# Patient Record
Sex: FEMALE | Race: WHITE | Employment: OTHER | URBAN - METROPOLITAN AREA
[De-identification: names, ages, dates, MRNs, and addresses within clinical notes are randomized per-mention and may not be internally consistent; named-entity substitution may affect disease eponyms.]

---

## 2020-02-12 ENCOUNTER — ANESTHESIA (OUTPATIENT)
Dept: OPERATING ROOM | Age: 75
DRG: 853 | End: 2020-02-12
Payer: MEDICARE

## 2020-02-12 ENCOUNTER — APPOINTMENT (OUTPATIENT)
Dept: GENERAL RADIOLOGY | Age: 75
DRG: 853 | End: 2020-02-12
Payer: MEDICARE

## 2020-02-12 ENCOUNTER — APPOINTMENT (OUTPATIENT)
Dept: CT IMAGING | Age: 75
DRG: 853 | End: 2020-02-12
Payer: MEDICARE

## 2020-02-12 ENCOUNTER — ANESTHESIA EVENT (OUTPATIENT)
Dept: OPERATING ROOM | Age: 75
DRG: 853 | End: 2020-02-12
Payer: MEDICARE

## 2020-02-12 ENCOUNTER — HOSPITAL ENCOUNTER (INPATIENT)
Age: 75
LOS: 8 days | Discharge: HOME | DRG: 853 | End: 2020-02-20
Attending: EMERGENCY MEDICINE | Admitting: SURGERY
Payer: MEDICARE

## 2020-02-12 VITALS — OXYGEN SATURATION: 93 % | SYSTOLIC BLOOD PRESSURE: 113 MMHG | TEMPERATURE: 100 F | DIASTOLIC BLOOD PRESSURE: 64 MMHG

## 2020-02-12 PROBLEM — K35.891 ACUTE APPENDICITIS WITH GENERALIZED PERITONITIS AND GANGRENE: Status: ACTIVE | Noted: 2020-02-12

## 2020-02-12 PROBLEM — K35.209 ACUTE APPENDICITIS WITH GENERALIZED PERITONITIS AND GANGRENE: Status: ACTIVE | Noted: 2020-02-12

## 2020-02-12 PROBLEM — K35.20 ACUTE APPENDICITIS WITH GENERALIZED PERITONITIS AND GANGRENE: Status: ACTIVE | Noted: 2020-02-12

## 2020-02-12 LAB
ALBUMIN SERPL-MCNC: 3.8 G/DL (ref 3.5–5.2)
ALP BLD-CCNC: 86 U/L (ref 35–104)
ALT SERPL-CCNC: 58 U/L (ref 0–32)
ANION GAP SERPL CALCULATED.3IONS-SCNC: 14 MMOL/L (ref 7–16)
AST SERPL-CCNC: 86 U/L (ref 0–31)
BACTERIA: ABNORMAL /HPF
BASOPHILS ABSOLUTE: 0.01 E9/L (ref 0–0.2)
BASOPHILS RELATIVE PERCENT: 0.2 % (ref 0–2)
BILIRUB SERPL-MCNC: 1.5 MG/DL (ref 0–1.2)
BILIRUBIN URINE: ABNORMAL
BLOOD, URINE: NEGATIVE
BUN BLDV-MCNC: 20 MG/DL (ref 8–23)
CALCIUM SERPL-MCNC: 8.9 MG/DL (ref 8.6–10.2)
CASTS 2: ABNORMAL /LPF
CASTS UA: ABNORMAL /LPF
CASTS: ABNORMAL /LPF
CHLORIDE BLD-SCNC: 102 MMOL/L (ref 98–107)
CLARITY: CLEAR
CO2: 19 MMOL/L (ref 22–29)
COLOR: YELLOW
CREAT SERPL-MCNC: 1.7 MG/DL (ref 0.5–1)
EOSINOPHILS ABSOLUTE: 0.03 E9/L (ref 0.05–0.5)
EOSINOPHILS RELATIVE PERCENT: 0.6 % (ref 0–6)
EPITHELIAL CELLS, UA: ABNORMAL /HPF
GFR AFRICAN AMERICAN: 35
GFR NON-AFRICAN AMERICAN: 29 ML/MIN/1.73
GLUCOSE BLD-MCNC: 107 MG/DL (ref 74–99)
GLUCOSE URINE: NEGATIVE MG/DL
HCT VFR BLD CALC: 36.5 % (ref 34–48)
HEMOGLOBIN: 11.8 G/DL (ref 11.5–15.5)
IMMATURE GRANULOCYTES #: 0.01 E9/L
IMMATURE GRANULOCYTES %: 0.2 % (ref 0–5)
INFLUENZA A BY PCR: NOT DETECTED
INFLUENZA B BY PCR: NOT DETECTED
KETONES, URINE: ABNORMAL MG/DL
LACTIC ACID, SEPSIS: 1.6 MMOL/L (ref 0.5–1.9)
LEUKOCYTE ESTERASE, URINE: ABNORMAL
LIPASE: 19 U/L (ref 13–60)
LYMPHOCYTES ABSOLUTE: 0.64 E9/L (ref 1.5–4)
LYMPHOCYTES RELATIVE PERCENT: 12.8 % (ref 20–42)
MCH RBC QN AUTO: 29.6 PG (ref 26–35)
MCHC RBC AUTO-ENTMCNC: 32.3 % (ref 32–34.5)
MCV RBC AUTO: 91.7 FL (ref 80–99.9)
MONOCYTES ABSOLUTE: 0.36 E9/L (ref 0.1–0.95)
MONOCYTES RELATIVE PERCENT: 7.2 % (ref 2–12)
NEUTROPHILS ABSOLUTE: 3.95 E9/L (ref 1.8–7.3)
NEUTROPHILS RELATIVE PERCENT: 79 % (ref 43–80)
NITRITE, URINE: POSITIVE
PDW BLD-RTO: 14.4 FL (ref 11.5–15)
PH UA: 5.5 (ref 5–9)
PLATELET # BLD: 340 E9/L (ref 130–450)
PMV BLD AUTO: 9.2 FL (ref 7–12)
POTASSIUM SERPL-SCNC: 3.8 MMOL/L (ref 3.5–5)
PROTEIN UA: 100 MG/DL
RBC # BLD: 3.98 E12/L (ref 3.5–5.5)
RBC UA: ABNORMAL /HPF (ref 0–2)
SODIUM BLD-SCNC: 135 MMOL/L (ref 132–146)
SPECIFIC GRAVITY UA: 1.02 (ref 1–1.03)
TOTAL PROTEIN: 7.2 G/DL (ref 6.4–8.3)
TROPONIN: <0.01 NG/ML (ref 0–0.03)
UROBILINOGEN, URINE: 1 E.U./DL
WBC # BLD: 5 E9/L (ref 4.5–11.5)
WBC UA: ABNORMAL /HPF (ref 0–5)

## 2020-02-12 PROCEDURE — 87077 CULTURE AEROBIC IDENTIFY: CPT

## 2020-02-12 PROCEDURE — 2500000003 HC RX 250 WO HCPCS: Performed by: SURGERY

## 2020-02-12 PROCEDURE — 2709999900 HC NON-CHARGEABLE SUPPLY: Performed by: SURGERY

## 2020-02-12 PROCEDURE — 96376 TX/PRO/DX INJ SAME DRUG ADON: CPT

## 2020-02-12 PROCEDURE — 71045 X-RAY EXAM CHEST 1 VIEW: CPT

## 2020-02-12 PROCEDURE — 99285 EMERGENCY DEPT VISIT HI MDM: CPT

## 2020-02-12 PROCEDURE — 6360000002 HC RX W HCPCS: Performed by: STUDENT IN AN ORGANIZED HEALTH CARE EDUCATION/TRAINING PROGRAM

## 2020-02-12 PROCEDURE — 87040 BLOOD CULTURE FOR BACTERIA: CPT

## 2020-02-12 PROCEDURE — 96375 TX/PRO/DX INJ NEW DRUG ADDON: CPT

## 2020-02-12 PROCEDURE — 3700000001 HC ADD 15 MINUTES (ANESTHESIA): Performed by: SURGERY

## 2020-02-12 PROCEDURE — 6360000002 HC RX W HCPCS: Performed by: NURSE ANESTHETIST, CERTIFIED REGISTERED

## 2020-02-12 PROCEDURE — 87205 SMEAR GRAM STAIN: CPT

## 2020-02-12 PROCEDURE — 87186 SC STD MICRODIL/AGAR DIL: CPT

## 2020-02-12 PROCEDURE — 83605 ASSAY OF LACTIC ACID: CPT

## 2020-02-12 PROCEDURE — 2720000010 HC SURG SUPPLY STERILE: Performed by: SURGERY

## 2020-02-12 PROCEDURE — 0DTJ4ZZ RESECTION OF APPENDIX, PERCUTANEOUS ENDOSCOPIC APPROACH: ICD-10-PCS | Performed by: SURGERY

## 2020-02-12 PROCEDURE — 87206 SMEAR FLUORESCENT/ACID STAI: CPT

## 2020-02-12 PROCEDURE — 87015 SPECIMEN INFECT AGNT CONCNTJ: CPT

## 2020-02-12 PROCEDURE — 87102 FUNGUS ISOLATION CULTURE: CPT

## 2020-02-12 PROCEDURE — 2580000003 HC RX 258: Performed by: STUDENT IN AN ORGANIZED HEALTH CARE EDUCATION/TRAINING PROGRAM

## 2020-02-12 PROCEDURE — 85025 COMPLETE CBC W/AUTO DIFF WBC: CPT

## 2020-02-12 PROCEDURE — 6360000002 HC RX W HCPCS: Performed by: ANESTHESIOLOGY

## 2020-02-12 PROCEDURE — 74176 CT ABD & PELVIS W/O CONTRAST: CPT

## 2020-02-12 PROCEDURE — 80053 COMPREHEN METABOLIC PANEL: CPT

## 2020-02-12 PROCEDURE — 3600000014 HC SURGERY LEVEL 4 ADDTL 15MIN: Performed by: SURGERY

## 2020-02-12 PROCEDURE — 87502 INFLUENZA DNA AMP PROBE: CPT

## 2020-02-12 PROCEDURE — 36415 COLL VENOUS BLD VENIPUNCTURE: CPT

## 2020-02-12 PROCEDURE — 93005 ELECTROCARDIOGRAM TRACING: CPT | Performed by: STUDENT IN AN ORGANIZED HEALTH CARE EDUCATION/TRAINING PROGRAM

## 2020-02-12 PROCEDURE — 7100000000 HC PACU RECOVERY - FIRST 15 MIN: Performed by: SURGERY

## 2020-02-12 PROCEDURE — 05HM33Z INSERTION OF INFUSION DEVICE INTO RIGHT INTERNAL JUGULAR VEIN, PERCUTANEOUS APPROACH: ICD-10-PCS | Performed by: SURGERY

## 2020-02-12 PROCEDURE — 7100000001 HC PACU RECOVERY - ADDTL 15 MIN: Performed by: SURGERY

## 2020-02-12 PROCEDURE — 36556 INSERT NON-TUNNEL CV CATH: CPT

## 2020-02-12 PROCEDURE — 2500000003 HC RX 250 WO HCPCS: Performed by: STUDENT IN AN ORGANIZED HEALTH CARE EDUCATION/TRAINING PROGRAM

## 2020-02-12 PROCEDURE — 83690 ASSAY OF LIPASE: CPT

## 2020-02-12 PROCEDURE — 2500000003 HC RX 250 WO HCPCS: Performed by: NURSE ANESTHETIST, CERTIFIED REGISTERED

## 2020-02-12 PROCEDURE — 2580000003 HC RX 258: Performed by: NURSE ANESTHETIST, CERTIFIED REGISTERED

## 2020-02-12 PROCEDURE — 87070 CULTURE OTHR SPECIMN AEROBIC: CPT

## 2020-02-12 PROCEDURE — 2700000000 HC OXYGEN THERAPY PER DAY

## 2020-02-12 PROCEDURE — 96365 THER/PROPH/DIAG IV INF INIT: CPT

## 2020-02-12 PROCEDURE — 3600000004 HC SURGERY LEVEL 4 BASE: Performed by: SURGERY

## 2020-02-12 PROCEDURE — 81001 URINALYSIS AUTO W/SCOPE: CPT

## 2020-02-12 PROCEDURE — 96372 THER/PROPH/DIAG INJ SC/IM: CPT

## 2020-02-12 PROCEDURE — 87116 MYCOBACTERIA CULTURE: CPT

## 2020-02-12 PROCEDURE — 2060000000 HC ICU INTERMEDIATE R&B

## 2020-02-12 PROCEDURE — 87088 URINE BACTERIA CULTURE: CPT

## 2020-02-12 PROCEDURE — 84484 ASSAY OF TROPONIN QUANT: CPT

## 2020-02-12 PROCEDURE — 87075 CULTR BACTERIA EXCEPT BLOOD: CPT

## 2020-02-12 PROCEDURE — 88304 TISSUE EXAM BY PATHOLOGIST: CPT

## 2020-02-12 PROCEDURE — 3700000000 HC ANESTHESIA ATTENDED CARE: Performed by: SURGERY

## 2020-02-12 RX ORDER — 0.9 % SODIUM CHLORIDE 0.9 %
1000 INTRAVENOUS SOLUTION INTRAVENOUS ONCE
Status: COMPLETED | OUTPATIENT
Start: 2020-02-12 | End: 2020-02-12

## 2020-02-12 RX ORDER — DIPHENHYDRAMINE HYDROCHLORIDE 50 MG/ML
12.5 INJECTION INTRAMUSCULAR; INTRAVENOUS
Status: DISCONTINUED | OUTPATIENT
Start: 2020-02-12 | End: 2020-02-12 | Stop reason: HOSPADM

## 2020-02-12 RX ORDER — MORPHINE SULFATE 4 MG/ML
6 INJECTION, SOLUTION INTRAMUSCULAR; INTRAVENOUS ONCE
Status: DISCONTINUED | OUTPATIENT
Start: 2020-02-12 | End: 2020-02-12

## 2020-02-12 RX ORDER — PROMETHAZINE HYDROCHLORIDE 25 MG/ML
25 INJECTION, SOLUTION INTRAMUSCULAR; INTRAVENOUS ONCE
Status: COMPLETED | OUTPATIENT
Start: 2020-02-12 | End: 2020-02-12

## 2020-02-12 RX ORDER — SODIUM CHLORIDE, SODIUM LACTATE, POTASSIUM CHLORIDE, CALCIUM CHLORIDE 600; 310; 30; 20 MG/100ML; MG/100ML; MG/100ML; MG/100ML
INJECTION, SOLUTION INTRAVENOUS CONTINUOUS PRN
Status: DISCONTINUED | OUTPATIENT
Start: 2020-02-12 | End: 2020-02-12 | Stop reason: SDUPTHER

## 2020-02-12 RX ORDER — MEPERIDINE HYDROCHLORIDE 25 MG/ML
12.5 INJECTION INTRAMUSCULAR; INTRAVENOUS; SUBCUTANEOUS EVERY 5 MIN PRN
Status: DISCONTINUED | OUTPATIENT
Start: 2020-02-12 | End: 2020-02-12 | Stop reason: HOSPADM

## 2020-02-12 RX ORDER — ACETAMINOPHEN 325 MG/1
650 TABLET ORAL EVERY 4 HOURS PRN
Status: DISCONTINUED | OUTPATIENT
Start: 2020-02-12 | End: 2020-02-20 | Stop reason: HOSPADM

## 2020-02-12 RX ORDER — PANTOPRAZOLE SODIUM 40 MG/10ML
40 INJECTION, POWDER, LYOPHILIZED, FOR SOLUTION INTRAVENOUS DAILY
Status: DISCONTINUED | OUTPATIENT
Start: 2020-02-13 | End: 2020-02-20 | Stop reason: HOSPADM

## 2020-02-12 RX ORDER — PROPOFOL 10 MG/ML
INJECTION, EMULSION INTRAVENOUS PRN
Status: DISCONTINUED | OUTPATIENT
Start: 2020-02-12 | End: 2020-02-12 | Stop reason: SDUPTHER

## 2020-02-12 RX ORDER — FENTANYL CITRATE 50 UG/ML
50 INJECTION, SOLUTION INTRAMUSCULAR; INTRAVENOUS ONCE
Status: COMPLETED | OUTPATIENT
Start: 2020-02-12 | End: 2020-02-12

## 2020-02-12 RX ORDER — ONDANSETRON 2 MG/ML
4 INJECTION INTRAMUSCULAR; INTRAVENOUS ONCE
Status: COMPLETED | OUTPATIENT
Start: 2020-02-12 | End: 2020-02-12

## 2020-02-12 RX ORDER — SODIUM CHLORIDE, SODIUM LACTATE, POTASSIUM CHLORIDE, CALCIUM CHLORIDE 600; 310; 30; 20 MG/100ML; MG/100ML; MG/100ML; MG/100ML
INJECTION, SOLUTION INTRAVENOUS CONTINUOUS
Status: DISCONTINUED | OUTPATIENT
Start: 2020-02-12 | End: 2020-02-14

## 2020-02-12 RX ORDER — LIDOCAINE HYDROCHLORIDE 20 MG/ML
INJECTION, SOLUTION EPIDURAL; INFILTRATION; INTRACAUDAL; PERINEURAL PRN
Status: DISCONTINUED | OUTPATIENT
Start: 2020-02-12 | End: 2020-02-12 | Stop reason: SDUPTHER

## 2020-02-12 RX ORDER — SODIUM CHLORIDE 9 MG/ML
INJECTION, SOLUTION INTRAVENOUS EVERY 8 HOURS
Status: DISCONTINUED | OUTPATIENT
Start: 2020-02-13 | End: 2020-02-20 | Stop reason: HOSPADM

## 2020-02-12 RX ORDER — MORPHINE SULFATE 2 MG/ML
2 INJECTION, SOLUTION INTRAMUSCULAR; INTRAVENOUS
Status: DISCONTINUED | OUTPATIENT
Start: 2020-02-12 | End: 2020-02-20

## 2020-02-12 RX ORDER — MORPHINE SULFATE 4 MG/ML
4 INJECTION, SOLUTION INTRAMUSCULAR; INTRAVENOUS
Status: DISCONTINUED | OUTPATIENT
Start: 2020-02-12 | End: 2020-02-18

## 2020-02-12 RX ORDER — FENTANYL CITRATE 50 UG/ML
INJECTION, SOLUTION INTRAMUSCULAR; INTRAVENOUS PRN
Status: DISCONTINUED | OUTPATIENT
Start: 2020-02-12 | End: 2020-02-12 | Stop reason: SDUPTHER

## 2020-02-12 RX ORDER — LEVOTHYROXINE SODIUM 0.05 MG/1
50 TABLET ORAL DAILY
Status: DISCONTINUED | OUTPATIENT
Start: 2020-02-13 | End: 2020-02-20 | Stop reason: HOSPADM

## 2020-02-12 RX ORDER — SUCCINYLCHOLINE/SOD CL,ISO/PF 200MG/10ML
SYRINGE (ML) INTRAVENOUS PRN
Status: DISCONTINUED | OUTPATIENT
Start: 2020-02-12 | End: 2020-02-12 | Stop reason: SDUPTHER

## 2020-02-12 RX ORDER — ONDANSETRON 2 MG/ML
4 INJECTION INTRAMUSCULAR; INTRAVENOUS EVERY 6 HOURS PRN
Status: DISCONTINUED | OUTPATIENT
Start: 2020-02-12 | End: 2020-02-20 | Stop reason: HOSPADM

## 2020-02-12 RX ORDER — ONDANSETRON 2 MG/ML
INJECTION INTRAMUSCULAR; INTRAVENOUS PRN
Status: DISCONTINUED | OUTPATIENT
Start: 2020-02-12 | End: 2020-02-12 | Stop reason: SDUPTHER

## 2020-02-12 RX ORDER — ROCURONIUM BROMIDE 10 MG/ML
INJECTION, SOLUTION INTRAVENOUS PRN
Status: DISCONTINUED | OUTPATIENT
Start: 2020-02-12 | End: 2020-02-12 | Stop reason: SDUPTHER

## 2020-02-12 RX ADMIN — FENTANYL CITRATE 100 MCG: 50 INJECTION, SOLUTION INTRAMUSCULAR; INTRAVENOUS at 20:22

## 2020-02-12 RX ADMIN — ROCURONIUM BROMIDE 10 MG: 10 SOLUTION INTRAVENOUS at 20:47

## 2020-02-12 RX ADMIN — PHENYLEPHRINE HYDROCHLORIDE 100 MCG: 10 INJECTION INTRAVENOUS at 20:31

## 2020-02-12 RX ADMIN — LIDOCAINE HYDROCHLORIDE 100 MG: 20 INJECTION, SOLUTION EPIDURAL; INFILTRATION; INTRACAUDAL; PERINEURAL at 20:22

## 2020-02-12 RX ADMIN — HYDROMORPHONE HYDROCHLORIDE 0.5 MG: 1 INJECTION, SOLUTION INTRAMUSCULAR; INTRAVENOUS; SUBCUTANEOUS at 21:46

## 2020-02-12 RX ADMIN — ONDANSETRON HYDROCHLORIDE 4 MG: 2 INJECTION, SOLUTION INTRAMUSCULAR; INTRAVENOUS at 20:30

## 2020-02-12 RX ADMIN — Medication 100 MG: at 20:22

## 2020-02-12 RX ADMIN — SODIUM CHLORIDE 1000 ML: 9 INJECTION, SOLUTION INTRAVENOUS at 18:22

## 2020-02-12 RX ADMIN — FENTANYL CITRATE 50 MCG: 50 INJECTION, SOLUTION INTRAMUSCULAR; INTRAVENOUS at 18:23

## 2020-02-12 RX ADMIN — ROCURONIUM BROMIDE 20 MG: 10 SOLUTION INTRAVENOUS at 20:32

## 2020-02-12 RX ADMIN — PROMETHAZINE HYDROCHLORIDE 25 MG: 25 INJECTION INTRAMUSCULAR; INTRAVENOUS at 18:02

## 2020-02-12 RX ADMIN — METRONIDAZOLE 500 MG: 500 INJECTION, SOLUTION INTRAVENOUS at 18:58

## 2020-02-12 RX ADMIN — SODIUM CHLORIDE, POTASSIUM CHLORIDE, SODIUM LACTATE AND CALCIUM CHLORIDE: 600; 310; 30; 20 INJECTION, SOLUTION INTRAVENOUS at 20:16

## 2020-02-12 RX ADMIN — SODIUM CHLORIDE 1000 ML: 9 INJECTION, SOLUTION INTRAVENOUS at 16:55

## 2020-02-12 RX ADMIN — SUGAMMADEX 400 MG: 100 INJECTION, SOLUTION INTRAVENOUS at 21:15

## 2020-02-12 RX ADMIN — FENTANYL CITRATE 50 MCG: 50 INJECTION, SOLUTION INTRAMUSCULAR; INTRAVENOUS at 16:55

## 2020-02-12 RX ADMIN — HYDROMORPHONE HYDROCHLORIDE 0.5 MG: 1 INJECTION, SOLUTION INTRAMUSCULAR; INTRAVENOUS; SUBCUTANEOUS at 21:52

## 2020-02-12 RX ADMIN — PROPOFOL 110 MG: 10 INJECTION, EMULSION INTRAVENOUS at 20:22

## 2020-02-12 RX ADMIN — CEFTRIAXONE 1 G: 1 INJECTION, POWDER, FOR SOLUTION INTRAMUSCULAR; INTRAVENOUS at 18:22

## 2020-02-12 RX ADMIN — ONDANSETRON 4 MG: 2 INJECTION INTRAMUSCULAR; INTRAVENOUS at 16:55

## 2020-02-12 RX ADMIN — SODIUM CHLORIDE 1000 ML: 9 INJECTION, SOLUTION INTRAVENOUS at 19:25

## 2020-02-12 ASSESSMENT — ENCOUNTER SYMPTOMS
BLOOD IN STOOL: 0
ABDOMINAL DISTENTION: 1
RECTAL PAIN: 0
NAUSEA: 0
DIARRHEA: 0
BACK PAIN: 0
COLOR CHANGE: 0
COUGH: 0
ABDOMINAL PAIN: 1
SHORTNESS OF BREATH: 1
VOMITING: 0
VOICE CHANGE: 0
TROUBLE SWALLOWING: 0
CONSTIPATION: 0
SORE THROAT: 0

## 2020-02-12 ASSESSMENT — PAIN DESCRIPTION - PAIN TYPE
TYPE: SURGICAL PAIN
TYPE: ACUTE PAIN
TYPE: SURGICAL PAIN
TYPE: ACUTE PAIN
TYPE: SURGICAL PAIN
TYPE: SURGICAL PAIN

## 2020-02-12 ASSESSMENT — PAIN DESCRIPTION - FREQUENCY
FREQUENCY: CONTINUOUS

## 2020-02-12 ASSESSMENT — PAIN SCALES - GENERAL
PAINLEVEL_OUTOF10: 10
PAINLEVEL_OUTOF10: 0
PAINLEVEL_OUTOF10: 10
PAINLEVEL_OUTOF10: 6
PAINLEVEL_OUTOF10: 10
PAINLEVEL_OUTOF10: 9
PAINLEVEL_OUTOF10: 6
PAINLEVEL_OUTOF10: 10
PAINLEVEL_OUTOF10: 10
PAINLEVEL_OUTOF10: 6
PAINLEVEL_OUTOF10: 8

## 2020-02-12 ASSESSMENT — PAIN DESCRIPTION - ORIENTATION
ORIENTATION: ANTERIOR

## 2020-02-12 ASSESSMENT — PAIN DESCRIPTION - DESCRIPTORS
DESCRIPTORS: ACHING
DESCRIPTORS: SHARP;ACHING

## 2020-02-12 ASSESSMENT — PAIN DESCRIPTION - LOCATION
LOCATION: ABDOMEN
LOCATION: BACK
LOCATION: ABDOMEN
LOCATION: ABDOMEN

## 2020-02-12 ASSESSMENT — PAIN DESCRIPTION - ONSET: ONSET: AWAKENED FROM SLEEP

## 2020-02-12 ASSESSMENT — PAIN - FUNCTIONAL ASSESSMENT: PAIN_FUNCTIONAL_ASSESSMENT: PREVENTS OR INTERFERES SOME ACTIVE ACTIVITIES AND ADLS

## 2020-02-12 ASSESSMENT — PAIN DESCRIPTION - PROGRESSION: CLINICAL_PROGRESSION: NOT CHANGED

## 2020-02-12 NOTE — ED PROVIDER NOTES
The patient is a 49-year-old female who presents emergency department with complaint of lower abdominal pain that started suddenly yesterday and is described as sharp. The patient reports that she was lifting some heavy bags full of canned food few days ago and wonders if she may have strained something. She states that her abdomen also feels distended and she has mild nausea without vomiting. She states she has never had abdominal pain like this. Pain is rated 10/10, constant, with radiation to the groin, worse with palpation or movement, better with lying still. Her last bowel movement was yesterday. She has passed no gas since the pain started. She has had no vomiting. She denies any chest pain but reports that the pain in her abdomen makes it a little bit hard to take deep breaths. She denies any other known trauma to the area. She has no history of surgeries. She is alert and oriented x3. Vital signs stable. The history is provided by the patient. Review of Systems   Constitutional: Negative for activity change, appetite change, chills, diaphoresis, fatigue and fever. HENT: Negative for congestion, sore throat, trouble swallowing and voice change. Eyes: Negative for visual disturbance. Respiratory: Positive for shortness of breath (Secondary to abdominal pain). Negative for cough. Cardiovascular: Negative for chest pain, palpitations and leg swelling. Gastrointestinal: Positive for abdominal distention and abdominal pain. Negative for blood in stool, constipation, diarrhea, nausea, rectal pain and vomiting. Endocrine: Negative for polyuria. Genitourinary: Negative for decreased urine volume, difficulty urinating, dysuria, flank pain, hematuria, vaginal bleeding, vaginal discharge and vaginal pain. Musculoskeletal: Negative for arthralgias, back pain, gait problem, joint swelling, myalgias, neck pain and neck stiffness.    Skin: Negative for color change, pallor, rash and wound. Allergic/Immunologic: Negative for immunocompromised state. Neurological: Negative for dizziness, tremors, seizures, syncope, facial asymmetry, speech difficulty, weakness, light-headedness, numbness and headaches. Hematological: Negative for adenopathy. Does not bruise/bleed easily. Psychiatric/Behavioral: Negative. Physical Exam  Vitals signs and nursing note reviewed. Constitutional:       General: She is not in acute distress. Appearance: She is well-developed. She is not ill-appearing, toxic-appearing or diaphoretic. HENT:      Head: Normocephalic and atraumatic. Jaw: There is normal jaw occlusion. Right Ear: External ear normal.      Left Ear: External ear normal.      Nose: Nose normal. No congestion or rhinorrhea. Mouth/Throat:      Pharynx: No oropharyngeal exudate or posterior oropharyngeal erythema. Eyes:      General: No scleral icterus. Right eye: No discharge. Left eye: No discharge. Conjunctiva/sclera: Conjunctivae normal.      Pupils: Pupils are equal, round, and reactive to light. Neck:      Musculoskeletal: Normal range of motion and neck supple. No neck rigidity or muscular tenderness. Thyroid: No thyromegaly. Vascular: No JVD. Trachea: No tracheal deviation. Cardiovascular:      Rate and Rhythm: Normal rate. Rhythm irregular. Pulses:           Radial pulses are 2+ on the right side and 2+ on the left side. Dorsalis pedis pulses are 2+ on the right side and 2+ on the left side. Heart sounds: Normal heart sounds, S1 normal and S2 normal. Heart sounds not distant. No murmur. No friction rub. No gallop. No S3 or S4 sounds. Pulmonary:      Effort: Pulmonary effort is normal. No tachypnea, accessory muscle usage or respiratory distress. Breath sounds: Normal breath sounds. No stridor, decreased air movement or transmitted upper airway sounds.  No decreased breath sounds, wheezing, rhonchi or rales.   Chest:      Chest wall: No tenderness. Abdominal:      General: There is distension. Palpations: Abdomen is soft. There is no hepatomegaly, splenomegaly, mass or pulsatile mass. Tenderness: There is abdominal tenderness (Generalized, worse in bilateral lower quadrants). There is no right CVA tenderness, left CVA tenderness, guarding or rebound. Hernia: No hernia is present. Comments: Bowel sounds decreased   Musculoskeletal: Normal range of motion. General: No swelling, tenderness, deformity or signs of injury. Right lower leg: No edema. Left lower leg: No edema. Lymphadenopathy:      Cervical: No cervical adenopathy. Skin:     General: Skin is warm and dry. Capillary Refill: Capillary refill takes less than 2 seconds. Coloration: Skin is not jaundiced or pale. Findings: No bruising, erythema, lesion or rash. Neurological:      General: No focal deficit present. Mental Status: She is alert and oriented to person, place, and time. Psychiatric:         Mood and Affect: Mood normal.         Behavior: Behavior normal.         Thought Content: Thought content normal.         Judgment: Judgment normal.          Procedures   Central Line Placement Procedure Note    Indication: vascular access, hypovolemia and need for frequent blood draws    Consent: The patient was counseled regarding the procedure, its indications, risks, potential complications and alternatives, and any questions were answered. Consent was obtained to proceed. Procedure: The patient was positioned appropriately and the skin over the right internal jugular vein was prepped with Chloraprep and draped in a sterile fashion. Local anesthesia was obtained by infiltration using 1% Lidocaine without epinephrine. A large bore needle was used to identify the vein. A guide wire was then inserted into the vein through the needle.  A triple lumen catheter was then inserted into the vessel over the guide wire using the Seldinger technique. All ports showed good, free flowing blood return and were flushed with saline solution. The catheter was then securely fastened to the skin with suture at 17 cm. Two sutures were placed into the kit included tube clamp, proximal eyelets and a suture end from each of the securing sutures was extended around the catheter and tied to the proximal eyelets as an added measure to prevent dislodgement. An antibiotic disk was placed and the site was then covered with a sterile dressing. A post procedure X-ray was ordered and is still pending at this time. The patient tolerated the procedure well. Complications: None        MDM   Patient presents to the ED for generalized abdominal pain worse in the lower quadrants. Differential diagnoses included but not limited to bowel obstruction, gastritis, peptic ulcer disease, pancreatitis, ileus, appendicitis, ischemic bowel, ovarian pathology, constipation, perforated viscus gallbladder disease, other process. Workup in the ED revealed acute appendicitis with perforation, urinary tract infection EL. Patient was given IV fluids, IV antibiotics IV analgesics, IV antiemetics for their symptoms essentially no change in symptoms. General surgery was consulted. Patient will require surgery evaluation and likely need to go to the OR. ED Course as of Feb 12 2038   Wed Feb 12, 2020   1755 GFR is too low for contrast scan. We will perform CT abdomen pelvis without.    [LS]   1808 Patient having nausea and vomiting as well as pain. I will treat her with Phenergan and morphine. Nursing rechecking blood pressure.    [LS]   1808 Patient is now slightly tachycardic with a heart of 110. Blood pressure 90/53. O2 saturation 89%. Patient placed on 2 L of oxygen. She is going to CT at this time. More IV fluids have been ordered as well as IV antibiotics. Patient does have UTI. Creatinine 1.7.   No leukocytosis or lactic acidosis. [LS]   9606 Patient and family updated on available results of labs and diagnostic imaging. Patient still having significant pain and not much improvement after first dose of fentanyl. Because her blood pressures running low I will give her another dose of fentanyl.     [LS]   1900 Spoke with OR on behalf of Dr. Abhijit Duarte. Patient findings and condition discussed. Surgery will call back. [BM]   1919 Triple-lumen catheter is been placed in the right IJ. X-ray is pending.    [LS]   1930 Spoke with Dr. Abhijit Duarte directly regarding patient. States he will take over management. [BM]   1947 Central venous catheter was withdrawn 3 cm after reviewing postplacement x-ray. Dr. Abhijit Duarte is at the bedside currently discussing the case with the patient and family. Radiology is coming to take a repeat x-ray. Plan is for the patient to go to the OR.    [LS]   2014 Repeat chest x-ray showed good position of central venous catheter and no pneumothorax.    [LS]   2014 Patient is gone to the OR.    [LS]      ED Course User Index  [BM] Claudia Aggarwal MD  [LS] Julienne Palmer,       EKG: This EKG is signed and interpreted by me. Rate: 107  Rhythm: Sinus  Interpretation: Sinus tachycardia with premature supraventricular complexes and fusion complexes, possible left atrial enlargement, cannot rule out anterior infarct age undetermined  Comparison: no previous EKG    ED Course as of Feb 12 2038   Wed Feb 12, 2020   1755 GFR is too low for contrast scan. We will perform CT abdomen pelvis without.    [LS]   1808 Patient having nausea and vomiting as well as pain. I will treat her with Phenergan and morphine. Nursing rechecking blood pressure.    [LS]   1808 Patient is now slightly tachycardic with a heart of 110. Blood pressure 90/53. O2 saturation 89%. Patient placed on 2 L of oxygen. She is going to CT at this time. More IV fluids have been ordered as well as IV antibiotics. Patient does have UTI.   Creatinine Range    Influenza A by PCR Not Detected Not Detected    Influenza B by PCR Not Detected Not Detected   CBC Auto Differential   Result Value Ref Range    WBC 5.0 4.5 - 11.5 E9/L    RBC 3.98 3.50 - 5.50 E12/L    Hemoglobin 11.8 11.5 - 15.5 g/dL    Hematocrit 36.5 34.0 - 48.0 %    MCV 91.7 80.0 - 99.9 fL    MCH 29.6 26.0 - 35.0 pg    MCHC 32.3 32.0 - 34.5 %    RDW 14.4 11.5 - 15.0 fL    Platelets 916 259 - 100 E9/L    MPV 9.2 7.0 - 12.0 fL    Neutrophils % 79.0 43.0 - 80.0 %    Immature Granulocytes % 0.2 0.0 - 5.0 %    Lymphocytes % 12.8 (L) 20.0 - 42.0 %    Monocytes % 7.2 2.0 - 12.0 %    Eosinophils % 0.6 0.0 - 6.0 %    Basophils % 0.2 0.0 - 2.0 %    Neutrophils Absolute 3.95 1.80 - 7.30 E9/L    Immature Granulocytes # 0.01 E9/L    Lymphocytes Absolute 0.64 (L) 1.50 - 4.00 E9/L    Monocytes Absolute 0.36 0.10 - 0.95 E9/L    Eosinophils Absolute 0.03 (L) 0.05 - 0.50 E9/L    Basophils Absolute 0.01 0.00 - 0.20 E9/L   Comprehensive Metabolic Panel   Result Value Ref Range    Sodium 135 132 - 146 mmol/L    Potassium 3.8 3.5 - 5.0 mmol/L    Chloride 102 98 - 107 mmol/L    CO2 19 (L) 22 - 29 mmol/L    Anion Gap 14 7 - 16 mmol/L    Glucose 107 (H) 74 - 99 mg/dL    BUN 20 8 - 23 mg/dL    CREATININE 1.7 (H) 0.5 - 1.0 mg/dL    GFR Non-African American 29 >=60 mL/min/1.73    GFR African American 35     Calcium 8.9 8.6 - 10.2 mg/dL    Total Protein 7.2 6.4 - 8.3 g/dL    Alb 3.8 3.5 - 5.2 g/dL    Total Bilirubin 1.5 (H) 0.0 - 1.2 mg/dL    Alkaline Phosphatase 86 35 - 104 U/L    ALT 58 (H) 0 - 32 U/L    AST 86 (H) 0 - 31 U/L   Lactate, Sepsis   Result Value Ref Range    Lactic Acid, Sepsis 1.6 0.5 - 1.9 mmol/L   Lipase   Result Value Ref Range    Lipase 19 13 - 60 U/L   Urinalysis with Microscopic   Result Value Ref Range    Color, UA Yellow Straw/Yellow    Clarity, UA Clear Clear    Glucose, Ur Negative Negative mg/dL    Bilirubin Urine MODERATE (A) Negative    Ketones, Urine TRACE (A) Negative mg/dL    Specific Gravity, UA 1.025 1.005 - 1.030    Blood, Urine Negative Negative    pH, UA 5.5 5.0 - 9.0    Protein,  (A) Negative mg/dL    Urobilinogen, Urine 1.0 <2.0 E.U./dL    Nitrite, Urine POSITIVE (A) Negative    Leukocyte Esterase, Urine SMALL (A) Negative    Casts FEW /LPF    CASTS 2 RARE /LPF    Casts UA RARE /LPF    WBC, UA 5-10 0 - 5 /HPF    RBC, UA NONE 0 - 2 /HPF    Epi Cells MODERATE /HPF    Bacteria, UA MODERATE (A) None Seen /HPF   Troponin   Result Value Ref Range    Troponin <0.01 0.00 - 0.03 ng/mL   EKG 12 Lead   Result Value Ref Range    Ventricular Rate 107 BPM    Atrial Rate 107 BPM    P-R Interval 180 ms    QRS Duration 84 ms    Q-T Interval 300 ms    QTc Calculation (Bazett) 400 ms    P Axis 44 degrees    R Axis -3 degrees    T Axis -91 degrees       RADIOLOGY:  XR CHEST PORTABLE   Final Result      1. Interval retraction of right IJ central venous catheter with distal   tip now overlying the expected location of superior vena cava and in   satisfactory position. 2. No pneumothorax. XR CHEST PORTABLE   Final Result      No airspace opacities or pleural effusion. CT ABDOMEN PELVIS WO CONTRAST Additional Contrast? None   Final Result      Acute appendicitis with reactive inflammation of the cecum and   terminal ileum and findings suspicious for focal perforation. No   loculated drainable fluid collection. XR CHEST PORTABLE   Final Result      No airspace opacities or pleural effusion.                ------------------------- NURSING NOTES AND VITALS REVIEWED ---------------------------  Date / Time Roomed:  2/12/2020  3:44 PM  ED Bed Assignment:  OR POOL/NONE    The nursing notes within the ED encounter and vital signs as below have been reviewed.      Patient Vitals for the past 24 hrs:   BP Temp Temp src Pulse Resp SpO2 Weight   02/12/20 1946 102/60 -- -- 110 18 95 % --   02/12/20 1915 105/71 -- -- 99 18 95 % --   02/12/20 1851 (!) 86/59 -- -- 102 20 96 % --   02/12/20

## 2020-02-12 NOTE — ED NOTES
Bed: 08  Expected date:   Expected time:   Means of arrival:   Comments:  [address] [facility]  02/12/20 4524

## 2020-02-13 LAB
ALBUMIN SERPL-MCNC: 3 G/DL (ref 3.5–5.2)
ALP BLD-CCNC: 62 U/L (ref 35–104)
ALT SERPL-CCNC: 48 U/L (ref 0–32)
ANION GAP SERPL CALCULATED.3IONS-SCNC: 11 MMOL/L (ref 7–16)
AST SERPL-CCNC: 55 U/L (ref 0–31)
BILIRUB SERPL-MCNC: 0.8 MG/DL (ref 0–1.2)
BUN BLDV-MCNC: 18 MG/DL (ref 8–23)
CALCIUM SERPL-MCNC: 7.6 MG/DL (ref 8.6–10.2)
CHLORIDE BLD-SCNC: 102 MMOL/L (ref 98–107)
CO2: 22 MMOL/L (ref 22–29)
CREAT SERPL-MCNC: 1.4 MG/DL (ref 0.5–1)
EKG ATRIAL RATE: 107 BPM
EKG P AXIS: 44 DEGREES
EKG P-R INTERVAL: 180 MS
EKG Q-T INTERVAL: 300 MS
EKG QRS DURATION: 84 MS
EKG QTC CALCULATION (BAZETT): 400 MS
EKG R AXIS: -3 DEGREES
EKG T AXIS: -91 DEGREES
EKG VENTRICULAR RATE: 107 BPM
GFR AFRICAN AMERICAN: 44
GFR NON-AFRICAN AMERICAN: 36 ML/MIN/1.73
GLUCOSE BLD-MCNC: 104 MG/DL (ref 74–99)
HCT VFR BLD CALC: 30.1 % (ref 34–48)
HEMOGLOBIN: 9.5 G/DL (ref 11.5–15.5)
MCH RBC QN AUTO: 29.9 PG (ref 26–35)
MCHC RBC AUTO-ENTMCNC: 31.6 % (ref 32–34.5)
MCV RBC AUTO: 94.7 FL (ref 80–99.9)
PDW BLD-RTO: 14.6 FL (ref 11.5–15)
PLATELET # BLD: 261 E9/L (ref 130–450)
PMV BLD AUTO: 9.3 FL (ref 7–12)
POTASSIUM SERPL-SCNC: 3.7 MMOL/L (ref 3.5–5)
RBC # BLD: 3.18 E12/L (ref 3.5–5.5)
SODIUM BLD-SCNC: 135 MMOL/L (ref 132–146)
TOTAL PROTEIN: 5.5 G/DL (ref 6.4–8.3)
WBC # BLD: 6.2 E9/L (ref 4.5–11.5)

## 2020-02-13 PROCEDURE — 6360000002 HC RX W HCPCS: Performed by: SURGERY

## 2020-02-13 PROCEDURE — 2060000000 HC ICU INTERMEDIATE R&B

## 2020-02-13 PROCEDURE — 36415 COLL VENOUS BLD VENIPUNCTURE: CPT

## 2020-02-13 PROCEDURE — 85027 COMPLETE CBC AUTOMATED: CPT

## 2020-02-13 PROCEDURE — 97165 OT EVAL LOW COMPLEX 30 MIN: CPT

## 2020-02-13 PROCEDURE — 93010 ELECTROCARDIOGRAM REPORT: CPT | Performed by: INTERNAL MEDICINE

## 2020-02-13 PROCEDURE — 2580000003 HC RX 258: Performed by: SURGERY

## 2020-02-13 PROCEDURE — 2700000000 HC OXYGEN THERAPY PER DAY

## 2020-02-13 PROCEDURE — 97530 THERAPEUTIC ACTIVITIES: CPT

## 2020-02-13 PROCEDURE — 80053 COMPREHEN METABOLIC PANEL: CPT

## 2020-02-13 PROCEDURE — 36592 COLLECT BLOOD FROM PICC: CPT

## 2020-02-13 PROCEDURE — C9113 INJ PANTOPRAZOLE SODIUM, VIA: HCPCS | Performed by: SURGERY

## 2020-02-13 PROCEDURE — 2580000003 HC RX 258: Performed by: STUDENT IN AN ORGANIZED HEALTH CARE EDUCATION/TRAINING PROGRAM

## 2020-02-13 RX ORDER — LISINOPRIL AND HYDROCHLOROTHIAZIDE 20; 12.5 MG/1; MG/1
2 TABLET ORAL DAILY
Status: ON HOLD | COMMUNITY
End: 2022-03-16 | Stop reason: HOSPADM

## 2020-02-13 RX ORDER — SODIUM CHLORIDE, SODIUM LACTATE, POTASSIUM CHLORIDE, AND CALCIUM CHLORIDE .6; .31; .03; .02 G/100ML; G/100ML; G/100ML; G/100ML
500 INJECTION, SOLUTION INTRAVENOUS ONCE
Status: DISCONTINUED | OUTPATIENT
Start: 2020-02-13 | End: 2020-02-20 | Stop reason: HOSPADM

## 2020-02-13 RX ORDER — OMEPRAZOLE 40 MG/1
40 CAPSULE, DELAYED RELEASE ORAL DAILY
COMMUNITY

## 2020-02-13 RX ADMIN — SODIUM CHLORIDE, POTASSIUM CHLORIDE, SODIUM LACTATE AND CALCIUM CHLORIDE: 600; 310; 30; 20 INJECTION, SOLUTION INTRAVENOUS at 07:05

## 2020-02-13 RX ADMIN — SODIUM CHLORIDE: 9 INJECTION, SOLUTION INTRAVENOUS at 20:34

## 2020-02-13 RX ADMIN — SODIUM CHLORIDE, POTASSIUM CHLORIDE, SODIUM LACTATE AND CALCIUM CHLORIDE: 600; 310; 30; 20 INJECTION, SOLUTION INTRAVENOUS at 11:26

## 2020-02-13 RX ADMIN — MORPHINE SULFATE 2 MG: 2 INJECTION, SOLUTION INTRAMUSCULAR; INTRAVENOUS at 19:54

## 2020-02-13 RX ADMIN — SODIUM CHLORIDE: 9 INJECTION, SOLUTION INTRAVENOUS at 03:00

## 2020-02-13 RX ADMIN — SODIUM CHLORIDE: 9 INJECTION, SOLUTION INTRAVENOUS at 11:26

## 2020-02-13 RX ADMIN — MORPHINE SULFATE 4 MG: 4 INJECTION, SOLUTION INTRAMUSCULAR; INTRAVENOUS at 09:13

## 2020-02-13 RX ADMIN — MORPHINE SULFATE 2 MG: 2 INJECTION, SOLUTION INTRAMUSCULAR; INTRAVENOUS at 12:31

## 2020-02-13 RX ADMIN — ENOXAPARIN SODIUM 40 MG: 40 INJECTION SUBCUTANEOUS at 09:13

## 2020-02-13 RX ADMIN — MORPHINE SULFATE 4 MG: 4 INJECTION, SOLUTION INTRAMUSCULAR; INTRAVENOUS at 16:04

## 2020-02-13 RX ADMIN — PANTOPRAZOLE SODIUM 40 MG: 40 INJECTION, POWDER, FOR SOLUTION INTRAVENOUS at 09:12

## 2020-02-13 RX ADMIN — PIPERACILLIN AND TAZOBACTAM 3.38 G: 3; .375 INJECTION, POWDER, FOR SOLUTION INTRAVENOUS at 00:04

## 2020-02-13 RX ADMIN — SODIUM CHLORIDE, POTASSIUM CHLORIDE, SODIUM LACTATE AND CALCIUM CHLORIDE: 600; 310; 30; 20 INJECTION, SOLUTION INTRAVENOUS at 19:28

## 2020-02-13 RX ADMIN — MORPHINE SULFATE 4 MG: 4 INJECTION, SOLUTION INTRAMUSCULAR; INTRAVENOUS at 03:06

## 2020-02-13 RX ADMIN — PIPERACILLIN AND TAZOBACTAM 3.38 G: 3; .375 INJECTION, POWDER, FOR SOLUTION INTRAVENOUS at 16:33

## 2020-02-13 RX ADMIN — PIPERACILLIN AND TAZOBACTAM 3.38 G: 3; .375 INJECTION, POWDER, FOR SOLUTION INTRAVENOUS at 07:05

## 2020-02-13 RX ADMIN — MORPHINE SULFATE 2 MG: 2 INJECTION, SOLUTION INTRAMUSCULAR; INTRAVENOUS at 00:04

## 2020-02-13 RX ADMIN — SODIUM CHLORIDE, POTASSIUM CHLORIDE, SODIUM LACTATE AND CALCIUM CHLORIDE: 600; 310; 30; 20 INJECTION, SOLUTION INTRAVENOUS at 00:13

## 2020-02-13 ASSESSMENT — PAIN SCALES - GENERAL
PAINLEVEL_OUTOF10: 10
PAINLEVEL_OUTOF10: 8
PAINLEVEL_OUTOF10: 8
PAINLEVEL_OUTOF10: 10
PAINLEVEL_OUTOF10: 10
PAINLEVEL_OUTOF10: 7
PAINLEVEL_OUTOF10: 6
PAINLEVEL_OUTOF10: 6
PAINLEVEL_OUTOF10: 8

## 2020-02-13 ASSESSMENT — PAIN - FUNCTIONAL ASSESSMENT
PAIN_FUNCTIONAL_ASSESSMENT: PREVENTS OR INTERFERES SOME ACTIVE ACTIVITIES AND ADLS

## 2020-02-13 ASSESSMENT — PAIN DESCRIPTION - PAIN TYPE
TYPE: SURGICAL PAIN

## 2020-02-13 ASSESSMENT — PAIN DESCRIPTION - ONSET: ONSET: AWAKENED FROM SLEEP

## 2020-02-13 ASSESSMENT — PAIN DESCRIPTION - LOCATION
LOCATION: BACK;ABDOMEN
LOCATION: ABDOMEN

## 2020-02-13 ASSESSMENT — PAIN DESCRIPTION - PROGRESSION: CLINICAL_PROGRESSION: GRADUALLY WORSENING

## 2020-02-13 ASSESSMENT — PAIN DESCRIPTION - ORIENTATION: ORIENTATION: MID

## 2020-02-13 ASSESSMENT — PAIN DESCRIPTION - FREQUENCY
FREQUENCY: INTERMITTENT

## 2020-02-13 ASSESSMENT — PAIN DESCRIPTION - DESCRIPTORS
DESCRIPTORS: DISCOMFORT;PRESSURE
DESCRIPTORS: ACHING;SHARP
DESCRIPTORS: ACHING;SHARP
DESCRIPTORS: DISCOMFORT

## 2020-02-13 NOTE — PROGRESS NOTES
Occupational Therapy  OCCUPATIONAL THERAPY INITIAL EVALUATION      Date:2020  Patient Name: Vladimir Justice  MRN: [id number]  : 1945  Room: [address] [place] [address]    Referring Provider: Kervin Keating MD  Evaluating OT: Valentino  Vannessa Ching - [id number]    AM-PAC Daily Activity Raw Score: 15/24      Recommended Adaptive Equipment: Shower Chair    Diagnosis: Acute appendicitis with generalized peritonitis and gangrene, unspecified whether abscess present, unspecified whether perforation present [K35.20, K35.891]   Surgery: Patient underwent laparoscopic appendectomy on 2020. Pertinent Medical History: HTN     Precautions: falls, abdominal splinting, JONI drain, 4L of O2 via nasal cannula, skin integrity, chair alarm    Home Living: Patient lives in a one-floor home; patient's bedroom and bathroom are on the main living level. Bathroom Setup: walk-in shower (with seat available)    Prior Level of Function (PLOF): Per patient, she was independent with ADLs, independent with IADLs (primarily responsible), and independent with functional mobility (without device) prior to this hospitalization. Driving: Yes    Pain Level: Patient reported experiencing abdominal pain, which she rated 9 out of 10; patient's nurse reported that patient had been given pain medication shortly prior to start of this session. Cognition: Patient alert and oriented x3. WFL command follow demonstrated. Patient pleasant, cooperative, and motivated to return to [facility] and home environment.    Memory: WFL   Sequencing: WFL   Problem Solving: WFL grossly   Judgement/Safety: WFL grossly    Functional Assessment:   Initial Eval Status  Date: 2020 Treatment Status  Date:  Short Term Goals  Treatment Frequency/Duration: 2-5x/week for 3-7 days PRN   Feeding Setup  Independent   Grooming Min A  Mod I / Independent   (standing at sink)   UB Dressing Min A  Mod I / Independent   LB Dressing Max A to adjust socks  Mod I / Independent - with use of AE, as needed/appropriate   Bathing Mod A  Mod I / Independent - with use of AE/DME, as needed/appropriate   Toileting Mod A  Patient with tyler cathter currently. Mod I / Independent   Bed Mobility  Supine-to-Sit: Mod A  Patient education provided regarding log rolling technique and abdominal splinting with bed mobility; patient verbalized understanding. Independent   Functional Transfers Sit-to-Stand: Min A   from EOB  Independent   Functional Mobility Min A   (without device) for few steps from EOB to bedside chair. Mod I / Independent - with use of device, as needed/appropriate   Balance Sitting: Good-  (at EOB)  Standing: Fair-  (without device)  Good dynamic standing balance during completion of ADLs and other functional tasks. Activity Tolerance Fair-  Patient will demonstrate Good understanding and consistent implementation of energy conservation techniques and work simplification techniques into ADL/IADL routines. Visual/  Perceptual WFL     N/A     Long-Term Goal: Patient will increase functional independence to PLOF in order to allow patient to live in least restrictive environment. Strength: ROM: Additional Information:    R UE  4/5  WFL    L UE 4/5  WFL      Hearing: WFL  Sensation: Patient denied experiencing numbness/tingling in B UEs. Tone: WFL  Edema: Yes - mild in B LEs    Comments: RN approved patient's participation in [address] [place] [address] activities. Upon arrival, patient supine in bed. At end of session, patient seated in bedside chair with call light and phone within reach, chair alarm activated, and all lines and tubes intact. Patient would benefit from continued skilled OT to increase safety and independence with completion of ADL/IADL tasks for functional independence and quality of life.      Treatment: Patient education provided regardin) safe transfer techniques, 2) abdominal splinting techniques, 3) log rolling technique with bed mobility, 4) importance of having assistance with ADLs

## 2020-02-13 NOTE — H&P
the abdomen reveal: Contrast: IV contrast: None. Oral contrast: None RESULT: Lower thorax: Linear atelectasis/scarring bilateral lower lobes, right middle lobe and lingula. 5 mm nodule in the right middle lobe is unchanged since 2010 suggesting benign etiology. No consolidation or pleural effusion. Mild aortic valve calcifications. Liver: Normal unenhanced appearance. Biliary:  Cholelithiasis. No gallbladder wall dilation or pericholecystic fluid. Spleen:  No splenomegaly. Pancreas: Normal. Adrenals: No mass. Kidneys: No nephroureterolithiasis or hydronephrosis. 5.4 cm cyst in the right mid to lower pole. GI tract: Appendix is fluid-filled and dilated to 18 mm with several appendicoliths (series 2 image 71, coronal images 41-46). There is surrounding periappendiceal stranding and ill-defined fluid in the right lower quadrant as well as inflammatory changes adjacent to the cecum and terminal ileum. Focus of gas in the right lower quadrant (series 2 image 68, coronal image 39) does not conform to a bowel loop and is concerning for perforation. No loculated drainable fluid collection. Scattered colonic diverticulosis without diverticulitis. Lymph nodes: No abdominal or pelvic lymphadenopathy. Mesentery/Peritoneum: Mesenteric stranding and ill-defined fluid within the right lower quadrant. There is mesenteric stranding in the left abdomen and trace ill-defined fluid in the left paracolic gutter (series 2 image 34-35). No loculated drainable fluid collection. Vasculature:  Mild vascular atherosclerotic calcifications. No abdominal or iliac artery aneurysm. Pelvis: No loculated drainable fluid collection. Ill-defined mesenteric stranding in the lower abdomen and pelvis. Bones/Soft Tissues: Severe multilevel degenerative changes of the thoracolumbar spine. Degenerative changes of the pubic symphysis and bilateral sacroiliac joints.      Acute appendicitis with reactive inflammation of the cecum and terminal ileum and findings suspicious for focal perforation. No loculated drainable fluid collection. Xr Chest Portable    Result Date: 2020  Patient MRN:  [id number] : 1945 Age: 66 years Gender: Female Order Date:  2020 7:15 PM EXAM: XR CHEST PORTABLE COMPARISON: 2020 INDICATION:  line placement line placement FINDINGS: The heart is normal in size. Right IJ central venous catheter is present with distal tip at level of right atrial/caval junction. No focal airspace opacity. There is no pleural effusion. There is no pneumothorax. No free air beneath the diaphragms. No airspace opacities or pleural effusion. Xr Chest Portable    Result Date: 2020  Patient MRN:  [id number] : 1945 Age: 66 years Gender: Female Order Date:  2020 4:30 PM EXAM: XR CHEST PORTABLE COMPARISON: 2015 INDICATION:  Abdominal pain, nausea Abdominal pain, nausea FINDINGS: The heart is normal in size. No focal airspace opacity. There is no pleural effusion. There is no pneumothorax. No free air beneath the diaphragms. No airspace opacities or pleural effusion.            Assessment:  Perforated appendicitis with peritonitis and sepsis      Plan:  IV abx  IV fluid resuscitation  Laparoscopy with washout and drainage; appendectomy         Carina Dutton MD 2020 at 7:53 PM

## 2020-02-13 NOTE — ANESTHESIA POSTPROCEDURE EVALUATION
Department of Anesthesiology  Postprocedure Note    Patient: Stuart Lester  MRN: [id number]  YOB: 1945  Date of evaluation: 2/13/2020  Time:  5:28 AM     Procedure Summary     Date:  02/12/20 Room / Location:  [facility] OR 07 / [facility]    Anesthesia Start:  2016 Anesthesia Stop:  2130    Procedures:       APPENDECTOMY LAPAROSCOPIC (N/A Abdomen)      lap appy Diagnosis:  (appendicitis)    Surgeon:  Daniel Benitez MD Responsible Provider:  Trixie Ballesteros MD    Anesthesia Type:  general ASA Status:  3 - Emergent          Anesthesia Type: general    Kash Phase I: Kash Score: 9    Kash Phase II:      Last vitals: Reviewed and per EMR flowsheets.        Anesthesia Post Evaluation    Patient location during evaluation: PACU  Patient participation: complete - patient participated  Level of consciousness: awake and alert  Airway patency: patent  Nausea & Vomiting: no nausea and no vomiting  Complications: no  Cardiovascular status: hemodynamically stable  Respiratory status: acceptable  Hydration status: euvolemic

## 2020-02-13 NOTE — PROGRESS NOTES
[address] Dr Zuleika Wren informed of low bp 86/51 pt update given to further orders at this time

## 2020-02-13 NOTE — OP NOTE
PREOPERATIVE DIAGNOSIS: Perforated appendicitis.     POSTOPERATIVE DIAGNOSIS: Perforated appendicitis with purulent peritonitis     OPERATION: Laparoscopic appendectomy      ANESTHESIA: General endotracheal anesthesia.     SURGEON: Daniel Benitez MD     Estimated Blood Loss: Minimal     Complications: none     Specimens: appendix, cultures     PROCEDURE: The patient was brought to the operating room and positioned supine on the OR table. Sequential compression devices were placed on the patient's lower extremities and functioning. Preoperative antibiotics were administered. General anesthesia was administered. A tyler catheter was placed. Immediately prior to the procedure a time-out was called and the surgical checklist was reviewed and agreed upon by all present. The patient was prepped and draped in the usual sterile fashion.     Using an 11 blade, a 5 mm supraumbilical incision was made. The abdominal wall was tented upward. A Veress needle was inserted through the incision into the peritoneal cavity and placement of the Veress needle was confirmed with a saline drop test.  The abdomen was insufflated to 15 mmHg. The needle was removed and a 5 mm trocar was inserted and connected to the insufflation line. A 5 mm 30 degree laparoscope was inserted. The abdomen was inspected. There was diffuse purulent peritonitis. The patient was placed in Trendelenburg position and rotated left side down. A 5mm port was placed under direct vision via stab incision in the suprapubic area, and a 12 mm port was placed under direct vision via stab incision in the left lower quadrant.     A suction tip was inserted and fluid was recovered via a Luken's trap and sent for culture. Using 2 atraumatic graspers, loculated fluid collections between small bowel loops were broken up. The suction  was inserted and the abdomen was copiously irrigated. Purulent fluid was suctioned out of the pelvis.   This area was copiously

## 2020-02-13 NOTE — ANESTHESIA PRE PROCEDURE
Department of Anesthesiology  Preprocedure Note       Name:  [place]   Age:  66 y.o.  :  1945                                          MRN:  [id number]         Date:  2020      Surgeon: Alvaro Morris):  Ridge Ontiveros MD    Procedure: APPENDECTOMY LAPAROSCOPIC (N/A Abdomen)    Medications prior to admission:   Prior to Admission medications    Medication Sig Start Date End Date Taking? Authorizing Provider   levothyroxine (SYNTHROID) 50 MCG tablet Take 50 mcg by mouth daily. Historical Provider, MD   lisinopril (PRINIVIL;ZESTRIL) 20 MG tablet Take 20 mg by mouth daily. Historical Provider, MD       Current medications:    Current Facility-Administered Medications   Medication Dose Route Frequency Provider Last Rate Last Dose    meperidine (DEMEROL) injection 12.5 mg  12.5 mg Intravenous Q5 Min PRN Denver Andreas, MD        diphenhydrAMINE (BENADRYL) injection 12.5 mg  12.5 mg Intravenous Once PRN Denver Andreas, MD        HYDROmorphone (DILAUDID) injection 0.5 mg  0.5 mg Intravenous Q5 Min PRN Denver Andreas, MD        HYDROmorphone (DILAUDID) injection 0.25 mg  0.25 mg Intravenous Q5 Min PRN Denver Andreas, MD        bupivacaine (MARCAINE) 10 mL, lidocaine 1 % 10 mL    PRN Ridge Ontiveros MD   20 mL at 20 2100     Current Outpatient Medications   Medication Sig Dispense Refill    levothyroxine (SYNTHROID) 50 MCG tablet Take 50 mcg by mouth daily.  lisinopril (PRINIVIL;ZESTRIL) 20 MG tablet Take 20 mg by mouth daily. Allergies:  No Known Allergies    Problem List:  There is no problem list on file for this patient. Past Medical History:        Diagnosis Date    Asthma     Hypertension        Past Surgical History:  No past surgical history on file.     Social History:    Social History     Tobacco Use    Smoking status: Former Smoker   Substance Use Topics    Alcohol use: No     Comment: former alcoholic                                Counseling given: clear to auscultation  (+) asthma:                           ROS comment: Former Smoker   Cardiovascular:    (+) hypertension:,       ECG reviewed  Rhythm: regular  Rate: normal           Beta Blocker:  Not on Beta Blocker         Neuro/Psych:   Negative Neuro/Psych ROS              GI/Hepatic/Renal:   (+) GERD:, renal disease (EL):,           Endo/Other:    (+) hypothyroidism::., .                 Abdominal:           Vascular: negative vascular ROS. Anesthesia Plan      general     ASA 3 - emergent     (Glidescope)  Induction: intravenous and rapid sequence. MIPS: Postoperative opioids intended and Prophylactic antiemetics administered. Anesthetic plan and risks discussed with patient. Plan discussed with CRNA.                 Sally Gonzalez MD   2/12/2020

## 2020-02-14 LAB
ALBUMIN SERPL-MCNC: 2.6 G/DL (ref 3.5–5.2)
ALP BLD-CCNC: 121 U/L (ref 35–104)
ALT SERPL-CCNC: 29 U/L (ref 0–32)
ANION GAP SERPL CALCULATED.3IONS-SCNC: 10 MMOL/L (ref 7–16)
AST SERPL-CCNC: 27 U/L (ref 0–31)
BILIRUB SERPL-MCNC: 0.4 MG/DL (ref 0–1.2)
BUN BLDV-MCNC: 21 MG/DL (ref 8–23)
CALCIUM SERPL-MCNC: 8 MG/DL (ref 8.6–10.2)
CHLORIDE BLD-SCNC: 102 MMOL/L (ref 98–107)
CO2: 25 MMOL/L (ref 22–29)
CREAT SERPL-MCNC: 1.4 MG/DL (ref 0.5–1)
GFR AFRICAN AMERICAN: 44
GFR NON-AFRICAN AMERICAN: 36 ML/MIN/1.73
GLUCOSE BLD-MCNC: 105 MG/DL (ref 74–99)
GRAM STAIN ORDERABLE: NORMAL
HCT VFR BLD CALC: 26.8 % (ref 34–48)
HEMOGLOBIN: 8.4 G/DL (ref 11.5–15.5)
MCH RBC QN AUTO: 29.8 PG (ref 26–35)
MCHC RBC AUTO-ENTMCNC: 31.3 % (ref 32–34.5)
MCV RBC AUTO: 95 FL (ref 80–99.9)
PDW BLD-RTO: 14.8 FL (ref 11.5–15)
PLATELET # BLD: 223 E9/L (ref 130–450)
PMV BLD AUTO: 9.4 FL (ref 7–12)
POTASSIUM SERPL-SCNC: 3.5 MMOL/L (ref 3.5–5)
RBC # BLD: 2.82 E12/L (ref 3.5–5.5)
SODIUM BLD-SCNC: 137 MMOL/L (ref 132–146)
TOTAL PROTEIN: 5.3 G/DL (ref 6.4–8.3)
URINE CULTURE, ROUTINE: NORMAL
WBC # BLD: 10.4 E9/L (ref 4.5–11.5)

## 2020-02-14 PROCEDURE — 97161 PT EVAL LOW COMPLEX 20 MIN: CPT

## 2020-02-14 PROCEDURE — 97535 SELF CARE MNGMENT TRAINING: CPT

## 2020-02-14 PROCEDURE — 80053 COMPREHEN METABOLIC PANEL: CPT

## 2020-02-14 PROCEDURE — 2700000000 HC OXYGEN THERAPY PER DAY

## 2020-02-14 PROCEDURE — 6360000002 HC RX W HCPCS: Performed by: SURGERY

## 2020-02-14 PROCEDURE — 2580000003 HC RX 258: Performed by: SURGERY

## 2020-02-14 PROCEDURE — 2500000003 HC RX 250 WO HCPCS: Performed by: STUDENT IN AN ORGANIZED HEALTH CARE EDUCATION/TRAINING PROGRAM

## 2020-02-14 PROCEDURE — 85027 COMPLETE CBC AUTOMATED: CPT

## 2020-02-14 PROCEDURE — 36415 COLL VENOUS BLD VENIPUNCTURE: CPT

## 2020-02-14 PROCEDURE — C9113 INJ PANTOPRAZOLE SODIUM, VIA: HCPCS | Performed by: SURGERY

## 2020-02-14 PROCEDURE — 2060000000 HC ICU INTERMEDIATE R&B

## 2020-02-14 RX ORDER — OXYCODONE HYDROCHLORIDE AND ACETAMINOPHEN 5; 325 MG/1; MG/1
1 TABLET ORAL EVERY 6 HOURS PRN
Qty: 10 TABLET | Refills: 0 | Status: SHIPPED | OUTPATIENT
Start: 2020-02-14 | End: 2020-02-21

## 2020-02-14 RX ORDER — DEXTROSE, SODIUM CHLORIDE, AND POTASSIUM CHLORIDE 5; .45; .15 G/100ML; G/100ML; G/100ML
INJECTION INTRAVENOUS CONTINUOUS
Status: DISCONTINUED | OUTPATIENT
Start: 2020-02-14 | End: 2020-02-18

## 2020-02-14 RX ADMIN — PIPERACILLIN AND TAZOBACTAM 3.38 G: 3; .375 INJECTION, POWDER, FOR SOLUTION INTRAVENOUS at 16:29

## 2020-02-14 RX ADMIN — PIPERACILLIN AND TAZOBACTAM 3.38 G: 3; .375 INJECTION, POWDER, FOR SOLUTION INTRAVENOUS at 00:17

## 2020-02-14 RX ADMIN — POTASSIUM CHLORIDE, DEXTROSE MONOHYDRATE AND SODIUM CHLORIDE: 150; 5; 450 INJECTION, SOLUTION INTRAVENOUS at 20:25

## 2020-02-14 RX ADMIN — MORPHINE SULFATE 2 MG: 2 INJECTION, SOLUTION INTRAMUSCULAR; INTRAVENOUS at 05:31

## 2020-02-14 RX ADMIN — PANTOPRAZOLE SODIUM 40 MG: 40 INJECTION, POWDER, FOR SOLUTION INTRAVENOUS at 08:53

## 2020-02-14 RX ADMIN — MORPHINE SULFATE 2 MG: 2 INJECTION, SOLUTION INTRAMUSCULAR; INTRAVENOUS at 21:28

## 2020-02-14 RX ADMIN — SODIUM CHLORIDE: 9 INJECTION, SOLUTION INTRAVENOUS at 20:24

## 2020-02-14 RX ADMIN — SODIUM CHLORIDE: 9 INJECTION, SOLUTION INTRAVENOUS at 04:18

## 2020-02-14 RX ADMIN — MORPHINE SULFATE 2 MG: 2 INJECTION, SOLUTION INTRAMUSCULAR; INTRAVENOUS at 08:53

## 2020-02-14 RX ADMIN — MORPHINE SULFATE 2 MG: 2 INJECTION, SOLUTION INTRAMUSCULAR; INTRAVENOUS at 00:01

## 2020-02-14 RX ADMIN — POTASSIUM CHLORIDE, DEXTROSE MONOHYDRATE AND SODIUM CHLORIDE: 150; 5; 450 INJECTION, SOLUTION INTRAVENOUS at 09:45

## 2020-02-14 RX ADMIN — ENOXAPARIN SODIUM 40 MG: 40 INJECTION SUBCUTANEOUS at 08:53

## 2020-02-14 RX ADMIN — PIPERACILLIN AND TAZOBACTAM 3.38 G: 3; .375 INJECTION, POWDER, FOR SOLUTION INTRAVENOUS at 08:52

## 2020-02-14 RX ADMIN — SODIUM CHLORIDE: 9 INJECTION, SOLUTION INTRAVENOUS at 13:12

## 2020-02-14 RX ADMIN — MORPHINE SULFATE 2 MG: 2 INJECTION, SOLUTION INTRAMUSCULAR; INTRAVENOUS at 14:10

## 2020-02-14 RX ADMIN — MORPHINE SULFATE 2 MG: 2 INJECTION, SOLUTION INTRAMUSCULAR; INTRAVENOUS at 18:26

## 2020-02-14 ASSESSMENT — PAIN DESCRIPTION - ORIENTATION
ORIENTATION: LOWER;RIGHT;LEFT
ORIENTATION: MID
ORIENTATION: MID

## 2020-02-14 ASSESSMENT — PAIN DESCRIPTION - PAIN TYPE
TYPE: SURGICAL PAIN
TYPE: SURGICAL PAIN
TYPE: ACUTE PAIN

## 2020-02-14 ASSESSMENT — PAIN - FUNCTIONAL ASSESSMENT
PAIN_FUNCTIONAL_ASSESSMENT: PREVENTS OR INTERFERES SOME ACTIVE ACTIVITIES AND ADLS
PAIN_FUNCTIONAL_ASSESSMENT: PREVENTS OR INTERFERES SOME ACTIVE ACTIVITIES AND ADLS
PAIN_FUNCTIONAL_ASSESSMENT: PREVENTS OR INTERFERES WITH MANY ACTIVE NOT PASSIVE ACTIVITIES

## 2020-02-14 ASSESSMENT — PAIN DESCRIPTION - LOCATION
LOCATION: ABDOMEN

## 2020-02-14 ASSESSMENT — PAIN SCALES - GENERAL
PAINLEVEL_OUTOF10: 6
PAINLEVEL_OUTOF10: 7
PAINLEVEL_OUTOF10: 6
PAINLEVEL_OUTOF10: 0
PAINLEVEL_OUTOF10: 7
PAINLEVEL_OUTOF10: 8
PAINLEVEL_OUTOF10: 8
PAINLEVEL_OUTOF10: 0
PAINLEVEL_OUTOF10: 8

## 2020-02-14 ASSESSMENT — PAIN DESCRIPTION - DESCRIPTORS
DESCRIPTORS: ACHING;DISCOMFORT
DESCRIPTORS: TENDER;PRESSURE;ACHING
DESCRIPTORS: ACHING;DISCOMFORT

## 2020-02-14 ASSESSMENT — PAIN SCALES - WONG BAKER: WONGBAKER_NUMERICALRESPONSE: 0

## 2020-02-14 ASSESSMENT — PAIN DESCRIPTION - PROGRESSION: CLINICAL_PROGRESSION: NOT CHANGED

## 2020-02-14 NOTE — CARE COORDINATION
CASE MANAGEMENT. ... Met with patient, son Renetta Berry and daughter in law to discuss hospital stay and discharge needs. Ms Uriel Madrid is POD # 2. Tolerating a clear liquid diet, continues to receive iv zosyn q8, iv protonix qd, and ivf + kcl at 100/hr. Still with no flatus or bm. Gonzales cath removed today. o2 on 3lnc is new-nursing to wean as tolerated. Confirmed with patient that she does not want [address] unless iv antibiotcs are needed at discharge. She was interested in home meal programs. List of services provided for her. Will cont to follow along and assist with needs accordingly.

## 2020-02-14 NOTE — PROGRESS NOTES
Occupational Therapy  OT BEDSIDE TREATMENT NOTE      Date:2020  Patient Name: Efrem Cook  MRN: [id number]  : 1945  Room: [address] [place] [address]     Referring Provider: Cat Liriano MD  Evaluating OT: Eun Pruitt, OTR/L - [id number]     AM-PAC Daily Activity Raw Score:       Recommended Adaptive Equipment: Shower Chair     Diagnosis: Acute appendicitis with generalized peritonitis and gangrene, unspecified whether abscess present, unspecified whether perforation present [K35.20, K35.891]   Surgery: Patient underwent laparoscopic appendectomy on 2020. Pertinent Medical History: HTN     Precautions: falls, abdominal splinting, JONI drain, 4L of O2 via nasal cannula, skin integrity, chair alarm     Home Living: Patient lives in a one-floor home; patient's bedroom and bathroom are on the main living level. Bathroom Setup: walk-in shower (with seat available)     Prior Level of Function (PLOF): Per patient, she was independent with ADLs, independent with IADLs (primarily responsible), and independent with functional mobility (without device) prior to this hospitalization. Driving: Yes     Pain Level: Abdomen- mild  Cognition: Patient alert and oriented 4/4 with fair ability to follow commands.   Safety awareness deficits noted.      Functional Assessment:    Initial Eval Status  Date: 2020 Treatment Status  Date: 20 Short Term Goals  Treatment Frequency/Duration: 2-5x/week for 3-7 days PRN   Feeding Setup   Independent   Grooming Min A SBA standing at sink  Mod I / Independent   (standing at sink)   UB Dressing Min A SBA to manage gown  Mod I / Independent   LB Dressing Max A to adjust socks SBA to don/ doff socks using cross over method  Mod I / Independent - with use of AE, as needed/appropriate   Bathing Mod A UE: SBA standing    LE: Min A  Mod I / Independent - with use of AE/DME, as needed/appropriate   Toileting Mod A  Patient with tyler cathter currently.   Mod I / Katty Fontenot

## 2020-02-14 NOTE — CARE COORDINATION
Social Work/Discharge Planning:  Met with patient in regards to [facility] indicating need for snf. Patient states plan is home and is agreeable to home health care ONLY if IV antibiotics are needed. Patient states she has a shower chair and denies any need for a walker. Will continue to follow.   Electronically signed by TRAN White on 2/14/2020 at 10:15 AM

## 2020-02-14 NOTE — PROGRESS NOTES
Physical Therapy    Facility/Department: [address] INTERMEDIATE 1  Initial Assessment    NAME: Isabel Bates  : 1945  MRN: [id number]    Date of Service: 2020      Patient Diagnosis(es): The primary encounter diagnosis was Appendicitis with perforation. Diagnoses of Urinary tract infection without hematuria, site unspecified, EL (acute kidney injury) ([facility] [address] and Septicemia ([facility] [address] were also pertinent to this visit. has a past medical history of Asthma and Hypertension. has a past surgical history that includes laparoscopic appendectomy (N/A, 2020). Physical Therapy Initial Assessment     Referring Provider:  Brooklyn Wells MD    Date of Service: 2020    Evaluating Therapist: Keshia Whittington PT      Equipment Recommendation wheeled walker  Room 427  Laparoscopic appendectomy    DIAGNOSIS: Acute appendicitis  PRECAUTIONS: falls, abdominal splinting, O2    Social:  Pt lives alone in a 1 floor plan 3 steps to enter. Prior to admission independent without device     Initial Evaluation  Date: 20 Treatment      Short Term/ Long Term   Goals   Was pt agreeable to Eval/treatment? yes     Does pt have pain? Abdominal pain     Bed Mobility  Rolling: mod assist  Supine to sit: mod assist  Sit to supine: NT  Scooting: mod assist  independent   Transfers Sit to stand: min assist  Stand to sit: min assist  Stand pivot: min assist  independent   Ambulation    25 feet with ww with min assist  150 feet with AAD independent   Stair Negotiation  Ascended and descended  NT   4 steps with 1 rail independent   LE strength     3+/5   4-/5   balance      Fair with ww     AM-PAC Raw score               15/24         Pt is alert and Oriented   LE ROM: WFL  Sensation: intact  Edema: none  Endurance: fair     ASSESSMENT  Pt displays functional ability as noted in the objective portion of this evaluation.       Patient education  Pt educated on abdominal splinting    Patient response to education:   Pt verbalized understanding Pt demonstrated skill Pt requires further education in this area   yes yes       ASSESSMENT:      Pt's/ family goals   1. To return home    Patient and or family understand(s) diagnosis, prognosis, and plan of care. yes    PLAN:    PT care will be provided in accordance with the objectives noted above. Exercises and functional mobility practice will be used as well as appropriate assistive devices or modalities to obtain goals. Patient and family education will also be administered as needed. Frequency of treatments: 2-5x/week x 5. Time in  0900  Time out  0915      Evaluation Time includes thorough review of current medical information, gathering information on past medical history/social history and prior level of function, completion of standardized testing/informal observation of tasks, assessment of data and education on plan of care and goals.     CPT codes:  [x] Low Complexity PT evaluation 28548  [] Moderate Complexity PT evaluation 12817  [] High Complexity PT evaluation 98154  [] PT Re-evaluation 44485  [] Gait training 09779 minutes  [] Manual therapy 48803 minutes  [] Therapeutic activities 34949 minutes  [] Therapeutic exercises 90576 minutes  [] Neuromuscular reeducation 06406 minutes     [facility] PT [id number]

## 2020-02-15 LAB
ALBUMIN SERPL-MCNC: 2.5 G/DL (ref 3.5–5.2)
ALP BLD-CCNC: 148 U/L (ref 35–104)
ALT SERPL-CCNC: 22 U/L (ref 0–32)
ANION GAP SERPL CALCULATED.3IONS-SCNC: 11 MMOL/L (ref 7–16)
AST SERPL-CCNC: 21 U/L (ref 0–31)
BILIRUB SERPL-MCNC: 0.3 MG/DL (ref 0–1.2)
BODY FLUID CULTURE, STERILE: ABNORMAL
BUN BLDV-MCNC: 12 MG/DL (ref 8–23)
CALCIUM SERPL-MCNC: 8.3 MG/DL (ref 8.6–10.2)
CHLORIDE BLD-SCNC: 103 MMOL/L (ref 98–107)
CO2: 25 MMOL/L (ref 22–29)
CREAT SERPL-MCNC: 1.1 MG/DL (ref 0.5–1)
GFR AFRICAN AMERICAN: 58
GFR NON-AFRICAN AMERICAN: 48 ML/MIN/1.73
GLUCOSE BLD-MCNC: 135 MG/DL (ref 74–99)
GRAM STAIN RESULT: ABNORMAL
HCT VFR BLD CALC: 26.3 % (ref 34–48)
HEMOGLOBIN: 8.1 G/DL (ref 11.5–15.5)
MCH RBC QN AUTO: 29.2 PG (ref 26–35)
MCHC RBC AUTO-ENTMCNC: 30.8 % (ref 32–34.5)
MCV RBC AUTO: 94.9 FL (ref 80–99.9)
ORGANISM: ABNORMAL
PDW BLD-RTO: 14.6 FL (ref 11.5–15)
PLATELET # BLD: 251 E9/L (ref 130–450)
PMV BLD AUTO: 9.2 FL (ref 7–12)
POTASSIUM SERPL-SCNC: 3.8 MMOL/L (ref 3.5–5)
RBC # BLD: 2.77 E12/L (ref 3.5–5.5)
SODIUM BLD-SCNC: 139 MMOL/L (ref 132–146)
TOTAL PROTEIN: 5.4 G/DL (ref 6.4–8.3)
WBC # BLD: 13.6 E9/L (ref 4.5–11.5)

## 2020-02-15 PROCEDURE — 6370000000 HC RX 637 (ALT 250 FOR IP): Performed by: SURGERY

## 2020-02-15 PROCEDURE — C9113 INJ PANTOPRAZOLE SODIUM, VIA: HCPCS | Performed by: SURGERY

## 2020-02-15 PROCEDURE — 2500000003 HC RX 250 WO HCPCS: Performed by: STUDENT IN AN ORGANIZED HEALTH CARE EDUCATION/TRAINING PROGRAM

## 2020-02-15 PROCEDURE — 2700000000 HC OXYGEN THERAPY PER DAY

## 2020-02-15 PROCEDURE — 36415 COLL VENOUS BLD VENIPUNCTURE: CPT

## 2020-02-15 PROCEDURE — 36592 COLLECT BLOOD FROM PICC: CPT

## 2020-02-15 PROCEDURE — 2580000003 HC RX 258

## 2020-02-15 PROCEDURE — 85027 COMPLETE CBC AUTOMATED: CPT

## 2020-02-15 PROCEDURE — 2060000000 HC ICU INTERMEDIATE R&B

## 2020-02-15 PROCEDURE — 2580000003 HC RX 258: Performed by: SURGERY

## 2020-02-15 PROCEDURE — 6360000002 HC RX W HCPCS: Performed by: SURGERY

## 2020-02-15 PROCEDURE — 80053 COMPREHEN METABOLIC PANEL: CPT

## 2020-02-15 RX ORDER — SODIUM CHLORIDE 0.9 % (FLUSH) 0.9 %
SYRINGE (ML) INJECTION
Status: COMPLETED
Start: 2020-02-15 | End: 2020-02-15

## 2020-02-15 RX ADMIN — SODIUM CHLORIDE, PRESERVATIVE FREE 10 ML: 5 INJECTION INTRAVENOUS at 08:12

## 2020-02-15 RX ADMIN — MORPHINE SULFATE 2 MG: 2 INJECTION, SOLUTION INTRAMUSCULAR; INTRAVENOUS at 16:08

## 2020-02-15 RX ADMIN — PIPERACILLIN AND TAZOBACTAM 3.38 G: 3; .375 INJECTION, POWDER, FOR SOLUTION INTRAVENOUS at 16:07

## 2020-02-15 RX ADMIN — SODIUM CHLORIDE: 9 INJECTION, SOLUTION INTRAVENOUS at 11:57

## 2020-02-15 RX ADMIN — PIPERACILLIN AND TAZOBACTAM 3.38 G: 3; .375 INJECTION, POWDER, FOR SOLUTION INTRAVENOUS at 00:09

## 2020-02-15 RX ADMIN — POTASSIUM CHLORIDE, DEXTROSE MONOHYDRATE AND SODIUM CHLORIDE: 150; 5; 450 INJECTION, SOLUTION INTRAVENOUS at 05:37

## 2020-02-15 RX ADMIN — PIPERACILLIN AND TAZOBACTAM 3.38 G: 3; .375 INJECTION, POWDER, FOR SOLUTION INTRAVENOUS at 23:55

## 2020-02-15 RX ADMIN — MORPHINE SULFATE 2 MG: 2 INJECTION, SOLUTION INTRAMUSCULAR; INTRAVENOUS at 05:37

## 2020-02-15 RX ADMIN — MORPHINE SULFATE 2 MG: 2 INJECTION, SOLUTION INTRAMUSCULAR; INTRAVENOUS at 12:01

## 2020-02-15 RX ADMIN — PIPERACILLIN AND TAZOBACTAM 3.38 G: 3; .375 INJECTION, POWDER, FOR SOLUTION INTRAVENOUS at 08:10

## 2020-02-15 RX ADMIN — Medication 10 ML: at 16:08

## 2020-02-15 RX ADMIN — MORPHINE SULFATE 2 MG: 2 INJECTION, SOLUTION INTRAMUSCULAR; INTRAVENOUS at 20:16

## 2020-02-15 RX ADMIN — LEVOTHYROXINE SODIUM 50 MCG: 50 TABLET ORAL at 05:40

## 2020-02-15 RX ADMIN — POTASSIUM CHLORIDE, DEXTROSE MONOHYDRATE AND SODIUM CHLORIDE: 150; 5; 450 INJECTION, SOLUTION INTRAVENOUS at 16:07

## 2020-02-15 RX ADMIN — MORPHINE SULFATE 2 MG: 2 INJECTION, SOLUTION INTRAMUSCULAR; INTRAVENOUS at 00:30

## 2020-02-15 RX ADMIN — MORPHINE SULFATE 2 MG: 2 INJECTION, SOLUTION INTRAMUSCULAR; INTRAVENOUS at 23:55

## 2020-02-15 RX ADMIN — SODIUM CHLORIDE: 9 INJECTION, SOLUTION INTRAVENOUS at 04:59

## 2020-02-15 RX ADMIN — MORPHINE SULFATE 2 MG: 2 INJECTION, SOLUTION INTRAMUSCULAR; INTRAVENOUS at 08:43

## 2020-02-15 RX ADMIN — ENOXAPARIN SODIUM 40 MG: 40 INJECTION SUBCUTANEOUS at 08:08

## 2020-02-15 RX ADMIN — PANTOPRAZOLE SODIUM 40 MG: 40 INJECTION, POWDER, FOR SOLUTION INTRAVENOUS at 08:12

## 2020-02-15 ASSESSMENT — PAIN DESCRIPTION - DESCRIPTORS
DESCRIPTORS: ACHING;DISCOMFORT;SORE
DESCRIPTORS: TENDER;PRESSURE;DISCOMFORT
DESCRIPTORS: DISCOMFORT;TENDER;PRESSURE
DESCRIPTORS: DISCOMFORT;SORE;TENDER
DESCRIPTORS: ACHING;SORE;DISCOMFORT
DESCRIPTORS: ACHING;DISCOMFORT;SORE

## 2020-02-15 ASSESSMENT — PAIN DESCRIPTION - LOCATION
LOCATION: ABDOMEN

## 2020-02-15 ASSESSMENT — PAIN DESCRIPTION - ORIENTATION
ORIENTATION: MID
ORIENTATION: UPPER
ORIENTATION: MID
ORIENTATION: LOWER;MID
ORIENTATION: UPPER
ORIENTATION: MID

## 2020-02-15 ASSESSMENT — PAIN DESCRIPTION - ONSET
ONSET: GRADUAL

## 2020-02-15 ASSESSMENT — PAIN SCALES - GENERAL
PAINLEVEL_OUTOF10: 6
PAINLEVEL_OUTOF10: 6
PAINLEVEL_OUTOF10: 8
PAINLEVEL_OUTOF10: 8
PAINLEVEL_OUTOF10: 6
PAINLEVEL_OUTOF10: 9
PAINLEVEL_OUTOF10: 9

## 2020-02-15 ASSESSMENT — PAIN DESCRIPTION - PAIN TYPE
TYPE: SURGICAL PAIN
TYPE: ACUTE PAIN
TYPE: SURGICAL PAIN
TYPE: ACUTE PAIN
TYPE: SURGICAL PAIN
TYPE: ACUTE PAIN

## 2020-02-15 ASSESSMENT — PAIN DESCRIPTION - FREQUENCY
FREQUENCY: CONTINUOUS

## 2020-02-15 ASSESSMENT — PAIN SCALES - WONG BAKER
WONGBAKER_NUMERICALRESPONSE: 0
WONGBAKER_NUMERICALRESPONSE: 0

## 2020-02-15 ASSESSMENT — PAIN - FUNCTIONAL ASSESSMENT
PAIN_FUNCTIONAL_ASSESSMENT: PREVENTS OR INTERFERES WITH MANY ACTIVE NOT PASSIVE ACTIVITIES
PAIN_FUNCTIONAL_ASSESSMENT: PREVENTS OR INTERFERES WITH MANY ACTIVE NOT PASSIVE ACTIVITIES

## 2020-02-15 NOTE — PROGRESS NOTES
much    Physical Exam:    Abdomen:    softly distended.    appropriate tenderness without guarding    Incision:   Intact ports; some drainage around JONI site  Drain cloudy serous    Impression:  POD#3 lap appy for perforated appendicitis  Increasing WBC    Plan:  Continue limited PO  Ambulate  Continue abx- cultures GNR ID and sensitivities pending  At high risk for intra-abdominal infection based on operative findings      Electronically by Eloy Campos MD on 2/15/2020 at 8:37 AM

## 2020-02-16 LAB
ALBUMIN SERPL-MCNC: 2.5 G/DL (ref 3.5–5.2)
ALP BLD-CCNC: 125 U/L (ref 35–104)
ALT SERPL-CCNC: 19 U/L (ref 0–32)
ANAEROBIC CULTURE: ABNORMAL
ANAEROBIC CULTURE: ABNORMAL
ANION GAP SERPL CALCULATED.3IONS-SCNC: 10 MMOL/L (ref 7–16)
AST SERPL-CCNC: 18 U/L (ref 0–31)
BILIRUB SERPL-MCNC: 0.3 MG/DL (ref 0–1.2)
BUN BLDV-MCNC: 9 MG/DL (ref 8–23)
CALCIUM SERPL-MCNC: 8.5 MG/DL (ref 8.6–10.2)
CHLORIDE BLD-SCNC: 103 MMOL/L (ref 98–107)
CO2: 26 MMOL/L (ref 22–29)
CREAT SERPL-MCNC: 0.9 MG/DL (ref 0.5–1)
GFR AFRICAN AMERICAN: >60
GFR NON-AFRICAN AMERICAN: >60 ML/MIN/1.73
GLUCOSE BLD-MCNC: 120 MG/DL (ref 74–99)
ORGANISM: ABNORMAL
ORGANISM: ABNORMAL
POTASSIUM SERPL-SCNC: 3.8 MMOL/L (ref 3.5–5)
SODIUM BLD-SCNC: 139 MMOL/L (ref 132–146)
TOTAL PROTEIN: 5.9 G/DL (ref 6.4–8.3)

## 2020-02-16 PROCEDURE — 2500000003 HC RX 250 WO HCPCS: Performed by: STUDENT IN AN ORGANIZED HEALTH CARE EDUCATION/TRAINING PROGRAM

## 2020-02-16 PROCEDURE — 6360000002 HC RX W HCPCS: Performed by: SURGERY

## 2020-02-16 PROCEDURE — 2580000003 HC RX 258

## 2020-02-16 PROCEDURE — 36415 COLL VENOUS BLD VENIPUNCTURE: CPT

## 2020-02-16 PROCEDURE — 6370000000 HC RX 637 (ALT 250 FOR IP): Performed by: SURGERY

## 2020-02-16 PROCEDURE — 80053 COMPREHEN METABOLIC PANEL: CPT

## 2020-02-16 PROCEDURE — 2580000003 HC RX 258: Performed by: SURGERY

## 2020-02-16 PROCEDURE — C9113 INJ PANTOPRAZOLE SODIUM, VIA: HCPCS | Performed by: SURGERY

## 2020-02-16 PROCEDURE — 36592 COLLECT BLOOD FROM PICC: CPT

## 2020-02-16 PROCEDURE — 2700000000 HC OXYGEN THERAPY PER DAY

## 2020-02-16 PROCEDURE — 2060000000 HC ICU INTERMEDIATE R&B

## 2020-02-16 RX ORDER — OXYMETAZOLINE HYDROCHLORIDE 0.05 G/100ML
2 SPRAY NASAL 2 TIMES DAILY PRN
Status: DISPENSED | OUTPATIENT
Start: 2020-02-16 | End: 2020-02-19

## 2020-02-16 RX ORDER — SODIUM CHLORIDE 0.9 % (FLUSH) 0.9 %
SYRINGE (ML) INJECTION
Status: COMPLETED
Start: 2020-02-16 | End: 2020-02-16

## 2020-02-16 RX ADMIN — LEVOTHYROXINE SODIUM 50 MCG: 50 TABLET ORAL at 08:43

## 2020-02-16 RX ADMIN — SODIUM CHLORIDE: 9 INJECTION, SOLUTION INTRAVENOUS at 04:39

## 2020-02-16 RX ADMIN — PIPERACILLIN AND TAZOBACTAM 3.38 G: 3; .375 INJECTION, POWDER, FOR SOLUTION INTRAVENOUS at 08:54

## 2020-02-16 RX ADMIN — MORPHINE SULFATE 2 MG: 2 INJECTION, SOLUTION INTRAMUSCULAR; INTRAVENOUS at 20:16

## 2020-02-16 RX ADMIN — PIPERACILLIN AND TAZOBACTAM 3.38 G: 3; .375 INJECTION, POWDER, FOR SOLUTION INTRAVENOUS at 23:45

## 2020-02-16 RX ADMIN — PIPERACILLIN AND TAZOBACTAM 3.38 G: 3; .375 INJECTION, POWDER, FOR SOLUTION INTRAVENOUS at 16:44

## 2020-02-16 RX ADMIN — SODIUM CHLORIDE: 9 INJECTION, SOLUTION INTRAVENOUS at 12:55

## 2020-02-16 RX ADMIN — MORPHINE SULFATE 2 MG: 2 INJECTION, SOLUTION INTRAMUSCULAR; INTRAVENOUS at 16:50

## 2020-02-16 RX ADMIN — MORPHINE SULFATE 2 MG: 2 INJECTION, SOLUTION INTRAMUSCULAR; INTRAVENOUS at 05:35

## 2020-02-16 RX ADMIN — ENOXAPARIN SODIUM 40 MG: 40 INJECTION SUBCUTANEOUS at 08:43

## 2020-02-16 RX ADMIN — POTASSIUM CHLORIDE, DEXTROSE MONOHYDRATE AND SODIUM CHLORIDE: 150; 5; 450 INJECTION, SOLUTION INTRAVENOUS at 12:18

## 2020-02-16 RX ADMIN — MORPHINE SULFATE 2 MG: 2 INJECTION, SOLUTION INTRAMUSCULAR; INTRAVENOUS at 23:44

## 2020-02-16 RX ADMIN — MORPHINE SULFATE 2 MG: 2 INJECTION, SOLUTION INTRAMUSCULAR; INTRAVENOUS at 08:46

## 2020-02-16 RX ADMIN — POTASSIUM CHLORIDE, DEXTROSE MONOHYDRATE AND SODIUM CHLORIDE: 150; 5; 450 INJECTION, SOLUTION INTRAVENOUS at 22:02

## 2020-02-16 RX ADMIN — SODIUM CHLORIDE: 9 INJECTION, SOLUTION INTRAVENOUS at 21:00

## 2020-02-16 RX ADMIN — Medication 2 SPRAY: at 13:54

## 2020-02-16 RX ADMIN — PANTOPRAZOLE SODIUM 40 MG: 40 INJECTION, POWDER, FOR SOLUTION INTRAVENOUS at 08:43

## 2020-02-16 RX ADMIN — Medication 10 ML: at 16:44

## 2020-02-16 RX ADMIN — POTASSIUM CHLORIDE, DEXTROSE MONOHYDRATE AND SODIUM CHLORIDE: 150; 5; 450 INJECTION, SOLUTION INTRAVENOUS at 01:54

## 2020-02-16 RX ADMIN — MORPHINE SULFATE 2 MG: 2 INJECTION, SOLUTION INTRAMUSCULAR; INTRAVENOUS at 12:18

## 2020-02-16 ASSESSMENT — PAIN DESCRIPTION - FREQUENCY
FREQUENCY: CONTINUOUS

## 2020-02-16 ASSESSMENT — PAIN - FUNCTIONAL ASSESSMENT: PAIN_FUNCTIONAL_ASSESSMENT: PREVENTS OR INTERFERES WITH MANY ACTIVE NOT PASSIVE ACTIVITIES

## 2020-02-16 ASSESSMENT — PAIN SCALES - GENERAL
PAINLEVEL_OUTOF10: 8
PAINLEVEL_OUTOF10: 8
PAINLEVEL_OUTOF10: 6
PAINLEVEL_OUTOF10: 8
PAINLEVEL_OUTOF10: 8
PAINLEVEL_OUTOF10: 9
PAINLEVEL_OUTOF10: 4

## 2020-02-16 ASSESSMENT — PAIN DESCRIPTION - LOCATION
LOCATION: ABDOMEN

## 2020-02-16 ASSESSMENT — PAIN DESCRIPTION - ONSET
ONSET: ON-GOING
ONSET: AWAKENED FROM SLEEP

## 2020-02-16 ASSESSMENT — PAIN DESCRIPTION - PAIN TYPE
TYPE: SURGICAL PAIN
TYPE: ACUTE PAIN
TYPE: SURGICAL PAIN

## 2020-02-16 ASSESSMENT — PAIN DESCRIPTION - DESCRIPTORS
DESCRIPTORS: ACHING;SORE;TENDER
DESCRIPTORS: ACHING;SORE;TENDER;DISCOMFORT
DESCRIPTORS: ACHING;SORE;TENDER
DESCRIPTORS: SHARP;RADIATING;PRESSURE
DESCRIPTORS: ACHING;SORE;TENDER

## 2020-02-16 ASSESSMENT — PAIN SCALES - WONG BAKER: WONGBAKER_NUMERICALRESPONSE: 0

## 2020-02-16 ASSESSMENT — PAIN DESCRIPTION - ORIENTATION
ORIENTATION: ANTERIOR
ORIENTATION: UPPER
ORIENTATION: ANTERIOR
ORIENTATION: ANTERIOR

## 2020-02-16 NOTE — PLAN OF CARE
Problem: Pain:  Goal: Control of acute pain  Description  Control of acute pain  Outcome: Met This Shift     Problem: Falls - Risk of:  Goal: Will remain free from falls  Description  Will remain free from falls  Outcome: Met This Shift     Problem: Skin Integrity:  Goal: Risk for impaired skin integrity will decrease  Description  Risk for impaired skin integrity will decrease  Outcome: Met This Shift

## 2020-02-16 NOTE — PROGRESS NOTES
Attending Physician Progress Note     Current Meds:  dextrose 5 % and 0.45 % NaCl with KCl 20 mEq infusion, Continuous  lactated ringers bolus, Once  levothyroxine (SYNTHROID) tablet 50 mcg, Daily  morphine (PF) injection 2 mg, Q3H PRN    Or  morphine sulfate (PF) injection 4 mg, Q3H PRN  enoxaparin (LOVENOX) injection 40 mg, Daily  ondansetron (ZOFRAN) injection 4 mg, Q6H PRN  acetaminophen (TYLENOL) tablet 650 mg, Q4H PRN  pantoprazole (PROTONIX) injection 40 mg, Daily  piperacillin-tazobactam (ZOSYN) 3.375 g in dextrose 5 % 50 mL IVPB extended infusion (mini-bag), Q8H    And  0.9 % sodium chloride infusion, Q8H         Vitals/Labs:    Patient Vitals for the past 24 hrs:   BP Temp Temp src Pulse Resp SpO2 Weight   02/16/20 0459 134/73 98.3 °F (36.8 °C) Oral 78 -- 96 % --   02/16/20 0130 (!) 147/71 98.1 °F (36.7 °C) Oral 80 18 -- --   02/16/20 0107 -- -- -- -- -- -- 196 lb 14.4 oz (89.3 kg)   02/15/20 2356 (!) 103/57 98.2 °F (36.8 °C) Oral 74 16 97 % --   02/15/20 2019 (!) 112/55 98.3 °F (36.8 °C) Oral 85 18 -- --   02/15/20 2016 -- -- -- -- -- 95 % --   02/15/20 1319 108/65 96.5 °F (35.8 °C) Axillary 73 21 -- --   02/15/20 0800 (!) 111/59 98.3 °F (36.8 °C) Oral 83 21 94 % --        I/O last 3 completed shifts: In: 2970 [P.O.:360; I.V.:2610]  Out: 4057 [Urine:1100; Drains:70]  No intake/output data recorded. Recent Labs     02/14/20  0344 02/15/20  0320   WBC 10.4 13.6*   HGB 8.4* 8.1*   HCT 26.8* 26.3*    251     Recent Labs     02/14/20  0344 02/15/20  0320 02/16/20  0545   CREATININE 1.4* 1.1* 0.9   BUN 21 12 9    139 139   K 3.5 3.8 3.8    103 103   CO2 25 25 26     Recent Labs     02/14/20  0344 02/15/20  0320 02/16/20  0545   AST 27 21 18   ALT 29 22 19   BILITOT 0.4 0.3 0.3   ALKPHOS 121* 148* 125*     No results for input(s): LIPASE, AMYLASE in the last 72 hours. No results for input(s): LACTATE in the last 72 hours. No results for input(s): INR, PTT in the last 72 hours.     Invalid

## 2020-02-17 LAB
ANION GAP SERPL CALCULATED.3IONS-SCNC: 8 MMOL/L (ref 7–16)
BLOOD CULTURE, ROUTINE: NORMAL
BUN BLDV-MCNC: 8 MG/DL (ref 8–23)
CALCIUM SERPL-MCNC: 8.5 MG/DL (ref 8.6–10.2)
CHLORIDE BLD-SCNC: 101 MMOL/L (ref 98–107)
CO2: 25 MMOL/L (ref 22–29)
CREAT SERPL-MCNC: 1 MG/DL (ref 0.5–1)
CULTURE, BLOOD 2: NORMAL
GFR AFRICAN AMERICAN: >60
GFR NON-AFRICAN AMERICAN: 54 ML/MIN/1.73
GLUCOSE BLD-MCNC: 124 MG/DL (ref 74–99)
HCT VFR BLD CALC: 26.2 % (ref 34–48)
HEMOGLOBIN: 8.2 G/DL (ref 11.5–15.5)
MCH RBC QN AUTO: 29.4 PG (ref 26–35)
MCHC RBC AUTO-ENTMCNC: 31.3 % (ref 32–34.5)
MCV RBC AUTO: 93.9 FL (ref 80–99.9)
PDW BLD-RTO: 14.8 FL (ref 11.5–15)
PLATELET # BLD: 271 E9/L (ref 130–450)
PMV BLD AUTO: 9.1 FL (ref 7–12)
POTASSIUM SERPL-SCNC: 3.8 MMOL/L (ref 3.5–5)
RBC # BLD: 2.79 E12/L (ref 3.5–5.5)
SODIUM BLD-SCNC: 134 MMOL/L (ref 132–146)
WBC # BLD: 11.3 E9/L (ref 4.5–11.5)

## 2020-02-17 PROCEDURE — 80048 BASIC METABOLIC PNL TOTAL CA: CPT

## 2020-02-17 PROCEDURE — 2700000000 HC OXYGEN THERAPY PER DAY

## 2020-02-17 PROCEDURE — C9113 INJ PANTOPRAZOLE SODIUM, VIA: HCPCS | Performed by: SURGERY

## 2020-02-17 PROCEDURE — 36592 COLLECT BLOOD FROM PICC: CPT

## 2020-02-17 PROCEDURE — 2500000003 HC RX 250 WO HCPCS: Performed by: STUDENT IN AN ORGANIZED HEALTH CARE EDUCATION/TRAINING PROGRAM

## 2020-02-17 PROCEDURE — 85027 COMPLETE CBC AUTOMATED: CPT

## 2020-02-17 PROCEDURE — 2580000003 HC RX 258: Performed by: SURGERY

## 2020-02-17 PROCEDURE — 36415 COLL VENOUS BLD VENIPUNCTURE: CPT

## 2020-02-17 PROCEDURE — 97530 THERAPEUTIC ACTIVITIES: CPT

## 2020-02-17 PROCEDURE — 1200000000 HC SEMI PRIVATE

## 2020-02-17 PROCEDURE — 6370000000 HC RX 637 (ALT 250 FOR IP): Performed by: STUDENT IN AN ORGANIZED HEALTH CARE EDUCATION/TRAINING PROGRAM

## 2020-02-17 PROCEDURE — 6360000002 HC RX W HCPCS: Performed by: SURGERY

## 2020-02-17 PROCEDURE — 6370000000 HC RX 637 (ALT 250 FOR IP): Performed by: SURGERY

## 2020-02-17 PROCEDURE — 97535 SELF CARE MNGMENT TRAINING: CPT

## 2020-02-17 RX ORDER — BISACODYL 10 MG
10 SUPPOSITORY, RECTAL RECTAL DAILY PRN
Status: DISCONTINUED | OUTPATIENT
Start: 2020-02-17 | End: 2020-02-20 | Stop reason: HOSPADM

## 2020-02-17 RX ADMIN — MORPHINE SULFATE 2 MG: 2 INJECTION, SOLUTION INTRAMUSCULAR; INTRAVENOUS at 18:25

## 2020-02-17 RX ADMIN — LEVOTHYROXINE SODIUM 50 MCG: 50 TABLET ORAL at 06:02

## 2020-02-17 RX ADMIN — PANTOPRAZOLE SODIUM 40 MG: 40 INJECTION, POWDER, FOR SOLUTION INTRAVENOUS at 09:24

## 2020-02-17 RX ADMIN — SODIUM CHLORIDE: 9 INJECTION, SOLUTION INTRAVENOUS at 03:45

## 2020-02-17 RX ADMIN — MORPHINE SULFATE 2 MG: 2 INJECTION, SOLUTION INTRAMUSCULAR; INTRAVENOUS at 06:02

## 2020-02-17 RX ADMIN — ENOXAPARIN SODIUM 40 MG: 40 INJECTION SUBCUTANEOUS at 09:24

## 2020-02-17 RX ADMIN — ONDANSETRON 4 MG: 2 INJECTION INTRAMUSCULAR; INTRAVENOUS at 23:41

## 2020-02-17 RX ADMIN — SODIUM CHLORIDE: 9 INJECTION, SOLUTION INTRAVENOUS at 13:30

## 2020-02-17 RX ADMIN — POTASSIUM CHLORIDE, DEXTROSE MONOHYDRATE AND SODIUM CHLORIDE: 150; 5; 450 INJECTION, SOLUTION INTRAVENOUS at 18:26

## 2020-02-17 RX ADMIN — PIPERACILLIN AND TAZOBACTAM 3.38 G: 3; .375 INJECTION, POWDER, FOR SOLUTION INTRAVENOUS at 09:29

## 2020-02-17 RX ADMIN — POTASSIUM CHLORIDE, DEXTROSE MONOHYDRATE AND SODIUM CHLORIDE: 150; 5; 450 INJECTION, SOLUTION INTRAVENOUS at 08:26

## 2020-02-17 RX ADMIN — MORPHINE SULFATE 2 MG: 2 INJECTION, SOLUTION INTRAMUSCULAR; INTRAVENOUS at 15:05

## 2020-02-17 RX ADMIN — SODIUM CHLORIDE: 9 INJECTION, SOLUTION INTRAVENOUS at 21:08

## 2020-02-17 RX ADMIN — MORPHINE SULFATE 2 MG: 2 INJECTION, SOLUTION INTRAMUSCULAR; INTRAVENOUS at 09:24

## 2020-02-17 RX ADMIN — BISACODYL 10 MG: 10 SUPPOSITORY RECTAL at 17:05

## 2020-02-17 RX ADMIN — MORPHINE SULFATE 4 MG: 4 INJECTION, SOLUTION INTRAMUSCULAR; INTRAVENOUS at 02:45

## 2020-02-17 RX ADMIN — PIPERACILLIN AND TAZOBACTAM 3.38 G: 3; .375 INJECTION, POWDER, FOR SOLUTION INTRAVENOUS at 17:02

## 2020-02-17 RX ADMIN — MORPHINE SULFATE 2 MG: 2 INJECTION, SOLUTION INTRAMUSCULAR; INTRAVENOUS at 21:45

## 2020-02-17 ASSESSMENT — PAIN SCALES - GENERAL
PAINLEVEL_OUTOF10: 0
PAINLEVEL_OUTOF10: 9
PAINLEVEL_OUTOF10: 8
PAINLEVEL_OUTOF10: 6
PAINLEVEL_OUTOF10: 8
PAINLEVEL_OUTOF10: 0
PAINLEVEL_OUTOF10: 10
PAINLEVEL_OUTOF10: 10

## 2020-02-17 ASSESSMENT — PAIN SCALES - WONG BAKER: WONGBAKER_NUMERICALRESPONSE: 0

## 2020-02-17 ASSESSMENT — PAIN DESCRIPTION - DESCRIPTORS: DESCRIPTORS: ACHING;DISCOMFORT;DULL

## 2020-02-17 ASSESSMENT — PAIN DESCRIPTION - PAIN TYPE
TYPE: SURGICAL PAIN
TYPE: SURGICAL PAIN

## 2020-02-17 ASSESSMENT — PAIN DESCRIPTION - PROGRESSION: CLINICAL_PROGRESSION: NOT CHANGED

## 2020-02-17 ASSESSMENT — PAIN DESCRIPTION - FREQUENCY: FREQUENCY: CONTINUOUS

## 2020-02-17 ASSESSMENT — PAIN DESCRIPTION - ORIENTATION: ORIENTATION: MID

## 2020-02-17 ASSESSMENT — PAIN DESCRIPTION - LOCATION
LOCATION: ABDOMEN
LOCATION: ABDOMEN

## 2020-02-17 ASSESSMENT — PAIN - FUNCTIONAL ASSESSMENT: PAIN_FUNCTIONAL_ASSESSMENT: ACTIVITIES ARE NOT PREVENTED

## 2020-02-17 ASSESSMENT — PAIN DESCRIPTION - ONSET: ONSET: ON-GOING

## 2020-02-17 NOTE — PROGRESS NOTES
Messaged surgical resident regarding patient's complaints of constipation and if patient can be transferred to general floor.

## 2020-02-17 NOTE — PROGRESS NOTES
Orders: Clear Liquid   · Oral Diet intake: 1-25%  · Oral Nutrition Supplement (ONS) Orders: None  · Anthropometric Measures:  · Ht: 5' 4\" (162.6 cm)   · Current Body Wt: 197 lb (89.4 kg)(2/17 bed scale)  · Admission Body Wt: 203 lb (92.1 kg)(2/12 bed scale)  · Usual Body Wt: (UTO)  · % Weight Change: unable to assess longterm wt changes d/t lack of EMR wt hx. pt denies any unplanned wt loss recently  · Ideal Body Wt: 120 lb (54.4 kg), % Ideal Body 164%  · BMI Classification: BMI 35.0 - 39.9 Obese Class II    Nutrition Interventions:   Start ONS(ADAT.  Add Ensure Clear TID)  Continued Inpatient Monitoring, Education Initiated, Coordination of Care(ONS options/preferences reviewed)    Nutrition Evaluation:   · Evaluation: Goals set   · Goals: tolerance to diet progression    · Monitoring: Nutrition Progression, Meal Intake, Supplement Intake, Diet Tolerance, Skin Integrity, Wound Healing, I&O, Weight, Pertinent Labs, Monitor Bowel Function      Electronically signed by Anna Vidal, MS, RD, LD on 2/17/20 at 4:08 PM    Contact Number: [phone]

## 2020-02-17 NOTE — PROGRESS NOTES
Occupational Therapy  OT BEDSIDE TREATMENT NOTE      Date:2020  Patient Name: Efrem Cook  MRN: [id number]  : 1945  Room: [address] [place] [address]     Referring Nohemi Eddy MD  Evaluating OT: Eun Pruitt, OTR/L - [id number]     AM-PAC Daily Activity Raw Score: 17     Recommended Adaptive Equipment: Shower Chair     Diagnosis: Acute appendicitis with generalized peritonitis and gangrene, unspecified whether abscess present, unspecified whether perforation present [K35.20, M57.633]   Surgery: Patient underwent laparoscopic appendectomy on 2020. Pertinent Medical History: HTN     Precautions: falls, abdominal splinting, JONI drain, O2 nasal cannula     Home Living: Patient lives in a one-floor home; patient's bedroom and bathroom are on the main living level. Bathroom Setup: walk-in shower (with seat available)     Prior Level of Function (PLOF): Per patient, she was independent with ADLs, independent with IADLs (primarily responsible), and independent with functional mobility (without device) prior to this hospitalization. Driving: Yes     Pain Level: Pt reports moderate pain   Cognition: Patient alert and oriented. Functional Assessment:    Initial Eval Status  Date: 2020 Treatment Status   Short Term Goals  Treatment Frequency/Duration: 2-5x/week for 3-7 days PRN   Feeding Setup   Independent   Grooming Min A Min  A. Pt refusing to sit up to don teeth.   Wants to complete ADL activity while supine in the bed.   Mod I / Independent   (standing at sink)   UB Dressing Min A Min A to don gown around back  Mod I / Independent   LB Dressing Max A to adjust socks Mod A due to pt limiting  Mod I / Independent - with use of AE, as needed/appropriate   Bathing Mod A  Mod I / Independent - with use of AE/DME, as needed/appropriate   Toileting Mod A  Patient with tyler cathter currently.   Mod I / Independent   Bed Mobility  Supine-to-Sit: Mod A  Patient education provided regarding log rolling technique and abdominal splinting with bed mobility; patient verbalized understanding. Min A supine to sit   SBA sit to supine  Independent   Functional Transfers Sit-to-Stand: Min A   from EOB  SBA  Independent   Functional Mobility Min A   (without device) for few steps from EOB to bedside chair.  SBA  Without device. Mod I / Independent - with use of device, as needed/appropriate   Balance Sitting: Good-  (at EOB)  Standing: Fair-  (without device)   Good dynamic standing balance during completion of ADLs and other functional tasks. Activity Tolerance Fair-  Fair-  Patient will demonstrate Good understanding and consistent implementation of energy conservation techniques and work simplification techniques into ADL/IADL       Comments:  Pt self limiting her activity. Initially refusing to get OOB. Later agreeable however refusing to sit in the chair to complete ADL or feeding. Pt attempting to drink broth while laying flat in the chair. Pt states she cannot eat while sitting upright. Nursing notified. O2 saturation monitored during session. 95% while on 3L during activity. Education: cues for pacing during mobility with limited carry over. Instructed with safety with O2 tubing. ADL retraining with limited carry over. · Pt has made limited  progress towards set goals.    · Continue with current plan of care        Treatment Charges: Mins Units    ADL/Home Mgt 57677 12 1    Thera Activities 24181 14 1    Ther Ex 29561      Manual Therapy 90120      Neuro Re-ed 68027      Orthotic manage/training  85008      Non Billable Time      Total Timed Treatment 26 5616 [facility] [address] [place] MARYANN/L [address]

## 2020-02-18 LAB
ANION GAP SERPL CALCULATED.3IONS-SCNC: 9 MMOL/L (ref 7–16)
BUN BLDV-MCNC: 8 MG/DL (ref 8–23)
CALCIUM SERPL-MCNC: 8.4 MG/DL (ref 8.6–10.2)
CHLORIDE BLD-SCNC: 102 MMOL/L (ref 98–107)
CO2: 26 MMOL/L (ref 22–29)
CREAT SERPL-MCNC: 1 MG/DL (ref 0.5–1)
GFR AFRICAN AMERICAN: >60
GFR NON-AFRICAN AMERICAN: 54 ML/MIN/1.73
GLUCOSE BLD-MCNC: 130 MG/DL (ref 74–99)
HCT VFR BLD CALC: 26 % (ref 34–48)
HEMOGLOBIN: 8 G/DL (ref 11.5–15.5)
MCH RBC QN AUTO: 29 PG (ref 26–35)
MCHC RBC AUTO-ENTMCNC: 30.8 % (ref 32–34.5)
MCV RBC AUTO: 94.2 FL (ref 80–99.9)
PDW BLD-RTO: 15.2 FL (ref 11.5–15)
PLATELET # BLD: 298 E9/L (ref 130–450)
PMV BLD AUTO: 9 FL (ref 7–12)
POTASSIUM SERPL-SCNC: 3.9 MMOL/L (ref 3.5–5)
RBC # BLD: 2.76 E12/L (ref 3.5–5.5)
SODIUM BLD-SCNC: 137 MMOL/L (ref 132–146)
WBC # BLD: 11.6 E9/L (ref 4.5–11.5)

## 2020-02-18 PROCEDURE — 2580000003 HC RX 258: Performed by: SURGERY

## 2020-02-18 PROCEDURE — 97535 SELF CARE MNGMENT TRAINING: CPT

## 2020-02-18 PROCEDURE — 6370000000 HC RX 637 (ALT 250 FOR IP): Performed by: STUDENT IN AN ORGANIZED HEALTH CARE EDUCATION/TRAINING PROGRAM

## 2020-02-18 PROCEDURE — 2500000003 HC RX 250 WO HCPCS: Performed by: STUDENT IN AN ORGANIZED HEALTH CARE EDUCATION/TRAINING PROGRAM

## 2020-02-18 PROCEDURE — 97530 THERAPEUTIC ACTIVITIES: CPT

## 2020-02-18 PROCEDURE — 85027 COMPLETE CBC AUTOMATED: CPT

## 2020-02-18 PROCEDURE — 80048 BASIC METABOLIC PNL TOTAL CA: CPT

## 2020-02-18 PROCEDURE — 2700000000 HC OXYGEN THERAPY PER DAY

## 2020-02-18 PROCEDURE — 36415 COLL VENOUS BLD VENIPUNCTURE: CPT

## 2020-02-18 PROCEDURE — C9113 INJ PANTOPRAZOLE SODIUM, VIA: HCPCS | Performed by: SURGERY

## 2020-02-18 PROCEDURE — 6370000000 HC RX 637 (ALT 250 FOR IP): Performed by: SURGERY

## 2020-02-18 PROCEDURE — 6360000002 HC RX W HCPCS: Performed by: SURGERY

## 2020-02-18 PROCEDURE — 1200000000 HC SEMI PRIVATE

## 2020-02-18 RX ORDER — HYDROCODONE BITARTRATE AND ACETAMINOPHEN 5; 325 MG/1; MG/1
1 TABLET ORAL EVERY 6 HOURS PRN
Status: DISCONTINUED | OUTPATIENT
Start: 2020-02-18 | End: 2020-02-20 | Stop reason: HOSPADM

## 2020-02-18 RX ORDER — OXYCODONE HYDROCHLORIDE 5 MG/1
5 TABLET ORAL EVERY 4 HOURS PRN
Status: DISCONTINUED | OUTPATIENT
Start: 2020-02-18 | End: 2020-02-18

## 2020-02-18 RX ADMIN — MORPHINE SULFATE 2 MG: 2 INJECTION, SOLUTION INTRAMUSCULAR; INTRAVENOUS at 01:32

## 2020-02-18 RX ADMIN — PIPERACILLIN AND TAZOBACTAM 3.38 G: 3; .375 INJECTION, POWDER, FOR SOLUTION INTRAVENOUS at 01:36

## 2020-02-18 RX ADMIN — ONDANSETRON 4 MG: 2 INJECTION INTRAMUSCULAR; INTRAVENOUS at 16:33

## 2020-02-18 RX ADMIN — SODIUM CHLORIDE: 9 INJECTION, SOLUTION INTRAVENOUS at 21:14

## 2020-02-18 RX ADMIN — PIPERACILLIN AND TAZOBACTAM 3.38 G: 3; .375 INJECTION, POWDER, FOR SOLUTION INTRAVENOUS at 09:16

## 2020-02-18 RX ADMIN — SODIUM CHLORIDE: 9 INJECTION, SOLUTION INTRAVENOUS at 13:31

## 2020-02-18 RX ADMIN — PIPERACILLIN AND TAZOBACTAM 3.38 G: 3; .375 INJECTION, POWDER, FOR SOLUTION INTRAVENOUS at 17:33

## 2020-02-18 RX ADMIN — OXYCODONE HYDROCHLORIDE 5 MG: 5 TABLET ORAL at 09:28

## 2020-02-18 RX ADMIN — MAGNESIUM HYDROXIDE 60 ML: 400 SUSPENSION ORAL at 06:25

## 2020-02-18 RX ADMIN — POTASSIUM CHLORIDE, DEXTROSE MONOHYDRATE AND SODIUM CHLORIDE: 150; 5; 450 INJECTION, SOLUTION INTRAVENOUS at 04:36

## 2020-02-18 RX ADMIN — ONDANSETRON 4 MG: 2 INJECTION INTRAMUSCULAR; INTRAVENOUS at 21:17

## 2020-02-18 RX ADMIN — PANTOPRAZOLE SODIUM 40 MG: 40 INJECTION, POWDER, FOR SOLUTION INTRAVENOUS at 09:16

## 2020-02-18 RX ADMIN — HYDROCODONE BITARTRATE AND ACETAMINOPHEN 1 TABLET: 5; 325 TABLET ORAL at 23:21

## 2020-02-18 RX ADMIN — ENOXAPARIN SODIUM 40 MG: 40 INJECTION SUBCUTANEOUS at 09:16

## 2020-02-18 RX ADMIN — MORPHINE SULFATE 2 MG: 2 INJECTION, SOLUTION INTRAMUSCULAR; INTRAVENOUS at 04:55

## 2020-02-18 RX ADMIN — SODIUM CHLORIDE: 9 INJECTION, SOLUTION INTRAVENOUS at 04:36

## 2020-02-18 RX ADMIN — HYDROCODONE BITARTRATE AND ACETAMINOPHEN 1 TABLET: 5; 325 TABLET ORAL at 16:33

## 2020-02-18 RX ADMIN — LEVOTHYROXINE SODIUM 50 MCG: 50 TABLET ORAL at 06:25

## 2020-02-18 ASSESSMENT — PAIN DESCRIPTION - LOCATION
LOCATION: ABDOMEN

## 2020-02-18 ASSESSMENT — PAIN DESCRIPTION - ORIENTATION
ORIENTATION: MID
ORIENTATION: RIGHT;LEFT;LOWER
ORIENTATION: MID

## 2020-02-18 ASSESSMENT — PAIN DESCRIPTION - PAIN TYPE
TYPE: SURGICAL PAIN

## 2020-02-18 ASSESSMENT — PAIN DESCRIPTION - ONSET
ONSET: ON-GOING
ONSET: GRADUAL
ONSET: GRADUAL
ONSET: ON-GOING

## 2020-02-18 ASSESSMENT — PAIN DESCRIPTION - FREQUENCY
FREQUENCY: CONTINUOUS

## 2020-02-18 ASSESSMENT — PAIN DESCRIPTION - DESCRIPTORS
DESCRIPTORS: ACHING;DISCOMFORT;DULL
DESCRIPTORS: ACHING;DISCOMFORT;DULL
DESCRIPTORS: ACHING;DISCOMFORT

## 2020-02-18 ASSESSMENT — PAIN SCALES - GENERAL
PAINLEVEL_OUTOF10: 0
PAINLEVEL_OUTOF10: 6
PAINLEVEL_OUTOF10: 0
PAINLEVEL_OUTOF10: 0
PAINLEVEL_OUTOF10: 6
PAINLEVEL_OUTOF10: 6
PAINLEVEL_OUTOF10: 0

## 2020-02-18 ASSESSMENT — PAIN - FUNCTIONAL ASSESSMENT
PAIN_FUNCTIONAL_ASSESSMENT: PREVENTS OR INTERFERES SOME ACTIVE ACTIVITIES AND ADLS
PAIN_FUNCTIONAL_ASSESSMENT: ACTIVITIES ARE NOT PREVENTED
PAIN_FUNCTIONAL_ASSESSMENT: PREVENTS OR INTERFERES SOME ACTIVE ACTIVITIES AND ADLS
PAIN_FUNCTIONAL_ASSESSMENT: ACTIVITIES ARE NOT PREVENTED
PAIN_FUNCTIONAL_ASSESSMENT: PREVENTS OR INTERFERES SOME ACTIVE ACTIVITIES AND ADLS

## 2020-02-18 ASSESSMENT — PAIN DESCRIPTION - PROGRESSION
CLINICAL_PROGRESSION: GRADUALLY WORSENING
CLINICAL_PROGRESSION: NOT CHANGED
CLINICAL_PROGRESSION: GRADUALLY WORSENING
CLINICAL_PROGRESSION: NOT CHANGED

## 2020-02-18 ASSESSMENT — PAIN SCALES - WONG BAKER: WONGBAKER_NUMERICALRESPONSE: 0

## 2020-02-18 NOTE — PROGRESS NOTES
increased to 87% on room air. Patient education  Pt educated on PT objectives during treatment session, deep breathing    Patient response to education:   Pt verbalized understanding Pt demonstrated skill Pt requires further education in this area   Yes    yes     ASSESSMENT:    Comments:  Pt found in bed. No report of dizziness during functional mobility. No LOB during ambulation. Ambulation distance limited since pt needed to use the restroom. At end of treatment session, pt left sitting on the commode with call light in reach. Treatment:  Patient practiced and was instructed in the following treatment:    Therapeutic activities described as above. PLAN:    Patient is making good progress towards established goals. Will continue with current POC.      Time in  0840  Time out  0850    Total Treatment Time  10 minutes     CPT codes:    [x] Therapeutic activities 13079 10 minutes  [] Therapeutic exercises 58264  minutes      [facility], [address]

## 2020-02-18 NOTE — PROGRESS NOTES
understanding.  Supine <> sit: SBA with HOB slightly elevated    Independent   Functional Transfers Sit-to-Stand: Min A   from EOB  STS: Sup from EOB Independent   Functional Mobility Min A   (without device) for few steps from EOB to bedside chair.  SBA without AD in room  Mod I / Independent - with use of device, as needed/appropriate   Balance Sitting: Good-  (at EOB)  Standing: Fair-  (without device) Sitting: Independent    Standing: SBA  Good dynamic standing balance during completion of ADLs and other functional tasks. Activity Tolerance Fair-  Fair Patient will demonstrate Good understanding and consistent implementation of energy conservation techniques and work simplification techniques into ADL/IADL           B UE were WFL during tasks. Comments: Upon arrival pt was supine in bed. At end of session pt was transferred back to bed per request with alarm on, all lines and tubes intact and call light within reach. Treatment: Pt required mod vc for encouragement to participate in tx session. Intermittent vc for pacing provided to prevent falls. Education: Safety training, benefits of performing functional tasks seated verses supine and activity pacing to prevent falls. · Pt has made good progress towards set goals.    · Continue with current plan of care      Treatment Charges: Mins Units   Ther Ex  35453     Manual Therapy 15816     Thera Activities 99668 5 0   ADL/Home Mgt 06195 10 1   Neuro Re-ed 07529     Group Therapy      Orthotic manage/training  42177     Non-Billable Time     Total Timed Treatment 15 1       Lolly Less CR/L [id number]

## 2020-02-19 ENCOUNTER — APPOINTMENT (OUTPATIENT)
Dept: CT IMAGING | Age: 75
DRG: 853 | End: 2020-02-19
Payer: MEDICARE

## 2020-02-19 LAB
ANION GAP SERPL CALCULATED.3IONS-SCNC: 12 MMOL/L (ref 7–16)
BUN BLDV-MCNC: 9 MG/DL (ref 8–23)
CALCIUM SERPL-MCNC: 8.5 MG/DL (ref 8.6–10.2)
CHLORIDE BLD-SCNC: 104 MMOL/L (ref 98–107)
CO2: 23 MMOL/L (ref 22–29)
CREAT SERPL-MCNC: 1 MG/DL (ref 0.5–1)
GFR AFRICAN AMERICAN: >60
GFR NON-AFRICAN AMERICAN: 54 ML/MIN/1.73
GLUCOSE BLD-MCNC: 110 MG/DL (ref 74–99)
HCT VFR BLD CALC: 26.8 % (ref 34–48)
HEMOGLOBIN: 8.5 G/DL (ref 11.5–15.5)
MCH RBC QN AUTO: 29.5 PG (ref 26–35)
MCHC RBC AUTO-ENTMCNC: 31.7 % (ref 32–34.5)
MCV RBC AUTO: 93.1 FL (ref 80–99.9)
PDW BLD-RTO: 15.1 FL (ref 11.5–15)
PLATELET # BLD: 360 E9/L (ref 130–450)
PMV BLD AUTO: 9.1 FL (ref 7–12)
POTASSIUM SERPL-SCNC: 3.7 MMOL/L (ref 3.5–5)
RBC # BLD: 2.88 E12/L (ref 3.5–5.5)
SODIUM BLD-SCNC: 139 MMOL/L (ref 132–146)
WBC # BLD: 11.9 E9/L (ref 4.5–11.5)

## 2020-02-19 PROCEDURE — 2700000000 HC OXYGEN THERAPY PER DAY

## 2020-02-19 PROCEDURE — 6360000004 HC RX CONTRAST MEDICATION: Performed by: RADIOLOGY

## 2020-02-19 PROCEDURE — 2580000003 HC RX 258: Performed by: SURGERY

## 2020-02-19 PROCEDURE — 74177 CT ABD & PELVIS W/CONTRAST: CPT

## 2020-02-19 PROCEDURE — 36415 COLL VENOUS BLD VENIPUNCTURE: CPT

## 2020-02-19 PROCEDURE — 80048 BASIC METABOLIC PNL TOTAL CA: CPT

## 2020-02-19 PROCEDURE — 85027 COMPLETE CBC AUTOMATED: CPT

## 2020-02-19 PROCEDURE — 1200000000 HC SEMI PRIVATE

## 2020-02-19 PROCEDURE — 6370000000 HC RX 637 (ALT 250 FOR IP): Performed by: STUDENT IN AN ORGANIZED HEALTH CARE EDUCATION/TRAINING PROGRAM

## 2020-02-19 PROCEDURE — C9113 INJ PANTOPRAZOLE SODIUM, VIA: HCPCS | Performed by: SURGERY

## 2020-02-19 PROCEDURE — 6360000002 HC RX W HCPCS: Performed by: SURGERY

## 2020-02-19 PROCEDURE — 6370000000 HC RX 637 (ALT 250 FOR IP): Performed by: SURGERY

## 2020-02-19 RX ADMIN — HYDROCODONE BITARTRATE AND ACETAMINOPHEN 1 TABLET: 5; 325 TABLET ORAL at 21:24

## 2020-02-19 RX ADMIN — IOPAMIDOL 110 ML: 755 INJECTION, SOLUTION INTRAVENOUS at 14:44

## 2020-02-19 RX ADMIN — HYDROCODONE BITARTRATE AND ACETAMINOPHEN 1 TABLET: 5; 325 TABLET ORAL at 15:28

## 2020-02-19 RX ADMIN — HYDROCODONE BITARTRATE AND ACETAMINOPHEN 1 TABLET: 5; 325 TABLET ORAL at 05:40

## 2020-02-19 RX ADMIN — IOHEXOL 50 ML: 240 INJECTION, SOLUTION INTRATHECAL; INTRAVASCULAR; INTRAVENOUS; ORAL at 14:44

## 2020-02-19 RX ADMIN — PIPERACILLIN AND TAZOBACTAM 3.38 G: 3; .375 INJECTION, POWDER, FOR SOLUTION INTRAVENOUS at 17:32

## 2020-02-19 RX ADMIN — PIPERACILLIN AND TAZOBACTAM 3.38 G: 3; .375 INJECTION, POWDER, FOR SOLUTION INTRAVENOUS at 01:14

## 2020-02-19 RX ADMIN — PIPERACILLIN AND TAZOBACTAM 3.38 G: 3; .375 INJECTION, POWDER, FOR SOLUTION INTRAVENOUS at 09:52

## 2020-02-19 RX ADMIN — SODIUM CHLORIDE 12.5 ML/HR: 9 INJECTION, SOLUTION INTRAVENOUS at 05:45

## 2020-02-19 RX ADMIN — MORPHINE SULFATE 2 MG: 2 INJECTION, SOLUTION INTRAMUSCULAR; INTRAVENOUS at 23:23

## 2020-02-19 RX ADMIN — SODIUM CHLORIDE: 9 INJECTION, SOLUTION INTRAVENOUS at 21:27

## 2020-02-19 RX ADMIN — PANTOPRAZOLE SODIUM 40 MG: 40 INJECTION, POWDER, FOR SOLUTION INTRAVENOUS at 09:52

## 2020-02-19 RX ADMIN — LEVOTHYROXINE SODIUM 50 MCG: 50 TABLET ORAL at 05:40

## 2020-02-19 RX ADMIN — ENOXAPARIN SODIUM 40 MG: 40 INJECTION SUBCUTANEOUS at 09:52

## 2020-02-19 RX ADMIN — ONDANSETRON 4 MG: 2 INJECTION INTRAMUSCULAR; INTRAVENOUS at 21:25

## 2020-02-19 ASSESSMENT — PAIN DESCRIPTION - DESCRIPTORS
DESCRIPTORS: ACHING;DISCOMFORT;DULL
DESCRIPTORS: ACHING;DISCOMFORT
DESCRIPTORS: ACHING;DISCOMFORT

## 2020-02-19 ASSESSMENT — PAIN DESCRIPTION - ORIENTATION
ORIENTATION: RIGHT;LEFT;LOWER
ORIENTATION: LOWER
ORIENTATION: RIGHT;LEFT;LOWER

## 2020-02-19 ASSESSMENT — PAIN SCALES - GENERAL
PAINLEVEL_OUTOF10: 8
PAINLEVEL_OUTOF10: 3
PAINLEVEL_OUTOF10: 6
PAINLEVEL_OUTOF10: 0
PAINLEVEL_OUTOF10: 0
PAINLEVEL_OUTOF10: 5
PAINLEVEL_OUTOF10: 0
PAINLEVEL_OUTOF10: 6

## 2020-02-19 ASSESSMENT — PAIN - FUNCTIONAL ASSESSMENT
PAIN_FUNCTIONAL_ASSESSMENT: PREVENTS OR INTERFERES SOME ACTIVE ACTIVITIES AND ADLS

## 2020-02-19 ASSESSMENT — PAIN DESCRIPTION - PAIN TYPE
TYPE: SURGICAL PAIN
TYPE: ACUTE PAIN;SURGICAL PAIN
TYPE: ACUTE PAIN;SURGICAL PAIN

## 2020-02-19 ASSESSMENT — PAIN DESCRIPTION - ONSET
ONSET: GRADUAL
ONSET: GRADUAL

## 2020-02-19 ASSESSMENT — PAIN DESCRIPTION - LOCATION
LOCATION: ABDOMEN

## 2020-02-19 ASSESSMENT — PAIN DESCRIPTION - FREQUENCY
FREQUENCY: CONTINUOUS
FREQUENCY: CONTINUOUS

## 2020-02-19 ASSESSMENT — PAIN DESCRIPTION - PROGRESSION
CLINICAL_PROGRESSION: GRADUALLY WORSENING
CLINICAL_PROGRESSION: GRADUALLY WORSENING

## 2020-02-19 NOTE — PROGRESS NOTES
Seen/examined  Doing well, had some chills  Moved bowels  Tmax 99.2  Abdomen soft, no guarding  For CT today with contrast- expecting organizing fluid collection(s)  Erwin

## 2020-02-19 NOTE — PROGRESS NOTES
Physical Therapy    Pt refused Rx, states not interested in PT, states walking on her own, presently wants to stay in bed. Wishes respected. Will follow on another date/time.     Vahid Pisano PTA [id number]

## 2020-02-19 NOTE — PROGRESS NOTES
GENERAL SURGERY  DAILY PROGRESS NOTE  2/19/2020    CHIEF COMPLAINT:  Chief Complaint   Patient presents with    Abdominal Pain     onset yesterday       SUBJECTIVE:  Chills overnight. No nausea/vomiting but belching. Passing flatus/+BM    OBJECTIVE:  /72   Pulse 81   Temp 98.2 °F (36.8 °C) (Oral)   Resp 16   Ht 5' 4\" (1.626 m)   Wt 197 lb 9.6 oz (89.6 kg)   SpO2 94%   BMI 33.92 kg/m²     GENERAL:  NAD. A&Ox3. LUNGS:  No increased work of breathing. CARDIOVASCULAR: RR  ABDOMEN:  Soft, non-distended, non-tender. JONI out. No guarding, rigidity, rebound.     ASSESSMENT/PLAN:  Perforated appendicitis w peritonitis s/p Lap Appy w JONI placement 2/12  Ileus -- resolved  Ecoli in cultures sensitive to Zosyn    Continue IV Abx  Low Fiber Diet  CT today -- likely has abscess or fluid collections    Ho Pierre DO  Resident, PGY-2  2/19/2020  7:09 AM    Seen/examined  Will remove JONI  Advance diet  CT tomorrow  Erwin

## 2020-02-20 VITALS
DIASTOLIC BLOOD PRESSURE: 80 MMHG | WEIGHT: 197.6 LBS | HEART RATE: 92 BPM | RESPIRATION RATE: 16 BRPM | TEMPERATURE: 98.4 F | HEIGHT: 64 IN | BODY MASS INDEX: 33.73 KG/M2 | OXYGEN SATURATION: 92 % | SYSTOLIC BLOOD PRESSURE: 124 MMHG

## 2020-02-20 PROCEDURE — 6370000000 HC RX 637 (ALT 250 FOR IP): Performed by: STUDENT IN AN ORGANIZED HEALTH CARE EDUCATION/TRAINING PROGRAM

## 2020-02-20 PROCEDURE — 2580000003 HC RX 258: Performed by: SURGERY

## 2020-02-20 PROCEDURE — 97535 SELF CARE MNGMENT TRAINING: CPT

## 2020-02-20 PROCEDURE — C9113 INJ PANTOPRAZOLE SODIUM, VIA: HCPCS | Performed by: SURGERY

## 2020-02-20 PROCEDURE — 2700000000 HC OXYGEN THERAPY PER DAY

## 2020-02-20 PROCEDURE — 6360000002 HC RX W HCPCS: Performed by: SURGERY

## 2020-02-20 PROCEDURE — 6370000000 HC RX 637 (ALT 250 FOR IP): Performed by: SURGERY

## 2020-02-20 RX ORDER — ALPRAZOLAM 0.25 MG/1
0.25 TABLET ORAL ONCE
Status: COMPLETED | OUTPATIENT
Start: 2020-02-20 | End: 2020-02-20

## 2020-02-20 RX ORDER — MORPHINE SULFATE 2 MG/ML
1 INJECTION, SOLUTION INTRAMUSCULAR; INTRAVENOUS EVERY 4 HOURS PRN
Status: DISCONTINUED | OUTPATIENT
Start: 2020-02-20 | End: 2020-02-20 | Stop reason: HOSPADM

## 2020-02-20 RX ORDER — AMOXICILLIN AND CLAVULANATE POTASSIUM 875; 125 MG/1; MG/1
1 TABLET, FILM COATED ORAL 2 TIMES DAILY
Qty: 20 TABLET | Refills: 0 | Status: SHIPPED | OUTPATIENT
Start: 2020-02-20 | End: 2020-03-01

## 2020-02-20 RX ORDER — ALPRAZOLAM 0.5 MG/1
0.5 TABLET ORAL 3 TIMES DAILY PRN
Qty: 15 TABLET | Refills: 0 | Status: SHIPPED | OUTPATIENT
Start: 2020-02-20 | End: 2020-03-21

## 2020-02-20 RX ADMIN — MORPHINE SULFATE 2 MG: 2 INJECTION, SOLUTION INTRAMUSCULAR; INTRAVENOUS at 02:30

## 2020-02-20 RX ADMIN — SODIUM CHLORIDE: 9 INJECTION, SOLUTION INTRAVENOUS at 08:40

## 2020-02-20 RX ADMIN — LEVOTHYROXINE SODIUM 50 MCG: 50 TABLET ORAL at 06:05

## 2020-02-20 RX ADMIN — PIPERACILLIN AND TAZOBACTAM 3.38 G: 3; .375 INJECTION, POWDER, FOR SOLUTION INTRAVENOUS at 02:31

## 2020-02-20 RX ADMIN — PIPERACILLIN AND TAZOBACTAM 3.38 G: 3; .375 INJECTION, POWDER, FOR SOLUTION INTRAVENOUS at 11:24

## 2020-02-20 RX ADMIN — PANTOPRAZOLE SODIUM 40 MG: 40 INJECTION, POWDER, FOR SOLUTION INTRAVENOUS at 08:40

## 2020-02-20 RX ADMIN — ALPRAZOLAM 0.25 MG: 0.25 TABLET ORAL at 06:05

## 2020-02-20 RX ADMIN — ENOXAPARIN SODIUM 40 MG: 40 INJECTION SUBCUTANEOUS at 08:40

## 2020-02-20 ASSESSMENT — PAIN DESCRIPTION - PROGRESSION: CLINICAL_PROGRESSION: GRADUALLY WORSENING

## 2020-02-20 ASSESSMENT — PAIN SCALES - GENERAL
PAINLEVEL_OUTOF10: 8
PAINLEVEL_OUTOF10: 0
PAINLEVEL_OUTOF10: 0

## 2020-02-20 ASSESSMENT — PAIN DESCRIPTION - DESCRIPTORS: DESCRIPTORS: ACHING;DISCOMFORT;DULL

## 2020-02-20 ASSESSMENT — PAIN DESCRIPTION - ONSET: ONSET: GRADUAL

## 2020-02-20 ASSESSMENT — PAIN DESCRIPTION - PAIN TYPE: TYPE: ACUTE PAIN;SURGICAL PAIN

## 2020-02-20 ASSESSMENT — PAIN DESCRIPTION - FREQUENCY: FREQUENCY: CONTINUOUS

## 2020-02-20 ASSESSMENT — PAIN - FUNCTIONAL ASSESSMENT: PAIN_FUNCTIONAL_ASSESSMENT: PREVENTS OR INTERFERES SOME ACTIVE ACTIVITIES AND ADLS

## 2020-02-20 ASSESSMENT — PAIN DESCRIPTION - LOCATION: LOCATION: ABDOMEN

## 2020-02-20 ASSESSMENT — PAIN DESCRIPTION - ORIENTATION: ORIENTATION: RIGHT;LEFT

## 2020-02-20 NOTE — PROGRESS NOTES
Pulse ox was 87% on room air at rest.  Ambulated patient on room air. Oxygen saturation was 88% on room air while ambulating. Oxygen applied 1 liters nasal cannula. Recovery pulse ox was 92% on 1 liters of oxygen while ambulating.

## 2020-02-20 NOTE — PROGRESS NOTES
Occupational Therapy  OT BEDSIDE TREATMENT NOTE      Date:2020  Patient Name: Vladimir Justice  MRN: [id number]  : 1945  Room: [address] [place] [address]A     Referring Beverly Preston MD  Evaluating OT: Valentino Pro. LaRiccia, OTR/L - [id number]     AM-PAC Daily Activity Raw Score:      Recommended Adaptive Equipment: Shower Chair     Diagnosis: Acute appendicitis with generalized peritonitis and gangrene, unspecified whether abscess present, unspecified whether perforation present [K35.20, J18.114]   Surgery: Patient underwent laparoscopic appendectomy on 2020. Pertinent Medical History: HTN     Precautions: falls, abdominal splinting, O2 nasal cannula     Home Living: Patient lives in a one-floor home; patient's bedroom and bathroom are on the main living level. Bathroom Setup: walk-in shower (with seat available)     Prior Level of Function (PLOF): Per patient, she was independent with ADLs, independent with IADLs (primarily responsible), and independent with functional mobility (without device) prior to this hospitalization. Driving: Yes     Pain Level: No c/o pain  Cognition: Patient alert and oriented with fair+ ability to follow multi- step commands.   Functional Assessment:    Initial Eval Status  Date: 2020 Treatment Status 20    Short Term Goals  Treatment Frequency/Duration: 2-5x/week for 3-7 days PRN   Feeding Setup   Independent   Grooming Min A  Sup Mod I / Independent   (standing at sink)   UB Dressing Min A  S/U  To doff gown and don pull over shirt Mod I / Independent   LB Dressing Max A to adjust socks Pt donned pants and underwear with Sup post compensatory training from therapist but required Min A to manage B socks. See comments. Mod I / Independent - with use of AE, as needed/appropriate   Bathing Mod A UE: Sup seated    LE: SBA standing  Mod I / Independent - with use of AE/DME, as needed/appropriate   Toileting Mod A  Patient with tyler cathter currently.  Sup  Mod I / Independent   Bed Mobility  Supine-to-Sit: Mod A  Patient education provided regarding log rolling technique and abdominal splinting with bed mobility; patient verbalized understanding.  Supine <> sit: Independent    Independent   Functional Transfers Sit-to-Stand: Min A   from EOB  STS: Sup from EOB, arm chair and shower chair Independent   Functional Mobility Min A   (without device) for few steps from EOB to bedside chair.  Sup without AD in room Mod I / Independent - with use of device, as needed/appropriate   Balance Sitting: Good-  (at EOB)  Standing: Fair-  (without device) Sitting: Independent     Standing: Sup  Good dynamic standing balance during completion of ADLs and other functional tasks. Activity Tolerance Fair-  Fair+ Patient will demonstrate Good understanding and consistent implementation of energy conservation techniques and work simplification techniques into ADL/IADL          B UE were WFL during tasks. Comments: Upon arrival pt was supine in bed. At end of session pt was transferred to chair with all lines and tubes intact. Treatment: Per RN physician placed an order for pt to shower. Nursing assessed dressings and applied IV protective cover prior to bathing. Attempted ADLs without O2 with RN present but SpO2 desaturated to 85%. While wearing nasal cannula SpO2 was 90% during activity. Instructed pt on energy conservation training and O2 management to prevent falls or SOB during ADLs post D/C. Assistance was required to management of B sock due to limited flexibility; however pt noted donning/ doffing socks by lifting B LE on bed which was demonstrated during previous session with sup. Education: Safety training, energy conservation techniques, O2 management and compensatory LE dressing techniques. · Pt has made good progress towards set goals.    · Continue with current plan of care      Treatment Charges: Mins Units   Ther Ex  [id number]     Manual Therapy [id number]     Thera Activities [id number]     ADL/Home Mgt [id number] 26 2   Neuro Re-ed [id number]     Group Therapy      Orthotic manage/training  [id number]     Non-Billable Time     Total Timed Treatment 26 2       [address] CR/L [id number]

## 2020-02-20 NOTE — PROGRESS NOTES
[facility] Quality Flow/Interdisciplinary Rounds Progress Note        Quality Flow Rounds held on February 20, 2020    Disciplines Attending:  Bedside Nurse, ,  and Nursing Unit Aram Freire was admitted on 2/12/2020  3:44 PM    Anticipated Discharge Date:  Expected Discharge Date: 02/16/20    Disposition:    Jorge Score:  Jorge Scale Score: 20    Readmission Risk              Risk of Unplanned Readmission:        12           Discussed patient goal for the day, patient clinical progression, and barriers to discharge.   The following Goal(s) of the Day/Commitment(s) have been identified:  Discharge [address] [place]  February 20, 2020

## 2020-02-20 NOTE — CARE COORDINATION
Pt to discharge home with home o2. Choices given and arranged through [facility] [place] [facility]. Orders in chart and confirmed discharge plan with pt and her daughter-in-law.  Pt to discharge today-FENG

## 2020-02-20 NOTE — PROGRESS NOTES
GENERAL SURGERY  DAILY PROGRESS NOTE  2/20/2020    CHIEF COMPLAINT:  Chief Complaint   Patient presents with    Abdominal Pain     onset yesterday       SUBJECTIVE:  No nausea/vomiting. Still with chills. Very anxious thinks that's contributing to her chills      OBJECTIVE:  BP (!) 137/92   Pulse 104   Temp 98.4 °F (36.9 °C) (Oral)   Resp 16   Ht 5' 4\" (1.626 m)   Wt 197 lb 9.6 oz (89.6 kg)   SpO2 93%   BMI 33.92 kg/m²     GENERAL:  NAD. A&Ox3. LUNGS:  No increased work of breathing. CARDIOVASCULAR: RR  ABDOMEN:  Soft, non-distended, non-tender. JONI out. No guarding, rigidity, rebound.     ASSESSMENT/PLAN:  Perforated appendicitis w peritonitis s/p Lap Appy w JONI placement 2/12  Ileus -- resolved  CT 2/19 -- small thin-walled fluid collection/abscess in pelvis with expected inflammatory changes of R colon/cecum    Continue Abx -- Ecoli in cultures sensitive to Zosyn home with PO abx  Xanax one-time dose for anxiety  Decrease IV pain Rx  Low Fiber Diet  Discharge planning       Gloria Salgado DO  Resident, PGY-2  2/20/2020  6:24 AM    Socorro Scales MD  General Surgery, PGY-5

## 2020-03-16 LAB
FUNGUS (MYCOLOGY) CULTURE: NORMAL
FUNGUS STAIN: NORMAL

## 2020-03-31 LAB
AFB CULTURE (MYCOBACTERIA): NORMAL
AFB SMEAR: NORMAL

## 2020-05-14 ENCOUNTER — HOSPITAL ENCOUNTER (EMERGENCY)
Age: 75
Discharge: HOME | End: 2020-05-15
Attending: EMERGENCY MEDICINE
Payer: MEDICARE

## 2020-05-14 PROCEDURE — 99284 EMERGENCY DEPT VISIT MOD MDM: CPT

## 2020-05-14 PROCEDURE — 93005 ELECTROCARDIOGRAM TRACING: CPT | Performed by: EMERGENCY MEDICINE

## 2020-05-14 RX ORDER — ALPRAZOLAM 0.25 MG/1
1 TABLET ORAL NIGHTLY PRN
COMMUNITY
End: 2022-04-19

## 2020-05-15 VITALS
SYSTOLIC BLOOD PRESSURE: 141 MMHG | HEART RATE: 70 BPM | TEMPERATURE: 98.2 F | BODY MASS INDEX: 31.58 KG/M2 | DIASTOLIC BLOOD PRESSURE: 79 MMHG | RESPIRATION RATE: 18 BRPM | OXYGEN SATURATION: 97 % | WEIGHT: 185 LBS | HEIGHT: 64 IN

## 2020-05-15 LAB
ANION GAP SERPL CALCULATED.3IONS-SCNC: 14 MMOL/L (ref 7–16)
BASOPHILS ABSOLUTE: 0.07 E9/L (ref 0–0.2)
BASOPHILS RELATIVE PERCENT: 0.7 % (ref 0–2)
BUN BLDV-MCNC: 14 MG/DL (ref 8–23)
CALCIUM SERPL-MCNC: 9.3 MG/DL (ref 8.6–10.2)
CHLORIDE BLD-SCNC: 97 MMOL/L (ref 98–107)
CO2: 24 MMOL/L (ref 22–29)
CREAT SERPL-MCNC: 1.1 MG/DL (ref 0.5–1)
EKG ATRIAL RATE: 87 BPM
EKG P AXIS: 35 DEGREES
EKG P-R INTERVAL: 176 MS
EKG Q-T INTERVAL: 376 MS
EKG QRS DURATION: 88 MS
EKG QTC CALCULATION (BAZETT): 452 MS
EKG R AXIS: -25 DEGREES
EKG T AXIS: 45 DEGREES
EKG VENTRICULAR RATE: 87 BPM
EOSINOPHILS ABSOLUTE: 0.62 E9/L (ref 0.05–0.5)
EOSINOPHILS RELATIVE PERCENT: 6.6 % (ref 0–6)
GFR AFRICAN AMERICAN: 58
GFR NON-AFRICAN AMERICAN: 48 ML/MIN/1.73
GLUCOSE BLD-MCNC: 135 MG/DL (ref 74–99)
HCT VFR BLD CALC: 36.1 % (ref 34–48)
HEMOGLOBIN: 11.8 G/DL (ref 11.5–15.5)
IMMATURE GRANULOCYTES #: 0.05 E9/L
IMMATURE GRANULOCYTES %: 0.5 % (ref 0–5)
LYMPHOCYTES ABSOLUTE: 2.73 E9/L (ref 1.5–4)
LYMPHOCYTES RELATIVE PERCENT: 29.1 % (ref 20–42)
MAGNESIUM: 1.8 MG/DL (ref 1.6–2.6)
MCH RBC QN AUTO: 29.4 PG (ref 26–35)
MCHC RBC AUTO-ENTMCNC: 32.7 % (ref 32–34.5)
MCV RBC AUTO: 90 FL (ref 80–99.9)
MONOCYTES ABSOLUTE: 0.74 E9/L (ref 0.1–0.95)
MONOCYTES RELATIVE PERCENT: 7.9 % (ref 2–12)
NEUTROPHILS ABSOLUTE: 5.17 E9/L (ref 1.8–7.3)
NEUTROPHILS RELATIVE PERCENT: 55.2 % (ref 43–80)
PDW BLD-RTO: 14.6 FL (ref 11.5–15)
PLATELET # BLD: 340 E9/L (ref 130–450)
PMV BLD AUTO: 8.7 FL (ref 7–12)
POTASSIUM REFLEX MAGNESIUM: 3.8 MMOL/L (ref 3.5–5)
RBC # BLD: 4.01 E12/L (ref 3.5–5.5)
SODIUM BLD-SCNC: 135 MMOL/L (ref 132–146)
TROPONIN: <0.01 NG/ML (ref 0–0.03)
WBC # BLD: 9.4 E9/L (ref 4.5–11.5)

## 2020-05-15 PROCEDURE — 85025 COMPLETE CBC W/AUTO DIFF WBC: CPT

## 2020-05-15 PROCEDURE — 83735 ASSAY OF MAGNESIUM: CPT

## 2020-05-15 PROCEDURE — 93010 ELECTROCARDIOGRAM REPORT: CPT | Performed by: INTERNAL MEDICINE

## 2020-05-15 PROCEDURE — 6370000000 HC RX 637 (ALT 250 FOR IP): Performed by: EMERGENCY MEDICINE

## 2020-05-15 PROCEDURE — 80048 BASIC METABOLIC PNL TOTAL CA: CPT

## 2020-05-15 PROCEDURE — 2580000003 HC RX 258: Performed by: EMERGENCY MEDICINE

## 2020-05-15 PROCEDURE — 84484 ASSAY OF TROPONIN QUANT: CPT

## 2020-05-15 RX ORDER — LORAZEPAM 1 MG/1
1 TABLET ORAL ONCE
Status: COMPLETED | OUTPATIENT
Start: 2020-05-15 | End: 2020-05-15

## 2020-05-15 RX ORDER — 0.9 % SODIUM CHLORIDE 0.9 %
1000 INTRAVENOUS SOLUTION INTRAVENOUS ONCE
Status: COMPLETED | OUTPATIENT
Start: 2020-05-15 | End: 2020-05-15

## 2020-05-15 RX ORDER — POTASSIUM CHLORIDE 20 MEQ/1
40 TABLET, EXTENDED RELEASE ORAL ONCE
Status: COMPLETED | OUTPATIENT
Start: 2020-05-15 | End: 2020-05-15

## 2020-05-15 RX ADMIN — LORAZEPAM 1 MG: 1 TABLET ORAL at 00:13

## 2020-05-15 RX ADMIN — SODIUM CHLORIDE 1000 ML: 9 INJECTION, SOLUTION INTRAVENOUS at 00:26

## 2020-05-15 RX ADMIN — POTASSIUM CHLORIDE 40 MEQ: 20 TABLET, EXTENDED RELEASE ORAL at 01:31

## 2020-05-15 NOTE — ED PROVIDER NOTES
90.0 80.0 - 99.9 fL    MCH 29.4 26.0 - 35.0 pg    MCHC 32.7 32.0 - 34.5 %    RDW 14.6 11.5 - 15.0 fL    Platelets 945 090 - 927 E9/L    MPV 8.7 7.0 - 12.0 fL    Neutrophils % 55.2 43.0 - 80.0 %    Immature Granulocytes % 0.5 0.0 - 5.0 %    Lymphocytes % 29.1 20.0 - 42.0 %    Monocytes % 7.9 2.0 - 12.0 %    Eosinophils % 6.6 (H) 0.0 - 6.0 %    Basophils % 0.7 0.0 - 2.0 %    Neutrophils Absolute 5.17 1.80 - 7.30 E9/L    Immature Granulocytes # 0.05 E9/L    Lymphocytes Absolute 2.73 1.50 - 4.00 E9/L    Monocytes Absolute 0.74 0.10 - 0.95 E9/L    Eosinophils Absolute 0.62 (H) 0.05 - 0.50 E9/L    Basophils Absolute 0.07 0.00 - 0.20 L3/A   Basic Metabolic Panel w/ Reflex to MG   Result Value Ref Range    Sodium 135 132 - 146 mmol/L    Potassium reflex Magnesium 3.8 3.5 - 5.0 mmol/L    Chloride 97 (L) 98 - 107 mmol/L    CO2 24 22 - 29 mmol/L    Anion Gap 14 7 - 16 mmol/L    Glucose 135 (H) 74 - 99 mg/dL    BUN 14 8 - 23 mg/dL    CREATININE 1.1 (H) 0.5 - 1.0 mg/dL    GFR Non-African American 48 >=60 mL/min/1.73    GFR African American 58     Calcium 9.3 8.6 - 10.2 mg/dL   Troponin   Result Value Ref Range    Troponin <0.01 0.00 - 0.03 ng/mL   Magnesium   Result Value Ref Range    Magnesium 1.8 1.6 - 2.6 mg/dL   EKG 12 Lead   Result Value Ref Range    Ventricular Rate 87 BPM    Atrial Rate 87 BPM    P-R Interval 176 ms    QRS Duration 88 ms    Q-T Interval 376 ms    QTc Calculation (Bazett) 452 ms    P Axis 35 degrees    R Axis -25 degrees    T Axis 45 degrees       RADIOLOGY:  Interpreted by Radiologist.  No orders to display       ------------------------- NURSING NOTES AND VITALS REVIEWED ---------------------------   The nursing notes within the ED encounter and vital signs as below have been reviewed.    BP (!) 151/83   Pulse 68   Temp 98.2 °F (36.8 °C) (Oral)   Resp 20   Ht 5' 4\" (1.626 m)   Wt 185 lb (83.9 kg)   SpO2 98%   BMI 31.76 kg/m²   Oxygen Saturation Interpretation: Normal      ---------------------------------------------------PHYSICAL EXAM--------------------------------------      Constitutional/General: Alert and oriented x3, well appearing, non toxic in NAD  Head: NC/AT  Eyes: PERRL, EOMI  Mouth: Oropharynx clear, handling secretions, no trismus  Neck: Supple, full ROM, no meningeal signs  Pulmonary: Lungs clear to auscultation bilaterally, no wheezes, rales, or rhonchi. Not in respiratory distress  Cardiovascular:  Regular rate and rhythm, no murmurs, gallops, or rubs. 2+ distal pulses  Abdomen: Soft, non tender, non distended,   Extremities: Moves all extremities x 4. Warm and well perfused  Skin: warm and dry without rash  Neurologic: GCS 15,  Psych: Normal Affect/moderately anxious      ------------------------------ ED COURSE/MEDICAL DECISION MAKING----------------------  Medications   potassium chloride (KLOR-CON M) extended release tablet 40 mEq (has no administration in time range)   0.9 % sodium chloride bolus (0 mLs Intravenous Stopped 5/15/20 0059)   LORazepam (ATIVAN) tablet 1 mg (1 mg Oral Given 5/15/20 0013)         Medical Decision Making:    Palpitations rule out dysrhythmia or electrolyte disorder    Counseling: The emergency provider has spoken with the patient and discussed todays results, in addition to providing specific details for the plan of care and counseling regarding the diagnosis and prognosis. Questions are answered at this time and they are agreeable with the plan.      --------------------------------- IMPRESSION AND DISPOSITION ---------------------------------    IMPRESSION  1.  Palpitations Stable       DISPOSITION  Disposition: Discharge to home  Patient condition is stable      EKG: Sinus rhythm with arrhythmia with frequent PVCs, no clear acute ischemic change and normal axis            John Ochoa MD  05/15/20 0122

## 2020-05-19 NOTE — PROGRESS NOTES
personally reviewed the laboratory, cardiac diagnostic and radiographic testing as outlined below:    Data:    No results for input(s): WBC, HGB, HCT, PLT in the last 72 hours. No results for input(s): NA, K, CL, CO2, BUN, CREATININE, CALCIUM in the last 72 hours. Invalid input(s): GLU, MAGNESIUM   Lab Results   Component Value Date    MG 1.8 05/15/2020     No results for input(s): TSH in the last 72 hours. No results for input(s): INR in the last 72 hours. CXR: 2020:    Narrative   Patient MRN:  [id number]   : 1945   Age: 66 years   Gender: Female       Order Date:  2020 7:45 PM       EXAM: XR CHEST PORTABLE       COMPARISON: 2020 at 1922 hours       INDICATION:  line placement, retracted line    line placement, retracted line        FINDINGS:       Retraction of previously seen right IJ central venous catheter with   distal tip now level superior vena cava. Heart is normal in size. No   airspace opacity or pleural effusion.           Impression       1. Interval retraction of right IJ central venous catheter with distal   tip now overlying the expected location of superior vena cava and in   satisfactory position.       2. No pneumothorax. EK20: SR with PACs and PVC, rate: 75bpm - Please see scan in Cardiology. Outpatient Monitor: 2020: I have independently reviewed all of the ECGs and rhythm strips per above     Assessment/Plan: This is a 66 y.o. female with a history of   Patient Active Problem List   Diagnosis    Acute appendicitis with generalized peritonitis and gangrene    who presents with occasional palpitations    1. Palpitations  - Reported occasional increased heart rate up to 180's bpm per pulse oximetry.  - Reported  palpitation episodes associated with SOB, lightheadedness and dizziness with HR between  bpm on pulse oximetry which she attributed to panic attack.    - Twenty four hour Holter monitor showed no significant arrhythmia and noted HR between 40 to 130 bpm.  - Can not totally exclude paroxysmal arrhythmias. - Will check echocardiogram to exclude structural heart disease.  - Will schedule 30 day cardiac monitor to exclude paroxysmal arrhythmias. 2. Hypertension  - Elevated at this office visit. - Currently on Prinzide. 3. Hypothyroidism  - Currently on Synthroid. - Managed by PCP. 4. Asthma  - Uses Asmanex inhaler bid. 5. GERD  - On Prilosec    6. Anxiety  - On Xanax    Recommendations:  1. Echocardiogram to exclude structural heart disease. 2. Thirty day cardiac monitor tp exclude paroxysmal arrhythmia. 3. Nuclear stress test due to dyspnea on exertion to rule out coronary artery disease. 4. Follow up in 3 months. Encouraged the patient to call the office for any questions or concerns. I have spent a total of 60 minutes with the patient and the family reviewing the above stated recommendations. And a total of >50% of that time involved face-to-face time providing counseling and or coordination of care with the other providers. Thank you for allowing me to participate in your patient's care. Please call me if there are any questions or concerns.       Talon Greenwood MD  Cardiac Electrophysiology  [address]  [facility] [place]: [facility]  5/21/20   12:26 PM

## 2020-05-21 ENCOUNTER — OFFICE VISIT (OUTPATIENT)
Dept: NON INVASIVE DIAGNOSTICS | Age: 75
End: 2020-05-21
Payer: MEDICARE

## 2020-05-21 VITALS
BODY MASS INDEX: 31.24 KG/M2 | HEIGHT: 64 IN | HEART RATE: 75 BPM | RESPIRATION RATE: 16 BRPM | WEIGHT: 183 LBS | DIASTOLIC BLOOD PRESSURE: 70 MMHG | SYSTOLIC BLOOD PRESSURE: 160 MMHG

## 2020-05-21 PROCEDURE — G8417 CALC BMI ABV UP PARAM F/U: HCPCS | Performed by: INTERNAL MEDICINE

## 2020-05-21 PROCEDURE — 4040F PNEUMOC VAC/ADMIN/RCVD: CPT | Performed by: INTERNAL MEDICINE

## 2020-05-21 PROCEDURE — 1090F PRES/ABSN URINE INCON ASSESS: CPT | Performed by: INTERNAL MEDICINE

## 2020-05-21 PROCEDURE — G8427 DOCREV CUR MEDS BY ELIG CLIN: HCPCS | Performed by: INTERNAL MEDICINE

## 2020-05-21 PROCEDURE — 1036F TOBACCO NON-USER: CPT | Performed by: INTERNAL MEDICINE

## 2020-05-21 PROCEDURE — 99205 OFFICE O/P NEW HI 60 MIN: CPT | Performed by: INTERNAL MEDICINE

## 2020-05-21 PROCEDURE — G8400 PT W/DXA NO RESULTS DOC: HCPCS | Performed by: INTERNAL MEDICINE

## 2020-05-21 PROCEDURE — 1123F ACP DISCUSS/DSCN MKR DOCD: CPT | Performed by: INTERNAL MEDICINE

## 2020-05-21 PROCEDURE — 93000 ELECTROCARDIOGRAM COMPLETE: CPT | Performed by: INTERNAL MEDICINE

## 2020-06-22 ENCOUNTER — TELEPHONE (OUTPATIENT)
Dept: CARDIOLOGY | Age: 75
End: 2020-06-22

## 2020-06-23 ENCOUNTER — TELEPHONE (OUTPATIENT)
Dept: NON INVASIVE DIAGNOSTICS | Age: 75
End: 2020-06-23

## 2020-06-24 ENCOUNTER — HOSPITAL ENCOUNTER (OUTPATIENT)
Dept: CARDIOLOGY | Age: 75
Discharge: HOME | End: 2020-06-24
Payer: MEDICARE

## 2020-06-24 VITALS
HEIGHT: 64 IN | SYSTOLIC BLOOD PRESSURE: 146 MMHG | BODY MASS INDEX: 30.73 KG/M2 | DIASTOLIC BLOOD PRESSURE: 62 MMHG | WEIGHT: 180 LBS | HEART RATE: 100 BPM

## 2020-06-24 LAB
LV EF: 60 %
LVEF MODALITY: NORMAL

## 2020-06-24 PROCEDURE — 6360000002 HC RX W HCPCS: Performed by: INTERNAL MEDICINE

## 2020-06-24 PROCEDURE — 2580000003 HC RX 258: Performed by: INTERNAL MEDICINE

## 2020-06-24 PROCEDURE — 93306 TTE W/DOPPLER COMPLETE: CPT | Performed by: PSYCHIATRY & NEUROLOGY

## 2020-06-24 PROCEDURE — A9500 TC99M SESTAMIBI: HCPCS | Performed by: INTERNAL MEDICINE

## 2020-06-24 PROCEDURE — 93017 CV STRESS TEST TRACING ONLY: CPT

## 2020-06-24 PROCEDURE — 78452 HT MUSCLE IMAGE SPECT MULT: CPT

## 2020-06-24 PROCEDURE — 3430000000 HC RX DIAGNOSTIC RADIOPHARMACEUTICAL: Performed by: INTERNAL MEDICINE

## 2020-06-24 RX ORDER — SODIUM CHLORIDE 0.9 % (FLUSH) 0.9 %
10 SYRINGE (ML) INJECTION PRN
Status: DISCONTINUED | OUTPATIENT
Start: 2020-06-24 | End: 2020-06-25 | Stop reason: HOSPADM

## 2020-06-24 RX ADMIN — Medication 34.3 MILLICURIE: at 09:59

## 2020-06-24 RX ADMIN — Medication 10 MILLICURIE: at 08:51

## 2020-06-24 RX ADMIN — REGADENOSON 0.4 MG: 0.08 INJECTION, SOLUTION INTRAVENOUS at 09:59

## 2020-06-24 RX ADMIN — SODIUM CHLORIDE, PRESERVATIVE FREE 10 ML: 5 INJECTION INTRAVENOUS at 10:00

## 2020-06-24 RX ADMIN — SODIUM CHLORIDE, PRESERVATIVE FREE 10 ML: 5 INJECTION INTRAVENOUS at 09:59

## 2020-06-24 RX ADMIN — SODIUM CHLORIDE, PRESERVATIVE FREE 10 ML: 5 INJECTION INTRAVENOUS at 08:51

## 2020-06-24 NOTE — PROCEDURES
normal.    Rotational analog analysis demonstrated soft tissue attenuation. The gated SPECT stress imaging in the short, vertical long, and horizontal long axis demonstrated normal homogeneous tracer distribution throughout the myocardium. The resting images show no change. Gated SPECT left ventricular ejection fraction was calculated to be 73%, with normal myocardial thickening and wall motion. Impression:    1. ECG during the infusion did not change. 2. The myocardial perfusion imaging was normal with attenuation artifact. 3. Overall left ventricular systolic function was normal without regional wall motion abnormalities. 4. Low risk general pharmacologic stress test.    Thank you for sending your patient to this [employer].      Electronically signed by Shiv Vargas MD on 6/24/20 at 3:06 PM EDT

## 2020-06-26 ENCOUNTER — TELEPHONE (OUTPATIENT)
Dept: NON INVASIVE DIAGNOSTICS | Age: 75
End: 2020-06-26

## 2020-07-02 NOTE — PROGRESS NOTES
Negative for epistaxis. Eyes: Negative for diploplia, blurred vision. Respiratory: Negative for cough and chest tightness. Positive for shortness of breath and negative for wheezing. Cardiovascular: pertinent positives in HPI  Gastrointestinal: Negative for abdominal pain and blood in stool. All other review of systems are negative     PHYSICAL EXAM:   Vitals:    20 1506   BP: (!) 138/90   Pulse: 74   Resp: 16   Weight: 182 lb (82.6 kg)   Height: 5' 4.5\" (1.638 m)      Constitutional: Well-developed, no acute distress  Eyes: conjunctivae normal, no xanthelasma   Ears, Nose, Throat: oral mucosa moist, no cyanosis   CV: no JVD. Regular rate and rhythm. Normal S1S2 and no S3. No murmurs, rubs, or gallops. PMI is nondisplaced  Lungs: clear to auscultation bilaterally, normal respiratory effort without used of accessory muscles  Abdomen: soft, non-tender, bowel sounds present, no masses or hepatomegaly   Musculoskeletal: no digital clubbing. Trace edema of LEs. Skin: warm, no rashes     I have personally reviewed the laboratory, cardiac diagnostic and radiographic testing as outlined below:    Data:    No results for input(s): WBC, HGB, HCT, PLT in the last 72 hours. No results for input(s): NA, K, CL, CO2, BUN, CREATININE, CALCIUM in the last 72 hours. Invalid input(s): GLU, MAGNESIUM   Lab Results   Component Value Date    MG 1.8 05/15/2020     No results for input(s): TSH in the last 72 hours. No results for input(s): INR in the last 72 hours. CXR: 2020:    Narrative   Patient MRN:  [id number]   : 1945   Age: 66 years   Gender: Female       Order Date:  2020 7:45 PM       EXAM: XR CHEST PORTABLE       COMPARISON: 2020 at 1922 hours       INDICATION:  line placement, retracted line    line placement, retracted line        FINDINGS:       Retraction of previously seen right IJ central venous catheter with   distal tip now level superior vena cava.  Heart is normal in size. No   airspace opacity or pleural effusion.           Impression       1. Interval retraction of right IJ central venous catheter with distal   tip now overlying the expected location of superior vena cava and in   satisfactory position.       2. No pneumothorax. EK20: SR with PACs, rate: 74bpm - Please see scan in Cardiology. Echocardiogram 20:   Findings      Left Ventricle   Left ventricle size is normal.   Normal left ventricular wall thickness. Normal left ventricular systolic function. Ejection fraction is visually estimated at 60%. Right Ventricle   Normal right ventricular size and function (TAPSE 2.5 cm). Left Atrium   Mildly dilated left atrium by volume index. Right Atrium   Normal sized right atrium. Mitral Valve   Physiologic and/or trace mitral regurgitation. No evidence of hemodynamically significant mitral stenosis. Tricuspid Valve   Mild tricuspid regurgitation. PASP is estimated at 22 mmHg. Aortic Valve   No evidence of hemodynamically significant aortic regurgitation or   stenosis. Pulmonic Valve   Mild pulmonic regurgitation. Pericardial Effusion   No evidence of a hemodynamically significant pericardial effusion. Aorta   Aortic root dimension within normal limits. Conclusions      Summary   Normal left ventricular systolic function. Ejection fraction is visually estimated at 60%. Normal right ventricular size and function (TAPSE 2.5 cm). Mild tricuspid regurgitation. PASP is estimated at 22 mmHg.       Signature      ----------------------------------------------------------------   Electronically signed by Sami Carballo MD(Interpreting   physician) on 2020 04:03 PM   ----------------------------------------------------------------     M-Mode/2D Measurements & Calculations      LV Diastolic    LV Systolic Dimension: 2.9  AV Cusp Separation: 1.8 cmLA   Dimension: 4.5  cm m/s   (continuity): 3.7                                PV Mean Gradient: 1.9   cm^2                                             mmHg   MV E' Septal   Velocity: 0.12   m/s   MV E' Lateral   Velocity: 6 m/s    Nuclear stress test 6/24/20:  FINDINGS: The overall quality of the study was good. Left ventricular cavity size was noted to be normal.    Rotational analog analysis demonstrated soft tissue attenuation. The gated SPECT stress imaging in the short, vertical long, and   horizontal long axis demonstrated normal homogeneous tracer   distribution throughout the myocardium. The resting images show no change. Gated SPECT left ventricular ejection fraction was calculated to   be 73%, with normal myocardial thickening and wall motion. Impression:    1. ECG during the infusion did not change. 2. The myocardial perfusion imaging was normal with attenuation   artifact. 3. Overall left ventricular systolic function was normal without   regional wall motion abnormalities. 4. Low risk general pharmacologic stress test.    Thank you for sending your patient to this [facility]. Electronically signed by Mira Pineda MD on 6/24/20 at 3:06 PM   EDT    Thirty day cardiac monitor 5/26/20:  Paroxysmal atrial fibrillation with rapid ventricular rate with 1% burden was observed. Symptom correlated with sinus rhythm with atrial run. Outpatient Monitor: 5/13/2020: I have independently reviewed all of the ECGs and rhythm strips per above     Assessment/Plan: This is a 66 y.o. female with a history of   Patient Active Problem List   Diagnosis    Acute appendicitis with generalized peritonitis and gangrene     1. Paroxysmal atrial fibrillation  - Newly diagnosed 5/21/20 on 30 day MCOT with < 1% burden. - [id number] = 4 (female, age, HTN). - Current OAC regiment includes none. - Current medical therapy includes none.   - Thus far has had no cardioversion, AAD or ablation.   - Had an extensive discussion regarding options between rate and rhythm control and the patient has chosen rate control for now. - Will start Cardizem for rate control.  - Will start Eliquis for stroke risk reduction.  - Consider start AAD therapy if fails Cardizem treatment. - Had an extensive discussion regarding all secondary causes of AF which include but are not limited to the following and then need for lifestyle modifications and stringent control of contributing medical conditions which include            - Weight Loss: aggressive weight loss and regular moderate cardiopulmonary exercise to maintain BMI <27 with a loss of at least 10% of their current weight which is safely and adequately maintained            - Exercise: 30min 3-4x/week of at least moderate cardiopulmonary exercise up to 250 min/wk            - Blood pressure: target of <130/80mmHg            - Dyslipidemia: add a statin if LDL >100mg/dL            - Diabetes Mellitus: add Metformin if HbA1c >6.5%            - Obstructive sleep apnea: evaluate for and or treat with CPAP if AHI >30/hour            - Abstinence from alcohol and tobacco products  - Discussed the potential improvement in all atrial fibrillation therapies if diet/exercise and weight loss are achieved per above. 2. Palpitations  - Reported occasional increased heart rate up to 180's bpm per pulse oximetry.  - Reported  palpitation episodes associated with SOB, lightheadedness and dizziness with HR between  bpm on pulse oximetry which she attributed to panic attack. - Twenty four hour Holter monitor showed no significant arrhythmia and noted HR between 40 to 130 bpm.  - 30 day cardiac monitor showed PAF with burden of <1%    3. Hypertension  - Elevated at this office visit. - Currently on Prinzide. 4. Hypothyroidism  - Currently on Synthroid. - Managed by PCP. 5. Asthma  - Uses Asmanex inhaler bid.     6. GERD  - On Prilosec    7. Anxiety  - On Xanax    Recommendations:  1. Will check CBC, CMP, TSH, FT4, INR and PTT as baseline. 2. Start Cardizem  mg daily. 3. Start Eliquis 5 mg bid. 4. Consider start AAD therapy such as Flecainide if fails Cardizem treatment. 5. Life style modifications as above. 6. Follow up in 3 months. Encouraged the patient to call the office for any questions or concerns. I have spent a total of 40 minutes with the patient and the family reviewing the above stated recommendations. And a total of >50% of that time involved face-to-face time providing counseling and or coordination of care with the other providers. Thank you for allowing me to participate in your patient's care. Please call me if there are any questions or concerns.       Silvino Morales MD  Cardiac Electrophysiology  [address]  [facility] [place]: [facility]  7/6/20   3:33 PM

## 2020-07-06 ENCOUNTER — HOSPITAL ENCOUNTER (OUTPATIENT)
Age: 75
Discharge: HOME | End: 2020-07-06
Payer: MEDICARE

## 2020-07-06 ENCOUNTER — OFFICE VISIT (OUTPATIENT)
Dept: NON INVASIVE DIAGNOSTICS | Age: 75
End: 2020-07-06
Payer: MEDICARE

## 2020-07-06 VITALS
WEIGHT: 182 LBS | HEART RATE: 74 BPM | HEIGHT: 65 IN | DIASTOLIC BLOOD PRESSURE: 90 MMHG | SYSTOLIC BLOOD PRESSURE: 138 MMHG | BODY MASS INDEX: 30.32 KG/M2 | RESPIRATION RATE: 16 BRPM

## 2020-07-06 LAB
ALBUMIN SERPL-MCNC: 4.2 G/DL (ref 3.5–5.2)
ALP BLD-CCNC: 59 U/L (ref 35–104)
ALT SERPL-CCNC: 29 U/L (ref 0–32)
ANION GAP SERPL CALCULATED.3IONS-SCNC: 11 MMOL/L (ref 7–16)
APTT: 35.1 SEC (ref 24.5–35.1)
AST SERPL-CCNC: 28 U/L (ref 0–31)
BILIRUB SERPL-MCNC: 0.3 MG/DL (ref 0–1.2)
BUN BLDV-MCNC: 11 MG/DL (ref 8–23)
CALCIUM SERPL-MCNC: 9.2 MG/DL (ref 8.6–10.2)
CHLORIDE BLD-SCNC: 95 MMOL/L (ref 98–107)
CO2: 26 MMOL/L (ref 22–29)
CREAT SERPL-MCNC: 1 MG/DL (ref 0.5–1)
GFR AFRICAN AMERICAN: >60
GFR NON-AFRICAN AMERICAN: 54 ML/MIN/1.73
GLUCOSE BLD-MCNC: 116 MG/DL (ref 74–99)
HCT VFR BLD CALC: 35.2 % (ref 34–48)
HEMOGLOBIN: 11.6 G/DL (ref 11.5–15.5)
INR BLD: 0.9
MCH RBC QN AUTO: 29.1 PG (ref 26–35)
MCHC RBC AUTO-ENTMCNC: 33 % (ref 32–34.5)
MCV RBC AUTO: 88.4 FL (ref 80–99.9)
PDW BLD-RTO: 14.2 FL (ref 11.5–15)
PLATELET # BLD: 351 E9/L (ref 130–450)
PMV BLD AUTO: 8.3 FL (ref 7–12)
POTASSIUM SERPL-SCNC: 4.4 MMOL/L (ref 3.5–5)
PROTHROMBIN TIME: 11 SEC (ref 9.3–12.4)
RBC # BLD: 3.98 E12/L (ref 3.5–5.5)
SODIUM BLD-SCNC: 132 MMOL/L (ref 132–146)
T4 FREE: 1.06 NG/DL (ref 0.93–1.7)
TOTAL PROTEIN: 7.8 G/DL (ref 6.4–8.3)
TSH SERPL DL<=0.05 MIU/L-ACNC: 2.38 UIU/ML (ref 0.27–4.2)
WBC # BLD: 7.4 E9/L (ref 4.5–11.5)

## 2020-07-06 PROCEDURE — 1036F TOBACCO NON-USER: CPT | Performed by: INTERNAL MEDICINE

## 2020-07-06 PROCEDURE — 80053 COMPREHEN METABOLIC PANEL: CPT

## 2020-07-06 PROCEDURE — 85730 THROMBOPLASTIN TIME PARTIAL: CPT

## 2020-07-06 PROCEDURE — 99215 OFFICE O/P EST HI 40 MIN: CPT | Performed by: INTERNAL MEDICINE

## 2020-07-06 PROCEDURE — 1123F ACP DISCUSS/DSCN MKR DOCD: CPT | Performed by: INTERNAL MEDICINE

## 2020-07-06 PROCEDURE — 84443 ASSAY THYROID STIM HORMONE: CPT

## 2020-07-06 PROCEDURE — G8427 DOCREV CUR MEDS BY ELIG CLIN: HCPCS | Performed by: INTERNAL MEDICINE

## 2020-07-06 PROCEDURE — G8417 CALC BMI ABV UP PARAM F/U: HCPCS | Performed by: INTERNAL MEDICINE

## 2020-07-06 PROCEDURE — 84439 ASSAY OF FREE THYROXINE: CPT

## 2020-07-06 PROCEDURE — G8400 PT W/DXA NO RESULTS DOC: HCPCS | Performed by: INTERNAL MEDICINE

## 2020-07-06 PROCEDURE — 93000 ELECTROCARDIOGRAM COMPLETE: CPT | Performed by: INTERNAL MEDICINE

## 2020-07-06 PROCEDURE — 85027 COMPLETE CBC AUTOMATED: CPT

## 2020-07-06 PROCEDURE — 4040F PNEUMOC VAC/ADMIN/RCVD: CPT | Performed by: INTERNAL MEDICINE

## 2020-07-06 PROCEDURE — 1090F PRES/ABSN URINE INCON ASSESS: CPT | Performed by: INTERNAL MEDICINE

## 2020-07-06 PROCEDURE — 85610 PROTHROMBIN TIME: CPT

## 2020-07-06 PROCEDURE — 36415 COLL VENOUS BLD VENIPUNCTURE: CPT

## 2020-07-06 RX ORDER — DILTIAZEM HYDROCHLORIDE 120 MG/1
120 CAPSULE, COATED, EXTENDED RELEASE ORAL DAILY
Qty: 30 CAPSULE | Refills: 3 | Status: SHIPPED
Start: 2020-07-06 | End: 2020-08-12 | Stop reason: DRUGHIGH

## 2020-07-09 ENCOUNTER — TELEPHONE (OUTPATIENT)
Dept: NON INVASIVE DIAGNOSTICS | Age: 75
End: 2020-07-09

## 2020-07-09 NOTE — TELEPHONE ENCOUNTER
----- Message from Jeanine Sneed MD sent at 7/6/2020  6:37 PM EDT -----  OK to start on Eliquis or Warfarin per patient's choice.  Thanks.  ----- Message -----  From: Sylvia Catalan Incoming Results From [employer]  Sent: 7/6/2020   4:20 PM EDT  To: Jeanine Sneed MD

## 2020-07-09 NOTE — TELEPHONE ENCOUNTER
I left a message for [place] to come and get samples of Eliquis and to fill out patient assit forms for [facility].

## 2020-08-05 ENCOUNTER — TELEPHONE (OUTPATIENT)
Dept: NON INVASIVE DIAGNOSTICS | Age: 75
End: 2020-08-05

## 2020-08-05 NOTE — TELEPHONE ENCOUNTER
Patient called and states since starting diltiazem she is still having AF breakthroughs, please advise.

## 2020-08-06 RX ORDER — FLECAINIDE ACETATE 50 MG/1
50 TABLET ORAL 2 TIMES DAILY
Qty: 14 TABLET | Refills: 0 | Status: SHIPPED
Start: 2020-08-06 | End: 2020-08-12

## 2020-08-06 RX ORDER — FLECAINIDE ACETATE 50 MG/1
50 TABLET ORAL 2 TIMES DAILY
COMMUNITY
End: 2020-08-06 | Stop reason: SDUPTHER

## 2020-08-06 NOTE — TELEPHONE ENCOUNTER
I called [place] to continue cardizem and start flecainide 50mg bid she verbalized understanding.  She requested only a week supply of flecainide because she does not have drug plan coverage and he worried about cost.

## 2020-08-12 ENCOUNTER — TELEPHONE (OUTPATIENT)
Dept: NON INVASIVE DIAGNOSTICS | Age: 75
End: 2020-08-12

## 2020-08-12 RX ORDER — DILTIAZEM HYDROCHLORIDE 180 MG/1
180 CAPSULE, EXTENDED RELEASE ORAL DAILY
COMMUNITY
End: 2022-05-25

## 2020-12-31 ENCOUNTER — HOSPITAL ENCOUNTER (OUTPATIENT)
Age: 75
Discharge: HOME | End: 2021-01-02

## 2020-12-31 PROCEDURE — 88342 IMHCHEM/IMCYTCHM 1ST ANTB: CPT

## 2020-12-31 PROCEDURE — 88305 TISSUE EXAM BY PATHOLOGIST: CPT

## 2022-02-22 ENCOUNTER — OFFICE VISIT (OUTPATIENT)
Dept: CARDIOLOGY CLINIC | Age: 77
End: 2022-02-22
Payer: MEDICARE

## 2022-02-22 VITALS
RESPIRATION RATE: 18 BRPM | WEIGHT: 172.2 LBS | BODY MASS INDEX: 28.69 KG/M2 | SYSTOLIC BLOOD PRESSURE: 107 MMHG | HEART RATE: 79 BPM | DIASTOLIC BLOOD PRESSURE: 65 MMHG | HEIGHT: 65 IN

## 2022-02-22 DIAGNOSIS — Z01.810 PREOP CARDIOVASCULAR EXAM: Primary | ICD-10-CM

## 2022-02-22 DIAGNOSIS — M25.569 KNEE PAIN, UNSPECIFIED CHRONICITY, UNSPECIFIED LATERALITY: ICD-10-CM

## 2022-02-22 DIAGNOSIS — I48.91 ATRIAL FIBRILLATION, UNSPECIFIED TYPE (HCC): ICD-10-CM

## 2022-02-22 PROBLEM — K35.891 ACUTE APPENDICITIS WITH GENERALIZED PERITONITIS AND GANGRENE: Status: RESOLVED | Noted: 2020-02-12 | Resolved: 2022-02-22

## 2022-02-22 PROBLEM — K35.209 ACUTE APPENDICITIS WITH GENERALIZED PERITONITIS AND GANGRENE: Status: RESOLVED | Noted: 2020-02-12 | Resolved: 2022-02-22

## 2022-02-22 PROBLEM — R00.2 PALPITATIONS: Status: RESOLVED | Noted: 2022-02-22 | Resolved: 2022-02-22

## 2022-02-22 PROBLEM — K35.20 ACUTE APPENDICITIS WITH GENERALIZED PERITONITIS AND GANGRENE: Status: RESOLVED | Noted: 2020-02-12 | Resolved: 2022-02-22

## 2022-02-22 PROBLEM — R00.2 PALPITATIONS: Status: ACTIVE | Noted: 2022-02-22

## 2022-02-22 PROCEDURE — 1036F TOBACCO NON-USER: CPT | Performed by: INTERNAL MEDICINE

## 2022-02-22 PROCEDURE — G8417 CALC BMI ABV UP PARAM F/U: HCPCS | Performed by: INTERNAL MEDICINE

## 2022-02-22 PROCEDURE — G8484 FLU IMMUNIZE NO ADMIN: HCPCS | Performed by: INTERNAL MEDICINE

## 2022-02-22 PROCEDURE — G8427 DOCREV CUR MEDS BY ELIG CLIN: HCPCS | Performed by: INTERNAL MEDICINE

## 2022-02-22 PROCEDURE — 4040F PNEUMOC VAC/ADMIN/RCVD: CPT | Performed by: INTERNAL MEDICINE

## 2022-02-22 PROCEDURE — G8400 PT W/DXA NO RESULTS DOC: HCPCS | Performed by: INTERNAL MEDICINE

## 2022-02-22 PROCEDURE — 1123F ACP DISCUSS/DSCN MKR DOCD: CPT | Performed by: INTERNAL MEDICINE

## 2022-02-22 PROCEDURE — 93000 ELECTROCARDIOGRAM COMPLETE: CPT | Performed by: INTERNAL MEDICINE

## 2022-02-22 PROCEDURE — 1090F PRES/ABSN URINE INCON ASSESS: CPT | Performed by: INTERNAL MEDICINE

## 2022-02-22 PROCEDURE — 99204 OFFICE O/P NEW MOD 45 MIN: CPT | Performed by: INTERNAL MEDICINE

## 2022-02-22 NOTE — PROGRESS NOTES
Chief Complaint   Patient presents with    Cardiac Clearance    Atrial Fibrillation       Patient Active Problem List    Diagnosis Date Noted    Atrial fibrillation ([facility] [address] 2022       Current Outpatient Medications   Medication Sig Dispense Refill    Multiple Vitamins-Minerals (PRESERVISION AREDS 2+MULTI VIT PO) Take by mouth      dilTIAZem (DILACOR XR) 180 MG extended release capsule Take 180 mg by mouth daily      apixaban (ELIQUIS) 5 MG TABS tablet Take 1 tablet by mouth 2 times daily 60 tablet 3    mometasone (ASMANEX, 120 METERED DOSES,) 220 MCG/INH inhaler Inhale 2 puffs into the lungs 2 times daily      ALPRAZolam (XANAX) 0.25 MG tablet Take 1 mg by mouth nightly as needed for Sleep.  lisinopril-hydrochlorothiazide (PRINZIDE;ZESTORETIC) 20-12.5 MG per tablet Take 2 tablets by mouth daily      omeprazole (PRILOSEC) 40 MG delayed release capsule Take 40 mg by mouth daily      levothyroxine (SYNTHROID) 50 MCG tablet Take 50 mcg by mouth daily. No current facility-administered medications for this visit. Allergies   Allergen Reactions    Other      Ragweed and milk after allergy testing  No particular symptoms       Vitals:    22 1200   BP: 107/65   Pulse: 79   Resp: 18   Weight: 172 lb 3.2 oz (78.1 kg)   Height: 5' 4.5\" (1.638 m)       Social History     Socioeconomic History    Marital status:       Spouse name: Not on file    Number of children: Not on file    Years of education: Not on file    Highest education level: Not on file   Occupational History    Not on file   Tobacco Use    Smoking status: Former Smoker     Types: Cigarettes     Quit date: 2002     Years since quittin.0    Smokeless tobacco: Never Used    Tobacco comment: quit 20 or > years ago   Vaping Use    Vaping Use: Never used   Substance and Sexual Activity    Alcohol use: No     Comment: former alcoholic    Drug use: Not Currently     Types: Marijuana [facility])    Sexual activity: Not on file   Other Topics Concern    Not on file   Social History Narrative    Not on file     Social Determinants of Health     Financial Resource Strain:     Difficulty of Paying Living Expenses: Not on file   Food Insecurity:     Worried About Running Out of Food in the Last Year: Not on file    Claudia of Food in the Last Year: Not on file   Transportation Needs:     Lack of Transportation (Medical): Not on file    Lack of Transportation (Non-Medical): Not on file   Physical Activity:     Days of Exercise per Week: Not on file    Minutes of Exercise per Session: Not on file   Stress:     Feeling of Stress : Not on file   Social Connections:     Frequency of Communication with Friends and Family: Not on file    Frequency of Social Gatherings with Friends and Family: Not on file    Attends [employer] Services: Not on file    Active Member of [address] [place] [address] [address] or Organizations: Not on file    Attends Club or Organization Meetings: Not on file    Marital Status: Not on file   Intimate Partner Violence:     Fear of Current or Ex-Partner: Not on file    Emotionally Abused: Not on file    Physically Abused: Not on file    Sexually Abused: Not on file   Housing Stability:     Unable to Pay for Housing in the Last Year: Not on file    Number of [place] in the Last Year: Not on file    Unstable Housing in the Last Year: Not on file       Family History   Problem Relation Age of Onset    Other Mother 80        no health problems    Colon Cancer Father 79    Alcohol Abuse Sister         DRUG abuse    Alcohol Abuse Sister         Drug abuse         SUBJECTIVE: [place] presents to the office today for consult for pre-op evaluation prior to knee surgery with Dr. Alexei Jimenez. Hx of PAF - I reviewed notes of EPS. She complains of no new or unstable cardiac symptoms,  and denies chest pain, dyspnea, orthopnea and syncope. Can do household chores.   Compliant with NOAC        Review of Systems:   Heart: as above   Lungs: as above   Eyes: denies changes in vision or discharge. Ears: denies changes in hearing or pain. Nose: denies epistaxis or masses   Throat: denies sore throat or trouble swallowing. Neuro: denies numbness, tingling, tremors. Skin: denies rashes or itching. : denies hematuria, dysuria   GI: denies vomiting, diarrhea   Psych: denies mood changed, anxiety, depression. all others negative. Physical Exam   /65   Pulse 79   Resp 18   Ht 5' 4.5\" (1.638 m)   Wt 172 lb 3.2 oz (78.1 kg)   BMI 29.10 kg/m²   Constitutional: Oriented to person, place, and time. Obese No distress. Head: Normocephalic and atraumatic. Eyes: EOM are normal. Pupils are equal, round, and reactive to light. Neck: Normal range of motion. Neck supple. No hepatojugular reflux and no JVD present. Carotid bruit is not present. Cardiovascular: Normal rate, regular rhythm, normal heart sounds and intact distal pulses. Exam reveals no gallop and no friction rub. No murmur heard. Pulmonary/Chest: Effort normal and breath sounds normal. No respiratory distress. No wheezes. No rales. Abdominal: Soft. Bowel sounds are normal. No distension and no mass. No tenderness. No rebound and no guarding. Musculoskeletal:  No edema and no tenderness. Neurological: Alert and oriented to person, place, and time. Skin: Skin is warm and dry. No rash noted. Not diaphoretic. No erythema. Psychiatric: Normal mood and affect. Behavior is normal.     EKG:  normal EKG, normal sinus rhythm, nonspecific ST and T waves changes, unchanged from previous tracings.     ASSESSMENT AND PLAN:  Patient Active Problem List   Diagnosis    Atrial fibrillation ([address]       ·  Patient has no high risk clinical predictors of an adverse outcome in surgery, such as recent myocardial infarction, unstable angina, heart failure, rhythm other than sinus, severe obstructive valvular disease,cva, insulin, ckd  · Able to achieve 4 METS with AODLs  · Normal CV exam and ekg  · Unremarkable echo and stress 2020  · RCRI Class I risk (< 1% chance of cardiac complication).        Jeimy Thomas M.D  [facility]

## 2022-03-07 NOTE — H&P
Chief Complaint   right knee pre-op   History of Present Illness   [place] returns for preoperative assessment of right knee. She rates her pain as 2-3/10 at rest, worse w/walkign / standing. She takes Tylenol as needed. She states she did fall about 2 weeks ago. She has fallen twice in the past 2 months. Her pain remains unchanged. Would like to discuss surgery today. Last xrays were done at [facility]. *She is on eloquis for afib Here today w/a friend given mild dementia symptoms. Past Medical History - reviewed   Arthritis   GERD/Heartburn   Asthma   Heart disease/Heart attack   Hypertension   Bruise easily   Thyroid disease   Family History - reviewed   Cancer (father)   Surgical History - reviewed   Social History - reviewed   Risk Factors - reviewed   Patient had a flu shot in the last 12 months? no   The patient is 72 years or older and has had a pneumonia vaccine: yes   Patient has an implant none   Tobacco status: former smoker   Alcohol use: has drank alcohol in the past   Does patient exercise? yes   How often does patient exercise? daily   In the past 12 months, have you fallen? yes   Current Medications (including meds started today):   No known medications   Current Allergies (reviewed this update):   No known allergies   Vital Signs   Weight: 175.00 lbs. (79.38 kg.)   Height: 64 in. (162.56 cm.)   Blood Pressure: 108/64 mm Hg   Body Mass Index: 30.04   Body Surface Area: 1.85 m2   Review of Systems   General: Patient denies sweats, weight loss, fevers, chills, fatigue. Eyes: Positive for vision loss - both eyes. ENT: Patient denies decreased hearing, difficulty swallowing. Cardiovascular: Positive for palpitations, shortness of breath with exertion. Respiratory: Positive for chest discomfort. Gastrointestinal: Patient denies vomiting, loss of appetite, diarrhea, nausea. Genitourinary: Patient denies urinary retention, urinary frequency, frequent UTI, urinary urgency, pain. Musculoskeletal: Positive for back pain, arthritis, muscle cramps. Skin: Patient denies dryness, unusual hair distribution, suspicious lesions, psoriasis, changes in color of skin, changes in nail beds, poor wound healing. Neurologic: Positive for visual disturbances, poor balance, disturbances in coordination. Psychiatric: Positive for anxiety. Heme/Lymphatic: Patient denies abnormal bruising. Allergic/Immunologic: Positive for seasonal allergies. The remainder of the complete review of systems was negative. Physical Exam   General Appearance:   Awake, alert, oriented x3   No acute distress   Cardiovascular:   Radial Pulses: palpable   Brachial Pulse: palpable   Capillary refill normal   Lymphatic/Skin:   Skin Appearance: Normal   Skin Tone: Normal   Ecchymosis: Absent   Abrasions: Absent   Right Knee Exam   Inspection   Gait: antalgic   Alignment: normal   Effusion: small   Edema: 1+   Quadriceps Atrophy: mild   Pain/Tenderness: diffuse, medial joint line and lateral joint line   Neurovascularly intact   ROM   Active ROM   Flexion: 115 deg   Extension: abnormal deg   Passive ROM   Flexion: 115 deg   Extension: 10 deg   Muscle Strength   Quadriceps strength: 5/5   Hamstring strength: 5/5   Stability   Lachman Test: normal   Medial/MCL: normal   Lateral/LCL: normal   Posterior drawer: normal   Pivot Shift: normal   Anterior drawer sign: normal   Medial Translation: negative   Lateral Translation: negative   Ext Rotation Dial Test: negative   Patella   Patellofemoral Crepitus: yes   Patella Grind: no   Patellar Apprehension: no   Patella Mobility: fair   Extensor Mechanism: intact   Able to logroll without pain: yes   Impression   1. Stress fracture, right tibia, initial encounter for fracture: Unchanged   2. Osteopenia: Unchanged   3. Primary localized osteoarthritis of right knee: Unchanged   4.  Knee pain, right: New   Plan of Care:   Right knee pain x 1 year w/prior MRI showing extensive edema across tibial plateau consistent w/osseous overload / stress reaction. She has full thickness articular cartilage loss. She has failed conservative Tx. TKA has been recommended. She wishes to proceed. Recommend performing in hospital setting to allow for > 1 day hospital stay if necessary and potential for THU stay if needed. Operative Treatment Discussion: This patient has tri-compartmental knee osteoarthritis that has resulted in progressive pain, motion restriction, stiffness and deterioration in overall function. Symptoms have been inadequately controlled with prior conservative measures and are affecting quality of life. Elective total knee replacement has been recommended. Surgical and Anesthesia risks, benefits and the typical post-op recovery process were discussed. Specific concerns relative to knee replacement were reviewed as well, including arthrofibrosis, DVT, prosthetic loosening, prosthetic infection and implant longevity. Education:   I have obtained informed consent for surgical treatment. This discussion included comparison of surgical and nonsurgical options. General surgical risks (blood loss, neurovascular injury, infection) and anesthesia risks (cardiopulmonary events, intubation challenges, medication side effects) were discussed. Specific risks relative to the specific recommended procedure were discussed. Patient understands that in some cases additional surgical intervention may be warranted. The team of doctors, PAs, and nurses that will be involved in their care was discussed. The typical post-operative recovery process for their specific procedure and pathology was outlined including return to work/school where relevant. Previous H&P done by Dr Montse Gray      H&P Update    The patient's history and physical was reviewed with the patient and there were no significant changes.   Dr Montse Gray examined the patient and there were no significant changes from the previous history and physical.      Electronically signed by JENA Duke on 3/15/2022 at 6:39 AM  3/15/2022  6:39 AM

## 2022-03-08 ENCOUNTER — ANESTHESIA EVENT (OUTPATIENT)
Dept: OPERATING ROOM | Age: 77
End: 2022-03-08
Payer: MEDICARE

## 2022-03-08 RX ORDER — FENTANYL CITRATE 50 UG/ML
25 INJECTION, SOLUTION INTRAMUSCULAR; INTRAVENOUS PRN
Status: CANCELLED | OUTPATIENT
Start: 2022-03-08

## 2022-03-08 RX ORDER — MIDAZOLAM HYDROCHLORIDE 2 MG/2ML
0.5 INJECTION, SOLUTION INTRAMUSCULAR; INTRAVENOUS PRN
Status: CANCELLED | OUTPATIENT
Start: 2022-03-08

## 2022-03-08 RX ORDER — ROPIVACAINE HYDROCHLORIDE 5 MG/ML
30 INJECTION, SOLUTION EPIDURAL; INFILTRATION; PERINEURAL
Status: CANCELLED | OUTPATIENT
Start: 2022-03-08 | End: 2022-03-08

## 2022-03-08 ASSESSMENT — LIFESTYLE VARIABLES: SMOKING_STATUS: 0

## 2022-03-08 NOTE — ANESTHESIA PRE PROCEDURE
Department of Anesthesiology  Preprocedure Note       Name:  [place]   Age:  64 y.o.  :  1945                                          MRN:  [id number]         Date:  3/8/2022      Surgeon: Florencia Riggs):  Merlin Hernandez MD    Procedure: Procedure(s):  RIGHT KNEE TOTAL ARTHROPLASTY   ++SKY++    ++PNB++    Medications prior to admission:   Prior to Admission medications    Medication Sig Start Date End Date Taking? Authorizing Provider   Multiple Vitamins-Minerals (PRESERVISION AREDS 2+MULTI VIT PO) Take by mouth    Historical Provider, MD   dilTIAZem (DILACOR XR) 180 MG extended release capsule Take 180 mg by mouth daily    Historical Provider, MD   apixaban (ELIQUIS) 5 MG TABS tablet Take 1 tablet by mouth 2 times daily 20   Sukumar Parmar MD   mometasone (ASMANEX, 120 METERED DOSES,) 220 MCG/INH inhaler Inhale 2 puffs into the lungs 2 times daily    Historical Provider, MD   ALPRAZolam (XANAX) 0.25 MG tablet Take 1 mg by mouth nightly as needed for Sleep. Historical Provider, MD   lisinopril-hydrochlorothiazide (PRINZIDE;ZESTORETIC) 20-12.5 MG per tablet Take 2 tablets by mouth daily    Historical Provider, MD   omeprazole (PRILOSEC) 40 MG delayed release capsule Take 40 mg by mouth daily    Historical Provider, MD   levothyroxine (SYNTHROID) 50 MCG tablet Take 50 mcg by mouth daily. Historical Provider, MD       Current medications:    Current Outpatient Medications   Medication Sig Dispense Refill    Multiple Vitamins-Minerals (PRESERVISION AREDS 2+MULTI VIT PO) Take by mouth      dilTIAZem (DILACOR XR) 180 MG extended release capsule Take 180 mg by mouth daily      apixaban (ELIQUIS) 5 MG TABS tablet Take 1 tablet by mouth 2 times daily 60 tablet 3    mometasone (ASMANEX, 120 METERED DOSES,) 220 MCG/INH inhaler Inhale 2 puffs into the lungs 2 times daily      ALPRAZolam (XANAX) 0.25 MG tablet Take 1 mg by mouth nightly as needed for Sleep.        lisinopril-hydrochlorothiazide (PRINZIDE;ZESTORETIC) 20-12.5 MG per tablet Take 2 tablets by mouth daily      omeprazole (PRILOSEC) 40 MG delayed release capsule Take 40 mg by mouth daily      levothyroxine (SYNTHROID) 50 MCG tablet Take 50 mcg by mouth daily. No current facility-administered medications for this encounter. Allergies: Allergies   Allergen Reactions    Other      Ragweed and milk after allergy testing  No particular symptoms       Problem List:    Patient Active Problem List   Diagnosis Code    [facility] I48.91       Past Medical History:        Diagnosis Date    Anxiety     Asthma     Heart palpitations     Hypertension     Hypothyroidism     PVC's (premature ventricular contractions)     SVT [facility]ra[facility]ricular [facility]cardia) ([facility])        Past Surgical History:        Procedure Laterality Date    LAPAROSCOPIC APPENDECTOMY N/A 2020    APPENDECTOMY LAPAROSCOPIC performed by Luis Alberto Maddox MD at [facility] OR       Social History:    Social History     Tobacco Use    Smoking status: Former Smoker     Types: Cigarettes     Quit date: 2002     Years since quittin.0    Smokeless tobacco: Never Used    Tobacco comment: quit 20 or > years ago   Substance Use Topics    Alcohol use: No     Comment: former alcoholic                                Counseling given: Not Answered  Comment: quit 20 or > years ago      Vital Signs (Current): There were no vitals filed for this visit.                                            BP Readings from Last 3 Encounters:   22 107/65   20 (!) 138/90   20 (!) 146/62       NPO Status:                                                                                 BMI:   Wt Readings from Last 3 Encounters:   22 172 lb 3.2 oz (78.1 kg)   20 182 lb (82.6 kg)   20 180 lb (81.6 kg)     There is no height or weight on file to calculate BMI.    CBC:   Lab Results   Component Value Date    WBC 7.4 07/06/2020    RBC 3.98 07/06/2020    HGB 11.6 07/06/2020    HCT 35.2 07/06/2020    MCV 88.4 07/06/2020    RDW 14.2 07/06/2020     07/06/2020       CMP:   Lab Results   Component Value Date     07/06/2020    K 4.4 07/06/2020    K 3.8 05/15/2020    CL 95 07/06/2020    CO2 26 07/06/2020    BUN 11 07/06/2020    CREATININE 1.0 07/06/2020    GFRAA >60 07/06/2020    LABGLOM 54 07/06/2020    GLUCOSE 116 07/06/2020    GLUCOSE 132 11/24/2011    PROT 7.8 07/06/2020    CALCIUM 9.2 07/06/2020    BILITOT 0.3 07/06/2020    ALKPHOS 59 07/06/2020    AST 28 07/06/2020    ALT 29 07/06/2020       POC Tests: No results for input(s): POCGLU, POCNA, POCK, POCCL, POCBUN, POCHEMO, POCHCT in the last 72 hours.     Coags:   Lab Results   Component Value Date    PROTIME 11.0 07/06/2020    INR 0.9 07/06/2020    APTT 35.1 07/06/2020       HCG (If Applicable): No results found for: PREGTESTUR, PREGSERUM, HCG, HCGQUANT     ABGs:   Lab Results   Component Value Date    H0HYXIAO 91.0 01/22/2011        Type & Screen (If Applicable):  No results found for: LABABO, LABRH    Drug/Infectious Status (If Applicable):  No results found for: HIV, HEPCAB    COVID-19 Screening (If Applicable): No results found for: COVID19        Anesthesia Evaluation  Patient summary reviewed and Nursing notes reviewed no history of anesthetic complications:   Airway: Mallampati: III  TM distance: >3 FB   Neck ROM: full  Mouth opening: > = 3 FB Dental:    (+) upper dentures and lower dentures      Pulmonary:   (+) decreased breath sounds,  asthma (Denies recent exacerbations, steroid usage, or increased inhaler requirements):     (-) sleep apnea and not a current smoker                           Cardiovascular:  Exercise tolerance: good (>4 METS),   (+) hypertension: no interval change and mild, angina: no interval change, dysrhythmias (PVC's and palpitations): SVT and atrial fibrillation, CANAS: after ambulating 2 flights of stairs and no interval change, murmur (Grade I),     (-) past MI, CAD and CABG/stent    ECG reviewed  Rhythm: regular  Rate: normal  Echocardiogram reviewed  Stress test reviewed       Beta Blocker:  Not on Beta Blocker      ROS comment: EKG:  Sinus  Rhythm   axis +82  Diffuse nonspecific T-abnormality. No change from ekg of 7/6/2020    ECHO 2020:  Normal left ventricular systolic function. Ejection fraction is visually estimated at 60%. Normal right ventricular size and function (TAPSE 2.5 cm). Mild tricuspid regurgitation. PASP is estimated at 22 mmHg    STRESS 2020: Impression:    1. ECG during the infusion did not change. 2.The myocardial perfusion imaging was normal with attenuation artifact. 3.Overall left ventricular systolic function was normal without regional wall motion abnormalities. 4. Low risk general pharmacologic stress test.     Neuro/Psych:   (+) depression/anxiety    (-) seizures, TIA and CVA           GI/Hepatic/Renal:   (+) GERD: no interval change, renal disease: CRI,      (-) hepatitis       Endo/Other:    (+) hypothyroidism, blood dyscrasia (Eliquis; history of anemia (Hbg 10.8 & Hct 33.5) ): anticoagulation therapy and anemia, arthritis: OA., .    (-) diabetes mellitus        Pt had no PAT visit       Abdominal:         (-) obese       Vascular: negative vascular ROS. - DVT and PE. Other Findings:           Anesthesia Plan      spinal, regional and general     ASA 3       Induction: intravenous. MIPS: Postoperative opioids intended and Prophylactic antiemetics administered. Anesthetic plan and risks discussed with patient. Plan discussed with CRNA. Lima Dec, DO   3/8/2022      PNB Consent:    Benefits, alternatives and risks of PNBs were discussed with patient. Risks include but are not limited to bleeding, LAST, infection and possible nerve trauma. PNB was recommended and encouraged as part of multi-modal pain therapy. All questions were answered.   After consideration of the above, the patient agrees to:   right Adductor Canal Block for management of post-op pain. Lima Lutz DO  Staff Anesthesiologist  March 9, 2022  9:31 AM    Pt seen, examined, chart reviewed, plan discussed. Davida Bryan MD  3/15/2022  7:48 AM    Pt seen and evaluated preop.   Mehul Diaz, APRN - CRNA

## 2022-03-09 ENCOUNTER — HOSPITAL ENCOUNTER (OUTPATIENT)
Dept: PREADMISSION TESTING | Age: 77
Discharge: HOME | End: 2022-03-09
Payer: MEDICARE

## 2022-03-09 VITALS
OXYGEN SATURATION: 97 % | HEIGHT: 65 IN | SYSTOLIC BLOOD PRESSURE: 120 MMHG | WEIGHT: 170 LBS | RESPIRATION RATE: 18 BRPM | HEART RATE: 71 BPM | DIASTOLIC BLOOD PRESSURE: 65 MMHG | TEMPERATURE: 97.9 F | BODY MASS INDEX: 28.32 KG/M2

## 2022-03-09 DIAGNOSIS — Z01.818 PREOP TESTING: ICD-10-CM

## 2022-03-09 LAB
BASOPHILS ABSOLUTE: 0.05 E9/L (ref 0–0.2)
BASOPHILS RELATIVE PERCENT: 0.7 % (ref 0–2)
EOSINOPHILS ABSOLUTE: 0.38 E9/L (ref 0.05–0.5)
EOSINOPHILS RELATIVE PERCENT: 5 % (ref 0–6)
HCT VFR BLD CALC: 33.5 % (ref 34–48)
HEMOGLOBIN: 10.8 G/DL (ref 11.5–15.5)
IMMATURE GRANULOCYTES #: 0.03 E9/L
IMMATURE GRANULOCYTES %: 0.4 % (ref 0–5)
LYMPHOCYTES ABSOLUTE: 2.21 E9/L (ref 1.5–4)
LYMPHOCYTES RELATIVE PERCENT: 28.8 % (ref 20–42)
MCH RBC QN AUTO: 29.8 PG (ref 26–35)
MCHC RBC AUTO-ENTMCNC: 32.2 % (ref 32–34.5)
MCV RBC AUTO: 92.3 FL (ref 80–99.9)
MONOCYTES ABSOLUTE: 0.61 E9/L (ref 0.1–0.95)
MONOCYTES RELATIVE PERCENT: 8 % (ref 2–12)
NEUTROPHILS ABSOLUTE: 4.39 E9/L (ref 1.8–7.3)
NEUTROPHILS RELATIVE PERCENT: 57.1 % (ref 43–80)
PDW BLD-RTO: 14.6 FL (ref 11.5–15)
PLATELET # BLD: 298 E9/L (ref 130–450)
PMV BLD AUTO: 8.8 FL (ref 7–12)
RBC # BLD: 3.63 E12/L (ref 3.5–5.5)
WBC # BLD: 7.7 E9/L (ref 4.5–11.5)

## 2022-03-09 PROCEDURE — 36415 COLL VENOUS BLD VENIPUNCTURE: CPT

## 2022-03-09 PROCEDURE — 85025 COMPLETE CBC W/AUTO DIFF WBC: CPT

## 2022-03-09 PROCEDURE — 87081 CULTURE SCREEN ONLY: CPT

## 2022-03-09 RX ORDER — M-VIT,TX,IRON,MINS/CALC/FOLIC 27MG-0.4MG
1 TABLET ORAL DAILY
Status: ON HOLD | COMMUNITY
End: 2022-03-16 | Stop reason: HOSPADM

## 2022-03-09 ASSESSMENT — ENCOUNTER SYMPTOMS: DYSPNEA ACTIVITY LEVEL: AFTER AMBULATING 2 FLIGHTS OF STAIRS

## 2022-03-09 ASSESSMENT — PAIN DESCRIPTION - ORIENTATION: ORIENTATION: RIGHT

## 2022-03-09 ASSESSMENT — PAIN DESCRIPTION - FREQUENCY: FREQUENCY: CONTINUOUS

## 2022-03-09 ASSESSMENT — PAIN DESCRIPTION - DESCRIPTORS: DESCRIPTORS: ACHING

## 2022-03-09 ASSESSMENT — KOOS JR
GOING UP OR DOWN STAIRS: 3
STANDING UPRIGHT: 2
TWISING OR PIVOTING ON KNEE: 3
STRAIGHTENING KNEE FULLY: 2
RISING FROM SITTING: 2
BENDING TO THE FLOOR TO PICK UP OBJECT: 2
HOW SEVERE IS YOUR KNEE STIFFNESS AFTER FIRST WAKING IN MORNING: 1

## 2022-03-09 ASSESSMENT — PAIN SCALES - GENERAL: PAINLEVEL_OUTOF10: 4

## 2022-03-09 ASSESSMENT — PAIN DESCRIPTION - LOCATION: LOCATION: KNEE

## 2022-03-09 ASSESSMENT — PAIN DESCRIPTION - ONSET: ONSET: AWAKENED FROM SLEEP

## 2022-03-09 ASSESSMENT — PAIN DESCRIPTION - PAIN TYPE: TYPE: CHRONIC PAIN

## 2022-03-09 ASSESSMENT — PAIN DESCRIPTION - PROGRESSION: CLINICAL_PROGRESSION: GRADUALLY WORSENING

## 2022-03-09 NOTE — PROGRESS NOTES
[facility] PRE-ADMISSION TESTING INSTRUCTIONS    The Preadmission Testing patient is instructed accordingly using the following criteria (check applicable):    ARRIVAL INSTRUCTIONS:  [x] Parking the day of Surgery is located in the Main Entrance lot. Upon entering the door, make an immediate right to the surgery reception desk    [x] Bring photo ID and insurance card    [] Bring in a copy of Living will or Durable Power of  papers. [x] Please be sure to arrange for responsible adult to provide transportation to and from the hospital    [x] Please arrange for responsible adult to be with you for the 24 hour period post procedure due to having anesthesia      GENERAL INSTRUCTIONS:    [x] Nothing by mouth after midnight, including gum, candy, mints or water    [x] You may brush your teeth, but do not swallow any water    [x] Take medications as instructed with 1-2 oz of water    [x] Stop herbal supplements and vitamins 5 days prior to procedure    [] Follow preop dosing of blood thinners per physician instructions    [] Take 1/2 dose of evening insulin, but no insulin after midnight    [] No oral diabetic medications after midnight    [] If diabetic and have low blood sugar or feel symptomatic, take 1-2oz apple juice only    [x] Bring inhalers day of surgery    [] Bring C-PAP/ Bi-Pap day of surgery    [] Bring urine specimen day of surgery    [] Shower or bath with soap, lather and rinse well, AM of Surgery, no lotion, powders or creams to surgical site    [] Follow bowel prep as instructed per surgeon    [x] No tobacco products within 24 hours of surgery     [x] No alcohol or illegal drug use within 24 hours of surgery.     [x] Jewelry, body piercing's, eyeglasses, contact lenses and dentures are not permitted into surgery (bring cases)      [x] Please do not wear any nail polish, make up or hair products on the day of surgery    [x] You can expect a call the business day prior to procedure to notify you if your arrival time changes    [] If you receive a survey after surgery we would greatly appreciate your comments    [] Parent/guardian of a minor must accompany their child and remain on the premises  the entire time they are under our care     [] Pediatric patients may bring favorite toy, blanket or comfort item with them    [] A caregiver or family member must remain with the patient during their stay if they are mentally handicapped, have dementia, disoriented or unable to use a call light or would be a safety concern if left unattended    [x] Please notify surgeon if you develop any illness between now and time of surgery (cold, cough, sore throat, fever, nausea, vomiting) or any signs of infections  including skin, wounds, and dental.    [x]  The Outpatient Pharmacy is available to fill your prescription here on your day of surgery, ask your preop nurse for details    [] Other instructions    EDUCATIONAL MATERIALS PROVIDED:    [x] PAT Preoperative Education Packet/Booklet     [x] Medication List    [] Transfusion bracelet applied with instructions    [x] Shower with soap, lather and rinse well, and use CHG wipes provided the evening before surgery as instructed    [x] Incentive spirometer with instructions        Have you been tested for COVID  No           Have you been told you were positive for COVID No  Have you had any known exposure to someone that is positive for COVID No  Do you have a cough                   No              Do you have shortness of breath No                 Do you have a sore throat            No                Are you having chills                    No                Are you having muscle aches. No                    Please come to the hospital wearing a mask and have your significant other wear a mask as well. Both of you should check your temperature before leaving to come here,  if it is 100 or higher please call [phone] for instruction.

## 2022-03-10 LAB — MRSA CULTURE ONLY: NORMAL

## 2022-03-15 ENCOUNTER — HOSPITAL ENCOUNTER (OUTPATIENT)
Age: 77
Setting detail: OBSERVATION
Discharge: HOME | End: 2022-03-17
Attending: ORTHOPAEDIC SURGERY | Admitting: ORTHOPAEDIC SURGERY
Payer: MEDICARE

## 2022-03-15 ENCOUNTER — APPOINTMENT (OUTPATIENT)
Dept: GENERAL RADIOLOGY | Age: 77
End: 2022-03-15
Attending: ORTHOPAEDIC SURGERY
Payer: MEDICARE

## 2022-03-15 ENCOUNTER — ANESTHESIA (OUTPATIENT)
Dept: OPERATING ROOM | Age: 77
End: 2022-03-15
Payer: MEDICARE

## 2022-03-15 VITALS — OXYGEN SATURATION: 97 % | SYSTOLIC BLOOD PRESSURE: 149 MMHG | TEMPERATURE: 95.2 F | DIASTOLIC BLOOD PRESSURE: 77 MMHG

## 2022-03-15 DIAGNOSIS — Z01.818 PREOP TESTING: Primary | ICD-10-CM

## 2022-03-15 DIAGNOSIS — G89.18 POST-OP PAIN: ICD-10-CM

## 2022-03-15 PROBLEM — M17.31 POST-TRAUMATIC OSTEOARTHRITIS OF RIGHT KNEE: Status: ACTIVE | Noted: 2022-03-15

## 2022-03-15 LAB
ANION GAP SERPL CALCULATED.3IONS-SCNC: 10 MMOL/L (ref 7–16)
BUN BLDV-MCNC: 14 MG/DL (ref 6–23)
CALCIUM SERPL-MCNC: 7.8 MG/DL (ref 8.6–10.2)
CHLORIDE BLD-SCNC: 103 MMOL/L (ref 98–107)
CO2: 22 MMOL/L (ref 22–29)
CREAT SERPL-MCNC: 0.9 MG/DL (ref 0.5–1)
GFR AFRICAN AMERICAN: >60
GFR NON-AFRICAN AMERICAN: >60 ML/MIN/1.73
GLUCOSE BLD-MCNC: 104 MG/DL (ref 74–99)
POTASSIUM SERPL-SCNC: 3.6 MMOL/L (ref 3.5–5)
SODIUM BLD-SCNC: 135 MMOL/L (ref 132–146)

## 2022-03-15 PROCEDURE — 7100000000 HC PACU RECOVERY - FIRST 15 MIN: Performed by: ORTHOPAEDIC SURGERY

## 2022-03-15 PROCEDURE — C1713 ANCHOR/SCREW BN/BN,TIS/BN: HCPCS | Performed by: ORTHOPAEDIC SURGERY

## 2022-03-15 PROCEDURE — G0378 HOSPITAL OBSERVATION PER HR: HCPCS

## 2022-03-15 PROCEDURE — 6370000000 HC RX 637 (ALT 250 FOR IP): Performed by: PHYSICIAN ASSISTANT

## 2022-03-15 PROCEDURE — 6360000002 HC RX W HCPCS: Performed by: ANESTHESIOLOGY

## 2022-03-15 PROCEDURE — 97161 PT EVAL LOW COMPLEX 20 MIN: CPT

## 2022-03-15 PROCEDURE — 97165 OT EVAL LOW COMPLEX 30 MIN: CPT

## 2022-03-15 PROCEDURE — 88311 DECALCIFY TISSUE: CPT

## 2022-03-15 PROCEDURE — 6360000002 HC RX W HCPCS: Performed by: INTERNAL MEDICINE

## 2022-03-15 PROCEDURE — 6360000002 HC RX W HCPCS: Performed by: PHYSICIAN ASSISTANT

## 2022-03-15 PROCEDURE — 2720000010 HC SURG SUPPLY STERILE: Performed by: ORTHOPAEDIC SURGERY

## 2022-03-15 PROCEDURE — 2500000003 HC RX 250 WO HCPCS: Performed by: ORTHOPAEDIC SURGERY

## 2022-03-15 PROCEDURE — 2500000003 HC RX 250 WO HCPCS: Performed by: NURSE ANESTHETIST, CERTIFIED REGISTERED

## 2022-03-15 PROCEDURE — 97535 SELF CARE MNGMENT TRAINING: CPT

## 2022-03-15 PROCEDURE — 88305 TISSUE EXAM BY PATHOLOGIST: CPT

## 2022-03-15 PROCEDURE — 2709999900 HC NON-CHARGEABLE SUPPLY: Performed by: ORTHOPAEDIC SURGERY

## 2022-03-15 PROCEDURE — 2580000003 HC RX 258: Performed by: PHYSICIAN ASSISTANT

## 2022-03-15 PROCEDURE — 2500000003 HC RX 250 WO HCPCS: Performed by: PHYSICIAN ASSISTANT

## 2022-03-15 PROCEDURE — 3600000015 HC SURGERY LEVEL 5 ADDTL 15MIN: Performed by: ORTHOPAEDIC SURGERY

## 2022-03-15 PROCEDURE — C1776 JOINT DEVICE (IMPLANTABLE): HCPCS | Performed by: ORTHOPAEDIC SURGERY

## 2022-03-15 PROCEDURE — 2700000000 HC OXYGEN THERAPY PER DAY

## 2022-03-15 PROCEDURE — 7100000001 HC PACU RECOVERY - ADDTL 15 MIN: Performed by: ORTHOPAEDIC SURGERY

## 2022-03-15 PROCEDURE — 64447 NJX AA&/STRD FEMORAL NRV IMG: CPT | Performed by: ANESTHESIOLOGY

## 2022-03-15 PROCEDURE — 36415 COLL VENOUS BLD VENIPUNCTURE: CPT

## 2022-03-15 PROCEDURE — 99219 PR INITIAL OBSERVATION CARE/DAY 50 MINUTES: CPT | Performed by: INTERNAL MEDICINE

## 2022-03-15 PROCEDURE — 6360000002 HC RX W HCPCS: Performed by: ORTHOPAEDIC SURGERY

## 2022-03-15 PROCEDURE — 97530 THERAPEUTIC ACTIVITIES: CPT

## 2022-03-15 PROCEDURE — 3700000000 HC ANESTHESIA ATTENDED CARE: Performed by: ORTHOPAEDIC SURGERY

## 2022-03-15 PROCEDURE — 2580000003 HC RX 258: Performed by: NURSE ANESTHETIST, CERTIFIED REGISTERED

## 2022-03-15 PROCEDURE — 3600000005 HC SURGERY LEVEL 5 BASE: Performed by: ORTHOPAEDIC SURGERY

## 2022-03-15 PROCEDURE — 94640 AIRWAY INHALATION TREATMENT: CPT

## 2022-03-15 PROCEDURE — 80048 BASIC METABOLIC PNL TOTAL CA: CPT

## 2022-03-15 PROCEDURE — 3700000001 HC ADD 15 MINUTES (ANESTHESIA): Performed by: ORTHOPAEDIC SURGERY

## 2022-03-15 PROCEDURE — 73560 X-RAY EXAM OF KNEE 1 OR 2: CPT

## 2022-03-15 PROCEDURE — 6360000002 HC RX W HCPCS: Performed by: NURSE ANESTHETIST, CERTIFIED REGISTERED

## 2022-03-15 DEVICE — CEMENT BNE 40 GM RADIOPAQUE BA SIMPLEX P: Type: IMPLANTABLE DEVICE | Site: KNEE | Status: FUNCTIONAL

## 2022-03-15 DEVICE — TIBIAL BEAR INSRT CR: Type: IMPLANTABLE DEVICE | Site: KNEE | Status: FUNCTIONAL

## 2022-03-15 DEVICE — BASEPLATE TIB SZ 4 KNEE TRITANIUM CEM PRI LO PROF TRIATHLON: Type: IMPLANTABLE DEVICE | Site: KNEE | Status: FUNCTIONAL

## 2022-03-15 DEVICE — IMPLANTABLE DEVICE: Type: IMPLANTABLE DEVICE | Site: KNEE | Status: FUNCTIONAL

## 2022-03-15 DEVICE — IMPLANT PATELLAR DIA32MM THK10MM X3 ASYMMETRIC TRIATHLON: Type: IMPLANTABLE DEVICE | Site: KNEE | Status: FUNCTIONAL

## 2022-03-15 RX ORDER — SODIUM CHLORIDE 9 MG/ML
25 INJECTION, SOLUTION INTRAVENOUS PRN
Status: DISCONTINUED | OUTPATIENT
Start: 2022-03-15 | End: 2022-03-15 | Stop reason: HOSPADM

## 2022-03-15 RX ORDER — SODIUM CHLORIDE 0.9 % (FLUSH) 0.9 %
10 SYRINGE (ML) INJECTION EVERY 12 HOURS SCHEDULED
Status: DISCONTINUED | OUTPATIENT
Start: 2022-03-15 | End: 2022-03-15 | Stop reason: HOSPADM

## 2022-03-15 RX ORDER — ONDANSETRON 2 MG/ML
4 INJECTION INTRAMUSCULAR; INTRAVENOUS
Status: DISCONTINUED | OUTPATIENT
Start: 2022-03-15 | End: 2022-03-15 | Stop reason: HOSPADM

## 2022-03-15 RX ORDER — GLYCOPYRROLATE 1 MG/5 ML
SYRINGE (ML) INTRAVENOUS PRN
Status: DISCONTINUED | OUTPATIENT
Start: 2022-03-15 | End: 2022-03-15 | Stop reason: SDUPTHER

## 2022-03-15 RX ORDER — PROMETHAZINE HYDROCHLORIDE 25 MG/1
12.5 TABLET ORAL EVERY 6 HOURS PRN
Status: DISCONTINUED | OUTPATIENT
Start: 2022-03-15 | End: 2022-03-17 | Stop reason: HOSPADM

## 2022-03-15 RX ORDER — DEXAMETHASONE SODIUM PHOSPHATE 4 MG/ML
INJECTION, SOLUTION INTRA-ARTICULAR; INTRALESIONAL; INTRAMUSCULAR; INTRAVENOUS; SOFT TISSUE PRN
Status: DISCONTINUED | OUTPATIENT
Start: 2022-03-15 | End: 2022-03-15 | Stop reason: SDUPTHER

## 2022-03-15 RX ORDER — MIDAZOLAM HYDROCHLORIDE 2 MG/2ML
2 INJECTION, SOLUTION INTRAMUSCULAR; INTRAVENOUS
Status: DISCONTINUED | OUTPATIENT
Start: 2022-03-15 | End: 2022-03-15 | Stop reason: HOSPADM

## 2022-03-15 RX ORDER — OXYCODONE HYDROCHLORIDE AND ACETAMINOPHEN 5; 325 MG/1; MG/1
1 TABLET ORAL
Status: DISPENSED | OUTPATIENT
Start: 2022-03-15 | End: 2022-03-16

## 2022-03-15 RX ORDER — PANTOPRAZOLE SODIUM 40 MG/1
40 TABLET, DELAYED RELEASE ORAL
Status: DISCONTINUED | OUTPATIENT
Start: 2022-03-16 | End: 2022-03-17 | Stop reason: HOSPADM

## 2022-03-15 RX ORDER — DOCUSATE SODIUM 100 MG/1
100 CAPSULE, LIQUID FILLED ORAL 2 TIMES DAILY PRN
Status: DISCONTINUED | OUTPATIENT
Start: 2022-03-15 | End: 2022-03-17

## 2022-03-15 RX ORDER — SODIUM CHLORIDE 0.9 % (FLUSH) 0.9 %
5-40 SYRINGE (ML) INJECTION EVERY 12 HOURS SCHEDULED
Status: DISCONTINUED | OUTPATIENT
Start: 2022-03-15 | End: 2022-03-15 | Stop reason: HOSPADM

## 2022-03-15 RX ORDER — DILTIAZEM HYDROCHLORIDE 180 MG/1
180 CAPSULE, EXTENDED RELEASE ORAL DAILY
Status: DISCONTINUED | OUTPATIENT
Start: 2022-03-16 | End: 2022-03-16 | Stop reason: CLARIF

## 2022-03-15 RX ORDER — ALPRAZOLAM 1 MG/1
1 TABLET ORAL NIGHTLY PRN
Status: DISCONTINUED | OUTPATIENT
Start: 2022-03-15 | End: 2022-03-17 | Stop reason: HOSPADM

## 2022-03-15 RX ORDER — LISINOPRIL AND HYDROCHLOROTHIAZIDE 20; 12.5 MG/1; MG/1
2 TABLET ORAL DAILY
Status: DISCONTINUED | OUTPATIENT
Start: 2022-03-15 | End: 2022-03-15 | Stop reason: CLARIF

## 2022-03-15 RX ORDER — IPRATROPIUM BROMIDE AND ALBUTEROL SULFATE 2.5; .5 MG/3ML; MG/3ML
1 SOLUTION RESPIRATORY (INHALATION)
Status: DISCONTINUED | OUTPATIENT
Start: 2022-03-15 | End: 2022-03-15 | Stop reason: HOSPADM

## 2022-03-15 RX ORDER — MEPERIDINE HYDROCHLORIDE 25 MG/ML
12.5 INJECTION INTRAMUSCULAR; INTRAVENOUS; SUBCUTANEOUS EVERY 5 MIN PRN
Status: DISCONTINUED | OUTPATIENT
Start: 2022-03-15 | End: 2022-03-15 | Stop reason: HOSPADM

## 2022-03-15 RX ORDER — CELECOXIB 100 MG/1
100 CAPSULE ORAL ONCE
Status: COMPLETED | OUTPATIENT
Start: 2022-03-15 | End: 2022-03-15

## 2022-03-15 RX ORDER — SODIUM CHLORIDE 9 MG/ML
INJECTION, SOLUTION INTRAVENOUS CONTINUOUS
Status: DISCONTINUED | OUTPATIENT
Start: 2022-03-15 | End: 2022-03-17 | Stop reason: HOSPADM

## 2022-03-15 RX ORDER — LABETALOL HYDROCHLORIDE 5 MG/ML
10 INJECTION, SOLUTION INTRAVENOUS
Status: DISCONTINUED | OUTPATIENT
Start: 2022-03-15 | End: 2022-03-15 | Stop reason: HOSPADM

## 2022-03-15 RX ORDER — ACETAMINOPHEN 500 MG
1000 TABLET ORAL ONCE
Status: COMPLETED | OUTPATIENT
Start: 2022-03-15 | End: 2022-03-15

## 2022-03-15 RX ORDER — MORPHINE SULFATE 2 MG/ML
2 INJECTION, SOLUTION INTRAMUSCULAR; INTRAVENOUS
Status: DISCONTINUED | OUTPATIENT
Start: 2022-03-15 | End: 2022-03-17 | Stop reason: HOSPADM

## 2022-03-15 RX ORDER — M-VIT,TX,IRON,MINS/CALC/FOLIC 27MG-0.4MG
1 TABLET ORAL DAILY
Status: DISCONTINUED | OUTPATIENT
Start: 2022-03-15 | End: 2022-03-17 | Stop reason: HOSPADM

## 2022-03-15 RX ORDER — DEXAMETHASONE SODIUM PHOSPHATE 10 MG/ML
8 INJECTION, SOLUTION INTRAMUSCULAR; INTRAVENOUS ONCE
Status: COMPLETED | OUTPATIENT
Start: 2022-03-15 | End: 2022-03-15

## 2022-03-15 RX ORDER — MIDAZOLAM HYDROCHLORIDE 2 MG/2ML
0.5 INJECTION, SOLUTION INTRAMUSCULAR; INTRAVENOUS PRN
Status: DISCONTINUED | OUTPATIENT
Start: 2022-03-15 | End: 2022-03-15 | Stop reason: HOSPADM

## 2022-03-15 RX ORDER — LEVOTHYROXINE SODIUM 0.05 MG/1
50 TABLET ORAL DAILY
Status: DISCONTINUED | OUTPATIENT
Start: 2022-03-15 | End: 2022-03-17 | Stop reason: HOSPADM

## 2022-03-15 RX ORDER — PHENYLEPHRINE HCL IN 0.9% NACL 1 MG/10 ML
SYRINGE (ML) INTRAVENOUS PRN
Status: DISCONTINUED | OUTPATIENT
Start: 2022-03-15 | End: 2022-03-15 | Stop reason: SDUPTHER

## 2022-03-15 RX ORDER — SODIUM CHLORIDE 0.9 % (FLUSH) 0.9 %
10 SYRINGE (ML) INJECTION PRN
Status: DISCONTINUED | OUTPATIENT
Start: 2022-03-15 | End: 2022-03-15 | Stop reason: HOSPADM

## 2022-03-15 RX ORDER — FENTANYL CITRATE 50 UG/ML
INJECTION, SOLUTION INTRAMUSCULAR; INTRAVENOUS PRN
Status: DISCONTINUED | OUTPATIENT
Start: 2022-03-15 | End: 2022-03-15 | Stop reason: SDUPTHER

## 2022-03-15 RX ORDER — POLYETHYLENE GLYCOL 3350 17 G/17G
17 POWDER, FOR SOLUTION ORAL DAILY PRN
Status: DISCONTINUED | OUTPATIENT
Start: 2022-03-15 | End: 2022-03-17 | Stop reason: HOSPADM

## 2022-03-15 RX ORDER — ROPIVACAINE HYDROCHLORIDE 5 MG/ML
INJECTION, SOLUTION EPIDURAL; INFILTRATION; PERINEURAL
Status: COMPLETED | OUTPATIENT
Start: 2022-03-15 | End: 2022-03-15

## 2022-03-15 RX ORDER — ONDANSETRON 2 MG/ML
4 INJECTION INTRAMUSCULAR; INTRAVENOUS EVERY 6 HOURS PRN
Status: DISCONTINUED | OUTPATIENT
Start: 2022-03-15 | End: 2022-03-17 | Stop reason: HOSPADM

## 2022-03-15 RX ORDER — CALCIUM CHLORIDE 100 MG/ML
INJECTION INTRAVENOUS; INTRAVENTRICULAR PRN
Status: DISCONTINUED | OUTPATIENT
Start: 2022-03-15 | End: 2022-03-15 | Stop reason: ALTCHOICE

## 2022-03-15 RX ORDER — HYDROCHLOROTHIAZIDE 12.5 MG/1
12.5 TABLET ORAL DAILY
Status: DISCONTINUED | OUTPATIENT
Start: 2022-03-15 | End: 2022-03-17 | Stop reason: HOSPADM

## 2022-03-15 RX ORDER — OXYCODONE HYDROCHLORIDE 5 MG/1
2.5 TABLET ORAL
Status: DISPENSED | OUTPATIENT
Start: 2022-03-15 | End: 2022-03-16

## 2022-03-15 RX ORDER — SODIUM CHLORIDE 0.9 % (FLUSH) 0.9 %
10 SYRINGE (ML) INJECTION EVERY 12 HOURS SCHEDULED
Status: DISCONTINUED | OUTPATIENT
Start: 2022-03-15 | End: 2022-03-17 | Stop reason: HOSPADM

## 2022-03-15 RX ORDER — ACETAMINOPHEN 325 MG/1
650 TABLET ORAL EVERY 4 HOURS PRN
Status: DISCONTINUED | OUTPATIENT
Start: 2022-03-15 | End: 2022-03-17 | Stop reason: HOSPADM

## 2022-03-15 RX ORDER — SODIUM CHLORIDE 0.9 % (FLUSH) 0.9 %
5-40 SYRINGE (ML) INJECTION PRN
Status: DISCONTINUED | OUTPATIENT
Start: 2022-03-15 | End: 2022-03-15 | Stop reason: HOSPADM

## 2022-03-15 RX ORDER — ONDANSETRON 2 MG/ML
INJECTION INTRAMUSCULAR; INTRAVENOUS PRN
Status: DISCONTINUED | OUTPATIENT
Start: 2022-03-15 | End: 2022-03-15 | Stop reason: SDUPTHER

## 2022-03-15 RX ORDER — BUDESONIDE 0.5 MG/2ML
0.5 INHALANT ORAL 2 TIMES DAILY
Status: DISCONTINUED | OUTPATIENT
Start: 2022-03-15 | End: 2022-03-17 | Stop reason: HOSPADM

## 2022-03-15 RX ORDER — LISINOPRIL 20 MG/1
20 TABLET ORAL DAILY
Status: DISCONTINUED | OUTPATIENT
Start: 2022-03-15 | End: 2022-03-17 | Stop reason: HOSPADM

## 2022-03-15 RX ORDER — OMEPRAZOLE 20 MG/1
40 CAPSULE, DELAYED RELEASE ORAL DAILY
Status: DISCONTINUED | OUTPATIENT
Start: 2022-03-15 | End: 2022-03-15 | Stop reason: CLARIF

## 2022-03-15 RX ORDER — BUPIVACAINE HYDROCHLORIDE 7.5 MG/ML
INJECTION, SOLUTION INTRASPINAL PRN
Status: DISCONTINUED | OUTPATIENT
Start: 2022-03-15 | End: 2022-03-15 | Stop reason: SDUPTHER

## 2022-03-15 RX ORDER — ROPIVACAINE HYDROCHLORIDE 5 MG/ML
30 INJECTION, SOLUTION EPIDURAL; INFILTRATION; PERINEURAL
Status: COMPLETED | OUTPATIENT
Start: 2022-03-15 | End: 2022-03-15

## 2022-03-15 RX ORDER — SODIUM CHLORIDE 9 MG/ML
25 INJECTION, SOLUTION INTRAVENOUS PRN
Status: DISCONTINUED | OUTPATIENT
Start: 2022-03-15 | End: 2022-03-17 | Stop reason: HOSPADM

## 2022-03-15 RX ORDER — SODIUM CHLORIDE 9 MG/ML
INJECTION, SOLUTION INTRAVENOUS CONTINUOUS
Status: DISCONTINUED | OUTPATIENT
Start: 2022-03-15 | End: 2022-03-15

## 2022-03-15 RX ORDER — FENTANYL CITRATE 50 UG/ML
25 INJECTION, SOLUTION INTRAMUSCULAR; INTRAVENOUS PRN
Status: DISCONTINUED | OUTPATIENT
Start: 2022-03-15 | End: 2022-03-15 | Stop reason: HOSPADM

## 2022-03-15 RX ORDER — SODIUM CHLORIDE 9 MG/ML
INJECTION, SOLUTION INTRAVENOUS CONTINUOUS PRN
Status: DISCONTINUED | OUTPATIENT
Start: 2022-03-15 | End: 2022-03-15 | Stop reason: SDUPTHER

## 2022-03-15 RX ORDER — SODIUM CHLORIDE 0.9 % (FLUSH) 0.9 %
10 SYRINGE (ML) INJECTION PRN
Status: DISCONTINUED | OUTPATIENT
Start: 2022-03-15 | End: 2022-03-17 | Stop reason: HOSPADM

## 2022-03-15 RX ORDER — HYDRALAZINE HYDROCHLORIDE 20 MG/ML
10 INJECTION INTRAMUSCULAR; INTRAVENOUS
Status: DISCONTINUED | OUTPATIENT
Start: 2022-03-15 | End: 2022-03-15 | Stop reason: HOSPADM

## 2022-03-15 RX ORDER — PROPOFOL 10 MG/ML
INJECTION, EMULSION INTRAVENOUS CONTINUOUS PRN
Status: DISCONTINUED | OUTPATIENT
Start: 2022-03-15 | End: 2022-03-15 | Stop reason: SDUPTHER

## 2022-03-15 RX ADMIN — CELECOXIB 100 MG: 100 CAPSULE ORAL at 09:32

## 2022-03-15 RX ADMIN — SODIUM CHLORIDE: 9 INJECTION, SOLUTION INTRAVENOUS at 12:15

## 2022-03-15 RX ADMIN — PROPOFOL 40 MCG/KG/MIN: 10 INJECTION, EMULSION INTRAVENOUS at 10:50

## 2022-03-15 RX ADMIN — TRANEXAMIC ACID 1000 MG: 1 INJECTION, SOLUTION INTRAVENOUS at 11:01

## 2022-03-15 RX ADMIN — FENTANYL CITRATE 25 MCG: 50 INJECTION, SOLUTION INTRAMUSCULAR; INTRAVENOUS at 10:48

## 2022-03-15 RX ADMIN — OXYCODONE AND ACETAMINOPHEN 1 TABLET: 5; 325 TABLET ORAL at 18:55

## 2022-03-15 RX ADMIN — BUPIVACAINE HYDROCHLORIDE IN DEXTROSE 1.8 ML: 7.5 INJECTION, SOLUTION SUBARACHNOID at 10:48

## 2022-03-15 RX ADMIN — ROPIVACAINE HYDROCHLORIDE 30 ML: 5 INJECTION, SOLUTION EPIDURAL; INFILTRATION; PERINEURAL at 09:58

## 2022-03-15 RX ADMIN — TRANEXAMIC ACID 1000 MG: 1 INJECTION, SOLUTION INTRAVENOUS at 13:48

## 2022-03-15 RX ADMIN — BUDESONIDE 500 MCG: 0.5 SUSPENSION RESPIRATORY (INHALATION) at 19:21

## 2022-03-15 RX ADMIN — OXYCODONE HYDROCHLORIDE 2.5 MG: 5 TABLET ORAL at 18:56

## 2022-03-15 RX ADMIN — MORPHINE SULFATE 2 MG: 2 INJECTION, SOLUTION INTRAMUSCULAR; INTRAVENOUS at 16:23

## 2022-03-15 RX ADMIN — OXYCODONE HYDROCHLORIDE 2.5 MG: 5 TABLET ORAL at 23:46

## 2022-03-15 RX ADMIN — Medication 100 MCG: at 11:15

## 2022-03-15 RX ADMIN — HYDROMORPHONE HYDROCHLORIDE 0.5 MG: 1 INJECTION, SOLUTION INTRAMUSCULAR; INTRAVENOUS; SUBCUTANEOUS at 12:51

## 2022-03-15 RX ADMIN — MIDAZOLAM HYDROCHLORIDE 2 MG: 1 INJECTION, SOLUTION INTRAMUSCULAR; INTRAVENOUS at 09:55

## 2022-03-15 RX ADMIN — ONDANSETRON 4 MG: 2 INJECTION INTRAMUSCULAR; INTRAVENOUS at 14:45

## 2022-03-15 RX ADMIN — DEXAMETHASONE SODIUM PHOSPHATE 10 MG: 4 INJECTION, SOLUTION INTRAMUSCULAR; INTRAVENOUS at 10:54

## 2022-03-15 RX ADMIN — Medication 100 MCG: at 11:09

## 2022-03-15 RX ADMIN — Medication 0.2 MG: at 10:53

## 2022-03-15 RX ADMIN — ACETAMINOPHEN 1000 MG: 500 TABLET ORAL at 09:32

## 2022-03-15 RX ADMIN — ROPIVACAINE HYDROCHLORIDE 30 ML: 5 INJECTION, SOLUTION EPIDURAL; INFILTRATION; PERINEURAL at 09:59

## 2022-03-15 RX ADMIN — Medication 0.2 MG: at 11:19

## 2022-03-15 RX ADMIN — FENTANYL CITRATE 100 MCG: 50 INJECTION INTRAMUSCULAR; INTRAVENOUS at 09:55

## 2022-03-15 RX ADMIN — Medication 2000 MG: at 18:48

## 2022-03-15 RX ADMIN — Medication 100 MCG: at 10:52

## 2022-03-15 RX ADMIN — OXYCODONE AND ACETAMINOPHEN 1 TABLET: 5; 325 TABLET ORAL at 23:46

## 2022-03-15 RX ADMIN — HYDROMORPHONE HYDROCHLORIDE 0.5 MG: 1 INJECTION, SOLUTION INTRAMUSCULAR; INTRAVENOUS; SUBCUTANEOUS at 13:50

## 2022-03-15 RX ADMIN — ONDANSETRON 4 MG: 2 INJECTION INTRAMUSCULAR; INTRAVENOUS at 10:50

## 2022-03-15 RX ADMIN — SODIUM CHLORIDE: 9 INJECTION, SOLUTION INTRAVENOUS at 10:31

## 2022-03-15 RX ADMIN — DEXAMETHASONE SODIUM PHOSPHATE 8 MG: 10 INJECTION INTRAMUSCULAR; INTRAVENOUS at 09:31

## 2022-03-15 ASSESSMENT — PAIN DESCRIPTION - PAIN TYPE
TYPE: SURGICAL PAIN

## 2022-03-15 ASSESSMENT — PULMONARY FUNCTION TESTS
PIF_VALUE: 0
PIF_VALUE: 1
PIF_VALUE: 0
PIF_VALUE: 1
PIF_VALUE: 1
PIF_VALUE: 0
PIF_VALUE: 1
PIF_VALUE: 0
PIF_VALUE: 3
PIF_VALUE: 0
PIF_VALUE: 0
PIF_VALUE: 1
PIF_VALUE: 2
PIF_VALUE: 0
PIF_VALUE: 0
PIF_VALUE: 2
PIF_VALUE: 0
PIF_VALUE: 2
PIF_VALUE: 1
PIF_VALUE: 0

## 2022-03-15 ASSESSMENT — PAIN SCALES - GENERAL
PAINLEVEL_OUTOF10: 0
PAINLEVEL_OUTOF10: 10
PAINLEVEL_OUTOF10: 9
PAINLEVEL_OUTOF10: 7
PAINLEVEL_OUTOF10: 8
PAINLEVEL_OUTOF10: 8
PAINLEVEL_OUTOF10: 6
PAINLEVEL_OUTOF10: 10

## 2022-03-15 ASSESSMENT — PAIN DESCRIPTION - ORIENTATION
ORIENTATION: RIGHT

## 2022-03-15 ASSESSMENT — PAIN DESCRIPTION - DESCRIPTORS
DESCRIPTORS: ACHING;THROBBING
DESCRIPTORS: BURNING;THROBBING;ACHING
DESCRIPTORS: BURNING;THROBBING
DESCRIPTORS: BURNING;THROBBING
DESCRIPTORS: ACHING

## 2022-03-15 ASSESSMENT — PAIN DESCRIPTION - FREQUENCY
FREQUENCY: CONTINUOUS

## 2022-03-15 ASSESSMENT — PAIN DESCRIPTION - ONSET
ONSET: ON-GOING

## 2022-03-15 ASSESSMENT — PAIN DESCRIPTION - PROGRESSION: CLINICAL_PROGRESSION: GRADUALLY WORSENING

## 2022-03-15 ASSESSMENT — PAIN DESCRIPTION - LOCATION
LOCATION: KNEE

## 2022-03-15 ASSESSMENT — PAIN - FUNCTIONAL ASSESSMENT: PAIN_FUNCTIONAL_ASSESSMENT: 0-10

## 2022-03-15 NOTE — PLAN OF CARE
Problem: Infection - Surgical Site:  Goal: Will show no infection signs and symptoms  Description: Will show no infection signs and symptoms  Outcome: Met This Shift     Problem: Pain - Acute:  Goal: Pain level will decrease  Description: Pain level will decrease  Outcome: Met This Shift

## 2022-03-15 NOTE — CONSULTS
[address] [facility]      Reason for Consult:  Medical management    History of Present Illness:    She is a 75-year-old female with past medical history of hypertension, atrial fibrillation, hypothyroidism, underwent elective right total knee arthroplasty. We are consulted for medical management. As far as her medical problems she denies any chest pain, angina, short of breath, tachycardia or palpitations. Has history of hypertension and A. fib which has been stable with current medications. She has progressive worsening of right knee pain since last September. She says pain was worse after walking ,standing & activity. She has tried Tylenol, steroid injection and therapy with partial relief. She is s/p fall x2. Pain was interfering with her ADLs and ambulation. She was following with Dr. Jose Goodman. Had MRI as outpatient. She decided to undergo elective rt knee replacement which was done today. She is doing well postoperatively. BP is on lower side but tolerating well. We are following for medical side. REVIEW OF SYSTEMS:  Complete ROS performed with patient, pertinent positives and negatives are listed in the HPI.     PMH:  Past Medical History:   Diagnosis Date    Anxiety     Asthma     controlled per pt    Atrial fibrillation ([address] 04/2021    Follows with Dr James Sauceda- 2-2022    History of supraventricular tachycardia 2021    after surgery    Hypertension     Hypothyroidism     Macular degeneration 01/2022    bilateral    PVC's (premature ventricular contractions)        Surgical History:  Past Surgical History:   Procedure Laterality Date    APPENDECTOMY      COLONOSCOPY      LAPAROSCOPIC APPENDECTOMY N/A 2/12/2020    APPENDECTOMY LAPAROSCOPIC performed by Ana María Gonzales MD at [facility] Right 3/15/2022    RIGHT KNEE TOTAL ARTHROPLASTY   ++SKY++    ++PNB++ performed by Jessa Collier MD at [address] Medications Prior to Admission:    Prior to Admission medications    Medication Sig Start Date End Date Taking? Authorizing Provider   dilTIAZem (DILACOR XR) 180 MG extended release capsule Take 180 mg by mouth daily   Yes Historical Provider, MD   mometasone (ASMANEX, 120 METERED DOSES,) 220 MCG/INH inhaler Inhale 2 puffs into the lungs 2 times daily   Yes Historical Provider, MD   ALPRAZolam (XANAX) 0.25 MG tablet Take 1 mg by mouth nightly as needed for Sleep. Yes Historical Provider, MD   lisinopril-hydrochlorothiazide (PRINZIDE;ZESTORETIC) 20-12.5 MG per tablet Take 2 tablets by mouth daily   Yes Historical Provider, MD   omeprazole (PRILOSEC) 40 MG delayed release capsule Take 40 mg by mouth daily   Yes Historical Provider, MD   levothyroxine (SYNTHROID) 50 MCG tablet Take 50 mcg by mouth daily. Yes Historical Provider, MD   Multiple Vitamins-Minerals (THERAPEUTIC MULTIVITAMIN-MINERALS) tablet Take 1 tablet by mouth daily    Historical Provider, MD   Multiple Vitamins-Minerals (PRESERVISION AREDS 2+MULTI VIT PO) Take by mouth    Historical Provider, MD   apixaban (ELIQUIS) 5 MG TABS tablet Take 1 tablet by mouth 2 times daily 7/6/20   Randall Holt MD       Allergies: Other    Social History:    reports that she quit smoking about 20 years ago. Her smoking use included cigarettes. She has never used smokeless tobacco. She reports previous drug use. Drug: Marijuana Skylar Sahu). She reports that she does not drink alcohol.     Family History:       Problem Relation Age of Onset    Other Mother 80        no health problems    Colon Cancer Father 79    Alcohol Abuse Sister         DRUG abuse    Alcohol Abuse Sister         Drug abuse         PHYSICAL EXAM:  Vitals:  /60   Pulse 65   Temp 97.1 °F (36.2 °C) (Axillary)   Resp 16   Ht 5' 4.5\" (1.638 m)   Wt 170 lb (77.1 kg)   SpO2 96%   BMI 28.73 kg/m²   General Appearance: alert and oriented to person, place and time, well developed present.

## 2022-03-15 NOTE — PROGRESS NOTES
Physical Therapy    Facility/Department: [facility] SURGERY  Initial Assessment    NAME: Patty Miner  : 1945  MRN: [id number]    Date of Service: 3/15/2022        REQUIRES PT FOLLOW UP: Yes       Patient Diagnosis(es): The encounter diagnosis was Preop testing. has a past medical history of Anxiety, Asthma, Atrial fibrillation ([address] History of supraventricular tachycardia, Hypertension, Hypothyroidism, Macular degeneration, and PVC's (premature ventricular contractions).   has a past surgical history that includes laparoscopic appendectomy (N/A, 2020); Appendectomy; Colonoscopy; and Total knee arthroplasty (Right, 3/15/2022). Evaluating Therapist: Noé Sepulveda PT     rec ww     Referring Provider:  JENA Urlich    PT order : PT eval and treat     Room #:  701   DIAGNOSIS:  OA s/p R TKA 3/15/2022   Per chart, pt has had recent falls     PRECAUTIONS: falls, WBAT with walker     Social:  Pt lives alone  in a  1  floor plan  4  steps and  1  rails to enter. Prior to admission pt walked with  No AD. Has std walker      Initial Evaluation  Date: 3/15/2022  Treatment      Short Term/ Long Term   Goals   Was pt agreeable to Eval/treatment? yes      Does pt have pain? R knee pain      Bed Mobility  Rolling:  NT   Supine to sit:  Min assist   Sit to supine:  SBA   Scooting:  Min assist    SBA    Transfers Sit to stand:   Mod assist   Stand to sit:  Min assist   Stand pivot:  NT    SBA    Ambulation     15 feet x 2  with  ww with  Min assist   150-200  feet with ww  with  SBA        Stair negotiation: ascended and descended NT    4  steps with  1  rail with  SBA    LE ROM  AAROM R knee 10-80 degrees      LE strength  R knee 2-/ 5     AM- PAC RAW score  15/ 24            Pt is alert and Oriented x  3      Balance:  Min assist, high fall l risk due to weakness and decreased safety awareness  Endurance: limited by nausea and dizziness   Bed/Chair alarm: yes      ASSESSMENT  Pt displays static/dynamic balance and to reduce fall risk  [x] Endurance Training to improve activity tolerance during functional mobility   [x] Transfer Training to improve safety and independence with all functional transfers   [x] Gait Training to improve gait mechanics, endurance and assess need for appropriate assistive device  [x] Stair Training in preparation for safe discharge home and/or into the community   [x] Positioning to prevent skin breakdown and contractures  [x] Safety and Education Training   [x] Patient/Caregiver Education   [x] HEP  [] Other     Frequency of treatments will be BID x 2 days. Time in: 1540   Time out:  1610       Evaluation Time includes thorough review of current medical information, gathering information on past medical history/social history and prior level of function, completion of standardized testing/informal observation of tasks, assessment of data and education on plan of care and goals.     CPT codes:  [x] Low Complexity PT evaluation 71547  [] Moderate Complexity PT evaluation 94463  [] High Complexity PT evaluation 91098  [] PT Re-evaluation 81950  [] Gait training 47698  minutes  [x] Therapeutic activities 11954 10 minutes  [] Therapeutic exercises 36884  minutes  [] Neuromuscular reeducation 54524  minutes       Victor Manuel 18 number:  PT [id number]

## 2022-03-15 NOTE — PROGRESS NOTES
Occupational Therapy  OCCUPATIONAL THERAPY INITIAL EVALUATION  [facility] [address]    Date: 3/15/2022     Patient Name: Dionte Sanchez  MRN: [id number]  : 1945  Room: [address] [place] [address]    Evaluating OT: Carly Partida. Leah Ek, OTR/MYRNA - [id number]    Referring Provider: JENA Rodriguez  Specific Provider Orders/Date: \"OT eval and treat\" - 3/15/2022    Diagnosis: Post-traumatic osteoarthritis of right knee [M17.31]     Patient has had two falls recently, per review of patient's medical record. Surgery: Patient underwent R TKA on 3/15/2022. Pertinent Medical History: anxiety, a-fib, HTN, macular degeneration    Precautions: fall risk, FWBAT, bed alarm    Assessment of Current Deficits:    [x] Functional mobility   [x]ADLs  [x] Strength               [x]Cognition   [x] Functional transfers   [x] IADLs         [x] Safety Awareness   [x]Endurance   [] Fine Coordination              [x] Balance      [] Vision/perception   [x]Sensation    []Gross Motor Coordination  [] ROM  [] Delirium                   [] Motor Control     OT PLAN OF CARE   OT POC is based on physician orders, patient diagnosis, and results of clinical assessment.   Frequency/Duration 2-5 days/week for 2 weeks PRN   Specific OT Treatment Interventions to Include:   * Instruction/training on adapted ADL techniques and AE recommendations to increase functional independence within precautions       * Training on energy conservation strategies, correct breathing pattern and techniques to improve independence/tolerance for self-care routine  * Functional transfer/mobility training/DME recommendations for increased independence, safety, and fall prevention  * Patient/Family education to increase follow through with safety techniques and functional independence  * Recommendation of environmental modifications for increased safety with functional transfers/mobility and ADLs  * Therapeutic exercise to improve motor endurance, ROM, and functional strength for ADLs/functional transfers  * Therapeutic activities to facilitate/challenge dynamic balance, stand tolerance for increased safety and independence with ADLs  * Neuro-muscular re-education: facilitation of righting/equilibrium reactions, midline orientation, scapular stability/mobility, normalization of muscle tone, and facilitation of volitional active controled movement    Recommended Adaptive Equipment: TBD     Home Living: Patient lives alone in a one-floor home (four steps to enter); laundry is on the main living level. Bathroom Setup: tub shower (with grab bars, but no seat)    Prior Level of Function (PLOF): Patient was independent with ADLs, IADLs, and functional mobility (without device) prior to this hospitalization. Driving: Yes    Pain Level: No complaints of pain. Cognition: Patient alert and oriented x3. WFL command follow demonstrated. Impulsivity demonstrated consistently. Decreased insight to current abilities/limitations demonstrated. Memory: Fair  Sequencing: Fair  Problem Solving: Fair  Judgement/Safety: Impaired   Impulsivity demonstrated. Functional Assessment:  -PAC Daily Activity Raw Score: 15/24   Initial Eval Status  Date: 3/15/2022 Treatment Status  Date:  Short Term Goals = Long Term Goals   Feeding Setup  Independent   Grooming Min A  Mod I  (seated/standing at sink)   UB Dressing Min A to [facility]/don hospital gown. Mod I  (including item retrieval)   LB Dressing Max A to thread feet into disposable brief; Max A to pull up and arrange brief. Cues needed consistently to maximize safety. Mod I / Independent - with use of AE, as needed/appropriate   Bathing Mod A  Mod I / Independent - with use of AE/DME, as needed/appropriate   Toileting Mod A needed for toilet transfer; cues needed to maximize safety. Max A for clothing management.   Mod I / Independent   Bed Mobility  Supine-to-Sit: Min A  Sit-to-Supine: SBA   Independent / Mod I in order to maximize patient's independence with ADLs, re-positioning, and other functional tasks. Functional Transfers Sit-to-Stand: Mod A   from EOB and toilet (with use of grab bar). Cues needed to maximize safety awareness. Independent   Functional Mobility Min A   (with walker) within patient's room and bathroom. Consistent cues needed to maximize safety awareness. Mod I with functional mobility (with device, as needed/appropriate) in order to maximize independence with ADLs/IADLs and other functional tasks. Balance Sitting: Fair+  (at EOB and on toilet)  Standing: Poor+ to Fair-  (with walker)  Fair+ dynamic standing balance during completion of ADLs/IADLs and other functional tasks. Activity Tolerance Fair-  Limited secondary to nausea and dizziness. One small episode of emesis demonstrated while seated at EOB (following initial sit-to-stand). Patient will demonstrate Good understanding and consistent implementation of energy conservation techniques and work simplification techniques into ADL/IADL routines. Visual/  Perceptual WFL  Patient has h/o macular degeneration. N/A     Additional Long-Term Goal: Patient will increase functional independence to PLOF in order to allow patient to live in least restrictive environment. Strength: ROM: Additional Information:    R UE  WFL WFL    L UE WFL  WFL      Hearing: Fair  Sensation: No complaints of numbness/tingling in B UEs. Tone: WFL  Edema: No    Comments: Upon arrival, patient supine in bed. At end of session, patient supine in bed with call light and phone within reach, bed alarm activated, physician present, and all lines and tubes intact. Patient would benefit from continued skilled OT to increase safety and independence with completion of ADL/IADL tasks for functional independence and quality of life.      Treatment: OT treatment provided this date included:    Instruction/training on safety and adapted techniques for completion of ADLs.   Instruction/training on safe functional mobility/transfer techniques. Patient education provided regardin) importance of having staff assistance with ADLs and other OOB activities to prevent falls/injury during hospitalization, 2) techniques to maximize safety with management of walker, particularly during ADLs. Patient indicated [address] understanding. Further skilled OT treatment indicated to increase patient's safety and independence with completion of ADL/IADL tasks in order to maximize patient's functional independence and quality of life. Rehab Potential: Good for established goals. Patient / Family Goal: Patient indicated that she anticipates returning home upon discharge. Patient and/or family were instructed on functional diagnosis, prognosis/goals, and OT plan of care. Demonstrated Fair understanding. Eval Complexity: Low    Time In: 1540  Time Out: 1610  Total Treatment Time: 15 minutes      Minutes Units   OT Eval Low 28468 15 1   OT Eval Medium 95856     OT Eval High 39269     OT Re-Eval [id number]     Therapeutic Ex 84966     Therapeutic Activities 02285     ADL/Self Care 47172 15 1   Orthotic Management 88907     Neuro Re-Ed 35202     Non-Billable Time N/A ---     Evaluation time includes thorough review of current medical information, gathering information on past medical history/social history and prior level of function, completion of standardized testing/informal observation of tasks, assessment of data, and education on plan of care and goals. MARZENA Locke/L  License Number: [id number]

## 2022-03-15 NOTE — PROGRESS NOTES
Patient report called to 7th floor RN; family waiting room notified of patient transfer. Pro Jones RN.

## 2022-03-15 NOTE — CARE COORDINATION
SOCIAL WORK:    Mrs. Juan Bradley ([place]) is scheduled for a right TKA today. She was met in ortho camp on March 9th and was provided all ortho discharge information. [place] is a patient of Dr. Janiya Champion. She resides alone in a one floor home with 4 entry steps, tub/shower set up. [place] is prepared to return home with [facility], [facility]). She has no agency preference for her wheeled walker and prefers [facility], as recommended by her surgeon, Dr. Domnick Siemens. Bunny Vyas at [facility] is following. A referral to [facility] will be made today upon arrival to the unit for [place]'s wheeled walker.  [facility] will also supply [place]'s CPM.    Electronically signed by TRAN Mas on 3/15/2022 at 9:50 AM

## 2022-03-16 PROBLEM — M17.31 POST-TRAUMATIC OSTEOARTHRITIS OF RIGHT KNEE: Status: RESOLVED | Noted: 2022-03-15 | Resolved: 2022-03-16

## 2022-03-16 LAB
ANION GAP SERPL CALCULATED.3IONS-SCNC: 8 MMOL/L (ref 7–16)
BASOPHILS ABSOLUTE: 0.02 E9/L (ref 0–0.2)
BASOPHILS RELATIVE PERCENT: 0.2 % (ref 0–2)
BUN BLDV-MCNC: 20 MG/DL (ref 6–23)
CALCIUM SERPL-MCNC: 8.6 MG/DL (ref 8.6–10.2)
CHLORIDE BLD-SCNC: 100 MMOL/L (ref 98–107)
CO2: 22 MMOL/L (ref 22–29)
CREAT SERPL-MCNC: 1.1 MG/DL (ref 0.5–1)
EOSINOPHILS ABSOLUTE: 0 E9/L (ref 0.05–0.5)
EOSINOPHILS RELATIVE PERCENT: 0 % (ref 0–6)
GFR AFRICAN AMERICAN: 58
GFR NON-AFRICAN AMERICAN: 48 ML/MIN/1.73
GLUCOSE BLD-MCNC: 154 MG/DL (ref 74–99)
HCT VFR BLD CALC: 28.6 % (ref 34–48)
HEMOGLOBIN: 9.2 G/DL (ref 11.5–15.5)
IMMATURE GRANULOCYTES #: 0.07 E9/L
IMMATURE GRANULOCYTES %: 0.6 % (ref 0–5)
LYMPHOCYTES ABSOLUTE: 1.05 E9/L (ref 1.5–4)
LYMPHOCYTES RELATIVE PERCENT: 8.6 % (ref 20–42)
MCH RBC QN AUTO: 29.8 PG (ref 26–35)
MCHC RBC AUTO-ENTMCNC: 32.2 % (ref 32–34.5)
MCV RBC AUTO: 92.6 FL (ref 80–99.9)
MONOCYTES ABSOLUTE: 0.39 E9/L (ref 0.1–0.95)
MONOCYTES RELATIVE PERCENT: 3.2 % (ref 2–12)
NEUTROPHILS ABSOLUTE: 10.69 E9/L (ref 1.8–7.3)
NEUTROPHILS RELATIVE PERCENT: 87.4 % (ref 43–80)
PDW BLD-RTO: 14.5 FL (ref 11.5–15)
PLATELET # BLD: 289 E9/L (ref 130–450)
PMV BLD AUTO: 8.9 FL (ref 7–12)
POTASSIUM SERPL-SCNC: 4.6 MMOL/L (ref 3.5–5)
RBC # BLD: 3.09 E12/L (ref 3.5–5.5)
SODIUM BLD-SCNC: 130 MMOL/L (ref 132–146)
WBC # BLD: 12.2 E9/L (ref 4.5–11.5)

## 2022-03-16 PROCEDURE — 6370000000 HC RX 637 (ALT 250 FOR IP): Performed by: PHYSICIAN ASSISTANT

## 2022-03-16 PROCEDURE — 99225 PR SBSQ OBSERVATION CARE/DAY 25 MINUTES: CPT | Performed by: INTERNAL MEDICINE

## 2022-03-16 PROCEDURE — 85025 COMPLETE CBC W/AUTO DIFF WBC: CPT

## 2022-03-16 PROCEDURE — 97530 THERAPEUTIC ACTIVITIES: CPT

## 2022-03-16 PROCEDURE — 96376 TX/PRO/DX INJ SAME DRUG ADON: CPT

## 2022-03-16 PROCEDURE — 6370000000 HC RX 637 (ALT 250 FOR IP): Performed by: INTERNAL MEDICINE

## 2022-03-16 PROCEDURE — 36415 COLL VENOUS BLD VENIPUNCTURE: CPT

## 2022-03-16 PROCEDURE — 6360000002 HC RX W HCPCS: Performed by: PHYSICIAN ASSISTANT

## 2022-03-16 PROCEDURE — G0378 HOSPITAL OBSERVATION PER HR: HCPCS

## 2022-03-16 PROCEDURE — 80048 BASIC METABOLIC PNL TOTAL CA: CPT

## 2022-03-16 PROCEDURE — 2700000000 HC OXYGEN THERAPY PER DAY

## 2022-03-16 PROCEDURE — 6360000002 HC RX W HCPCS: Performed by: INTERNAL MEDICINE

## 2022-03-16 PROCEDURE — 97110 THERAPEUTIC EXERCISES: CPT

## 2022-03-16 PROCEDURE — 2580000003 HC RX 258: Performed by: PHYSICIAN ASSISTANT

## 2022-03-16 PROCEDURE — 94640 AIRWAY INHALATION TREATMENT: CPT

## 2022-03-16 PROCEDURE — 97535 SELF CARE MNGMENT TRAINING: CPT

## 2022-03-16 PROCEDURE — 96375 TX/PRO/DX INJ NEW DRUG ADDON: CPT

## 2022-03-16 PROCEDURE — 96374 THER/PROPH/DIAG INJ IV PUSH: CPT

## 2022-03-16 RX ORDER — DOCUSATE SODIUM 100 MG/1
100 CAPSULE, LIQUID FILLED ORAL 2 TIMES DAILY
Qty: 28 CAPSULE | Refills: 0 | Status: SHIPPED | OUTPATIENT
Start: 2022-03-16 | End: 2022-03-30

## 2022-03-16 RX ORDER — OXYCODONE HYDROCHLORIDE AND ACETAMINOPHEN 5; 325 MG/1; MG/1
1 TABLET ORAL EVERY 6 HOURS PRN
Qty: 40 TABLET | Refills: 0 | Status: SHIPPED | OUTPATIENT
Start: 2022-03-16 | End: 2022-03-23

## 2022-03-16 RX ORDER — DILTIAZEM HYDROCHLORIDE 180 MG/1
180 CAPSULE, COATED, EXTENDED RELEASE ORAL DAILY
Status: DISCONTINUED | OUTPATIENT
Start: 2022-03-16 | End: 2022-03-17 | Stop reason: HOSPADM

## 2022-03-16 RX ORDER — FERROUS SULFATE 325(65) MG
325 TABLET ORAL
Qty: 42 TABLET | Refills: 0 | Status: SHIPPED | OUTPATIENT
Start: 2022-03-16 | End: 2022-05-25

## 2022-03-16 RX ORDER — OXYCODONE HYDROCHLORIDE AND ACETAMINOPHEN 5; 325 MG/1; MG/1
1 TABLET ORAL EVERY 4 HOURS PRN
Status: DISCONTINUED | OUTPATIENT
Start: 2022-03-16 | End: 2022-03-17 | Stop reason: HOSPADM

## 2022-03-16 RX ADMIN — MULTIPLE VITAMINS W/ MINERALS TAB 1 TABLET: TAB at 08:43

## 2022-03-16 RX ADMIN — Medication 2000 MG: at 11:15

## 2022-03-16 RX ADMIN — OXYCODONE HYDROCHLORIDE 2.5 MG: 5 TABLET ORAL at 04:52

## 2022-03-16 RX ADMIN — OXYCODONE AND ACETAMINOPHEN 1 TABLET: 5; 325 TABLET ORAL at 04:51

## 2022-03-16 RX ADMIN — OXYCODONE AND ACETAMINOPHEN 1 TABLET: 5; 325 TABLET ORAL at 20:41

## 2022-03-16 RX ADMIN — OXYCODONE HYDROCHLORIDE 2.5 MG: 5 TABLET ORAL at 08:44

## 2022-03-16 RX ADMIN — LEVOTHYROXINE SODIUM 50 MCG: 0.05 TABLET ORAL at 08:44

## 2022-03-16 RX ADMIN — SODIUM CHLORIDE, PRESERVATIVE FREE 10 ML: 5 INJECTION INTRAVENOUS at 08:46

## 2022-03-16 RX ADMIN — ONDANSETRON 4 MG: 2 INJECTION INTRAMUSCULAR; INTRAVENOUS at 08:50

## 2022-03-16 RX ADMIN — DILTIAZEM HYDROCHLORIDE 180 MG: 180 CAPSULE, COATED, EXTENDED RELEASE ORAL at 14:57

## 2022-03-16 RX ADMIN — BUDESONIDE 500 MCG: 0.5 SUSPENSION RESPIRATORY (INHALATION) at 09:09

## 2022-03-16 RX ADMIN — ONDANSETRON 4 MG: 2 INJECTION INTRAMUSCULAR; INTRAVENOUS at 18:51

## 2022-03-16 RX ADMIN — ACETAMINOPHEN 650 MG: 325 TABLET ORAL at 14:57

## 2022-03-16 RX ADMIN — Medication 2000 MG: at 03:58

## 2022-03-16 RX ADMIN — SODIUM CHLORIDE, PRESERVATIVE FREE 10 ML: 5 INJECTION INTRAVENOUS at 20:34

## 2022-03-16 RX ADMIN — APIXABAN 5 MG: 5 TABLET, FILM COATED ORAL at 08:43

## 2022-03-16 RX ADMIN — ALPRAZOLAM 1 MG: 1 TABLET ORAL at 19:12

## 2022-03-16 RX ADMIN — PANTOPRAZOLE SODIUM 40 MG: 40 TABLET, DELAYED RELEASE ORAL at 06:18

## 2022-03-16 RX ADMIN — OXYCODONE AND ACETAMINOPHEN 1 TABLET: 5; 325 TABLET ORAL at 08:43

## 2022-03-16 RX ADMIN — BUDESONIDE 500 MCG: 0.5 SUSPENSION RESPIRATORY (INHALATION) at 20:19

## 2022-03-16 RX ADMIN — APIXABAN 5 MG: 5 TABLET, FILM COATED ORAL at 20:34

## 2022-03-16 RX ADMIN — SODIUM CHLORIDE: 9 INJECTION, SOLUTION INTRAVENOUS at 01:46

## 2022-03-16 RX ADMIN — SODIUM CHLORIDE, PRESERVATIVE FREE 10 ML: 5 INJECTION INTRAVENOUS at 11:15

## 2022-03-16 ASSESSMENT — PAIN SCALES - GENERAL
PAINLEVEL_OUTOF10: 6
PAINLEVEL_OUTOF10: 7
PAINLEVEL_OUTOF10: 6
PAINLEVEL_OUTOF10: 7
PAINLEVEL_OUTOF10: 6
PAINLEVEL_OUTOF10: 7
PAINLEVEL_OUTOF10: 7
PAINLEVEL_OUTOF10: 8

## 2022-03-16 ASSESSMENT — PAIN DESCRIPTION - FREQUENCY
FREQUENCY: CONTINUOUS
FREQUENCY: CONTINUOUS

## 2022-03-16 ASSESSMENT — PAIN DESCRIPTION - PROGRESSION
CLINICAL_PROGRESSION: GRADUALLY WORSENING
CLINICAL_PROGRESSION: GRADUALLY WORSENING

## 2022-03-16 ASSESSMENT — PAIN DESCRIPTION - ONSET
ONSET: ON-GOING
ONSET: ON-GOING

## 2022-03-16 ASSESSMENT — PAIN DESCRIPTION - ORIENTATION
ORIENTATION: RIGHT
ORIENTATION: RIGHT

## 2022-03-16 ASSESSMENT — PAIN - FUNCTIONAL ASSESSMENT
PAIN_FUNCTIONAL_ASSESSMENT: PREVENTS OR INTERFERES SOME ACTIVE ACTIVITIES AND ADLS
PAIN_FUNCTIONAL_ASSESSMENT: PREVENTS OR INTERFERES SOME ACTIVE ACTIVITIES AND ADLS

## 2022-03-16 ASSESSMENT — PAIN DESCRIPTION - DESCRIPTORS
DESCRIPTORS: ACHING;THROBBING
DESCRIPTORS: ACHING;THROBBING

## 2022-03-16 ASSESSMENT — PAIN DESCRIPTION - PAIN TYPE
TYPE: SURGICAL PAIN
TYPE: SURGICAL PAIN

## 2022-03-16 ASSESSMENT — PAIN DESCRIPTION - LOCATION
LOCATION: KNEE
LOCATION: KNEE

## 2022-03-16 NOTE — PROGRESS NOTES
Physical Therapy  Facility/Department: [address] ORTHO SURGERY  Daily Treatment Note  NAME: Rodriguez Evans  : 1945  MRN: [id number]    Date of Service: 3/16/2022          Patient Diagnosis(es): The primary encounter diagnosis was Preop testing. A diagnosis of Post-op pain was also pertinent to this visit. has a past medical history of Anxiety, Asthma, Atrial fibrillation ([address] History of supraventricular tachycardia, Hypertension, Hypothyroidism, Macular degeneration, and PVC's (premature ventricular contractions).   has a past surgical history that includes laparoscopic appendectomy (N/A, 2020); Appendectomy; Colonoscopy; and Total knee arthroplasty (Right, 3/15/2022). Evaluating Therapist: Katy Jasso, PT      rec ww      Referring Provider:  JENA Ceja     PT order : PT eval and treat      Room #:  701   DIAGNOSIS:  OA s/p R TKA 3/15/2022   Per chart, pt has had recent falls      PRECAUTIONS: falls, WBAT with walker      Social:  Pt lives alone  in a  1  floor plan  4  steps and  1  rails to enter. Prior to admission pt walked with  No AD. Has std walker        Initial Evaluation  Date: 3/15/2022  Treatment     3/16/2022 AM Short Term/ Long Term   Goals   Was pt agreeable to Eval/treatment? yes   yes      Does pt have pain? R knee pain   R knee pain 6-8 throughout session      Bed Mobility  Rolling:  NT   Supine to sit:  Min assist   Sit to supine:  SBA   Scooting:  Min assist   Supine to  sit : S/I   Sit to supine : SBA   SBA    Transfers Sit to stand:   Mod assist   Stand to sit:  Min assist   Stand pivot:  NT   Sit <> stand : SBA/CGA  SBA    Ambulation     15 feet x 2  with  ww with  Min assist   200 and 100 feet x 1 with ww SBA  150-200  feet with ww  with  SBA          Stair negotiation: ascended and descended NT   4 steps with B HRs SBA   4 steps with 1HR and cane SBA   4  steps with  1  rail with  SBA    LE ROM  AAROM R knee 10-80 degrees        LE strength  R knee 2-/ 5       AM- PAC RAW score  15/ 24   18/ 24               Pt is alert and Oriented x  3       Balance:  SBA, high fall  risk due to decreased safety awareness  Endurance: WFL  Bed/Chair alarm: yes      ASSESSMENT    Pt displays functional ability as noted in the objective portion of this treatment          Conditions Requiring Skilled Therapeutic Intervention:     [x]? Decreased strength                                      [x]? Decreased ROM  [x]? Decreased functional mobility  [x]? Decreased balance   [x]? Decreased endurance   [x]? Decreased posture  []? Decreased sensation  [x]? Decreased coordination   []? Decreased vision  [x]? Decreased safety awareness   [x]? Increased pain             Treatment/Education:    Pt in bed upon arrival ; agreeable to PT. Left room to get supplies and pt sitting EOB upon return. Performed seated LAQs, knee flex, and hip flex prior to mobility. Cues given for hand and foot placement with transfers. Pt needed instruction to maintain proper distance from ww with gait. Pt able to amb with reciprocal gait. Instructed pt on sequencing on stair negotiation and technique with 1 HR and cane. Pt forgetful and requires cues for safety and to stay on task with mobility and conversation. At high risk for falls. Reports increased R knee pain after session and nausea. RN informed. Pt return to supine after session per her request, QS performed.      Pt educated on fall risk,  Safe and proper technique with mobility         Patient response to education:   Pt verbalized understanding Pt demonstrated skill Pt requires further education in this area   x  needs assist   x         Comments:  Pt left  In bed per pt request after session, with call light in reach.             PLAN    PT care will be provided in accordance with the objectives noted above. Whenever appropriate, clear delegation orders will be provided for nursing staff.   Exercises and functional mobility practice will be used as well as appropriate assistive devices or modalities to obtain goals. Patient and family education will also be administered as needed.           PLAN OF CARE:     Current Treatment Recommendations      [x]? Strengthening to improve independence with functional mobility   [x]? ROM to improve independence with functional mobility   []? Balance Training to improve static/dynamic balance and to reduce fall risk  [x]? Endurance Training to improve activity tolerance during functional mobility   [x]? Transfer Training to improve safety and independence with all functional transfers   [x]? Gait Training to improve gait mechanics, endurance and assess need for appropriate assistive device  [x]? Stair Training in preparation for safe discharge home and/or into the community   [x]? Positioning to prevent skin breakdown and contractures  [x]? Safety and Education Training   [x]? Patient/Caregiver Education   [x]? HEP  []? Other      Frequency of treatments will be BID x 2 days.     Time in: 0750   Time out:  0829          Con't with PT POC with emphasis on increased safety.      CPT codes:  []? Low Complexity PT evaluation [id number]  []? Moderate Complexity PT evaluation 64330  []? High Complexity PT evaluation [id number]  []? PT Re-evaluation [id number]  []? Gait training 67745  minutes  [x]? Therapeutic activities 64508 30 minutes  [x]? Therapeutic exercises 31987 8 minutes  []?  Neuromuscular reeducation 98059  minutes         Victor Manuel 18 number:  PT [id number]

## 2022-03-16 NOTE — ANESTHESIA POSTPROCEDURE EVALUATION
Department of Anesthesiology  Postprocedure Note    Patient: Carson Qiu  MRN: [id number]  YOB: 1945  Date of evaluation: 3/16/2022  Time:  11:45 AM     Procedure Summary     Date: 03/15/22 Room / Location: [address]    Anesthesia Start: 1027 Anesthesia Stop: 1327    Procedure: RIGHT KNEE TOTAL ARTHROPLASTY   ++SKY++    ++PNB++ (Right Knee) Diagnosis: (OSTEOARTHRITIS)    Surgeons: Haley Anna MD Responsible Provider: Renuka Morgan MD    Anesthesia Type: spinal, regional, general ASA Status: 3          Anesthesia Type: spinal, regional, general    Kash Phase I: Kash Score: 10    Kash Phase II:      Last vitals: Reviewed and per EMR flowsheets.        Anesthesia Post Evaluation    Patient location during evaluation: PACU  Patient participation: complete - patient participated  Level of consciousness: awake  Airway patency: patent  Nausea & Vomiting: no nausea and no vomiting  Complications: no  Cardiovascular status: hemodynamically stable  Respiratory status: acceptable  Hydration status: stable

## 2022-03-16 NOTE — CARE COORDINATION
Case Management: Social Work Case Management Initial Assessment  Farshad Chowdary,         Met with:patient  Information verified: address, contacts, phone number, , insurance Yes  PCP: Sinai Workman MD  Pharmacy:   [address] [place] [address] [phone]  [address] [place] [id number]  Phone: [phone] Fax: [phone]         Discharge Planning  Patient Status Order: Observation Date: 3-15-22  Readmission within last 30 days:  no  Current Residence: Private Residence  Living Arrangements:  Alone   Home Access: [facility] of Steps: N/A  Support Systems:  Family Members  Current Services PTA: None Supplier: n/a  Patient able to perform ADL's: ASSISTANCE NEEDED  DME used to aid ambulation prior to admission: N/A. Potential Assistance Needed:  N/A  Potential Assistance Purchasing Medications:  No  Does patient want to participate in local refill/meds to beds program?: Yes    Patient agreeable to home care: Yes  Type of Home Care Services:  None  Patient expects to be discharged to:  home    Prior SNF/Rehab Placement and Facility: NO  Agreeable to SNF/Rehab: No    Choices given to patient: YES    Expected Discharge date:  22  Follow Up Appointment: Best Day/ Time: Monday AM    Social Work Assessment/Plan: Follow-up visit was made with Mrs. Jose Arriaga ([place]) this a.m., as well as afternoon. [place] is prepared to return home and will have assistance, however, she has sever anxiety and is nauseated, therefore, she is discharging home with [facility] for [employer], tomorrow.       Electronically signed by TRAN Morrell on 3/16/22 at 1:10 PM EDT

## 2022-03-16 NOTE — PROGRESS NOTES
[facility]ist   Progress Note    Admitting Date and Time: 3/15/2022  7:31 AM  Admit Dx: Post-traumatic osteoarthritis of right knee [M17.31]     Seen for follow on multiple problems as listed below. Subjective:  Denies CP,sob or abd pain. Post op pain well controlled with current meds. Ambulating well while at therapy. ROS: denies fever, chills, cp, sob, n/v, HA unless stated above.      apixaban  5 mg Oral BID    sodium chloride flush  10 mL IntraVENous 2 times per day    ceFAZolin (ANCEF) IVPB  2,000 mg IntraVENous Q8H    oxyCODONE-acetaminophen  1 tablet Oral 6 times per day    And    oxyCODONE  2.5 mg Oral 6 times per day    dilTIAZem  180 mg Oral Daily    levothyroxine  50 mcg Oral Daily    therapeutic multivitamin-minerals  1 tablet Oral Daily    pantoprazole  40 mg Oral QAM AC    lisinopril  20 mg Oral Daily    And    hydroCHLOROthiazide  12.5 mg Oral Daily    budesonide  0.5 mg Nebulization BID     sodium chloride flush, 10 mL, PRN  sodium chloride, 25 mL, PRN  acetaminophen, 650 mg, Q4H PRN  morphine, 2 mg, Q3H PRN  promethazine, 12.5 mg, Q6H PRN   Or  ondansetron, 4 mg, Q6H PRN  polyethylene glycol, 17 g, Daily PRN  docusate sodium, 100 mg, BID PRN  ALPRAZolam, 1 mg, Nightly PRN         Objective:    /64   Pulse 57   Temp 98.4 °F (36.9 °C) (Oral)   Resp 16   Ht 5' 4.5\" (1.638 m)   Wt 170 lb (77.1 kg)   SpO2 94%   BMI 28.73 kg/m²   General Appearance: alert and oriented to person, place and time, in no acute distress  Skin: warm and dry  Head: normocephalic and atraumatic  Eyes: pupils equal, round, and reactive to light, extraocular eye movements intact, conjunctivae normal  Neck: supple and non-tender without mass  Pulmonary/Chest: clear to auscultation bilaterally  Cardiovascular: normal rate, regular rhythm, normal S1 and S2  Abdomen: soft, non-tender, non-distended, normal bowel sounds, no masses or organomegaly  Extremities: no cyanosis, clubbing, rt knee with dressing d/i  Musculoskeletal: normal range of motion  Neurologic: no cranial nerve deficit,  speech normal      Recent Labs     03/15/22  0849 03/16/22  0215    130*   K 3.6 4.6    100   CO2 22 22   BUN 14 20   CREATININE 0.9 1.1*   GLUCOSE 104* 154*   CALCIUM 7.8* 8.6       Recent Labs     03/16/22 0215   WBC 12.2*   RBC 3.09*   HGB 9.2*   HCT 28.6*   MCV 92.6   MCH 29.8   MCHC 32.2   RDW 14.5      MPV 8.9       Labs and images reviewed     Radiology:   XR KNEE RIGHT (1-2 VIEWS)   Final Result   Anatomically aligned right TKA with no unexpected findings. Assessment:    Principal Problem (Resolved):    Post-traumatic osteoarthritis of right knee  Active Problems:    * No active hospital problems. *      Plan:  HTN / A fib  -on Cardizem for rate control, on Eliquis for anticoagulation. This is resumed as per home. Lisinopril hydrochlorothiazide resumed as per home. BP on lower side, parameters to hold added. BP still on lower side 104/64 , meds were on hold. Advised patient to resume cardizem but continue to hold/ DC Lisinopril/ HCTZ and follow with PCP in 1 week for BP check & if needed resume .  Also advised to have BP machine at home- monitor BP & follow with PCP.     Hypothyroidism -on supplements as per home.     Deg OA rt knee s/p rt TKA - continue post op care as per ortho.      On eliquis so ok for dvt prophy  Full code.              Electronically signed by Joetta Severe, MD on 3/16/2022 at 7:41 AM

## 2022-03-16 NOTE — DISCHARGE SUMMARY
Discharge Summary     Patient ID:  Kush Miguel  [id number]  64 y.o.  1945    Admit date: 3/15/2022    Discharge date and time: No discharge date for patient encounter. Admitting Physician: Rodolfo Johnston MD     Discharge Physician: same    Admission Diagnoses: right Knee Osteoarthritis    Discharge Diagnoses:     Admission Condition: good    Discharged Condition: good    Procedure and Date: right Total Knee Arthroplasty     Hospital Course: A right total knee arthroplasty was performed on 3/15/2022. Following this procedure the patient was admitted for a 1/2 day postoperative hospital stay. During this admission, DVT prophylaxis was followed using bilateral ICDs, NESTOR hose, and Eliquis was resumed POD#1. A regional block was used for post-operative pain control along with the use of IV/oral pain medications. Patient was seen daily by PT. Discharge plans were discussed with the patient with the aid of . She did have low H&H POD #1 and iron was ordered for 2 weeks. Consults: Patient's PCP was consulted for medical management. Significant Diagnostic Studies: H&H 9.2/28.6   WBC 12.2    Discharge Exam:  Dressing c/d/i. Calf soft, non-tender. No LE edema. 5+ DF, PF, EHL motor function. 2+ DP and PT pulses. Disposition: home    Patient Instructions:   Per med rec, percocet for pain  Activity: WBAT with walker  Diet: regular diet  Wound Care: aquacel dressing x 1 week    Follow-up with Dr. Bobo Whitehead in 2 weeks.     JENA Domingo  3/16/2022  7:13 AM

## 2022-03-16 NOTE — PROGRESS NOTES
Physical Therapy  Facility/Department: [address] ORTHO SURGERY  Daily Treatment Note  NAME: Aminah Johnson  : 1945  MRN: [id number]    Date of Service: 3/16/2022          Patient Diagnosis(es): The primary encounter diagnosis was Preop testing. A diagnosis of Post-op pain was also pertinent to this visit. has a past medical history of Anxiety, Asthma, Atrial fibrillation ([address] History of supraventricular tachycardia, Hypertension, Hypothyroidism, Macular degeneration, and PVC's (premature ventricular contractions).   has a past surgical history that includes laparoscopic appendectomy (N/A, 2020); Appendectomy; Colonoscopy; and Total knee arthroplasty (Right, 3/15/2022). Evaluating Therapist: Betty Richey, PT      rec ww      Referring Provider:  JENA Hooker     PT order : PT eval and treat      Room #:  701   [facility] s/p R TKA 3/15/2022   Per chart, pt has had recent [address], WBAT with walker      Social:  Pt lives alone  in a  1  floor plan  4  steps and  1  rails to enter. Prior to admission pt walked with  No AD. Has std walker        Initial Evaluation  Date: 3/15/2022  Treatment     3/16/2022 PM Short Term/ Long Term   Goals   Was pt agreeable to Eval/treatment?  yes   yes      Does pt have pain?  R knee pain   R knee pain      Bed Mobility  Rolling:  NT   Supine to sit:  Min assist   Sit to supine:  SBA   Scooting:  Min assist   Supine to  sit : S/I     SBA    Transfers Sit to stand:  Mod assist   Stand to sit:  Min assist   Stand pivot:  NT   Sit <> stand : SBA  SBA    Ambulation     15 feet x 2  with  ww with  Min assist   100 feet x 2 with ww SBA  150-200  feet with ww  with  SBA          Stair negotiation: ascended and descended NT     4 steps with 1HR and QC SBA   4  steps with  1  rail with  SBA    LE ROM  AAROM R knee 10-80 degrees        LE strength  R knee 2-/ 5       AM- PAC RAW score  15/ 24   18/ 24               Pt is alert and Oriented x  3       Balance:  SBA,  high fall  risk due to decreased safety awareness  Endurance: WFL  Bed/Chair alarm: yes      ASSESSMENT     Pt displays functional ability as noted in the objective portion of this treatment          Conditions Requiring Skilled Therapeutic Intervention:     [x]? ?Decreased strength                                      [x]? ?Decreased ROM  [x]? ?Decreased functional mobility  [x]? ?Decreased balance   [x]? ?Decreased endurance   [x]? ?Decreased posture  []? ?Decreased sensation  [x]? ?Decreased coordination   []? ?Decreased vision  [x]? ?Decreased safety awareness   [x]? ?Increased pain                Treatment/Education:    Pt in bed upon arrival ; agreeable to PT. Performed supine  SAQs, heel slides, and QS. Cues given for hand and foot placement with transfers. Pt able to amb with reciprocal gait. Instructed pt on sequencing on stair negotiation and technique with 1 HR and QC. Pt forgetful and requires cues for safety and to stay on task with mobility and conversation. At high risk for falls. Pt instructed in proper technique with car transfers.      Pt educated on fall risk,  Safe and proper technique with mobility         Patient response to education:   Pt verbalized understanding Pt demonstrated skill Pt requires further education in this area   x  needs cues   x         Comments:  Pt left  In chair per pt request after session, with call light in reach.              PLAN     PT care will be provided in accordance with the objectives noted above. Kary Cid appropriate, clear delegation orders will be provided for nursing staff.  Exercises and functional mobility practice will be used as well as appropriate assistive devices or modalities to obtain goals. Patient and family education will also be administered as needed.           PLAN OF CARE:     Current Treatment Recommendations      [x]? ? Strengthening to improve independence with functional mobility   [x]? ? ROM to improve independence with functional mobility   []? ? Balance Training to improve static/dynamic balance and to reduce fall risk  [x]? ? Endurance Training to improve activity tolerance during functional mobility   [x]? ? Transfer Training to improve safety and independence with all functional transfers   [x]? ? Gait Training to improve gait mechanics, endurance and assess need for appropriate assistive device  [x]? ? Stair Training in preparation for safe discharge home and/or into the community   [x]? ? Positioning to prevent skin breakdown and contractures  [x]? ? Safety and Education Training   [x]? ? Patient/Caregiver Education   [x]? ?Neeraj Caul  []? ? Other      Frequency of treatments will be BID x 2 days.     Time OB: 4988  Time LHQ:  1770         Con't with PT POC with emphasis on increased safety.      CPT codes:  []?? Low Complexity PT evaluation 74600  []? ? Moderate Complexity PT evaluation S1907257  []? [place] Complexity PT evaluation [id number]  []?? PT Re-evaluation [id number]  []? ? Gait training 58555  minutes  [x]? ? Therapeutic activities 66836 95  minutes  [x]? ? Therapeutic exercises 91522 10 minutes  []? ? Neuromuscular reeducation [id number]  minutes         Victor Manuel 18 number:  PT [id number]

## 2022-03-16 NOTE — PLAN OF CARE
Problem: Falls - Risk of:  Goal: Will remain free from falls  Description: Will remain free from falls  3/16/2022 0101 by Bobby Duggan RN  Outcome: Met This Shift     Problem: Pain:  Goal: Pain level will decrease  Description: Pain level will decrease  3/16/2022 0101 by Bobby Duggan RN  Outcome: Met This Shift     Problem: Discharge Planning:  Goal: Discharged to appropriate level of care  Description: Discharged to appropriate level of care  Outcome: Met This Shift     Problem: Mobility - Impaired:  Goal: Mobility will improve  Description: Mobility will improve  Outcome: Met This Shift     Problem: Infection - Surgical Site:  Goal: Will show no infection signs and symptoms  Description: Will show no infection signs and symptoms  3/16/2022 0101 by Bobby Duggan RN  Outcome: Met This Shift     Problem: Pain - Acute:  Goal: Pain level will decrease  Description: Pain level will decrease  3/16/2022 0101 by Bobby Duggan RN  Outcome: Met This Shift

## 2022-03-16 NOTE — PROGRESS NOTES
Department of Surgery   Surgical Service- Orthopaedics  Physician Assistant TKA Progress Note      Jeffry Luna  3/16/2022  7:10 AM    SUBJECTIVE:  POD #1 s/p RIGHT TKA. Patient is doing well, no complaints, pain controlled. Not taking pain medications due to fear of hypotension. C/o headache when taking Percocet last night. OBJECTIVE:      VITALS:  /64   Pulse 57   Temp 98.4 °F (36.9 °C) (Oral)   Resp 16   Ht 5' 4.5\" (1.638 m)   Wt 170 lb (77.1 kg)   SpO2 94%   BMI 28.73 kg/m²     Physical Exam: Dressing c/d/i. Calf soft, non-tender. No LE edema. 5+ DF, PF, EHL motor function. 2+ DP and PT pulses.      Data:  CBC:   Lab Results   Component Value Date    WBC 12.2 03/16/2022    RBC 3.09 03/16/2022    HGB 9.2 03/16/2022    HCT 28.6 03/16/2022    MCV 92.6 03/16/2022    MCH 29.8 03/16/2022    MCHC 32.2 03/16/2022    RDW 14.5 03/16/2022     03/16/2022    MPV 8.9 03/16/2022     BMP:    Lab Results   Component Value Date     03/16/2022    K 4.6 03/16/2022    K 3.8 05/15/2020     03/16/2022    CO2 22 03/16/2022    BUN 20 03/16/2022    LABALBU 4.2 07/06/2020    LABALBU 3.4 01/23/2011    CREATININE 1.1 03/16/2022    CALCIUM 8.6 03/16/2022    GFRAA 58 03/16/2022    LABGLOM 48 03/16/2022    GLUCOSE 154 03/16/2022    GLUCOSE 132 11/24/2011         ASSESSMENT: POD #1 s/p right TKA   Postop anemia secondary to acute blood loss    PLAN:   Discharge home- home PT, HHC, CPM  Pain medications as ordered- Percocet 5/325  Iron started TID for 3 weeks for postop anemia  PT today before discharge  Discharge instructions reviewed        Adrien Estrada PA-C

## 2022-03-16 NOTE — OP NOTE
[address] [address]                                OPERATIVE REPORT    PATIENT NAME: Arturo Molina              :        1945  MED REC NO:   [id number]                            ROOM:       0701  ACCOUNT NO:   [de-identified]                           ADMIT DATE: 03/15/2022  PROVIDER:     Louie Rivera MD    DATE OF PROCEDURE:  03/15/2022    PREOPERATIVE DIAGNOSIS:  Right knee osteoarthritis with genu valgum. POSTOPERATIVE DIAGNOSIS:  Right knee osteoarthritis with genu valgum. PROCEDURE PERFORMED:  Right total knee arthroplasty, [id number]. COMPONENTS UTILIZED:  Hidalgo Triathlon cruciate-retaining cemented TKA  - size 4T, size 4F, 10 mm CR - X3 polyethylene A32 x 10 mm asymmetric  patella. SURGEON:  Ivonne Figueroa MD.    FIRST ASSISTANT:  Devante Méndez PA-C. No qualified surgical resident  available for case. Physician assistant participated intraoperatively  assisting with retraction. She also performed closure of skin with  application of postoperative dressing. Her participation improved OR  efficiency and decreased OR time. ANESTHESIA:  Spinal plus regional.    OPERATIVE INDICATION:  This is a 64-year-old female with progressive  knee pain and gait dysfunction that is the result of a primary  osteoarthritis with genu valgum. She has failed conservative treatment  measures and now presents for elective total knee arthroplasty. DESCRIPTION OF PROCEDURE:  An adductor canal block was performed. This  was followed by induction of spinal anesthesia. The patient was  positioned supine and the affected leg was prepped and draped in the  usual standard fashion. The knee was sealed with Bijan Everts. The leg was  then exsanguinated with an Esmarch and a tourniquet inflated to 250  mmHg. A traditional total knee arthroplasty was performed using  standard jigs.   A straight skin incision was made followed by a standard  median parapatellar arthrotomy. A portion of the fat pad was removed. A soft tissue sleeve was lifted off the anteromedial tibia over to and  including release of the deep MCL. The patella was everted and the knee  flexed up. A drill was used to enter the canal of the femur followed by  placement of a flexible intramedullary guide, set to position a distal  femoral cutting block for an 8 mm resection in 5 degrees valgus. The  distal femur was cut, the femur was sized, and an appropriate  four-in-one cutting block was used to make anterior, posterior, and  chamfer cuts. She had very reasonable bone quality. The ACL was  resected. The PCL was preserved. An extramedullary guide was then  dropped to the center of the tibia distally and was set up with a  3-degree posterior slope cutting block to make the tibial resection. A  laminar  was then placed and the posterior compartment of the  knee was cleared of remaining meniscal tissue and posterior femoral  osteophytes. A tensiometer was then used to balance flexion and  extension gaps, both of which measured 20 mm. Medial and lateral  balance was equal.  Trial implants were placed and the patella was then  addressed. A patellar resection was made leaving a symmetric 14 mm  shell of patella bone that was sized to be a 32. A trial 32 was placed  and this tracked centrally with no subluxation or tilt. I made a  decision to cement components given her age and overall bone quality  which was reasonable, but not terribly osteoporotic. The femur was  drilled for pegs. The tibia was set up and prepared with the cruciform  punch. All trial implants were removed and final Wishon  cruciate-retaining implants were then cemented using Brad Simplex  cement. The posterior capsule on both sides of the arthrotomy were  injected with Marcaine to help with postop pain control. The skin was  closed in layers using 3-0 Monocryl. Tourniquet was let down. Dry  sterile dressing was applied. The patient was awoken and transferred to  Recovery in stable condition. There were no complications.         Reynaldo Chris MD    D: 03/15/2022 20:38:05       T: 03/15/2022 20:40:35     [email or web address]  Job#: [id number]     Doc#: [id number]    CC:

## 2022-03-16 NOTE — PROGRESS NOTES
[facility] was ordered dilTIAZem (DILACOR XR) which is a nonformulary medication (formulary alternative is Cardizem CD). This medication will need to be supplied by the patient as the pharmacy does not carry this non-formulary medication. If the medication has not been administered by 1400 on the following day from the time the order was placed, a pharmacist will follow-up with the nurse of the patient to assess the capability of the patient to bring in the medication. If it is determined that the patient cannot supply the medication and it is not available to be dispensed from the pharmacy, the provider will be notified.   Orlando Vences, [address] [place] [address]  3/15/2022  10:23 PM

## 2022-03-16 NOTE — PATIENT CARE CONFERENCE
P Quality Flow/Interdisciplinary Rounds Progress Note        Quality Flow Rounds held on March 16, 2022    Disciplines Attending:  Bedside Nurse, ,  and Nursing Unit Aram [facility] was admitted on 3/15/2022  7:31 AM    Anticipated Discharge Date:  Expected Discharge Date: 03/16/22    Disposition:    Jorge Score:  Jorge Scale Score: 20    Readmission Risk              Risk of Unplanned Readmission:  0           Discussed patient goal for the day, patient clinical progression, and barriers to discharge. The following Goal(s) of the Day/Commitment(s) have been identified: social work to see regarding home vs rehab.    Manage pain and safety      Babatunde Rehman RN  March 16, 2022

## 2022-03-16 NOTE — PROGRESS NOTES
Occupational Therapy  OT BEDSIDE TREATMENT NOTE      Date:3/16/2022  Patient Name: Alma Hodges  MRN: [id number]  : 1945  Room: [address] [place] [address]       Evaluating OT: Zakia Martin. Jabari Mobley, JCARLOSR/L - [id number]     Referring Provider: JENA Gutierrez  Specific Provider Orders/Date: \"OT eval and treat\" - 3/15/2022     Diagnosis: Post-traumatic osteoarthritis of right knee [M17.31]                Patient has had two falls recently, per review of patient's medical record.     Surgery: Patient underwent R TKA on 3/15/2022.     Pertinent Medical History: anxiety, a-fib, HTN, macular degeneration     Precautions: fall risk, FWBAT     Assessment of Current Deficits:    [x]? Functional mobility             [x]?ADLs           [x]? Strength                  [x]? Cognition   [x]? Functional transfers           [x]? IADLs         [x]? Safety Awareness   [x]? Endurance   []? Fine Coordination              [x]? Balance      []? Vision/perception   [x]? Sensation     []? Gross Motor Coordination  []? ROM           []? Delirium                   []? Motor Control      OT PLAN OF CARE   OT POC is based on physician orders, patient diagnosis, and results of clinical assessment.   Frequency/Duration 2-5 days/week for 2 weeks PRN   Specific OT Treatment Interventions to Include:   * Instruction/training on adapted ADL techniques and AE recommendations to increase functional independence within precautions       * Training on energy conservation strategies, correct breathing pattern and techniques to improve independence/tolerance for self-care routine  * Functional transfer/mobility training/DME recommendations for increased independence, safety, and fall prevention  * Patient/Family education to increase follow through with safety techniques and functional independence  * Recommendation of environmental modifications for increased safety with functional transfers/mobility and ADLs  * Therapeutic exercise to improve motor endurance, ROM, and functional strength for ADLs/functional transfers  * Therapeutic activities to facilitate/challenge dynamic balance, stand tolerance for increased safety and independence with ADLs  * Neuro-muscular re-education: facilitation of righting/equilibrium reactions, midline orientation, scapular stability/mobility, normalization of muscle tone, and facilitation of volitional active controled movement     Recommended Adaptive Equipment: TBD      Home Living: Patient lives alone in a one-floor home (four steps to enter); laundry is on the main living level. Bathroom Setup: tub shower (with grab bars, but no seat)     Prior Level of Function (PLOF): Patient was independent with ADLs, IADLs, and functional mobility (without device) prior to this hospitalization.     Pain Level: No complaints of pain. Pt with complaint of nausea throughout the session. Cognition: Patient alert and oriented. Min cues for safety.      Functional Assessment:                  AM-PAC Daily Activity Raw Score: 15/24    Initial Eval Status  Date: 3/15/2022 Treatment Status  Date: 3/16/22  Short Term Goals = Long Term Goals   Feeding Setup   Independent   Grooming Min A  supervision while standing at the sink. Mod I  (seated/standing at sink)   UB Dressing Min A to doff/don hospital gown. Setup   Mod I  (including item retrieval)   LB Dressing Max A to thread feet into disposable brief; Max A to pull up and arrange brief. Cues needed consistently to maximize safety.  instructed with compensatory strategies for LB ADL. Pt donned clothing over feet with exception to shoes. Unable to don shoes due to swelling and shoes do not fit at this time. Pt reports she has shoehorn at home. Mod I / Independent - with use of AE, as needed/appropriate   Bathing Mod A Pt reports she plans to sponge bathe upon return home.   She has a tub bench however unable to use due to the size of her bathroom.     Mod I / Independent - with use of AE/DME, as needed/appropriate   Toileting Mod A needed for toilet transfer; cues needed to maximize safety. Max A for clothing management. SBA   Mod I / Independent   Bed Mobility  Supine-to-Sit: Min A  Sit-to-Supine: SBA   SBA supine <> sit onto the side of the bed. Independent / Mod I in order to maximize patient's independence with ADLs, re-positioning, and other functional tasks. Functional Transfers Sit-to-Stand: Mod A   from EOB and toilet (with use of grab bar). Cues needed to maximize safety awareness. CGA and cues for hand placement to increase safety.   Independent   Functional Mobility Min A   (with walker) within patient's room and bathroom. Consistent cues needed to maximize safety awareness. CGA using w/w. Cues for safety.   Mod I with functional mobility (with device, as needed/appropriate) in order to maximize independence with ADLs/IADLs and other functional tasks. Balance Sitting: Fair+  (at EOB and on toilet)  Standing: Poor+ to Fair-  (with walker)   Fair+ dynamic standing balance during completion of ADLs/IADLs and other functional tasks. Activity Tolerance Fair-  Limited secondary to nausea and dizziness. One small episode of emesis demonstrated while seated at EOB (following initial sit-to-stand).  limited due to nausea. Grabbing pan and having dry heaves several times. Patient will demonstrate Good understanding and consistent implementation of energy conservation techniques and work simplification techniques into ADL/IADL routines. Comments:  Pt able to stand at the sink to complete bathing and grooming. Increased nausea when bending during self care activities. Pt unable to eat lunch and returned to bed at the end of the session. Education/treatment:  ADL retraining with facilitation of movement and instruction of compensatory strategies  to increase self care skills. Therapeutic activity to address balance and endurance for ADL and transfers.   Pt education of walker safety, transfer safety, and home safety. · Pt has made  progress towards set goals.        Time In: 10:35  Time Out: 11:15     Min Units   Therapeutic Ex 17495     Therapeutic Activities 80808 10 1   ADL/Self Care 66483 30 2   Orthotic Management 97557     Neuro Re-Ed 91822     Non-Billable Time     TOTAL TIMED TREATMENT 40 1239 [facility] MARYANN/L [id number]

## 2022-03-17 VITALS
HEIGHT: 65 IN | BODY MASS INDEX: 28.32 KG/M2 | SYSTOLIC BLOOD PRESSURE: 122 MMHG | TEMPERATURE: 98.8 F | RESPIRATION RATE: 16 BRPM | DIASTOLIC BLOOD PRESSURE: 53 MMHG | OXYGEN SATURATION: 90 % | WEIGHT: 170 LBS | HEART RATE: 62 BPM

## 2022-03-17 LAB
ANION GAP SERPL CALCULATED.3IONS-SCNC: 8 MMOL/L (ref 7–16)
BASOPHILS ABSOLUTE: 0.01 E9/L (ref 0–0.2)
BASOPHILS RELATIVE PERCENT: 0.1 % (ref 0–2)
BUN BLDV-MCNC: 21 MG/DL (ref 6–23)
CALCIUM SERPL-MCNC: 8.6 MG/DL (ref 8.6–10.2)
CHLORIDE BLD-SCNC: 98 MMOL/L (ref 98–107)
CO2: 24 MMOL/L (ref 22–29)
CREAT SERPL-MCNC: 1.2 MG/DL (ref 0.5–1)
EOSINOPHILS ABSOLUTE: 0 E9/L (ref 0.05–0.5)
EOSINOPHILS RELATIVE PERCENT: 0 % (ref 0–6)
GFR AFRICAN AMERICAN: 52
GFR NON-AFRICAN AMERICAN: 43 ML/MIN/1.73
GLUCOSE BLD-MCNC: 100 MG/DL (ref 74–99)
HCT VFR BLD CALC: 24.1 % (ref 34–48)
HEMOGLOBIN: 7.8 G/DL (ref 11.5–15.5)
IMMATURE GRANULOCYTES #: 0.05 E9/L
IMMATURE GRANULOCYTES %: 0.4 % (ref 0–5)
LYMPHOCYTES ABSOLUTE: 1.7 E9/L (ref 1.5–4)
LYMPHOCYTES RELATIVE PERCENT: 15.2 % (ref 20–42)
MCH RBC QN AUTO: 30.1 PG (ref 26–35)
MCHC RBC AUTO-ENTMCNC: 32.4 % (ref 32–34.5)
MCV RBC AUTO: 93.1 FL (ref 80–99.9)
MONOCYTES ABSOLUTE: 0.98 E9/L (ref 0.1–0.95)
MONOCYTES RELATIVE PERCENT: 8.8 % (ref 2–12)
NEUTROPHILS ABSOLUTE: 8.44 E9/L (ref 1.8–7.3)
NEUTROPHILS RELATIVE PERCENT: 75.5 % (ref 43–80)
PDW BLD-RTO: 14.6 FL (ref 11.5–15)
PLATELET # BLD: 253 E9/L (ref 130–450)
PMV BLD AUTO: 9.1 FL (ref 7–12)
POTASSIUM SERPL-SCNC: 4.6 MMOL/L (ref 3.5–5)
RBC # BLD: 2.59 E12/L (ref 3.5–5.5)
SODIUM BLD-SCNC: 130 MMOL/L (ref 132–146)
WBC # BLD: 11.2 E9/L (ref 4.5–11.5)

## 2022-03-17 PROCEDURE — 36415 COLL VENOUS BLD VENIPUNCTURE: CPT

## 2022-03-17 PROCEDURE — 6370000000 HC RX 637 (ALT 250 FOR IP): Performed by: INTERNAL MEDICINE

## 2022-03-17 PROCEDURE — 97110 THERAPEUTIC EXERCISES: CPT

## 2022-03-17 PROCEDURE — 99225 PR SBSQ OBSERVATION CARE/DAY 25 MINUTES: CPT | Performed by: INTERNAL MEDICINE

## 2022-03-17 PROCEDURE — G0378 HOSPITAL OBSERVATION PER HR: HCPCS

## 2022-03-17 PROCEDURE — 6370000000 HC RX 637 (ALT 250 FOR IP): Performed by: PHYSICIAN ASSISTANT

## 2022-03-17 PROCEDURE — 85025 COMPLETE CBC W/AUTO DIFF WBC: CPT

## 2022-03-17 PROCEDURE — 97530 THERAPEUTIC ACTIVITIES: CPT

## 2022-03-17 PROCEDURE — 97535 SELF CARE MNGMENT TRAINING: CPT

## 2022-03-17 PROCEDURE — 80048 BASIC METABOLIC PNL TOTAL CA: CPT

## 2022-03-17 RX ORDER — FERROUS SULFATE 325(65) MG
325 TABLET ORAL
Status: DISCONTINUED | OUTPATIENT
Start: 2022-03-17 | End: 2022-03-17 | Stop reason: HOSPADM

## 2022-03-17 RX ORDER — DOCUSATE SODIUM 100 MG/1
100 CAPSULE, LIQUID FILLED ORAL 2 TIMES DAILY
Status: DISCONTINUED | OUTPATIENT
Start: 2022-03-17 | End: 2022-03-17 | Stop reason: HOSPADM

## 2022-03-17 RX ADMIN — APIXABAN 5 MG: 5 TABLET, FILM COATED ORAL at 08:25

## 2022-03-17 RX ADMIN — MULTIPLE VITAMINS W/ MINERALS TAB 1 TABLET: TAB at 08:25

## 2022-03-17 RX ADMIN — FERROUS SULFATE TAB 325 MG (65 MG ELEMENTAL FE) 325 MG: 325 (65 FE) TAB at 08:25

## 2022-03-17 RX ADMIN — PANTOPRAZOLE SODIUM 40 MG: 40 TABLET, DELAYED RELEASE ORAL at 06:36

## 2022-03-17 RX ADMIN — ACETAMINOPHEN 650 MG: 325 TABLET ORAL at 04:20

## 2022-03-17 RX ADMIN — DOCUSATE SODIUM 100 MG: 100 CAPSULE, LIQUID FILLED ORAL at 08:25

## 2022-03-17 RX ADMIN — DILTIAZEM HYDROCHLORIDE 180 MG: 180 CAPSULE, COATED, EXTENDED RELEASE ORAL at 08:25

## 2022-03-17 RX ADMIN — LEVOTHYROXINE SODIUM 50 MCG: 0.05 TABLET ORAL at 08:25

## 2022-03-17 ASSESSMENT — PAIN DESCRIPTION - ORIENTATION: ORIENTATION: RIGHT

## 2022-03-17 ASSESSMENT — PAIN DESCRIPTION - DESCRIPTORS: DESCRIPTORS: ACHING;THROBBING

## 2022-03-17 ASSESSMENT — PAIN DESCRIPTION - LOCATION: LOCATION: KNEE

## 2022-03-17 ASSESSMENT — PAIN - FUNCTIONAL ASSESSMENT: PAIN_FUNCTIONAL_ASSESSMENT: PREVENTS OR INTERFERES SOME ACTIVE ACTIVITIES AND ADLS

## 2022-03-17 ASSESSMENT — PAIN DESCRIPTION - PAIN TYPE: TYPE: SURGICAL PAIN

## 2022-03-17 ASSESSMENT — PAIN SCALES - GENERAL
PAINLEVEL_OUTOF10: 6
PAINLEVEL_OUTOF10: 5

## 2022-03-17 ASSESSMENT — PAIN DESCRIPTION - ONSET: ONSET: ON-GOING

## 2022-03-17 ASSESSMENT — PAIN DESCRIPTION - PROGRESSION: CLINICAL_PROGRESSION: GRADUALLY WORSENING

## 2022-03-17 ASSESSMENT — PAIN DESCRIPTION - FREQUENCY: FREQUENCY: CONTINUOUS

## 2022-03-17 NOTE — PROGRESS NOTES
[facility] Hospitalist   Progress Note    Admitting Date and Time: 3/15/2022  7:31 AM  Admit Dx: Post-traumatic osteoarthritis of right knee [M17.31]     Seen for follow on multiple problems as listed below. Subjective:  Denies CP ,sob or abd pain. Post op pain well controlled with current meds. Ambulating in hallway with therapy without problem. She is supposed to be discharged todays. .    ROS: denies fever, chills, cp, sob, n/v, HA unless stated above.      ferrous sulfate  325 mg Oral TID WC    docusate sodium  100 mg Oral BID    dilTIAZem  180 mg Oral Daily    apixaban  5 mg Oral BID    sodium chloride flush  10 mL IntraVENous 2 times per day    levothyroxine  50 mcg Oral Daily    therapeutic multivitamin-minerals  1 tablet Oral Daily    pantoprazole  40 mg Oral QAM AC    lisinopril  20 mg Oral Daily    And    hydroCHLOROthiazide  12.5 mg Oral Daily    budesonide  0.5 mg Nebulization BID     oxyCODONE-acetaminophen, 1 tablet, Q4H PRN  sodium chloride flush, 10 mL, PRN  sodium chloride, 25 mL, PRN  acetaminophen, 650 mg, Q4H PRN  morphine, 2 mg, Q3H PRN  promethazine, 12.5 mg, Q6H PRN   Or  ondansetron, 4 mg, Q6H PRN  polyethylene glycol, 17 g, Daily PRN  ALPRAZolam, 1 mg, Nightly PRN         Objective:    BP (!) 101/48   Pulse 54   Temp 98.8 °F (37.1 °C) (Oral)   Resp 16   Ht 5' 4.5\" (1.638 m)   Wt 170 lb (77.1 kg)   SpO2 90%   BMI 28.73 kg/m²   General Appearance: alert and oriented to person, place and time, in no acute distress  Skin: warm and dry  Head: normocephalic and atraumatic  Eyes: pupils equal, round, and reactive to light, extraocular eye movements intact, conjunctivae normal  Neck: supple and non-tender without mass  Pulmonary/Chest: clear to auscultation bilaterally  Cardiovascular: normal rate, regular rhythm, normal S1 and S2  Abdomen: soft, non-tender, non-distended, normal bowel sounds, no masses or organomegaly  Extremities: no cyanosis, clubbing, rt knee with dressing d/i  Musculoskeletal: normal range of motion  Neurologic: no cranial nerve deficit,  speech normal      Recent Labs     03/15/22  0849 03/16/22  0215 03/17/22  0407    130* 130*   K 3.6 4.6 4.6    100 98   CO2 22 22 24   BUN 14 20 21   CREATININE 0.9 1.1* 1.2*   GLUCOSE 104* 154* 100*   CALCIUM 7.8* 8.6 8.6       Recent Labs     03/16/22  0215 03/17/22  0407   WBC 12.2* 11.2   RBC 3.09* 2.59*   HGB 9.2* 7.8*   HCT 28.6* 24.1*   MCV 92.6 93.1   MCH 29.8 30.1   MCHC 32.2 32.4   RDW 14.5 14.6    253   MPV 8.9 9.1       Labs and images reviewed     Radiology:   XR KNEE RIGHT (1-2 VIEWS)   Final Result   Anatomically aligned right TKA with no unexpected findings. Assessment:    Principal Problem (Resolved):    Post-traumatic osteoarthritis of right knee  Active Problems:    * No active hospital problems. *      Plan:  HTN / A fib  -on Cardizem for rate control, on Eliquis for anticoagulation. This is resumed as per home. Lisinopril hydrochlorothiazide resumed as per home. BP on lower side, parameters to hold added. BP still on lower side 104/64 , meds were on hold. Advised patient to resume cardizem but continue to hold/ DC Lisinopril/ HCTZ and follow with PCP in 1 week for BP check & if needed resume .  Also advised to have BP machine at home- monitor BP & follow with PCP.     Hypothyroidism -on supplements as per home.     Deg OA rt knee s/p rt TKA - continue post op care as per ortho.      On eliquis so ok for dvt prophy  Full code.              Electronically signed by Carolyn Dempsey MD on 3/17/2022 at 7:28 AM

## 2022-03-17 NOTE — PROGRESS NOTES
Physical Therapy  Facility/Department: [address] ORTHO SURGERY  Daily Treatment Note  NAME: Alina Dorado  : 1945  MRN: [id number]    Date of Service: 3/17/2022      Patient Diagnosis(es): The primary encounter diagnosis was Preop testing. A diagnosis of Post-op pain was also pertinent to this visit. has a past medical history of Anxiety, Asthma, Atrial fibrillation ([address] History of supraventricular tachycardia, Hypertension, Hypothyroidism, Macular degeneration, and PVC's (premature ventricular contractions).   has a past surgical history that includes laparoscopic appendectomy (N/A, 2020); Appendectomy; Colonoscopy; and Total knee arthroplasty (Right, 3/15/2022).    Evaluating Therapist: Josh Murdock PT      rec ww      Referring Provider:  JENA Hooker     PT order : PT eval and treat      Room #:  701   [facility] s/p R TKA 3/15/2022   Per chart, pt has had recent [address] [facility] [address], WBAT with walker      Social:  Pt lives alone  in a  1  floor plan  4  steps and  1  rails to enter. Prior to admission pt walked with  No AD. Has std walker        Initial Evaluation  Date: 3/15/2022  Treatment     3/17/2022 AM Short Term/ Long Term   Goals   Was pt agreeable to Eval/treatment?  yes   yes      Does pt have pain?  R knee pain   R knee pain      Bed Mobility  Rolling:  NT   Supine to sit:  Min assist   Sit to supine:  SBA   Scooting:  Min assist   Supine to  sit : S/I      SBA    Transfers Sit to stand:  Mod assist   Stand to sit:  Min assist   Stand pivot:  NT   Sit <> stand : S/SBA  SBA    Ambulation     15 feet x 2  with  ww with  Min assist   240 feet x1 with ww SBA  150-200  feet with ww  with  SBA          Stair negotiation: ascended and descended NT     4 steps with 1HR and QC SBA   4 steps with 1 HR SBA   4  steps with  1  rail with  SBA    LE ROM  AAROM R knee 10-80 degrees        LE strength  R knee 2-/ 5       AM- PAC RAW score  15/ 24   18/ 24             Pt is alert and Oriented x  3       Balance:  SBA,  high fall  risk due to decreased safety awareness  Endurance: WFL  Bed/Chair alarm: yes      ASSESSMENT     Pt displays functional ability as noted in the objective portion of this treatment          Conditions Requiring Skilled Therapeutic Intervention:     [x]? ? ? Decreased strength                                      [x]? ? ? Decreased ROM  [x]? ? ? Decreased functional mobility  [x]? ? ? Decreased balance   [x]? ? ? Decreased endurance   [x]? ? ? Decreased posture  []? ? ?Decreased sensation  [x]? ? ? Decreased coordination   []? ? ?Decreased vision  [x]? ? ? Decreased safety awareness   [x]? ? ? Increased pain                   Treatment/Education:    Pt in bed upon arrival ; agreeable to PT.  Performed seated   LAQs, knee flex, and APs. Pt issued written HEP and reviewed all exercises. Cues given for hand and foot placement with transfers. Pt amb with steady, reciprocal gait . Pt able to recall correct sequencing on steps, but needed verbal cuing for proper QC use on steps      Pt educated on fall risk,  Safe and proper technique with mobility         Patient response to education:   Pt verbalized understanding Pt demonstrated skill Pt requires further education in this area   x  needs cues   x         Comments:  Pt left  In bed  per pt request after session, with call light in reach.              PLAN     PT care will be provided in accordance with the objectives noted above. Patrice Liu appropriate, clear delegation orders will be provided for nursing staff.  Exercises and functional mobility practice will be used as well as appropriate assistive devices or modalities to obtain goals. Patient and family education will also be administered as needed.           PLAN OF CARE:     Current Treatment Recommendations      [x]? ?? Strengthening to improve independence with functional mobility   [x]? ?? ROM to improve independence with functional mobility   []? ?? Balance Training to improve static/dynamic balance and to reduce fall risk  [x]? ?? Endurance Training to improve activity tolerance during functional mobility   [x]? ?? Transfer Training to improve safety and independence with all functional transfers   [x]? ?? Gait Training to improve gait mechanics, endurance and assess need for appropriate assistive device  [x]??? Stair Training in preparation for safe discharge home and/or into the community   [x]? ?? Positioning to prevent skin breakdown and contractures  [x]??? Safety and Education Training   [x]? ?? Patient/Caregiver Education   [x]? ?? HEP  []??? Other      Frequency of treatments will be BID x 2 days.     Time UZ: 3235  Time MFJ:  9746        Plan is for pt to discharge home today.      CPT codes:  []??? Low Complexity PT evaluation 11942  []? ?? Moderate Complexity PT evaluation [id number]  []??? High Complexity PT evaluation [id number]  []??? PT Re-evaluation [id number]  []??? Gait training [phone]  minutes  [x]? ?? Therapeutic activities 53523 41  minutes  [x]? ?? Therapeutic exercises 86550 10 minutes  []? ?? Neuromuscular reeducation 21245  minutes         Victor [address] 18 number:  PT [id number]

## 2022-03-17 NOTE — PROGRESS NOTES
Offered patient pain medication and patient continues to refuse. Requesting tylenol instead. Tylenol given per prn order. Ice applied to Rt knee. Hourly rounding continues.

## 2022-03-17 NOTE — PLAN OF CARE
Problem: Falls - Risk of:  Goal: Will remain free from falls  Description: Will remain free from falls  Outcome: Met This Shift     Problem: Pain:  Goal: Pain level will decrease  Description: Pain level will decrease  Outcome: Met This Shift     Problem: Discharge Planning:  Goal: Discharged to appropriate level of care  Description: Discharged to appropriate level of care  Outcome: Met This Shift     Problem: Mobility - Impaired:  Goal: Mobility will improve  Description: Mobility will improve  Outcome: Met This Shift     Problem: Infection - Surgical Site:  Goal: Will show no infection signs and symptoms  Description: Will show no infection signs and symptoms  Outcome: Met This Shift

## 2022-03-17 NOTE — PROGRESS NOTES
Occupational Therapy  OT BEDSIDE TREATMENT NOTE      Date:3/17/2022  Patient Name: Gael Ford  MRN: [id number]  : 1945  Room: [address] [place] [address]       Evaluating OT: Casey Hurst. Fernando Valle, OTR/L - [id number]     Referring Provider: JENA Andino  Specific Provider Orders/Date: \"OT eval and treat\" - 3/15/2022     Diagnosis: Post-traumatic osteoarthritis of right knee [M17.31]                Patient has had two falls recently, per review of patient's medical record.     Surgery: Patient underwent R TKA on 3/15/2022.     Pertinent Medical History: anxiety, a-fib, HTN, macular degeneration     Precautions: fall risk, FWBAT     Assessment of Current Deficits:    [x]? Functional mobility             [x]?ADLs           [x]? Strength                  [x]? Cognition   [x]? Functional transfers           [x]? IADLs         [x]? Safety Awareness   [x]? Endurance   []? Fine Coordination              [x]? Balance      []? Vision/perception   [x]? Sensation     []? Gross Motor Coordination  []? ROM           []? Delirium                   []? Motor Control      OT PLAN OF CARE   OT POC is based on physician orders, patient diagnosis, and results of clinical assessment.   Frequency/Duration 2-5 days/week for 2 weeks PRN   Specific OT Treatment Interventions to Include:   * Instruction/training on adapted ADL techniques and AE recommendations to increase functional independence within precautions       * Training on energy conservation strategies, correct breathing pattern and techniques to improve independence/tolerance for self-care routine  * Functional transfer/mobility training/DME recommendations for increased independence, safety, and fall prevention  * Patient/Family education to increase follow through with safety techniques and functional independence  * Recommendation of environmental modifications for increased safety with functional transfers/mobility and ADLs  * Therapeutic exercise to improve motor endurance, ROM, and functional strength for ADLs/functional transfers  * Therapeutic activities to facilitate/challenge dynamic balance, stand tolerance for increased safety and independence with ADLs  * Neuro-muscular re-education: facilitation of righting/equilibrium reactions, midline orientation, scapular stability/mobility, normalization of muscle tone, and facilitation of volitional active controled movement     Recommended Adaptive Equipment: TBD      Home Living: Patient lives alone in a one-floor home (four steps to enter); laundry is on the main living level. Bathroom Setup: tub shower (with grab bars, but no seat)     Prior Level of Function (PLOF): Patient was independent with ADLs, IADLs, and functional mobility (without device) prior to this hospitalization.     Pain Level: No complaints of pain. Pt with complaint of nausea. Cognition: Patient alert and oriented. Min cues for safety.      Functional Assessment:                  AM-PAC Daily Activity Raw Score: 18/24    Initial Eval Status  Date: 3/15/2022 Treatment Status  Date: 3/17/22  Short Term Goals = Long Term Goals   Feeding Setup   Independent   Grooming Min A  supervision while standing at the sink. Mod I  (seated/standing at sink)   UB Dressing Min A to doff/don hospital gown. Setup   Mod I  (including item retrieval)   LB Dressing Max A to thread feet into disposable brief; Max A to pull up and arrange brief. Cues needed consistently to maximize safety. Donned pants after setup. Unable to don shoes due to swelling. Mod I / Independent - with use of AE, as needed/appropriate   Bathing Mod A Pt now requesting to complete tub transfers. Per pt reports she has grab bars in the tub.   Completed tub transfer by holding onto the grab bar and stepping over tub surface with min A and mod cues for safety technique.        Mod I / Independent - with use of AE/DME, as needed/appropriate   Toileting Mod A needed for toilet transfer; cues needed to maximize safety. Max A for clothing management. SBA   Mod I / Independent   Bed Mobility  Supine-to-Sit: Min A  Sit-to-Supine: SBA   Supervision supine > sit onto the side of the bed. Independent / Mod I in order to maximize patient's independence with ADLs, re-positioning, and other functional tasks. Functional Transfers Sit-to-Stand: Mod A   from EOB and toilet (with use of grab bar). Cues needed to maximize safety awareness. SBA and cues for hand placement to increase safety.   Independent   Functional Mobility Min A   (with walker) within patient's room and bathroom. Consistent cues needed to maximize safety awareness. CGA using w/w however pt did experience LOB 1x when walking to the rehab room to complete tub transfer. Min A to maintain balance. Pt reports \"I lost my concentration\".     Mod I with functional mobility (with device, as needed/appropriate) in order to maximize independence with ADLs/IADLs and other functional tasks. Balance Sitting: Fair+  (at EOB and on toilet)  Standing: Poor+ to Fair-  (with walker)   Fair+ dynamic standing balance during completion of ADLs/IADLs and other functional tasks. Activity Tolerance Fair-  Limited secondary to nausea and dizziness. One small episode of emesis demonstrated while seated at EOB (following initial sit-to-stand).  fair  Patient will demonstrate Good understanding and consistent implementation of energy conservation techniques and work simplification techniques into ADL/IADL routines. Comments:  Pt states she was \"out of it\" yesterday and does not remember stating she would sponge bathe at home. Pt has grab bar in her tub. She did complete tub transfer however min A required and cues for safety technique. Recommended she sponge bathe and have home health assess safety of tub transfer in her home environment. Pt verbalized that she understood this recommendation.     LOB 1x during this session when she was walking in the hallway and began to wave at someone in another room. Pt was instructed with safety education/walker safety. She remains at risk for falls. Education/treatment:  ADL retraining with facilitation of movement and instruction of compensatory strategies  to increase self care skills. Therapeutic activity to address balance and endurance for ADL and transfers. Pt education of walker safety, transfer safety, and home safety. · Pt has made  progress towards set goals.        Time In: 7:45  Time Out: 8:10      Min Units   Therapeutic Ex 81303     Therapeutic Activities 70750 10 1   ADL/Self Care 72274 15 1   Orthotic Management 02259     Neuro Re-Ed 01581     Non-Billable Time     TOTAL TIMED TREATMENT 25 [facility] MARYANN/L 57540

## 2022-04-18 NOTE — PROGRESS NOTES
[address] and Vascular [facility], [place], operated by [facility] Electrophysiology  Outpatient Progress Note Report  PATIENT: [facility] RECORD NUMBER: [id number]  DATE OF SERVICE:  4/19/2022  ATTENDING ELECTROPHYSIOLOGIST: Fiona Sloan MD  REFERRING PHYSICIAN: Helen Eastman MD  CHIEF COMPLAINT: Paroxysmal atrial fibrillation    HPI: This is a 64 y.o. female with a history of   Patient Active Problem List   Diagnosis    [facility]rial [facility]    Acute medial meniscal tear    Anxiety    Arthritis    Asthma    Cholelithiasis    Depressive disorder    Diverticular disease    Fracture of tibia    Gastroesophageal reflux disease    Hiatal hernia    Hypercholesterolemia    Hypertension    Hypothyroidism    Insomnia    Lipoma    Obesity    Osteopenia    Overweight with body mass index (BMI) 25.0-29.9    Restless legs syndrome    Rupture of appendix    Secondary non-renal hyperparathyroidism ([facility]    Stage 3 chronic kidney disease (HCC)    Steatosis of liver    Tubular adenoma of colon   The patient presents to cardiac electrophysiology clinic for follow up and management of paroxysmal atrial fibrillation. The patient feels overall well from a EP POV and reports her last AF episode was a few months ago. During that time, she reports she was doing strenuous activity. The patient denies any chest pain, dyspnea, dizziness, syncope, orthopnea or paroxysmal nocturnal dyspnea. She reports she reduced her Eliquis to 2.5mg BID because she was unable to afford the 5mg tablet.      Patient Active Problem List    Diagnosis Date Noted    Acute medial meniscal tear 04/19/2022    Anxiety 04/19/2022    Arthritis 04/19/2022    Asthma 04/19/2022    Cholelithiasis 04/19/2022    Depressive disorder 04/19/2022    Diverticular disease 04/19/2022    Fracture of tibia 04/19/2022    Gastroesophageal reflux disease 04/19/2022    Hiatal hernia 04/19/2022    Hypercholesterolemia 2022    Hypertension 2022    Hypothyroidism 2022    Insomnia 2022    Lipoma 2022    Obesity 2022    Osteopenia 2022    Overweight with body mass index (BMI) 25.0-29.9 2022    Restless legs syndrome 2022    Rupture of appendix 2022    Secondary non-renal hyperparathyroidism ([facility] 2022    Stage 3 chronic kidney disease ([facility] 2022    Steatosis of liver 2022    Tubular adenoma of colon 2022    Atrial fibrillation ([facility] [facility] 2022       Past Medical History:   Diagnosis Date    Anxiety     Asthma     controlled per pt    Atrial fibrillation ([facility] [address] [facility] 2021    Follows with Dr Simin Gibbs-     History of supraventricular tachycardia     after surgery    Hypertension     Hypothyroidism     Macular degeneration 2022    bilateral    PVC's (premature ventricular contractions)        Family History   Problem Relation Age of Onset    Other Mother 80        no health problems    Colon Cancer Father 79    Alcohol Abuse Sister         DRUG abuse    Alcohol Abuse Sister         Drug abuse       Social History     Tobacco Use    Smoking status: Former Smoker     Types: Cigarettes     Quit date: 2002     Years since quittin.1    Smokeless tobacco: Never Used   Substance Use Topics    Alcohol use: No     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       Multiple Vitamins-Minerals (PRESERVISION AREDS 2+MULTI VIT PO) Take by mouth      dilTIAZem (DILACOR XR) 180 MG extended release capsule Take 180 mg by mouth daily      apixaban (ELIQUIS) 5 MG TABS tablet Take 1 tablet by mouth 2 times daily (Patient taking differently: Take 2.5 mg by mouth 2 times daily ) 60 tablet 3    mometasone (ASMANEX, 120 METERED DOSES,) 220 MCG/INH inhaler Inhale 2 puffs into the lungs 2 times daily      omeprazole (PRILOSEC) 40 MG delayed release capsule Take 40 mg by mouth daily      levothyroxine (SYNTHROID) 50 MCG tablet Take 50 mcg by mouth daily.  ferrous sulfate (IRON 325) 325 (65 Fe) MG tablet Take 1 tablet by mouth 3 times daily (with meals) for 14 days 42 tablet 0     No current facility-administered medications for this visit. Allergies   Allergen Reactions    Other      Ragweed and milk after allergy testing  No particular symptoms       ROS:   Constitutional: Negative for fever, activity change and appetite change. HENT: Negative for epistaxis. Eyes: Negative for diploplia, blurred vision. Respiratory: Negative for cough and chest tightness. Negative for shortness of breath and for wheezing. Cardiovascular: pertinent positives in HPI  Gastrointestinal: Negative for abdominal pain and blood in stool. All other review of systems are negative     PHYSICAL EXAM:   Vitals:    04/19/22 1323   BP: 126/82   Site: Left Upper Arm   Position: Sitting   Cuff Size: Medium Adult   Pulse: 84   Resp: 18   Weight: 169 lb (76.7 kg)   Height: 5' 4.5\" (1.638 m)      Constitutional: Well-developed, no acute distress  Eyes: conjunctivae normal, no xanthelasma   Ears, Nose, Throat: oral mucosa moist, no cyanosis   CV: no JVD. Regular rate and rhythm. Normal S1S2 and no S3. No murmurs, rubs, or gallops. PMI is nondisplaced  Lungs: clear to auscultation bilaterally, normal respiratory effort without used of accessory muscles  Abdomen: soft, non-tender, bowel sounds present, no masses or hepatomegaly   Musculoskeletal: no digital clubbing. Trace edema of LEs. Skin: warm, no rashes     I have personally reviewed the laboratory, cardiac diagnostic and radiographic testing as outlined below:    Data:    No results for input(s): WBC, HGB, HCT, PLT in the last 72 hours. No results for input(s): NA, K, CL, CO2, BUN, CREATININE, GLU, CALCIUM in the last 72 hours.     Invalid input(s): MAGNESIUM   Lab Results   Component Value Date    MG 1.8 05/15/2020     No results for input(s): TSH in the last 72 hours. No results for input(s): INR in the last 72 hours. CXR: 2020:    Narrative   Patient MRN:  [id number]   : 1945   Age: 66 years   Gender: Female       Order Date:  2020 7:45 PM       EXAM: XR CHEST PORTABLE       COMPARISON: 2020 at 1922 hours       INDICATION:  line placement, retracted line    line placement, retracted line        FINDINGS:       Retraction of previously seen right IJ central venous catheter with   distal tip now level superior vena cava. Heart is normal in size. No   airspace opacity or pleural effusion.           Impression       1. Interval retraction of right IJ central venous catheter with distal   tip now overlying the expected location of superior vena cava and in   satisfactory position.       2. No pneumothorax. EK22: SR, RAD, rate: 84 bpm - Please see scan in Cardiology. Echocardiogram 20:   Findings      Left Ventricle   Left ventricle size is normal.   Normal left ventricular wall thickness. Normal left ventricular systolic function. Ejection fraction is visually estimated at 60%. Right Ventricle   Normal right ventricular size and function (TAPSE 2.5 cm). Left Atrium   Mildly dilated left atrium by volume index. Right Atrium   Normal sized right atrium. Mitral Valve   Physiologic and/or trace mitral regurgitation. No evidence of hemodynamically significant mitral stenosis. Tricuspid Valve   Mild tricuspid regurgitation. PASP is estimated at 22 mmHg. Aortic Valve   No evidence of hemodynamically significant aortic regurgitation or   stenosis. Pulmonic Valve   Mild pulmonic regurgitation. Pericardial Effusion   No evidence of a hemodynamically significant pericardial effusion. Aorta   Aortic root dimension within normal limits.       Conclusions Summary   Normal left ventricular systolic function. Ejection fraction is visually estimated at 60%. Normal right ventricular size and function (TAPSE 2.5 cm). Mild tricuspid regurgitation. PASP is estimated at 22 mmHg.       Signature      ----------------------------------------------------------------   Electronically signed by Paola Mohamud MD(Interpreting   physician) on 06/24/2020 04:03 PM   ----------------------------------------------------------------     M-Mode/2D Measurements & Calculations      LV Diastolic    LV Systolic Dimension: 2.9  AV Cusp Separation: 1.8 cmLA   Dimension: 4.5  cm                          Dimension: 3.6 cmAO Root   cm              LV Volume Diastolic: 94 ml  Dimension: 2.6 cm   LV FS:35.6 %    LV Volume Systolic: 77.3 ml   LV PW           LV EDV/LV EDV Index: 94   Diastolic: 0.7  AH/87 YF/G^2KC ESV/LV ESV   cm              Index: 31.6 ml/17ml/ m^2   LV PW Systolic: EF Calculated: 62.1 %       LA/Aorta: 1.42   1 cm            LV Mass Index: 51 l/min*m^2 Ascending Aorta: 3.2 cm   Septum                                      LA volume/Index: 57.4 ml   Diastolic: 0.7                              /36ml/m^2   cm              LVOT: 2 cm                  RA Area: 13.6 cm^2   Septum   Systolic: 1 cm                              IVC Expiration: 1.6 cm      LV Mass: 95.66   g     Doppler Measurements & Calculations      MV Peak E-Wave:   AV Peak Velocity: 1.22 m/s     LVOT Peak Velocity: 0.96   0.68 m/s          AV Peak Gradient: 5.96 mmHg    m/s   MV Peak A-Wave:   AV Mean Velocity: 0.8 m/s      LVOT Mean Velocity: 0.64   0.79 m/s          AV Mean Gradient: 2.9 mmHg     m/s   MV E/A Ratio:     AV VTI: 22.4 cm                LVOT Peak Gradient: 3.7   0.86              AV Area (Continuity):2.62 cm^2 mmHgLVOT Mean Gradient:   MV Peak Gradient:                                1.8 mmHg   4.3 mmHg          LVOT VTI: 18.7 cm              Estimated RVSP: 22.3 mmHg   MV Mean Gradient: IVRT: rapid ventricular rate with 1% burden was observed. Symptom correlated with sinus rhythm with atrial run. Outpatient Monitor: 5/13/2020: I have independently reviewed all of the ECGs and rhythm strips per above     Assessment/Plan: This is a 64 y.o. female with a history of   Patient Active Problem List   Diagnosis    [facility] ([facility] [address] [facility]    Acute medial meniscal tear    Anxiety    Arthritis    Asthma    Cholelithiasis    Depressive disorder    Diverticular disease    Fracture of tibia    Gastroesophageal reflux disease    Hiatal hernia    Hypercholesterolemia    Hypertension    Hypothyroidism    Insomnia    Lipoma    Obesity    Osteopenia    Overweight with body mass index (BMI) 25.0-29.9    Restless legs syndrome    Rupture of appendix    Secondary non-renal hyperparathyroidism (HCC)    Stage 3 chronic kidney disease (HCC)    Steatosis of liver    Tubular adenoma of colon     1. Paroxysmal atrial fibrillation  - Newly diagnosed 5/21/20 on 30 day MCOT with < 1% burden. - [id number] = 4 (female, age, HTN). - Current OAC regiment includes Eliquis  - Current medical therapy includes Diltiazem  - Thus far has had no cardioversion, AAD or ablation.   - Presents in NSR.  - Consider start AAD therapy if fails Cardizem treatment. - Re-education on importance of well controlled HTN (goal BP < 130/80), adequate weight control (goal BMI of < 27), physical activity consisting of moderate cardiopulmonary exercise up to a goal of 250 min/wk, daily compliance with CPAP in treating sleep apnea, smoking cessation and limited ETOH intake. 2  Hypertension  - Controlled at this office visit. - Currently on Cardizem. 4. Hypothyroidism  - On Synthroid. - Managed by PCP. 5. Asthma  - Uses Asmanex inhaler bid. 6. GERD  - On Prilosec    7. Anxiety  - On Xanax    Recommendations:  1. Continue Dilacor  mg daily.   2. Stressed the importance of taking at the prescribed dose of Eliquis 5 mg bid. 3. Consider start AAD therapy such as Flecainide if fails Cardizem treatment. 4. Life style modifications as above. 5. Follow up in 12 months or sooner PRN. Encouraged the patient to call the office for any questions or concerns. I have spent a total of 40 minutes with the patient and the family reviewing the above stated recommendations. And a total of >50% of that time involved face-to-face time providing counseling and/or coordination of care with the other providers, preparation for the clinic visit, reviewing records/tests, counseling/education of the patient, ordering medications/tests/procedures, coordinating care, and documenting clinical information in the EHR. Thank you for allowing me to participate in your patient's care. Please call me if there are any questions or concerns.       Tha Key MD  Cardiac Electrophysiology  [address]  [facility] [place]  4/19/22   1:39 PM

## 2022-04-19 ENCOUNTER — OFFICE VISIT (OUTPATIENT)
Dept: NON INVASIVE DIAGNOSTICS | Age: 77
End: 2022-04-19
Payer: MEDICARE

## 2022-04-19 VITALS
DIASTOLIC BLOOD PRESSURE: 82 MMHG | SYSTOLIC BLOOD PRESSURE: 126 MMHG | HEIGHT: 65 IN | WEIGHT: 169 LBS | BODY MASS INDEX: 28.16 KG/M2 | RESPIRATION RATE: 18 BRPM | HEART RATE: 84 BPM

## 2022-04-19 DIAGNOSIS — I48.0 PAROXYSMAL ATRIAL FIBRILLATION (HCC): ICD-10-CM

## 2022-04-19 DIAGNOSIS — I49.5 SSS (SICK SINUS SYNDROME) (HCC): Primary | ICD-10-CM

## 2022-04-19 PROBLEM — D12.6 TUBULAR ADENOMA OF COLON: Status: ACTIVE | Noted: 2022-04-19

## 2022-04-19 PROBLEM — E78.00 HYPERCHOLESTEROLEMIA: Status: ACTIVE | Noted: 2022-04-19

## 2022-04-19 PROBLEM — E66.9 OBESITY: Status: ACTIVE | Noted: 2022-04-19

## 2022-04-19 PROBLEM — M85.80 OSTEOPENIA: Status: ACTIVE | Noted: 2022-04-19

## 2022-04-19 PROBLEM — M19.90 ARTHRITIS: Status: ACTIVE | Noted: 2022-04-19

## 2022-04-19 PROBLEM — D17.9 LIPOMA: Status: ACTIVE | Noted: 2022-04-19

## 2022-04-19 PROBLEM — E03.9 HYPOTHYROIDISM: Status: ACTIVE | Noted: 2022-04-19

## 2022-04-19 PROBLEM — K80.20 CHOLELITHIASIS: Status: ACTIVE | Noted: 2022-04-19

## 2022-04-19 PROBLEM — J45.909 ASTHMA: Status: ACTIVE | Noted: 2022-04-19

## 2022-04-19 PROBLEM — N18.30 STAGE 3 CHRONIC KIDNEY DISEASE (HCC): Status: ACTIVE | Noted: 2022-04-19

## 2022-04-19 PROBLEM — K21.9 GASTROESOPHAGEAL REFLUX DISEASE: Status: ACTIVE | Noted: 2022-04-19

## 2022-04-19 PROBLEM — F32.A DEPRESSIVE DISORDER: Status: ACTIVE | Noted: 2022-04-19

## 2022-04-19 PROBLEM — E66.3 OVERWEIGHT WITH BODY MASS INDEX (BMI) 25.0-29.9: Status: ACTIVE | Noted: 2022-04-19

## 2022-04-19 PROBLEM — F41.9 ANXIETY: Status: ACTIVE | Noted: 2022-04-19

## 2022-04-19 PROBLEM — I10 HYPERTENSION: Status: ACTIVE | Noted: 2022-04-19

## 2022-04-19 PROBLEM — S83.249A ACUTE MEDIAL MENISCAL TEAR: Status: ACTIVE | Noted: 2022-04-19

## 2022-04-19 PROBLEM — E21.1 SECONDARY NON-RENAL HYPERPARATHYROIDISM (HCC): Status: ACTIVE | Noted: 2022-04-19

## 2022-04-19 PROBLEM — G47.00 INSOMNIA: Status: ACTIVE | Noted: 2022-04-19

## 2022-04-19 PROBLEM — S82.209A FRACTURE OF TIBIA: Status: ACTIVE | Noted: 2022-04-19

## 2022-04-19 PROBLEM — K57.90 DIVERTICULAR DISEASE: Status: ACTIVE | Noted: 2022-04-19

## 2022-04-19 PROBLEM — G25.81 RESTLESS LEGS SYNDROME: Status: ACTIVE | Noted: 2022-04-19

## 2022-04-19 PROBLEM — K44.9 HIATAL HERNIA: Status: ACTIVE | Noted: 2022-04-19

## 2022-04-19 PROBLEM — K76.0 STEATOSIS OF LIVER: Status: ACTIVE | Noted: 2022-04-19

## 2022-04-19 PROBLEM — K35.32 RUPTURE OF APPENDIX: Status: ACTIVE | Noted: 2022-04-19

## 2022-04-19 PROCEDURE — 1036F TOBACCO NON-USER: CPT | Performed by: INTERNAL MEDICINE

## 2022-04-19 PROCEDURE — 1123F ACP DISCUSS/DSCN MKR DOCD: CPT | Performed by: INTERNAL MEDICINE

## 2022-04-19 PROCEDURE — G8417 CALC BMI ABV UP PARAM F/U: HCPCS | Performed by: INTERNAL MEDICINE

## 2022-04-19 PROCEDURE — 99215 OFFICE O/P EST HI 40 MIN: CPT | Performed by: INTERNAL MEDICINE

## 2022-04-19 PROCEDURE — G8427 DOCREV CUR MEDS BY ELIG CLIN: HCPCS | Performed by: INTERNAL MEDICINE

## 2022-04-19 PROCEDURE — G8400 PT W/DXA NO RESULTS DOC: HCPCS | Performed by: INTERNAL MEDICINE

## 2022-04-19 PROCEDURE — 1090F PRES/ABSN URINE INCON ASSESS: CPT | Performed by: INTERNAL MEDICINE

## 2022-04-19 PROCEDURE — 93000 ELECTROCARDIOGRAM COMPLETE: CPT | Performed by: INTERNAL MEDICINE

## 2022-04-19 PROCEDURE — 4040F PNEUMOC VAC/ADMIN/RCVD: CPT | Performed by: INTERNAL MEDICINE

## 2022-04-19 RX ORDER — ALPRAZOLAM 1 MG/1
1 TABLET ORAL 3 TIMES DAILY PRN
Status: ON HOLD | COMMUNITY
Start: 2022-03-24 | End: 2022-06-07 | Stop reason: HOSPADM

## 2022-04-20 ENCOUNTER — TELEPHONE (OUTPATIENT)
Dept: NON INVASIVE DIAGNOSTICS | Age: 77
End: 2022-04-20

## 2022-04-20 NOTE — TELEPHONE ENCOUNTER
Gave the patient financial aid application for [employer]    Samples of this drug were given to the patient, quantity 4 boxes, Lot Number [id number] expires 10/23

## 2022-04-26 ENCOUNTER — TELEPHONE (OUTPATIENT)
Dept: NON INVASIVE DIAGNOSTICS | Age: 77
End: 2022-04-26

## 2022-05-25 ENCOUNTER — APPOINTMENT (OUTPATIENT)
Dept: CT IMAGING | Age: 77
DRG: 871 | End: 2022-05-25
Payer: MEDICARE

## 2022-05-25 ENCOUNTER — APPOINTMENT (OUTPATIENT)
Dept: GENERAL RADIOLOGY | Age: 77
DRG: 871 | End: 2022-05-25
Payer: MEDICARE

## 2022-05-25 ENCOUNTER — HOSPITAL ENCOUNTER (INPATIENT)
Age: 77
LOS: 13 days | Discharge: NURSING, REHAB OR LONG-TERM CARE | DRG: 871 | End: 2022-06-07
Attending: STUDENT IN AN ORGANIZED HEALTH CARE EDUCATION/TRAINING PROGRAM | Admitting: INTERNAL MEDICINE
Payer: MEDICARE

## 2022-05-25 DIAGNOSIS — E87.6 HYPOKALEMIA: ICD-10-CM

## 2022-05-25 DIAGNOSIS — F41.9 ANXIETY: Primary | ICD-10-CM

## 2022-05-25 DIAGNOSIS — N30.01 ACUTE CYSTITIS WITH HEMATURIA: ICD-10-CM

## 2022-05-25 DIAGNOSIS — R41.82 ALTERED MENTAL STATUS, UNSPECIFIED ALTERED MENTAL STATUS TYPE: ICD-10-CM

## 2022-05-25 DIAGNOSIS — A41.9 SEPTICEMIA (HCC): ICD-10-CM

## 2022-05-25 PROBLEM — G93.40 ACUTE ENCEPHALOPATHY: Status: ACTIVE | Noted: 2022-05-25

## 2022-05-25 LAB
ALBUMIN SERPL-MCNC: 4 G/DL (ref 3.5–5.2)
ALP BLD-CCNC: 61 U/L (ref 35–104)
ALT SERPL-CCNC: 30 U/L (ref 0–32)
AMPHETAMINE SCREEN, URINE: NOT DETECTED
ANION GAP SERPL CALCULATED.3IONS-SCNC: 10 MMOL/L (ref 7–16)
APTT: 34.8 SEC (ref 24.5–35.1)
AST SERPL-CCNC: 40 U/L (ref 0–31)
BACTERIA: ABNORMAL /HPF
BARBITURATE SCREEN URINE: NOT DETECTED
BASOPHILS ABSOLUTE: 0.03 E9/L (ref 0–0.2)
BASOPHILS RELATIVE PERCENT: 0.2 % (ref 0–2)
BENZODIAZEPINE SCREEN, URINE: POSITIVE
BILIRUB SERPL-MCNC: 0.3 MG/DL (ref 0–1.2)
BILIRUBIN URINE: NEGATIVE
BLOOD, URINE: ABNORMAL
BUN BLDV-MCNC: 9 MG/DL (ref 6–23)
CALCIUM SERPL-MCNC: 8.9 MG/DL (ref 8.6–10.2)
CANNABINOID SCREEN URINE: NOT DETECTED
CHLORIDE BLD-SCNC: 102 MMOL/L (ref 98–107)
CHP ED QC CHECK: YES
CLARITY: CLEAR
CO2: 27 MMOL/L (ref 22–29)
COCAINE METABOLITE SCREEN URINE: NOT DETECTED
COLOR: YELLOW
CREAT SERPL-MCNC: 0.8 MG/DL (ref 0.5–1)
EOSINOPHILS ABSOLUTE: 0 E9/L (ref 0.05–0.5)
EOSINOPHILS RELATIVE PERCENT: 0 % (ref 0–6)
FENTANYL SCREEN, URINE: NOT DETECTED
GFR AFRICAN AMERICAN: >60
GFR NON-AFRICAN AMERICAN: >60 ML/MIN/1.73
GLUCOSE BLD-MCNC: 138 MG/DL
GLUCOSE BLD-MCNC: 140 MG/DL (ref 74–99)
GLUCOSE URINE: NEGATIVE MG/DL
HCT VFR BLD CALC: 32.9 % (ref 34–48)
HEMOGLOBIN: 10.6 G/DL (ref 11.5–15.5)
IMMATURE GRANULOCYTES #: 0.06 E9/L
IMMATURE GRANULOCYTES %: 0.4 % (ref 0–5)
INFLUENZA A BY PCR: NOT DETECTED
INFLUENZA B BY PCR: NOT DETECTED
INR BLD: 1.5
KETONES, URINE: NEGATIVE MG/DL
LACTIC ACID, SEPSIS: 1.9 MMOL/L (ref 0.5–1.9)
LEUKOCYTE ESTERASE, URINE: NEGATIVE
LIPASE: 46 U/L (ref 13–60)
LYMPHOCYTES ABSOLUTE: 1.1 E9/L (ref 1.5–4)
LYMPHOCYTES RELATIVE PERCENT: 7.5 % (ref 20–42)
Lab: ABNORMAL
MAGNESIUM: 1.6 MG/DL (ref 1.6–2.6)
MCH RBC QN AUTO: 29.3 PG (ref 26–35)
MCHC RBC AUTO-ENTMCNC: 32.2 % (ref 32–34.5)
MCV RBC AUTO: 90.9 FL (ref 80–99.9)
METER GLUCOSE: 138 MG/DL (ref 74–99)
METHADONE SCREEN, URINE: NOT DETECTED
MONOCYTES ABSOLUTE: 0.51 E9/L (ref 0.1–0.95)
MONOCYTES RELATIVE PERCENT: 3.5 % (ref 2–12)
MUCUS: PRESENT /LPF
NEUTROPHILS ABSOLUTE: 12.96 E9/L (ref 1.8–7.3)
NEUTROPHILS RELATIVE PERCENT: 88.4 % (ref 43–80)
NITRITE, URINE: NEGATIVE
OPIATE SCREEN URINE: NOT DETECTED
OXYCODONE URINE: NOT DETECTED
PDW BLD-RTO: 15.5 FL (ref 11.5–15)
PH UA: 6 (ref 5–9)
PHENCYCLIDINE SCREEN URINE: NOT DETECTED
PLATELET # BLD: 308 E9/L (ref 130–450)
PMV BLD AUTO: 9.1 FL (ref 7–12)
POTASSIUM REFLEX MAGNESIUM: 3.1 MMOL/L (ref 3.5–5)
PROTEIN UA: ABNORMAL MG/DL
PROTHROMBIN TIME: 16.1 SEC (ref 9.3–12.4)
RBC # BLD: 3.62 E12/L (ref 3.5–5.5)
RBC UA: ABNORMAL /HPF (ref 0–2)
SARS-COV-2, NAAT: NOT DETECTED
SODIUM BLD-SCNC: 139 MMOL/L (ref 132–146)
SPECIFIC GRAVITY UA: 1.02 (ref 1–1.03)
TOTAL CK: 518 U/L (ref 20–180)
TOTAL PROTEIN: 7.9 G/DL (ref 6.4–8.3)
TROPONIN, HIGH SENSITIVITY: 20 NG/L (ref 0–9)
TROPONIN, HIGH SENSITIVITY: 23 NG/L (ref 0–9)
UROBILINOGEN, URINE: 0.2 E.U./DL
WBC # BLD: 14.7 E9/L (ref 4.5–11.5)
WBC UA: ABNORMAL /HPF (ref 0–5)

## 2022-05-25 PROCEDURE — 85025 COMPLETE CBC W/AUTO DIFF WBC: CPT

## 2022-05-25 PROCEDURE — 96365 THER/PROPH/DIAG IV INF INIT: CPT

## 2022-05-25 PROCEDURE — 87635 SARS-COV-2 COVID-19 AMP PRB: CPT

## 2022-05-25 PROCEDURE — 87040 BLOOD CULTURE FOR BACTERIA: CPT

## 2022-05-25 PROCEDURE — 2580000003 HC RX 258: Performed by: STUDENT IN AN ORGANIZED HEALTH CARE EDUCATION/TRAINING PROGRAM

## 2022-05-25 PROCEDURE — 72125 CT NECK SPINE W/O DYE: CPT

## 2022-05-25 PROCEDURE — 71045 X-RAY EXAM CHEST 1 VIEW: CPT

## 2022-05-25 PROCEDURE — 85610 PROTHROMBIN TIME: CPT

## 2022-05-25 PROCEDURE — 93005 ELECTROCARDIOGRAM TRACING: CPT | Performed by: STUDENT IN AN ORGANIZED HEALTH CARE EDUCATION/TRAINING PROGRAM

## 2022-05-25 PROCEDURE — 87502 INFLUENZA DNA AMP PROBE: CPT

## 2022-05-25 PROCEDURE — 81001 URINALYSIS AUTO W/SCOPE: CPT

## 2022-05-25 PROCEDURE — 83690 ASSAY OF LIPASE: CPT

## 2022-05-25 PROCEDURE — 2060000000 HC ICU INTERMEDIATE R&B

## 2022-05-25 PROCEDURE — 83605 ASSAY OF LACTIC ACID: CPT

## 2022-05-25 PROCEDURE — 80307 DRUG TEST PRSMV CHEM ANLYZR: CPT

## 2022-05-25 PROCEDURE — 70450 CT HEAD/BRAIN W/O DYE: CPT

## 2022-05-25 PROCEDURE — 74176 CT ABD & PELVIS W/O CONTRAST: CPT

## 2022-05-25 PROCEDURE — 82550 ASSAY OF CK (CPK): CPT

## 2022-05-25 PROCEDURE — 71250 CT THORAX DX C-: CPT

## 2022-05-25 PROCEDURE — 6370000000 HC RX 637 (ALT 250 FOR IP): Performed by: INTERNAL MEDICINE

## 2022-05-25 PROCEDURE — 83735 ASSAY OF MAGNESIUM: CPT

## 2022-05-25 PROCEDURE — 84484 ASSAY OF TROPONIN QUANT: CPT

## 2022-05-25 PROCEDURE — 99285 EMERGENCY DEPT VISIT HI MDM: CPT

## 2022-05-25 PROCEDURE — 96375 TX/PRO/DX INJ NEW DRUG ADDON: CPT

## 2022-05-25 PROCEDURE — 80053 COMPREHEN METABOLIC PANEL: CPT

## 2022-05-25 PROCEDURE — 2580000003 HC RX 258: Performed by: INTERNAL MEDICINE

## 2022-05-25 PROCEDURE — 6360000002 HC RX W HCPCS: Performed by: STUDENT IN AN ORGANIZED HEALTH CARE EDUCATION/TRAINING PROGRAM

## 2022-05-25 PROCEDURE — 82962 GLUCOSE BLOOD TEST: CPT

## 2022-05-25 PROCEDURE — 87088 URINE BACTERIA CULTURE: CPT

## 2022-05-25 PROCEDURE — 36415 COLL VENOUS BLD VENIPUNCTURE: CPT

## 2022-05-25 PROCEDURE — 85730 THROMBOPLASTIN TIME PARTIAL: CPT

## 2022-05-25 PROCEDURE — 6370000000 HC RX 637 (ALT 250 FOR IP): Performed by: STUDENT IN AN ORGANIZED HEALTH CARE EDUCATION/TRAINING PROGRAM

## 2022-05-25 RX ORDER — LISINOPRIL AND HYDROCHLOROTHIAZIDE 20; 12.5 MG/1; MG/1
2 TABLET ORAL DAILY
Status: ON HOLD | COMMUNITY
End: 2022-06-07 | Stop reason: HOSPADM

## 2022-05-25 RX ORDER — PANTOPRAZOLE SODIUM 40 MG/1
40 TABLET, DELAYED RELEASE ORAL DAILY
Status: DISCONTINUED | OUTPATIENT
Start: 2022-05-26 | End: 2022-06-07 | Stop reason: HOSPADM

## 2022-05-25 RX ORDER — DOCUSATE SODIUM 100 MG/1
100 CAPSULE, LIQUID FILLED ORAL 2 TIMES DAILY PRN
COMMUNITY

## 2022-05-25 RX ORDER — SODIUM CHLORIDE 0.9 % (FLUSH) 0.9 %
5-40 SYRINGE (ML) INJECTION PRN
Status: DISCONTINUED | OUTPATIENT
Start: 2022-05-25 | End: 2022-05-27

## 2022-05-25 RX ORDER — SODIUM CHLORIDE 9 MG/ML
INJECTION, SOLUTION INTRAVENOUS PRN
Status: DISCONTINUED | OUTPATIENT
Start: 2022-05-25 | End: 2022-05-28

## 2022-05-25 RX ORDER — SODIUM CHLORIDE 9 MG/ML
INJECTION, SOLUTION INTRAVENOUS PRN
Status: DISCONTINUED | OUTPATIENT
Start: 2022-05-25 | End: 2022-05-27

## 2022-05-25 RX ORDER — POLYETHYLENE GLYCOL 3350 17 G/17G
17 POWDER, FOR SOLUTION ORAL DAILY PRN
Status: DISCONTINUED | OUTPATIENT
Start: 2022-05-25 | End: 2022-06-07 | Stop reason: HOSPADM

## 2022-05-25 RX ORDER — LISINOPRIL 10 MG/1
20 TABLET ORAL DAILY
Status: DISCONTINUED | OUTPATIENT
Start: 2022-05-26 | End: 2022-05-26

## 2022-05-25 RX ORDER — SODIUM CHLORIDE 9 MG/ML
INJECTION, SOLUTION INTRAVENOUS CONTINUOUS
Status: DISCONTINUED | OUTPATIENT
Start: 2022-05-25 | End: 2022-05-27

## 2022-05-25 RX ORDER — SODIUM CHLORIDE 0.9 % (FLUSH) 0.9 %
5-40 SYRINGE (ML) INJECTION EVERY 12 HOURS SCHEDULED
Status: DISCONTINUED | OUTPATIENT
Start: 2022-05-25 | End: 2022-05-28

## 2022-05-25 RX ORDER — SODIUM CHLORIDE 0.9 % (FLUSH) 0.9 %
5-40 SYRINGE (ML) INJECTION EVERY 12 HOURS SCHEDULED
Status: DISCONTINUED | OUTPATIENT
Start: 2022-05-25 | End: 2022-05-27

## 2022-05-25 RX ORDER — ACETAMINOPHEN 650 MG/1
650 SUPPOSITORY RECTAL ONCE
Status: COMPLETED | OUTPATIENT
Start: 2022-05-25 | End: 2022-05-25

## 2022-05-25 RX ORDER — 0.9 % SODIUM CHLORIDE 0.9 %
1000 INTRAVENOUS SOLUTION INTRAVENOUS ONCE
Status: COMPLETED | OUTPATIENT
Start: 2022-05-25 | End: 2022-05-25

## 2022-05-25 RX ORDER — ONDANSETRON 4 MG/1
4 TABLET, ORALLY DISINTEGRATING ORAL EVERY 8 HOURS PRN
Status: DISCONTINUED | OUTPATIENT
Start: 2022-05-25 | End: 2022-06-07 | Stop reason: HOSPADM

## 2022-05-25 RX ORDER — LEVOTHYROXINE SODIUM 0.05 MG/1
50 TABLET ORAL DAILY
Status: DISCONTINUED | OUTPATIENT
Start: 2022-05-26 | End: 2022-06-07 | Stop reason: HOSPADM

## 2022-05-25 RX ORDER — POTASSIUM CHLORIDE 7.45 MG/ML
10 INJECTION INTRAVENOUS
Status: COMPLETED | OUTPATIENT
Start: 2022-05-25 | End: 2022-05-25

## 2022-05-25 RX ORDER — LEVOTHYROXINE SODIUM 0.05 MG/1
100 TABLET ORAL WEEKLY
Status: ON HOLD | COMMUNITY
End: 2022-06-07 | Stop reason: HOSPADM

## 2022-05-25 RX ORDER — ACETAMINOPHEN 325 MG/1
650 TABLET ORAL EVERY 6 HOURS PRN
Status: DISCONTINUED | OUTPATIENT
Start: 2022-05-25 | End: 2022-06-07 | Stop reason: HOSPADM

## 2022-05-25 RX ORDER — SODIUM CHLORIDE 0.9 % (FLUSH) 0.9 %
5-40 SYRINGE (ML) INJECTION PRN
Status: DISCONTINUED | OUTPATIENT
Start: 2022-05-25 | End: 2022-05-28

## 2022-05-25 RX ORDER — ONDANSETRON 2 MG/ML
4 INJECTION INTRAMUSCULAR; INTRAVENOUS EVERY 6 HOURS PRN
Status: DISCONTINUED | OUTPATIENT
Start: 2022-05-25 | End: 2022-06-07 | Stop reason: HOSPADM

## 2022-05-25 RX ORDER — ACETAMINOPHEN 650 MG/1
650 SUPPOSITORY RECTAL EVERY 6 HOURS PRN
Status: DISCONTINUED | OUTPATIENT
Start: 2022-05-25 | End: 2022-06-07 | Stop reason: HOSPADM

## 2022-05-25 RX ORDER — DOCUSATE SODIUM 100 MG/1
100 CAPSULE, LIQUID FILLED ORAL 2 TIMES DAILY PRN
Status: DISCONTINUED | OUTPATIENT
Start: 2022-05-25 | End: 2022-06-07 | Stop reason: HOSPADM

## 2022-05-25 RX ADMIN — CEFTRIAXONE 1000 MG: 1 INJECTION, POWDER, FOR SOLUTION INTRAMUSCULAR; INTRAVENOUS at 17:42

## 2022-05-25 RX ADMIN — SODIUM CHLORIDE: 9 INJECTION, SOLUTION INTRAVENOUS at 23:54

## 2022-05-25 RX ADMIN — SODIUM CHLORIDE 1000 ML: 9 INJECTION, SOLUTION INTRAVENOUS at 14:18

## 2022-05-25 RX ADMIN — APIXABAN 5 MG: 5 TABLET, FILM COATED ORAL at 23:50

## 2022-05-25 RX ADMIN — POTASSIUM CHLORIDE 10 MEQ: 7.46 INJECTION, SOLUTION INTRAVENOUS at 17:39

## 2022-05-25 RX ADMIN — POTASSIUM CHLORIDE 10 MEQ: 7.46 INJECTION, SOLUTION INTRAVENOUS at 16:31

## 2022-05-25 RX ADMIN — ACETAMINOPHEN 650 MG: 650 SUPPOSITORY RECTAL at 18:31

## 2022-05-25 RX ADMIN — ACETAMINOPHEN 325MG 650 MG: 325 TABLET ORAL at 23:50

## 2022-05-25 ASSESSMENT — PAIN - FUNCTIONAL ASSESSMENT: PAIN_FUNCTIONAL_ASSESSMENT: NONE - DENIES PAIN

## 2022-05-25 NOTE — LETTER
[place] Department Medicaid  CERTIFICATION OF NECESSITY  FOR NON-EMERGENCY TRANSPORTATION   BY GROUND AMBULANCE      Individual Information   1. Name: [place] 2. [place] Medicaid Billing Number:    3. Address: [address] [place] [address]  [address] [facility]      Transportation Provider Information   4. Provider Name:  [id number]   5. [place] Medicaid Provider Number:  National Provider Identifier (NPI):      Certification  7. Criteria:  During transport, this individual requires:  [x] Medical treatment or continuous     supervision by an EMT. [] The administration or regulation of oxygen by another person. [] Supervised protective restraint. 8. Period Beginning Date:  06/02/2022   9. Length  [x] Not more than 6 day(s)  [] One Year     Additional Information Relevant to Certification   10. Comments or Explanations, If Necessary or Appropriate     Acute Encephalopathy, Seizures, Alert to self only, DL PICC, double vision     Certifying Practitioner Information   11. Name of Practitioner:  Dr Manjula Mcghee   12. [place] Medicaid Provider Number, If Applicable:  [address] Provider Identifier (NPI):      Signature Information   14. Date of Signature:  06/02/2022 15. Name of Person Signing:  Hanna Mabry RN.   16. Signature and Professional Designation:   Electronically signed by Hanna Mabry RN on 6/2/22 at 12:05 PM EDT         OD 87704  Rev. 7/2015  [facility] Encounter Date/Time: 5/25/2022 Amanda [address] Account: [de-identified]    MRN: [id number]    Patient: [address] Serial #: [id number]      ENCOUNTER          Patient Class: I Private Enc?   No Unit  Sheyla Sood [address] Service: MED   Encounter DX: Acute encephalopathy [G9*   ADM Provider: Jordan De La Vega DO   Procedure:     ATT Provider: Jordan De La Vega DO   REF Provider:        Admission DX: Acute encephalopathy, Altered mental status, unspecified altered mental status type and DX codes: G93.40, R41.82      PATIENT                 Name: [place] : 1945 (77 yrs)   Address: [address] [address], [address] Sex: Female   City: [address] [place] [address]         Marital Status:    Employer: RETIRED         Jainism: [place] [employer]   Primary Care Provider: Yuliana Junior,*         Primary Phone: [phone]   EMERGENCY CONTACT   Contact Name Legal Guardian? Relationship to Patient Home Phone Work Phone   1. Walter Delgadillo      Child  Child [phone]              GUARANTOR            Guarantor: [place]     : 1945   Address: [address] [place] [address];[address] Sex: Female     [address]     Relation to Patient: Self       Home Phone: [phone]   Guarantor ID: [id number]       Work Phone:     Guarantor Employer: RETIRED         Status: RETIRED      COVERAGE        PRIMARY INSURANCE   Payor: [facility] MEDICARE Plan: [facility] - [employer] DIV[employer] SOLUTIONS   Payor Address: ,          Group Number: [id number] Insurance Type: [address] Name: Rosalina Palmer : 1945   Subscriber ID: [id number] Pat. Rel. to Sub: Self   SECONDARY INSURANCE   Payor:   Plan:     Payor Address:  ,           Group Number:   Insurance Type:     Subscriber Name:   Subscriber :     Subscriber ID:   Pat.  Rel. to Sub:             CSN: [id number]
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
## 2022-05-25 NOTE — LETTER
[address] Medicaid  CERTIFICATION OF NECESSITY  FOR TRANSPORTATION   BY WHEELCHAIR VAN     Individual Information   1. Name: [place] 2. [place] Medicaid Billing Number:    3. Address: [address] [place] [address]  [address] [place] [address]      Transportation Provider Information   4. Provider Name:  [facility]   5. [place] Medicaid Provider Number:  National Provider Identifier (NPI):      Certification  7. Criteria:  By signing this document, the practitioner certifies that two statements are true:  A. This individual must be accompanied by a mobility-related assistive device from the point of pick-up to the point of drop-off. B. Transport of this individual by standard passenger vehicle or common carrier is precluded or contraindicated. 8. Period Beginning Date: 06/07/2022   9. Length  [x] Not more than 1 day(s)  [] One Year     Additional Information Relevant to Certification   10. Comments or Explanations, If Necessary or Appropriate     Acute encephalopathy, seizures, aphasia, recent knee surgery     Certifying Practitioner Information   11. Name of Practitioner:  Dr Gopi Love   12. [place] Medicaid Provider Number, If Applicable:  [address] Provider Identifier (NPI):      Signature Information   14. Date of Signature:  06/07/2022 15. Name of Person Signing:  Diana Vega RN.   16. Signature and Professional Designation:  Electronically signed by Diana Vega RN on 6/7/22 at 11:12 AM EDT       OD 32484  Rev. 7/2015  [facility] Encounter Date/Time: 5/25/2022 Amanda 71 Account: [de-identified]    MRN: [id number]    Patient: [place]    Contact Serial #: [id number]      ENCOUNTER          Patient Class: I Private Enc?   No Unit  Wyatt Kenyon [address] Service: MED   Encounter DX: Acute encephalopathy [G9*   ADM Provider: Yg Jimenez DO   Procedure:     ATT Provider: Yg Jimenez DO   REF Provider:    Admission DX: Acute encephalopathy, Altered mental status, unspecified altered mental status type and DX codes: G93.40, R41.82      PATIENT                 Name: [place] : 1945 (77 yrs)   Address: [address], [address] * Sex: Female   City: [address] [place] [address]         Marital Status:    Employer: RETIRED         Evangelical: [employer]   Primary Care Provider: Cynthia Ritchie,*         Primary Phone: [phone]   EMERGENCY CONTACT   Contact Name Legal Guardian? Relationship to Patient Home Phone Work Phone   1. Chavo Guerra      Child  Child [phone]              GUARANTOR            Guarantor: [place]     : 1945   Address: [address] Sex: Female     [address]     Relation to Patient: Self       Home Phone: [phone]   Guarantor ID: [id number]       Work Phone:     Guarantor Employer: RETIRED         Status: RETIRED      COVERAGE        PRIMARY INSURANCE   Payor: [facility] MEDICARE Plan: [facility] SOLUTIONS   Payor Address: ,          Group Number: [id number] Insurance Type: [address] Name: Cat Tomas : 1945   Subscriber ID: [id number] Pat. Rel. to Sub: Self   SECONDARY INSURANCE   Payor:   Plan:     Payor Address:  ,           Group Number:   Insurance Type:     Subscriber Name:   Subscriber :     Subscriber ID:   Pat.  Rel. to Sub:             CSN: [id number]

## 2022-05-25 NOTE — Clinical Note
Discharge Plan[de-identified] Other/[facility])   Telemetry/Cardiac Monitoring Required?: Yes

## 2022-05-25 NOTE — ED PROVIDER NOTES
initial workup, who stated that patient was treated for UTI a couple weeks ago. She had reportedly told son that she thought antibiotic was messing with her brain as she felt off. She has previous history of alcohol abuse but has not had issues for years. She reportedly does drink on occasion. Serum and urine drug screens ordered, pending. Patient was given dose of rocephin in the ED and potassium was replaced. BP remained stable in the ED. Patient was admitted to telemetry in stable condition. ED Course as of 05/26/22 0037   Wed May 25, 2022   1428 Spoke with the patient's emergency contact, her son, Sigrid Lester, who is on the way to see the patient. He states that he texted her yesterday but has not seen or heard from her in a couple of days. She was found by the neighbor today. He is on his way to the hospital at this time. He states that she has been kind of out of it over the past several days and is being treated for UTI currently. She does live at home by herself and used to live with them but moved out over the past several weeks. [KG]   1609 EKG: Interpreted by me  Rate: 95  Rhythm: Sinus  Interpretation: First-degree AV block, no acute ST elevations or depressions, no acute changes, , QRS 86  Comparison: Compared to most recent EKG, first-degree AV block now present   [AP]      ED Course User Index  [AP] Sybil Monson MD  [KG] Laura Oseguera DO        ED Course as of 05/26/22 0037   Wed May 25, 2022   1428 Spoke with the patient's emergency contact, her son, Sigrid Lester, who is on the way to see the patient. He states that he texted her yesterday but has not seen or heard from her in a couple of days. She was found by the neighbor today. He is on his way to the hospital at this time. He states that she has been kind of out of it over the past several days and is being treated for UTI currently.   She does live at home by herself and used to live with them but moved out over the past several weeks. [KG]   1609 EKG: Interpreted by me  Rate: 95  Rhythm: Sinus  Interpretation: First-degree AV block, no acute ST elevations or depressions, no acute changes, , QRS 86  Comparison: Compared to most recent EKG, first-degree AV block now present   [AP]      ED Course User Index  [AP] Regina Vance MD  [KG] Leander Laura, DO       --------------------------------------------- PAST HISTORY ---------------------------------------------  Past Medical History:  has a past medical history of Anxiety, Asthma, Atrial fibrillation ([address] History of supraventricular tachycardia, Hypertension, Hypothyroidism, Macular degeneration, and PVC's (premature ventricular contractions). Past Surgical History:  has a past surgical history that includes laparoscopic appendectomy (N/A, 2/12/2020); Appendectomy; Colonoscopy; and Total knee arthroplasty (Right, 3/15/2022). Social History:  reports that she quit smoking about 20 years ago. Her smoking use included cigarettes. She has never used smokeless tobacco. She reports previous drug use. Drug: Marijuana [address]). She reports that she does not drink alcohol. Family History: family history includes Alcohol Abuse in her sister and sister; Colon Cancer (age of onset: 79) in her father; Other (age of onset: 80) in her mother. The patients home medications have been reviewed. Allergies:  Other    -------------------------------------------------- RESULTS -------------------------------------------------    LABS:  Results for orders placed or performed during the hospital encounter of 05/25/22   COVID-19, Rapid    Specimen: Nasopharyngeal Swab   Result Value Ref Range    SARS-CoV-2, NAAT Not Detected Not Detected   Rapid influenza A/B antigens    Specimen: Nasopharyngeal   Result Value Ref Range    Influenza A by PCR Not Detected Not Detected    Influenza B by PCR Not Detected Not Detected   CBC auto differential   Result Value Ref Range    WBC 14.7 (H) 4.5 - 11.5 E9/L    RBC 3.62 3.50 - 5.50 E12/L    Hemoglobin 10.6 (L) 11.5 - 15.5 g/dL    Hematocrit 32.9 (L) 34.0 - 48.0 %    MCV 90.9 80.0 - 99.9 fL    MCH 29.3 26.0 - 35.0 pg    MCHC 32.2 32.0 - 34.5 %    RDW 15.5 (H) 11.5 - 15.0 fL    Platelets 728 560 - 987 E9/L    MPV 9.1 7.0 - 12.0 fL    Neutrophils % 88.4 (H) 43.0 - 80.0 %    Immature Granulocytes % 0.4 0.0 - 5.0 %    Lymphocytes % 7.5 (L) 20.0 - 42.0 %    Monocytes % 3.5 2.0 - 12.0 %    Eosinophils % 0.0 0.0 - 6.0 %    Basophils % 0.2 0.0 - 2.0 %    Neutrophils Absolute 12.96 (H) 1.80 - 7.30 E9/L    Immature Granulocytes # 0.06 E9/L    Lymphocytes Absolute 1.10 (L) 1.50 - 4.00 E9/L    Monocytes Absolute 0.51 0.10 - 0.95 E9/L    Eosinophils Absolute 0.00 (L) 0.05 - 0.50 E9/L    Basophils Absolute 0.03 0.00 - 0.20 E9/L   Comprehensive Metabolic Panel w/ Reflex to MG   Result Value Ref Range    Sodium 139 132 - 146 mmol/L    Potassium reflex Magnesium 3.1 (L) 3.5 - 5.0 mmol/L    Chloride 102 98 - 107 mmol/L    CO2 27 22 - 29 mmol/L    Anion Gap 10 7 - 16 mmol/L    Glucose 140 (H) 74 - 99 mg/dL    BUN 9 6 - 23 mg/dL    CREATININE 0.8 0.5 - 1.0 mg/dL    GFR Non-African American >60 >=60 mL/min/1.73    GFR African American >60     Calcium 8.9 8.6 - 10.2 mg/dL    Total Protein 7.9 6.4 - 8.3 g/dL    Albumin 4.0 3.5 - 5.2 g/dL    Total Bilirubin 0.3 0.0 - 1.2 mg/dL    Alkaline Phosphatase 61 35 - 104 U/L    ALT 30 0 - 32 U/L    AST 40 (H) 0 - 31 U/L   Urinalysis   Result Value Ref Range    Color, UA Yellow Straw/Yellow    Clarity, UA Clear Clear    Glucose, Ur Negative Negative mg/dL    Bilirubin Urine Negative Negative    Ketones, Urine Negative Negative mg/dL    Specific Gravity, UA 1.020 1.005 - 1.030    Blood, Urine SMALL (A) Negative    pH, UA 6.0 5.0 - 9.0    Protein, UA TRACE Negative mg/dL    Urobilinogen, Urine 0.2 <2.0 E.U./dL    Nitrite, Urine Negative Negative    Leukocyte Esterase, Urine Negative Negative   Lactate, Sepsis   Result Value Ref Range    Lactic Acid, Sepsis 1.9 0.5 - 1.9 mmol/L   APTT   Result Value Ref Range    aPTT 34.8 24.5 - 35.1 sec   Protime-INR   Result Value Ref Range    Protime 16.1 (H) 9.3 - 12.4 sec    INR 1.5    Lipase   Result Value Ref Range    Lipase 46 13 - 60 U/L   Troponin   Result Value Ref Range    Troponin, High Sensitivity 20 (H) 0 - 9 ng/L   CK   Result Value Ref Range    Total  (H) 20 - 180 U/L   Microscopic Urinalysis   Result Value Ref Range    Mucus, UA Present (A) None Seen /LPF    WBC, UA 2-5 0 - 5 /HPF    RBC, UA 5-10 (A) 0 - 2 /HPF    Bacteria, UA RARE (A) None Seen /HPF   Magnesium   Result Value Ref Range    Magnesium 1.6 1.6 - 2.6 mg/dL   Troponin   Result Value Ref Range    Troponin, High Sensitivity 23 (H) 0 - 9 ng/L   URINE DRUG SCREEN   Result Value Ref Range    Amphetamine Screen, Urine NOT DETECTED Negative <1000 ng/mL    Barbiturate Screen, Ur NOT DETECTED Negative < 200 ng/mL    Benzodiazepine Screen, Urine POSITIVE (A) Negative < 200 ng/mL    Cannabinoid Scrn, Ur NOT DETECTED Negative < 50ng/mL    Cocaine Metabolite Screen, Urine NOT DETECTED Negative < 300 ng/mL    Opiate Scrn, Ur NOT DETECTED Negative < 300ng/mL    PCP Screen, Urine NOT DETECTED Negative < 25 ng/mL    Methadone Screen, Urine NOT DETECTED Negative <300 ng/mL    Oxycodone Urine NOT DETECTED Negative <100 ng/mL    FENTANYL SCREEN, URINE NOT DETECTED Negative <1 ng/mL    Drug Screen Comment: see below    POCT Glucose   Result Value Ref Range    Glucose 138 mg/dL    QC OK? yes    POCT Glucose   Result Value Ref Range    Meter Glucose 138 (H) 74 - 99 mg/dL       RADIOLOGY:  CT Head WO Contrast   Final Result   No acute intracranial abnormality or hemorrhage. Right parieto-occipital scalp hematoma. CT CERVICAL SPINE WO CONTRAST   Final Result   No acute abnormality of the cervical spine. Multilevel degenerative changes and facet arthrosis.          CT CHEST WO CONTRAST   Final Result   No acute process identified in the chest. Incidental cholelithiasis. CT ABDOMEN PELVIS WO CONTRAST Additional Contrast? None   Final Result   1. Cholelithiasis      2. Large right peripelvic renal cortical cyst.  No renal obstruction or   calculus. 3.  Evidence of previous appendectomy. XR CHEST PORTABLE   Final Result   Borderline cardiomegaly. Nothing active.                 ------------------------- NURSING NOTES AND VITALS REVIEWED ---------------------------  Date / Time Roomed:  5/25/2022  1:41 PM  ED Bed Assignment:  [id number]    The nursing notes within the ED encounter and vital signs as below have been reviewed. Patient Vitals for the past 24 hrs:   BP Temp Temp src Pulse Resp SpO2   05/25/22 2215 121/82 99.9 °F (37.7 °C) Oral 88 20 93 %   05/25/22 1751 -- (!) 101.9 °F (38.8 °C) Axillary -- -- --   05/25/22 1628 (!) 157/79 -- -- 85 20 98 %   05/25/22 1345 (!) 140/84 98.6 °F (37 °C) Axillary 100 16 93 %       Oxygen Saturation Interpretation: Normal    ------------------------------------------ PROGRESS NOTES ------------------------------------------      Counseling:  I have spoken with the son and discussed todays results, in addition to providing specific details for the plan of care and counseling regarding the diagnosis and prognosis. Their questions are answered at this time and they are agreeable with the plan of admission.    --------------------------------- ADDITIONAL PROVIDER NOTES ---------------------------------    This patient's ED course included: a personal history and physicial examination, re-evaluation prior to disposition, multiple bedside re-evaluations, IV medications, cardiac monitoring, continuous pulse oximetry and complex medical decision making and emergency management    This patient has remained hemodynamically stable during their ED course. Diagnosis:  1. Septicemia ([address]    2. Altered mental status, unspecified altered mental status type    3. Acute cystitis with hematuria    4. Hypokalemia        Disposition:  Patient's disposition: Admit to telemetry  Patient's condition is stable.             Raymund Rinne, MD  Resident  05/26/22 [id number]

## 2022-05-26 ENCOUNTER — APPOINTMENT (OUTPATIENT)
Dept: MRI IMAGING | Age: 77
DRG: 871 | End: 2022-05-26
Payer: MEDICARE

## 2022-05-26 PROBLEM — R29.90 STROKE-LIKE SYMPTOMS: Status: ACTIVE | Noted: 2022-05-26

## 2022-05-26 LAB
ACETAMINOPHEN LEVEL: <5 MCG/ML (ref 10–30)
AMMONIA: 32 UMOL/L (ref 11–51)
ANION GAP SERPL CALCULATED.3IONS-SCNC: 12 MMOL/L (ref 7–16)
BUN BLDV-MCNC: 8 MG/DL (ref 6–23)
CALCIUM SERPL-MCNC: 8 MG/DL (ref 8.6–10.2)
CHLORIDE BLD-SCNC: 101 MMOL/L (ref 98–107)
CO2: 25 MMOL/L (ref 22–29)
CREAT SERPL-MCNC: 0.8 MG/DL (ref 0.5–1)
EKG ATRIAL RATE: 95 BPM
EKG P AXIS: 62 DEGREES
EKG P-R INTERVAL: 210 MS
EKG Q-T INTERVAL: 320 MS
EKG QRS DURATION: 86 MS
EKG QTC CALCULATION (BAZETT): 402 MS
EKG R AXIS: -27 DEGREES
EKG T AXIS: 40 DEGREES
EKG VENTRICULAR RATE: 95 BPM
ETHANOL: <10 MG/DL (ref 0–0.08)
FOLATE: 18.9 NG/ML (ref 4.8–24.2)
GFR AFRICAN AMERICAN: >60
GFR NON-AFRICAN AMERICAN: >60 ML/MIN/1.73
GLUCOSE BLD-MCNC: 129 MG/DL (ref 74–99)
HCT VFR BLD CALC: 30.4 % (ref 34–48)
HEMOGLOBIN: 9.9 G/DL (ref 11.5–15.5)
MAGNESIUM: 1.4 MG/DL (ref 1.6–2.6)
MCH RBC QN AUTO: 29.4 PG (ref 26–35)
MCHC RBC AUTO-ENTMCNC: 32.6 % (ref 32–34.5)
MCV RBC AUTO: 90.2 FL (ref 80–99.9)
PDW BLD-RTO: 15.9 FL (ref 11.5–15)
PLATELET # BLD: 258 E9/L (ref 130–450)
PMV BLD AUTO: 9.1 FL (ref 7–12)
POTASSIUM SERPL-SCNC: 2.8 MMOL/L (ref 3.5–5)
RBC # BLD: 3.37 E12/L (ref 3.5–5.5)
SALICYLATE, SERUM: <0.3 MG/DL (ref 0–30)
SODIUM BLD-SCNC: 138 MMOL/L (ref 132–146)
TOTAL CK: 502 U/L (ref 20–180)
TRICYCLIC ANTIDEPRESSANTS SCREEN SERUM: NEGATIVE NG/ML
TSH SERPL DL<=0.05 MIU/L-ACNC: 0.6 UIU/ML (ref 0.27–4.2)
VITAMIN B-12: 289 PG/ML (ref 211–946)
WBC # BLD: 10.9 E9/L (ref 4.5–11.5)

## 2022-05-26 PROCEDURE — 92523 SPEECH SOUND LANG COMPREHEN: CPT | Performed by: SPEECH-LANGUAGE PATHOLOGIST

## 2022-05-26 PROCEDURE — 2060000000 HC ICU INTERMEDIATE R&B

## 2022-05-26 PROCEDURE — 2580000003 HC RX 258: Performed by: INTERNAL MEDICINE

## 2022-05-26 PROCEDURE — 84443 ASSAY THYROID STIM HORMONE: CPT

## 2022-05-26 PROCEDURE — 6370000000 HC RX 637 (ALT 250 FOR IP): Performed by: INTERNAL MEDICINE

## 2022-05-26 PROCEDURE — 2700000000 HC OXYGEN THERAPY PER DAY

## 2022-05-26 PROCEDURE — 83735 ASSAY OF MAGNESIUM: CPT

## 2022-05-26 PROCEDURE — 82550 ASSAY OF CK (CPK): CPT

## 2022-05-26 PROCEDURE — 36415 COLL VENOUS BLD VENIPUNCTURE: CPT

## 2022-05-26 PROCEDURE — 6360000002 HC RX W HCPCS: Performed by: INTERNAL MEDICINE

## 2022-05-26 PROCEDURE — 99222 1ST HOSP IP/OBS MODERATE 55: CPT

## 2022-05-26 PROCEDURE — 80143 DRUG ASSAY ACETAMINOPHEN: CPT

## 2022-05-26 PROCEDURE — 80048 BASIC METABOLIC PNL TOTAL CA: CPT

## 2022-05-26 PROCEDURE — 82746 ASSAY OF FOLIC ACID SERUM: CPT

## 2022-05-26 PROCEDURE — 85027 COMPLETE CBC AUTOMATED: CPT

## 2022-05-26 PROCEDURE — 70551 MRI BRAIN STEM W/O DYE: CPT

## 2022-05-26 PROCEDURE — 80179 DRUG ASSAY SALICYLATE: CPT

## 2022-05-26 PROCEDURE — 2580000003 HC RX 258: Performed by: STUDENT IN AN ORGANIZED HEALTH CARE EDUCATION/TRAINING PROGRAM

## 2022-05-26 PROCEDURE — 80307 DRUG TEST PRSMV CHEM ANLYZR: CPT

## 2022-05-26 PROCEDURE — 82607 VITAMIN B-12: CPT

## 2022-05-26 PROCEDURE — 82077 ASSAY SPEC XCP UR&BREATH IA: CPT

## 2022-05-26 PROCEDURE — 82140 ASSAY OF AMMONIA: CPT

## 2022-05-26 RX ORDER — POTASSIUM CHLORIDE 7.45 MG/ML
10 INJECTION INTRAVENOUS
Status: COMPLETED | OUTPATIENT
Start: 2022-05-26 | End: 2022-05-26

## 2022-05-26 RX ORDER — MAGNESIUM SULFATE IN WATER 40 MG/ML
2000 INJECTION, SOLUTION INTRAVENOUS ONCE
Status: COMPLETED | OUTPATIENT
Start: 2022-05-26 | End: 2022-05-26

## 2022-05-26 RX ADMIN — SODIUM CHLORIDE: 9 INJECTION, SOLUTION INTRAVENOUS at 20:14

## 2022-05-26 RX ADMIN — WATER 1000 MG: 1 INJECTION INTRAMUSCULAR; INTRAVENOUS; SUBCUTANEOUS at 09:09

## 2022-05-26 RX ADMIN — APIXABAN 5 MG: 5 TABLET, FILM COATED ORAL at 09:09

## 2022-05-26 RX ADMIN — LEVOTHYROXINE SODIUM 50 MCG: 0.05 TABLET ORAL at 06:00

## 2022-05-26 RX ADMIN — Medication 10 MEQ: at 20:15

## 2022-05-26 RX ADMIN — SODIUM CHLORIDE, PRESERVATIVE FREE 10 ML: 5 INJECTION INTRAVENOUS at 20:15

## 2022-05-26 RX ADMIN — APIXABAN 5 MG: 5 TABLET, FILM COATED ORAL at 20:16

## 2022-05-26 RX ADMIN — Medication 10 ML: at 20:16

## 2022-05-26 RX ADMIN — Medication 10 MEQ: at 16:22

## 2022-05-26 RX ADMIN — POTASSIUM BICARBONATE 40 MEQ: 782 TABLET, EFFERVESCENT ORAL at 09:10

## 2022-05-26 RX ADMIN — ONDANSETRON 4 MG: 2 INJECTION INTRAMUSCULAR; INTRAVENOUS at 11:03

## 2022-05-26 RX ADMIN — Medication 10 MEQ: at 17:13

## 2022-05-26 RX ADMIN — SODIUM CHLORIDE, PRESERVATIVE FREE 10 ML: 5 INJECTION INTRAVENOUS at 09:10

## 2022-05-26 RX ADMIN — Medication 10 MEQ: at 18:43

## 2022-05-26 RX ADMIN — MAGNESIUM SULFATE HEPTAHYDRATE 2000 MG: 40 INJECTION, SOLUTION INTRAVENOUS at 09:19

## 2022-05-26 NOTE — CARE COORDINATION
Social Work /Transition of Care:    SW made initial referrals to [facility] and [facility] per family.

## 2022-05-26 NOTE — PROGRESS NOTES
Occupational Therapy    Date:2022  Patient Name: Mark Ruffin  MRN: [id number]  : 1945  Room: [address] [place] [address]-A     OT orders received and chart reviewed. Per RN pt extremely nauseous and continuously vomiting. OT eval on hold at this time. OT will follow and re-attempt eval as appropriate, at a later time/date.     Hodan Geller, [address] OTR/L #[id number]

## 2022-05-26 NOTE — PLAN OF CARE
Problem: Safety - Medical Restraint  Goal: Remains free of injury from restraints (Restraint for Interference with Medical Device)  Outcome: Progressing

## 2022-05-26 NOTE — CARE COORDINATION
Here from home found down [facility] AMS. Neurology consulted. IV fluids IV rocephin. Currently on 2 L nasal cannula. MRI brain - negative. Having nausea and emesis today - Await pt/ot speech eval.to determine discharge plan. cm/sw to follow up with son. Electronically signed by Manuelito Mccabe RN on 5/26/2022 at 2:47 PM

## 2022-05-26 NOTE — PROGRESS NOTES
Patient continues to pull at monitor and lines when restraints are removed for ROM/repositioning. 2 point soft wrist restraints remain in place for patient safety. Will continue to monitor.

## 2022-05-26 NOTE — PROGRESS NOTES
SPEECH/LANGUAGE PATHOLOGY  SPEECH/LANGUAGE/COGNITIVE EVALUATION   and PLAN OF CARE      PATIENT NAME:  Bernabe Lr  (female)     MRN:  [id number]    :  1945  (76 y.o.)  STATUS:  Inpatient: Room 8514/8514-A    TODAY'S DATE:  22   SLP eval and treat Start: 22, End: 22, ONE TIME, Standing Count: 1 Occurrences, R    Judith Sorto DO  REASON FOR REFERRAL:  AMS  EVALUATING THERAPIST: VANESSA Rdz    ADMITTING DIAGNOSIS: Acute encephalopathy [G93.40]  Altered mental status, unspecified altered mental status type [R41.82]    VISIT DIAGNOSIS:   Visit Diagnoses       Codes    Septicemia ([facility] [address]    -  Primary A41.9    Altered mental status, unspecified altered mental status type     R41.82    Acute cystitis with hematuria     N30.01    Hypokalemia     E87.6             SPEECH THERAPY  PLAN OF CARE   The speech therapy  POC is established based on physician order, speech pathology diagnosis and results of clinical assessment     SPEECH PATHOLOGY DIAGNOSIS:    Severe Cognitive Deficits     Speech Pathology intervention is recommended up to 6 times per week for LOS or when goals are met with emphasis on the following:      Conditions Requiring Skilled Therapeutic Intervention for speech, language and/or cognition    Cognitive linguistic impairment    Specific Speech Therapy Interventions to Include: Therapeutic tasks for Cognition    Specific instructions for next treatment: To initiate POC    SHORT/LONG TERM GOALS  Pt will improve orientation to spatial and temporal surroundings with use of external memory aides.   Pt will improve immediate, short term, recent memory during structured and unstructured tasks with 75% accuracy   Pt will improve problem solving/thought organization during structured and unstructured tasks with 75% accuracy   Pt will improve receptive and expressive language skills with adequate thought content, organization, and processing time to facilitate improved communication with moderate. Patient goals: Patient/family involved in developing goals and treatment plan:   Treatment goals discussed with Patient    The Patient understand(s) the diagnosis, prognosis and plan of care   The patient/family Could not state,     This plan may be re-evaluated and revised as warranted. Rehabilitation Potential/Prognosis: guarded pending source of AMS               CLINICAL ASSESSMENT:  MOTOR SPEECH       Oral Peripheral Examination   Adequate lingual/labial strength     Parameters of Speech Production  Respiration:  Adequate for speech production  Articulation:  Within functional limits  Resonance:  Within functional limits  Quality:   Within functional limits  Pitch: Within functional limits  Intensity: Within functional limits  Fluency:  Intact  Prosody Intact    RECEPTIVE LANGUAGE    Comprehension of Yes/No Questions:   Could not test    Process  Simple Verbal Commands:   Could not test  Process Intermediate Verbal Commands:   Could not test  Process Complex Verbal Commands:     Could not test    Comprehension of Conversation:      Could not test      EXPRESSIVE LANGUAGE     Serials: Impaired    Imitation:  Words   To be assessed   Sentences To be assessed    Naming:  (Modality used:  Verbal)  Confrontation Naming  To be assessed  Functional Description  To be assessed  Response Naming:  To be assessed    Conversation:      Confusion was noted during conversation    COGNITION     Attention/Orientation  Attention: Easily Distracted  Orientation:  Not oriented    Memory/Organization/Problem Solving/Reasoning   n/a         CLINICAL OBSERVATIONS NOTED DURING THE EVALUATION  Latent responses, Inconsistent responses, Perseveration errors, Anomic errors, Cueing was required, Flat affect and Reduced eye contact                  EDUCATION:   The Speech Language Pathologist (SLP) completed education regarding results of evaluation and that intervention is warranted at this time. Learner: Patient  Education: Reviewed results and recommendations of this evaluation  Evaluation of Education:  No evidence of learning    Evaluation Time includes thorough review of current medical information, gathering information on past medical history/social history and prior level of function, completion of standardized testing/informal observation of tasks, assessment of data and education on plan of care and goals. CPT code:    92910  eval speech sound lang comprehension      The admitting diagnosis and active problem list, as listed below have been reviewed prior to initiation of this evaluation.         ACTIVE PROBLEM LIST:   Patient Active Problem List   Diagnosis    Atrial fibrillation ([facility] [address]    Acute medial meniscal tear    Anxiety    Arthritis    Asthma    Cholelithiasis    Depressive disorder    Diverticular disease    Fracture of tibia    Gastroesophageal reflux disease    Hiatal hernia    Hypercholesterolemia    Hypertension    Hypothyroidism    Insomnia    Lipoma    Obesity    Osteopenia    Overweight with body mass index (BMI) 25.0-29.9    Restless legs syndrome    Rupture of appendix    Secondary non-renal hyperparathyroidism (HCC)    Stage 3 chronic kidney disease (HCC)    Steatosis of liver    Tubular adenoma of colon    Acute encephalopathy    Stroke-like symptoms       Lev Guthrie CCC-SLP  Speech-Language Pathologist  [id number]  5/26/2022

## 2022-05-26 NOTE — PROGRESS NOTES
Called and spoke with Dr. Mirna Howard regarding pt only taking 40 MEQ of potassium chloride oral. Pt was to receive 80 MEQ but pt had episodes of vomiting. Orders were given to order 40 MEQ of potassium chloride IV.

## 2022-05-26 NOTE — CONSULTS
[facility]  Neurology Consult    Date:  5/26/2022  Patient Name:  Robb Bartlett  YOB: 1945  MRN: [id number]     PCP:  Niurka David MD   Referring:  No ref. provider found      Chief Complaint: AMS    History obtained from: Brie Jimenes is a 76 y.o. female     Neurology consulted for AMS possibly related to possible metabolic derangement due to sepsis. She was recently being treated for a UTI. She had reportedly told son that she thought antibiotic was messing with her brain and she felt off. She has previous history of alcohol abuse but has not had issues for years. Alcohol screen negative,  Serum drug screen + benzo, (on Ativan prn prior to admission)          Plan  · MRI  · EEG  · Ammonia, TSH, Vitamin B12, and Folate        History of Present Illness:  Robb Bartlett is a 76 y.o.  female presenting for evaluation of AMS. The patient is a 76year-old-female who presents to the ED complaining of altered mental status. She was found down by her neighbor. Last known well unknown. She is reportedly being treated for UTI. When EMS arrived, patient was on couch in her underwear with shards of glass on the chair. She is awake and alert with a flight of ideas. She does not follow any commands or answer any questions. She moves all of her extremities purposefully, but not to commands. Her speech is clear, however she is having a conversation with herself.  She was unable to be redirected during exam.          Review of Systems:  Unable to obtain review of symptoms due to altered mental status    Medical History:   Past Medical History:   Diagnosis Date    Anxiety     Asthma     controlled per pt    Atrial fibrillation ([facility] [address] 04/2021    Follows with Dr Yamila Bailon- 2-2022    History of supraventricular tachycardia 2021    after surgery    Hypertension     Hypothyroidism     Macular degeneration 01/2022    bilateral    PVC's (premature ventricular contractions)         Surgical History:   Past Surgical History:   Procedure Laterality Date    APPENDECTOMY      COLONOSCOPY      LAPAROSCOPIC APPENDECTOMY N/A 2020    APPENDECTOMY LAPAROSCOPIC performed by Nisha Baird MD at [facility] Right 3/15/2022    RIGHT KNEE TOTAL ARTHROPLASTY   ++SKY++    ++PNB++ performed by Bon Flores MD at [facility] OR        Family History:   Family History   Problem Relation Age of Onset    Other Mother 80        no health problems    Colon Cancer Father 79    Alcohol Abuse Sister         DRUG abuse    Alcohol Abuse Sister         Drug abuse       Social History:  Social History     Tobacco Use    Smoking status: Former Smoker     Types: Cigarettes     Quit date: 2002     Years since quittin.2    Smokeless tobacco: Never Used   Vaping Use    Vaping Use: Never used   Substance Use Topics    Alcohol use: No     Comment: former alcoholic-last drink [id number]    Drug use: Not Currently     Types: Marijuana Zollie Axe)     Comment: last use -        Current Medications:      Current Facility-Administered Medications   Medication Dose Route Frequency Provider Last Rate Last Admin    potassium bicarb-citric acid (EFFER-K) effervescent tablet 40 mEq  40 mEq Oral Q4H Bailey Sorto DO   40 mEq at 22 0910    potassium chloride 10 mEq/100 mL IVPB (Peripheral Line)  10 mEq IntraVENous Q1H Bailey Sorto DO        sodium chloride flush 0.9 % injection 5-40 mL  5-40 mL IntraVENous 2 times per day Sherita Kenney MD        sodium chloride flush 0.9 % injection 5-40 mL  5-40 mL IntraVENous PRN Sherita Kennye MD        0.9 % sodium chloride infusion   IntraVENous PRN Sherita Kenney MD        potassium bicarb-citric acid (EFFER-K) effervescent tablet 40 mEq  40 mEq Oral Once Willistine SimranDO        apixaban (ELIQUIS) tablet 5 mg  5 mg Oral BID Bailey Sorto DO   5 mg at 22 0236    docusate sodium (COLACE) capsule 100 mg  100 mg Oral BID PRN Tyrell Luciamer, DO        levothyroxine (SYNTHROID) tablet 50 mcg  50 mcg Oral Daily Tyrell Sorto, DO   50 mcg at 05/26/22 0600    pantoprazole (PROTONIX) tablet 40 mg  40 mg Oral Daily Tyrell Sorto, DO        sodium chloride flush 0.9 % injection 5-40 mL  5-40 mL IntraVENous 2 times per day Tyrell Sorto, DO   10 mL at 05/26/22 0910    sodium chloride flush 0.9 % injection 5-40 mL  5-40 mL IntraVENous PRN Tyrell Sorto, DO        0.9 % sodium chloride infusion   IntraVENous PRN Tyrell Sorto, DO        ondansetron (ZOFRAN-ODT) disintegrating tablet 4 mg  4 mg Oral Q8H PRN yTrell Sorto, DO        Or    ondansetron [facility]) injection 4 mg  4 mg IntraVENous Q6H PRN Tyrell Sorto, DO   4 mg at 05/26/22 1103    polyethylene glycol (GLYCOLAX) packet 17 g  17 g Oral Daily PRN Tyrell Sorto, DO        acetaminophen (TYLENOL) tablet 650 mg  650 mg Oral Q6H PRN Tyrell Sorto, DO   650 mg at 05/25/22 2350    Or    acetaminophen (TYLENOL) suppository 650 mg  650 mg Rectal Q6H PRN Tyrell Sorto, DO        0.9 % sodium chloride infusion   IntraVENous Continuous Tyrell Sorto, DO 75 mL/hr at 05/26/22 0400 Rate Verify at 05/26/22 0400    cefTRIAXone (ROCEPHIN) 1,000 mg in sterile water 10 mL IV syringe  1,000 mg IntraVENous Q24H Tyrell Sorto, DO   1,000 mg at 05/26/22 5454        Allergies: Allergies   Allergen Reactions    Other      Ragweed and milk after allergy testing  No particular symptoms        Physical Examination  Vitals   Vitals:    05/25/22 2345 05/26/22 0400 05/26/22 0449 05/26/22 0745   BP: 100/60  113/85 110/77   Pulse: 92 82 93 73   Resp: 16  15 16   Temp: (!) 100.7 °F (38.2 °C)  98 °F (36.7 °C) 98.2 °F (36.8 °C)   TempSrc: Temporal  Temporal Temporal   SpO2: 96%  92% 94%   Weight:   160 lb 15 oz (73 kg)         General: Patient appears in no acute distress. Awake and oriented x 1 (person).   HEENT: Normocephalic, atraumatic  Chest: Unlabored  Heart: SR on monitor  Extremities/Peripheral vascular: No edema/swelling noted. No cold limbs noted. Neurologic Examination    Mental Status  Alert to peron. Speech is clear, however,she is No evidence of memory impairment. Attention and concentration appeared abnormal.     Cranial Nerves  II. Unable to perform due to lack of patient cooperation. III, IV, VI: Pupils equally round and reactive to light, 3 to 2 mm bilaterally. EOMs: full, no nystagmus. Aniya Brawn to perform due to lack of patient cooperation. VII: Facial movements symmetric and strong  VIII: Hearing intact to voice  IX,X:Unable to perform due to lack of patient cooperation. XI: Unable to perform due to lack of patient cooperation. XII: Unable to perform due to lack of patient cooperation. Motor  Tone: Normal in all four limbs    Bulk: Normal in all four limbs with no evidence of atrophy    Pronator drift: Unable to perform due to lack of patient cooperation. Sensation  Unable to perform due to lack of patient cooperation. Reflexes     Right Left   Biceps 2 2   Brachioradialis 2 2   Triceps 2 2   Patellar 2 2   Achilles 2 2   ankle clonus none none   Babinski absent absent     Coordination  Unable to perform due to lack of patient cooperation.     Gait  Deferred for safety/fall consideration      Labs  Recent Labs     05/25/22  1418 05/25/22  1418 05/26/22  0552      < > 138   K 3.1*  --  2.8*      < > 101   CO2 27   < > 25   BUN 9   < > 8   CREATININE 0.8   < > 0.8   GLUCOSE 140*   < > 129*   CALCIUM 8.9   < > 8.0*   PROT 7.9  --   --    LABALBU 4.0  --   --    BILITOT 0.3  --   --    ALKPHOS 61  --   --    AST 40*  --   --    ALT 30  --   --    WBC 14.7*   < > 10.9   RBC 3.62   < > 3.37*   HGB 10.6*   < > 9.9*   HCT 32.9*   < > 30.4*   MCV 90.9   < > 90.2   MCH 29.3   < > 29.4   MCHC 32.2   < > 32.6   RDW 15.5*   < > 15.9*      < > 258   MPV 9.1   < > 9.1    < > = values in this interval not displayed. Imaging  MRI BRAIN WO CONTRAST   Final Result   Mild chronic microvascular disease within the periventricular white matter. No acute ischemia. RECOMMENDATIONS:   Unavailable         CT Head WO Contrast   Final Result   No acute intracranial abnormality or hemorrhage. Right parieto-occipital scalp hematoma. CT CERVICAL SPINE WO CONTRAST   Final Result   No acute abnormality of the cervical spine. Multilevel degenerative changes and facet arthrosis. CT CHEST WO CONTRAST   Final Result   No acute process identified in the chest.      Incidental cholelithiasis. CT ABDOMEN PELVIS WO CONTRAST Additional Contrast? None   Final Result   1. Cholelithiasis      2. Large right peripelvic renal cortical cyst.  No renal obstruction or   calculus. 3.  Evidence of previous appendectomy. XR CHEST PORTABLE   Final Result   Borderline cardiomegaly. Nothing active.                I have personally reviewed the above images:      Electronically signed by CHERRIE Gutierrez CNP on 5/26/2022 at 3:45 PM

## 2022-05-26 NOTE — H&P
[address]                  [address] [place],  [place]                              HISTORY AND PHYSICAL    PATIENT NAME: Suresh Meier              :        1945  MED REC NO:   [id number]                            ROOM:       8514  ACCOUNT NO:   [de-identified]                           ADMIT DATE: 2022  PROVIDER:     Romelia Aranda DO    CHIEF COMPLAINT:  Altered mental status. HISTORY OF PRESENT ILLNESS:  The patient is a 75-year-old   female who was found down at home with altered mental status. She was  seen in the emergency room and admitted for further evaluation and  treatment. The patient recently started on antibiotics for presumed  UTI. PAST MEDICAL HISTORY:  Hypertension, hypothyroidism, chronic  anticoagulation, atrial fibrillation, anxiety. PAST SURGICAL HISTORY:  Laparoscopic appendectomy, right total knee  arthroplasty. SOCIAL HISTORY:  The patient quit tobacco.  No alcohol. REVIEW OF SYSTEMS:  Remarkable for above-stated chief complaint. ALLERGIES:  RAGWEED and MILK after allergy testing. PRIMARY CARE PHYSICIAN:  Luiz Walker MD    MEDICATIONS PRIOR TO ADMISSION:  Synthroid, Zestoretic, Colace, Eliquis,  Xanax, Prilosec. PHYSICAL EXAMINATION:  GENERAL APPEARANCE:  Reveals a 75-year-old  female who is  responsive to verbal stimuli, does not follow commands. Poor historian. VITAL SIGNS:  On admission, temperature 98.6, pulse 100, respirations  16, blood pressure 140/84. HEENT:  Head:  Normocephalic, atraumatic. Eyes:  Pupils equal and  reactive to light. Extraocular muscles intact. Fundi not well  visualized. Nose, no obstruction, polyp or discharge noted. Mouth  mucosa without lesion. Pharynx noninjected without exudate. NECK:  Supple. No JVD. No thyromegaly. No carotid bruits. HEART:  Regular rate and rhythm without murmur.   LUNGS:  Clear to auscultation bilaterally. ABDOMEN:  Positive bowel sounds. Soft. Nontender. No rebound or  guarding. No hepatosplenomegaly. No masses. BACK:  With increased thoracic kyphosis. EXTREMITIES:  The patient moves all extremities. She is unable to  follow commands for formal strength testing. LYMPH NODES:  No adenopathy noted. SKIN:  Without rash or lesion. IMPRESSION:  Acute encephalopathy, partially treated UTI, paroxysmal  atrial fibrillation, chronic Eliquis therapy, hypothyroidism,  hypertension. PLAN:  Admit, IV hydration. MRI of the brain. Neurology to see. PT,  OT, Speech eval.    DISCHARGE PLAN:  Home versus rehab as per the patient's progress.         Joyce Smoker, DO    D: 05/26/2022 8:53:23       T: 05/26/2022 8:57:42     [email or web address]  Job#: [id number]     Doc#: [id number]    CC:

## 2022-05-26 NOTE — PROGRESS NOTES
Physical Therapy    Date: 2022       Patient Name: Mark Ruffin  : 1945      MRN: [id number]    PT order received. Chart has been reviewed. PT evaluation will be on hold due to due to patient being nauseated and vomiting. Will continue to follow and complete evaluation at later time.      Bonilla Foreman, PT

## 2022-05-26 NOTE — PROGRESS NOTES
Patient continues to pull at lines and attempt to get out of bed when restraints removed for assessment. Patient kicks legs over bed and tries to get up.  Bilateral wrist restraints continued and bilateral ankle restraints started for patient safety

## 2022-05-27 ENCOUNTER — APPOINTMENT (OUTPATIENT)
Dept: NEUROLOGY | Age: 77
DRG: 871 | End: 2022-05-27
Payer: MEDICARE

## 2022-05-27 ENCOUNTER — APPOINTMENT (OUTPATIENT)
Dept: CT IMAGING | Age: 77
DRG: 871 | End: 2022-05-27
Payer: MEDICARE

## 2022-05-27 PROBLEM — E44.0 MODERATE PROTEIN-CALORIE MALNUTRITION (HCC): Status: ACTIVE | Noted: 2022-05-27

## 2022-05-27 LAB
ADENOVIRUS BY PCR: NOT DETECTED
ANION GAP SERPL CALCULATED.3IONS-SCNC: 16 MMOL/L (ref 7–16)
ANION GAP SERPL CALCULATED.3IONS-SCNC: 9 MMOL/L (ref 7–16)
B.E.: 4.9 MMOL/L (ref -3–3)
BASOPHILS ABSOLUTE: 0.01 E9/L (ref 0–0.2)
BASOPHILS RELATIVE PERCENT: 0.1 % (ref 0–2)
BORDETELLA PARAPERTUSSIS BY PCR: NOT DETECTED
BORDETELLA PERTUSSIS BY PCR: NOT DETECTED
BUN BLDV-MCNC: 17 MG/DL (ref 6–23)
BUN BLDV-MCNC: 19 MG/DL (ref 6–23)
C-REACTIVE PROTEIN: 0.4 MG/DL (ref 0–0.4)
CALCIUM SERPL-MCNC: 7.8 MG/DL (ref 8.6–10.2)
CALCIUM SERPL-MCNC: 8.2 MG/DL (ref 8.6–10.2)
CHLAMYDOPHILIA PNEUMONIAE BY PCR: NOT DETECTED
CHLORIDE BLD-SCNC: 100 MMOL/L (ref 98–107)
CHLORIDE BLD-SCNC: 103 MMOL/L (ref 98–107)
CO2: 22 MMOL/L (ref 22–29)
CO2: 25 MMOL/L (ref 22–29)
COHB: 0.4 % (ref 0–1.5)
CORONAVIRUS 229E BY PCR: NOT DETECTED
CORONAVIRUS HKU1 BY PCR: NOT DETECTED
CORONAVIRUS NL63 BY PCR: NOT DETECTED
CORONAVIRUS OC43 BY PCR: NOT DETECTED
CREAT SERPL-MCNC: 0.8 MG/DL (ref 0.5–1)
CREAT SERPL-MCNC: 0.9 MG/DL (ref 0.5–1)
CRITICAL: ABNORMAL
DATE ANALYZED: ABNORMAL
DATE OF COLLECTION: ABNORMAL
EOSINOPHILS ABSOLUTE: 0 E9/L (ref 0.05–0.5)
EOSINOPHILS RELATIVE PERCENT: 0 % (ref 0–6)
GFR AFRICAN AMERICAN: >60
GFR AFRICAN AMERICAN: >60
GFR NON-AFRICAN AMERICAN: >60 ML/MIN/1.73
GFR NON-AFRICAN AMERICAN: >60 ML/MIN/1.73
GLUCOSE BLD-MCNC: 137 MG/DL (ref 74–99)
GLUCOSE BLD-MCNC: 141 MG/DL (ref 74–99)
HCO3: 28.4 MMOL/L (ref 22–26)
HCT VFR BLD CALC: 28.7 % (ref 34–48)
HEMOGLOBIN: 9.3 G/DL (ref 11.5–15.5)
HHB: 4.2 % (ref 0–5)
HUMAN METAPNEUMOVIRUS BY PCR: NOT DETECTED
HUMAN RHINOVIRUS/ENTEROVIRUS BY PCR: NOT DETECTED
IMMATURE GRANULOCYTES #: 0.08 E9/L
IMMATURE GRANULOCYTES %: 0.6 % (ref 0–5)
INFLUENZA A BY PCR: NOT DETECTED
INFLUENZA B BY PCR: NOT DETECTED
LAB: ABNORMAL
LACTIC ACID: 1.4 MMOL/L (ref 0.5–2.2)
LACTIC ACID: 1.4 MMOL/L (ref 0.5–2.2)
LACTIC ACID: 5.1 MMOL/L (ref 0.5–2.2)
LYMPHOCYTES ABSOLUTE: 1.42 E9/L (ref 1.5–4)
LYMPHOCYTES RELATIVE PERCENT: 11 % (ref 20–42)
Lab: ABNORMAL
MAGNESIUM: 1.9 MG/DL (ref 1.6–2.6)
MAGNESIUM: 2.1 MG/DL (ref 1.6–2.6)
MCH RBC QN AUTO: 29.7 PG (ref 26–35)
MCHC RBC AUTO-ENTMCNC: 32.4 % (ref 32–34.5)
MCV RBC AUTO: 91.7 FL (ref 80–99.9)
METER GLUCOSE: 120 MG/DL (ref 74–99)
METER GLUCOSE: 123 MG/DL (ref 74–99)
METER GLUCOSE: 141 MG/DL (ref 74–99)
METER GLUCOSE: 141 MG/DL (ref 74–99)
METHB: 0.3 % (ref 0–1.5)
MODE: ABNORMAL
MONOCYTES ABSOLUTE: 1.02 E9/L (ref 0.1–0.95)
MONOCYTES RELATIVE PERCENT: 7.9 % (ref 2–12)
MYCOPLASMA PNEUMONIAE BY PCR: NOT DETECTED
NEUTROPHILS ABSOLUTE: 10.33 E9/L (ref 1.8–7.3)
NEUTROPHILS RELATIVE PERCENT: 80.4 % (ref 43–80)
O2 SATURATION: 96.2 % (ref 92–98.5)
O2HB: 95.1 % (ref 94–97)
OPERATOR ID: ABNORMAL
PARAINFLUENZA VIRUS 1 BY PCR: NOT DETECTED
PARAINFLUENZA VIRUS 2 BY PCR: NOT DETECTED
PARAINFLUENZA VIRUS 3 BY PCR: NOT DETECTED
PARAINFLUENZA VIRUS 4 BY PCR: NOT DETECTED
PATIENT TEMP: 37 C
PCO2: 37.6 MMHG (ref 35–45)
PDW BLD-RTO: 15.9 FL (ref 11.5–15)
PH BLOOD GAS: 7.5 (ref 7.35–7.45)
PLATELET # BLD: 227 E9/L (ref 130–450)
PMV BLD AUTO: 9.4 FL (ref 7–12)
PO2: 75.7 MMHG (ref 75–100)
POTASSIUM SERPL-SCNC: 3.3 MMOL/L (ref 3.5–5)
POTASSIUM SERPL-SCNC: 3.4 MMOL/L (ref 3.5–5)
PROCALCITONIN: 0.16 NG/ML (ref 0–0.08)
PROLACTIN: 20.14 NG/ML
RBC # BLD: 3.13 E12/L (ref 3.5–5.5)
RESPIRATORY SYNCYTIAL VIRUS BY PCR: NOT DETECTED
SARS-COV-2, PCR: NOT DETECTED
SEDIMENTATION RATE, ERYTHROCYTE: 2 MM/HR (ref 0–20)
SODIUM BLD-SCNC: 137 MMOL/L (ref 132–146)
SODIUM BLD-SCNC: 138 MMOL/L (ref 132–146)
SOURCE, BLOOD GAS: ABNORMAL
T4 FREE: 0.89 NG/DL (ref 0.93–1.7)
THB: 9.7 G/DL (ref 11.5–16.5)
TIME ANALYZED: 1004
TOTAL CK: 872 U/L (ref 20–180)
TROPONIN, HIGH SENSITIVITY: 38 NG/L (ref 0–9)
WBC # BLD: 12.9 E9/L (ref 4.5–11.5)

## 2022-05-27 PROCEDURE — 6360000002 HC RX W HCPCS

## 2022-05-27 PROCEDURE — 6360000002 HC RX W HCPCS: Performed by: STUDENT IN AN ORGANIZED HEALTH CARE EDUCATION/TRAINING PROGRAM

## 2022-05-27 PROCEDURE — 87449 NOS EACH ORGANISM AG IA: CPT

## 2022-05-27 PROCEDURE — 2580000003 HC RX 258: Performed by: INTERNAL MEDICINE

## 2022-05-27 PROCEDURE — 99233 SBSQ HOSP IP/OBS HIGH 50: CPT

## 2022-05-27 PROCEDURE — 82962 GLUCOSE BLOOD TEST: CPT

## 2022-05-27 PROCEDURE — 85025 COMPLETE CBC W/AUTO DIFF WBC: CPT

## 2022-05-27 PROCEDURE — 84146 ASSAY OF PROLACTIN: CPT

## 2022-05-27 PROCEDURE — 82550 ASSAY OF CK (CPK): CPT

## 2022-05-27 PROCEDURE — 6360000002 HC RX W HCPCS: Performed by: INTERNAL MEDICINE

## 2022-05-27 PROCEDURE — 95819 EEG AWAKE AND ASLEEP: CPT

## 2022-05-27 PROCEDURE — 2580000003 HC RX 258: Performed by: STUDENT IN AN ORGANIZED HEALTH CARE EDUCATION/TRAINING PROGRAM

## 2022-05-27 PROCEDURE — 70450 CT HEAD/BRAIN W/O DYE: CPT

## 2022-05-27 PROCEDURE — 84439 ASSAY OF FREE THYROXINE: CPT

## 2022-05-27 PROCEDURE — 84484 ASSAY OF TROPONIN QUANT: CPT

## 2022-05-27 PROCEDURE — 85651 RBC SED RATE NONAUTOMATED: CPT

## 2022-05-27 PROCEDURE — 2700000000 HC OXYGEN THERAPY PER DAY

## 2022-05-27 PROCEDURE — 80048 BASIC METABOLIC PNL TOTAL CA: CPT

## 2022-05-27 PROCEDURE — 2500000003 HC RX 250 WO HCPCS: Performed by: STUDENT IN AN ORGANIZED HEALTH CARE EDUCATION/TRAINING PROGRAM

## 2022-05-27 PROCEDURE — 83735 ASSAY OF MAGNESIUM: CPT

## 2022-05-27 PROCEDURE — 2000000000 HC ICU R&B

## 2022-05-27 PROCEDURE — 0202U NFCT DS 22 TRGT SARS-COV-2: CPT

## 2022-05-27 PROCEDURE — 84145 PROCALCITONIN (PCT): CPT

## 2022-05-27 PROCEDURE — 83605 ASSAY OF LACTIC ACID: CPT

## 2022-05-27 PROCEDURE — 82805 BLOOD GASES W/O2 SATURATION: CPT

## 2022-05-27 PROCEDURE — 86140 C-REACTIVE PROTEIN: CPT

## 2022-05-27 PROCEDURE — 36415 COLL VENOUS BLD VENIPUNCTURE: CPT

## 2022-05-27 RX ORDER — INSULIN LISPRO 100 [IU]/ML
0-3 INJECTION, SOLUTION INTRAVENOUS; SUBCUTANEOUS NIGHTLY
Status: DISCONTINUED | OUTPATIENT
Start: 2022-05-27 | End: 2022-06-07 | Stop reason: HOSPADM

## 2022-05-27 RX ORDER — LANOLIN ALCOHOL/MO/W.PET/CERES
1000 CREAM (GRAM) TOPICAL DAILY
Status: DISCONTINUED | OUTPATIENT
Start: 2022-05-27 | End: 2022-06-03

## 2022-05-27 RX ORDER — LORAZEPAM 2 MG/ML
1 INJECTION INTRAMUSCULAR ONCE
Status: COMPLETED | OUTPATIENT
Start: 2022-05-27 | End: 2022-05-27

## 2022-05-27 RX ORDER — FOLIC ACID 5 MG/ML
1 INJECTION, SOLUTION INTRAMUSCULAR; INTRAVENOUS; SUBCUTANEOUS DAILY
Status: DISCONTINUED | OUTPATIENT
Start: 2022-05-27 | End: 2022-06-03

## 2022-05-27 RX ORDER — LEVETIRACETAM 5 MG/ML
500 INJECTION INTRAVASCULAR EVERY 12 HOURS
Status: DISCONTINUED | OUTPATIENT
Start: 2022-05-27 | End: 2022-06-03

## 2022-05-27 RX ORDER — LORAZEPAM 2 MG/ML
INJECTION INTRAMUSCULAR
Status: COMPLETED
Start: 2022-05-27 | End: 2022-05-27

## 2022-05-27 RX ORDER — DEXTROSE MONOHYDRATE 50 MG/ML
100 INJECTION, SOLUTION INTRAVENOUS PRN
Status: DISCONTINUED | OUTPATIENT
Start: 2022-05-27 | End: 2022-06-07 | Stop reason: HOSPADM

## 2022-05-27 RX ORDER — LORAZEPAM 2 MG/ML
2 INJECTION INTRAMUSCULAR EVERY 4 HOURS PRN
Status: DISCONTINUED | OUTPATIENT
Start: 2022-05-27 | End: 2022-05-27

## 2022-05-27 RX ORDER — LEVETIRACETAM 5 MG/ML
500 INJECTION INTRAVASCULAR ONCE
Status: COMPLETED | OUTPATIENT
Start: 2022-05-27 | End: 2022-05-27

## 2022-05-27 RX ORDER — INSULIN LISPRO 100 [IU]/ML
0-6 INJECTION, SOLUTION INTRAVENOUS; SUBCUTANEOUS
Status: DISCONTINUED | OUTPATIENT
Start: 2022-05-27 | End: 2022-06-07 | Stop reason: HOSPADM

## 2022-05-27 RX ORDER — LORAZEPAM 2 MG/ML
1 INJECTION INTRAMUSCULAR EVERY 4 HOURS PRN
Status: DISCONTINUED | OUTPATIENT
Start: 2022-05-27 | End: 2022-06-01

## 2022-05-27 RX ORDER — DEXTROSE AND SODIUM CHLORIDE 5; .9 G/100ML; G/100ML
INJECTION, SOLUTION INTRAVENOUS CONTINUOUS
Status: ACTIVE | OUTPATIENT
Start: 2022-05-27 | End: 2022-05-28

## 2022-05-27 RX ORDER — LORAZEPAM 2 MG/ML
1 INJECTION INTRAMUSCULAR EVERY 4 HOURS PRN
Status: DISCONTINUED | OUTPATIENT
Start: 2022-05-27 | End: 2022-05-27

## 2022-05-27 RX ORDER — THIAMINE HYDROCHLORIDE 100 MG/ML
100 INJECTION, SOLUTION INTRAMUSCULAR; INTRAVENOUS DAILY
Status: DISCONTINUED | OUTPATIENT
Start: 2022-05-27 | End: 2022-06-03

## 2022-05-27 RX ORDER — POTASSIUM CHLORIDE 7.45 MG/ML
10 INJECTION INTRAVENOUS
Status: COMPLETED | OUTPATIENT
Start: 2022-05-27 | End: 2022-05-27

## 2022-05-27 RX ADMIN — LORAZEPAM 1 MG: 2 INJECTION INTRAMUSCULAR; INTRAVENOUS at 17:42

## 2022-05-27 RX ADMIN — THIAMINE HYDROCHLORIDE 100 MG: 100 INJECTION, SOLUTION INTRAMUSCULAR; INTRAVENOUS at 09:10

## 2022-05-27 RX ADMIN — POTASSIUM CHLORIDE 10 MEQ: 7.46 INJECTION, SOLUTION INTRAVENOUS at 09:29

## 2022-05-27 RX ADMIN — Medication 10 ML: at 07:56

## 2022-05-27 RX ADMIN — CEFTRIAXONE 2000 MG: 2 INJECTION, POWDER, FOR SOLUTION INTRAMUSCULAR; INTRAVENOUS at 21:00

## 2022-05-27 RX ADMIN — DEXTROSE AND SODIUM CHLORIDE: 5; 900 INJECTION, SOLUTION INTRAVENOUS at 17:48

## 2022-05-27 RX ADMIN — LEVETIRACETAM 500 MG: 5 INJECTION INTRAVENOUS at 09:12

## 2022-05-27 RX ADMIN — LORAZEPAM 1 MG: 2 INJECTION INTRAMUSCULAR; INTRAVENOUS at 14:04

## 2022-05-27 RX ADMIN — LEVETIRACETAM 500 MG: 5 INJECTION INTRAVENOUS at 21:08

## 2022-05-27 RX ADMIN — LORAZEPAM 2 MG: 2 INJECTION INTRAMUSCULAR; INTRAVENOUS at 07:36

## 2022-05-27 RX ADMIN — LORAZEPAM 1 MG: 2 INJECTION INTRAMUSCULAR; INTRAVENOUS at 08:19

## 2022-05-27 RX ADMIN — DEXTROSE AND SODIUM CHLORIDE: 5; 900 INJECTION, SOLUTION INTRAVENOUS at 08:56

## 2022-05-27 RX ADMIN — FOLIC ACID 1 MG: 5 INJECTION, SOLUTION INTRAMUSCULAR; INTRAVENOUS; SUBCUTANEOUS at 10:35

## 2022-05-27 RX ADMIN — LEVETIRACETAM 500 MG: 5 INJECTION INTRAVENOUS at 07:35

## 2022-05-27 RX ADMIN — POTASSIUM CHLORIDE 10 MEQ: 7.46 INJECTION, SOLUTION INTRAVENOUS at 10:35

## 2022-05-27 RX ADMIN — SODIUM CHLORIDE, PRESERVATIVE FREE 10 ML: 5 INJECTION INTRAVENOUS at 08:20

## 2022-05-27 RX ADMIN — VANCOMYCIN HYDROCHLORIDE 1500 MG: 10 INJECTION, POWDER, LYOPHILIZED, FOR SOLUTION INTRAVENOUS at 14:48

## 2022-05-27 RX ADMIN — WATER 1000 MG: 1 INJECTION INTRAMUSCULAR; INTRAVENOUS; SUBCUTANEOUS at 09:08

## 2022-05-27 ASSESSMENT — PAIN SCALES - WONG BAKER
WONGBAKER_NUMERICALRESPONSE: 0

## 2022-05-27 NOTE — CONSULTS
 Attends [employer] Services: Not on file    Active Member of Clubs or Organizations: Not on file    Attends Club or Organization Meetings: Not on file    Marital Status: Not on file   Intimate Partner Violence:     Fear of Current or Ex-Partner: Not on file    Emotionally Abused: Not on file    Physically Abused: Not on file    Sexually Abused: Not on file   Housing Stability:     Unable to Pay for Housing in the Last Year: Not on file    Number of [place] in the Last Year: Not on file    Unstable Housing in the Last Year: Not on file     Tobacco: No  Alcohol: No  Pets: No  Travel: No              Family History:       Problem Relation Age of Onset    Other Mother 80        no health problems    Colon Cancer Father 79    Alcohol Abuse Sister         DRUG abuse    Alcohol Abuse Sister         Drug abuse   . Otherwise non-pertinent to the chief complaint. REVIEW OF SYSTEMS:    CONSTITUTIONAL:  No chills, fevers or night sweats. No loss of weight. EYES:  No double vision or drainage from eyes, ears or throat. HEENT:  No neck stiffness. No dysphagia. No drainage from eyes, ears or throat  RESPIRATORY:  No cough, productive sputum or hemoptysis. CARDIOVASCULAR:  No chest pain, palpitations, orthopnea or dyspnea on exertion. GASTROINTESTINAL:  No nausea, vomiting, diarrhea or constipation or hematochezia   GENITOURINARY:  No frequency burning dysuria or hematuria. INTEGUMENT/BREAST:  No rash or breast masses. HEMATOLOGIC/LYMPHATIC:  No lymphadenopathy or blood dyscrasics. ALLERGIC/IMMUNOLOGIC:  No anaphylaxis. ENDOCRINE:  No polyuria or polydipsia or temperature intolerance. MUSCULOSKELETAL:  No myalgia or arthralgia. Full ROM. NEUROLOGICAL:  No focal motor sensory deficit. BEHAVIOR/PSYCH:  No psychosis.      PHYSICAL EXAM:    Vitals:    /60   Pulse 68   Temp 98.2 °F (36.8 °C) (Oral)   Resp 12   Ht 5' 4\" (1.626 m)   Wt 160 lb 15 oz (73 kg)   SpO2 99%   BMI 27.62 kg/m² Constitutional: The patient is awake, alert, and oriented. Skin: Warm and dry. No rashes were noted. HEENT: Eyes show round, and reactive pupils. No jaundice. Moist mucous membranes, no ulcerations, no thrush. Neck: Supple to movements. No lymphadenopathy. Chest: No use of accessory muscles to breathe. Symmetrical expansion. Auscultation reveals no wheezing, crackles, or rhonchi. Cardiovascular: S1 and S2 are rhythmic and regular. No murmurs appreciated. Abdomen: Positive bowel sounds to auscultation. Benign to palpation. No masses felt. No hepatosplenomegaly. Extremities: No clubbing, no cyanosis, no edema. Lines: peripheral      CBC+dif:  Recent Labs     05/25/22  1418 05/25/22  1418 05/26/22  0552 05/26/22  0552 05/27/22  0641   WBC 14.7*  --  10.9  --  12.9*   HGB 10.6*   < > 9.9*   < > 9.3*   HCT 32.9*   < > 30.4*   < > 28.7*   MCV 90.9   < > 90.2   < > 91.7      < > 258   < > 227   NEUTROABS 12.96*   < >  --   --  10.33*    < > = values in this interval not displayed.      Lab Results   Component Value Date    CRP 0.4 05/27/2022     No results found for: CRPHS  No results found for: SEDRATE  Lab Results   Component Value Date    ALT 30 05/25/2022    AST 40 (H) 05/25/2022    ALKPHOS 61 05/25/2022    BILITOT 0.3 05/25/2022     Lab Results   Component Value Date     05/27/2022    K 3.3 05/27/2022    K 3.1 05/25/2022     05/27/2022    CO2 22 05/27/2022    BUN 19 05/27/2022    CREATININE 0.9 05/27/2022    GFRAA >60 05/27/2022    LABGLOM >60 05/27/2022    GLUCOSE 141 05/27/2022    GLUCOSE 132 11/24/2011    PROT 7.9 05/25/2022    LABALBU 4.0 05/25/2022    LABALBU 3.4 01/23/2011    CALCIUM 8.2 05/27/2022    BILITOT 0.3 05/25/2022    ALKPHOS 61 05/25/2022    AST 40 05/25/2022    ALT 30 05/25/2022       Lab Results   Component Value Date    PROTIME 16.1 05/25/2022    INR 1.5 05/25/2022       Lab Results   Component Value Date    TSH 0.599 05/26/2022       Lab Results   Component Value Date    COLORU Yellow 05/25/2022    PHUR 6.0 05/25/2022    LABCAST FEW 02/12/2020    WBCUA 2-5 05/25/2022    RBCUA 5-10 05/25/2022    MUCUS Present 05/25/2022    BACTERIA RARE 05/25/2022    CLARITYU Clear 05/25/2022    SPECGRAV 1.020 05/25/2022    LEUKOCYTESUR Negative 05/25/2022    UROBILINOGEN 0.2 05/25/2022    BILIRUBINUR Negative 05/25/2022    BLOODU SMALL 05/25/2022    GLUCOSEU Negative 05/25/2022       Lab Results   Component Value Date    V2MSLVMJ 91.0 01/22/2011     Radiology:  CT HEAD WO CONTRAST   Final Result   Generalized atrophy and chronic changes seen within the brain with no acute   intracranial abnormality. MRI BRAIN WO CONTRAST   Final Result   Mild chronic microvascular disease within the periventricular white matter. No acute ischemia. RECOMMENDATIONS:   Unavailable         CT Head WO Contrast   Final Result   No acute intracranial abnormality or hemorrhage. Right parieto-occipital scalp hematoma. CT CERVICAL SPINE WO CONTRAST   Final Result   No acute abnormality of the cervical spine. Multilevel degenerative changes and facet arthrosis. CT CHEST WO CONTRAST   Final Result   No acute process identified in the chest.      Incidental cholelithiasis. CT ABDOMEN PELVIS WO CONTRAST Additional Contrast? None   Final Result   1. Cholelithiasis      2. Large right peripelvic renal cortical cyst.  No renal obstruction or   calculus. 3.  Evidence of previous appendectomy. XR CHEST PORTABLE   Final Result   Borderline cardiomegaly. Nothing active. Microbiology:  Pending  Recent Labs     05/25/22  1418   BC 24 Hours no growth     No results for input(s): ORG in the last 72 hours. Recent Labs     05/25/22  1418   BLOODCULT2 24 Hours no growth     No results for input(s): STREPNEUMAGU in the last 72 hours. No results for input(s): LP1UAG in the last 72 hours. No results for input(s): ASO in the last 72 hours.   No results for input(s): CULTRESP in the last 72 hours. Assessment:  · AMS with a fever  · Leucocytosis  12,900  · Unable to do spinal tap today   Will be done tomorrow  · MRI without contrast  No temp lobe involvement   · BC pending  · Urine culture pending  · U/a not Impressive for infection  · Be sure to get HSV by PCR in LP    Plan:    · Cont rocephin  I would add iv vancomycin for now  · Await LP results   Hold off on acyclovir for now   · Technically at her age she should also be on ampicilin but reluctant to add that right now   · Check cultures  · Baseline ESR, CRP  · Monitor labs  · Will follow with you    Thank you for having us see this patient in consultation. I will be discussing this case with the treating physicians.       Electronically signed by Mac Flores MD on 5/27/2022 at 12:08 PM

## 2022-05-27 NOTE — CONSULTS
Medical Intensive Care Unit     [facility]  Resident Consult Note    Date and time: 5/27/2022 2:46 PM  Patient's name:  Arian Dey Record Number: [id number]  Patient's account/billing number: [de-identified]  Patient's YOB: 1945  Age: 76 y.o. Date of Admission: 5/25/2022  1:41 PM  Length of stay during current admission: 2    Primary Care Physician: Annabella Mancia MD  ICU Attending Physician: Dr. Yaneth Jenkins Status: Full Code    Reason for ICU admission:   AMS/ Seizures    History of Present Illness: The patient is a 76year old female with a past medical history of HTN, hypothyroidism, atrial fibrillation, and anxiety who presented to the ER on 5/25 with altered mental status. Found by neighbors in her home on the couch in her underwear, surrounded by broken glass. It is unknown when she was last well. She lives alone and is independent at baseline. She also has a history of a ruptured appendix in 2020 and L knee replacement in March 2022, in which she has not felt well since either procedure. According to chart, was taking antibiotics for UTI recently with family stating that the medications was making her feel weird, altered so she stopped taking them. RRT called this morning after patient had 2 witnessed seizures that lasted <30 seconds. There was some tonic clonic activity and saccadic eye movements. The patient was then given Ativan. She was transferred to the MICU for further management and evaluation.      CURRENT VENTILATION STATUS:   [] Ventilator  [] BIPAP  [x] Nasal Cannula [] Room Air      SECRETIONS   Amount:  [] Small [] Moderate  [] Large [] None    Color:     [] White [] Colored  [] Bloody    SEDATION:  RAAS Score:  [] Propofol gtt  [] Versed gtt  [] Fentanyl gtt  [] No Sedation    PARALYZED:  [] No    [] Yes    VASOPRESSORS:  [] No    [] Yes    If yes -   [] Levophed       [] Dopamine     [] Vasopressin       [] Dobutamine  [] Phenylephrine         [] Epinephrine    CENTRAL LINES:     [] No   [] Yes   (Date of Insertion:   )           If yes -     [] Right IJ     [] Left IJ [] Right Femoral [] Left Femoral                   [] Right Subclavian [] Left Subclavian     HUYNH'S CATHETER:   [] No   [] Yes  (Date of Insertion:   )     URINE OUTPUT:            [x] Good   [] Low              [] Anuric    REVIEW OF SYSTEMS:    · Limited due to ams. Cannot be assessed at this time. OBJECTIVE:     VITAL SIGNS:  /64   Pulse 60   Temp 99 °F (37.2 °C) (Oral)   Resp 15   Ht 5' 4\" (1.626 m)   Wt 160 lb 15 oz (73 kg)   SpO2 99%   BMI 27.62 kg/m²   Tmax over 24 hours:  Temp (24hrs), Av.7 °F (36.5 °C), Min:97 °F (36.1 °C), Max:99 °F (37.2 °C)      Patient Vitals for the past 6 hrs:   BP Temp Temp src Pulse Resp SpO2   22 1400 120/64 -- -- 60 15 99 %   22 1300 (!) 113/49 -- -- (!) 48 16 99 %   22 1200 (!) 118/52 99 °F (37.2 °C) Oral (!) 44 17 99 %   22 1100 120/60 -- -- 68 12 99 %   22 1000 (!) 134/56 -- -- 56 25 98 %   22 0900 121/79 -- -- (!) 47 16 98 %         Intake/Output Summary (Last 24 hours) at 2022 1446  Last data filed at 2022 1400  Gross per 24 hour   Intake --   Output 940 ml   Net -940 ml     Wt Readings from Last 2 Encounters:   22 160 lb 15 oz (73 kg)   22 169 lb (76.7 kg)     Body mass index is 27.62 kg/m².       PHYSICAL EXAMINATION:  General appearance - awake, in no acute distress, actively hallucinating, pressured speech  Mental status - confused, disoriented to person, place, and time, uncooperative  Eyes - pupils constricted 1-2mm and reactive, extraocular eye movements intact, sclera anicteric  Chest -  clear to auscultation, no wheezes, rales or rhonchi, symmetric air entry  Heart - bradycardic, regular rhythm, normal S1, S2, no murmurs, rubs, clicks or gallops  Abdomen - soft, nontender, nondistended, no masses or organomegaly  Neurological - awake, not oriented, pressured speech, no focal findings or movement disorder noted, neck supple without rigidity, normal muscle tone, no tremors, could not assess strength  Extremities - peripheral pulses normal, no pedal edema, no clubbing or cyanosis  Skin - normal coloration and turgor, no rashes. MEDICATIONS:  Scheduled Meds:   levETIRAcetam  500 mg IntraVENous Q12H    vitamin B-12  1,000 mcg Oral Daily    insulin lispro  0-6 Units SubCUTAneous TID WC    insulin lispro  0-3 Units SubCUTAneous Nightly    folic acid  1 mg IntraVENous Daily    thiamine  100 mg IntraVENous Daily    vancomycin  20 mg/kg IntraVENous Once    vancomycin (VANCOCIN) intermittent dosing (placeholder)   Other RX Placeholder    sodium chloride flush  5-40 mL IntraVENous 2 times per day    apixaban  5 mg Oral BID    levothyroxine  50 mcg Oral Daily    pantoprazole  40 mg Oral Daily    cefTRIAXone (ROCEPHIN) IV  1,000 mg IntraVENous Q24H     Continuous Infusions:   dextrose      dextrose 5 % and 0.9 % NaCl 150 mL/hr at 05/27/22 1120    sodium chloride       PRN Meds:   glucose, 4 tablet, PRN  dextrose bolus, 125 mL, PRN   Or  dextrose bolus, 250 mL, PRN  glucagon (rDNA), 1 mg, PRN  dextrose, 100 mL/hr, PRN  LORazepam, 1 mg, Q4H PRN  sodium chloride flush, 5-40 mL, PRN  sodium chloride, , PRN  docusate sodium, 100 mg, BID PRN  ondansetron, 4 mg, Q8H PRN   Or  ondansetron, 4 mg, Q6H PRN  polyethylene glycol, 17 g, Daily PRN  acetaminophen, 650 mg, Q6H PRN   Or  acetaminophen, 650 mg, Q6H PRN        VENT SETTINGS (Comprehensive) (if applicable):      Additional Respiratory Assessments  Heart Rate: 60  Resp: 15  SpO2: 99 %    ABGs:   Recent Labs     05/27/22  1004   PH 7.496*   PCO2 37.6   PO2 75.7   HCO3 28.4*   BE 4.9*   O2SAT 96.2       Laboratory findings:  Complete Blood Count:   Recent Labs     05/25/22  1418 05/26/22  0552 05/27/22  0641   WBC 14.7* 10.9 12.9*   HGB 10.6* 9.9* 9.3*   HCT 32.9* 30.4* 28.7*    258 227        Last 3 Blood Glucose:   Recent Labs     05/25/22  1418 05/26/22  0552 05/27/22  0641   GLUCOSE 140* 129* 141*        PT/INR:    Lab Results   Component Value Date    PROTIME 16.1 05/25/2022    INR 1.5 05/25/2022     PTT:    Lab Results   Component Value Date    APTT 34.8 05/25/2022       Comprehensive Metabolic Profile:   Recent Labs     05/25/22  1418 05/26/22  0552 05/27/22  0641    138 138   K 3.1* 2.8* 3.3*    101 100   CO2 27 25 22   BUN 9 8 19   CREATININE 0.8 0.8 0.9   GLUCOSE 140* 129* 141*   CALCIUM 8.9 8.0* 8.2*   PROT 7.9  --   --    LABALBU 4.0  --   --    BILITOT 0.3  --   --    ALKPHOS 61  --   --    AST 40*  --   --    ALT 30  --   --       Magnesium:   Lab Results   Component Value Date    MG 2.1 05/27/2022     Phosphorus: No results found for: PHOS  Ionized Calcium: No results found for: CAION   Troponin: No results for input(s): TROPONINI in the last 72 hours. Radiology/Imaging:   CT HEAD WO CONTRAST   Final Result   Generalized atrophy and chronic changes seen within the brain with no acute   intracranial abnormality. MRI BRAIN WO CONTRAST   Final Result   Mild chronic microvascular disease within the periventricular white matter. No acute ischemia. RECOMMENDATIONS:   Unavailable         CT Head WO Contrast   Final Result   No acute intracranial abnormality or hemorrhage. Right parieto-occipital scalp hematoma. CT CERVICAL SPINE WO CONTRAST   Final Result   No acute abnormality of the cervical spine. Multilevel degenerative changes and facet arthrosis. CT CHEST WO CONTRAST   Final Result   No acute process identified in the chest.      Incidental cholelithiasis. CT ABDOMEN PELVIS WO CONTRAST Additional Contrast? None   Final Result   1. Cholelithiasis      2. Large right peripelvic renal cortical cyst.  No renal obstruction or   calculus. 3.  Evidence of previous appendectomy.          XR CHEST PORTABLE   Final Result   Borderline cardiomegaly. Nothing active. ASSESSMENT  And PLAN:       ASSESSMENT:  1. AMS   · Meningitis vs encephalitis vs seizure disorder vs Benzodiazepine withdrawal vs other metabolic, infectious. · CT head, MRI brain no acute process, chronic atrophy. · Lactic acid 5.1, , Prolactin 20.14 post seizure. · On Vancomycin, rocephin. No acyclovir, no ampicillin. ID, Neuro following. Await LP and cultures. · EEG done. Await neuro recommendations. On Keppra 500 mg bid, prn ativan. · 1mg ativan q4h prn for Anxiety, Irritation, Agitation, Withdrawal.   · Ammonia, electrolytes, TSH, B12 wnl.     2. Atrial fibrillation on chronic anticoagulation therapy. 5mg BID  ·   Hold apixaban pending LP procedure. Resume when able. 3. Hypothyroidism   · Continue Synthroid 50 mcg. 4.   History of anxiety  · On Xanax 1 mg every 8 hours at home  · UDS + for benzodiazepines. 5. Hypertension  · On Lisinopril- HCTZ at home 20-12.5mg  · Stable     PLAN:  1. Follow cultures. 2. LP to be done soon. Follow up analysis, results etc.   3. Continue vancomycin, rocephin. No acyclovir, ampicillin per ID recs. 4. EEG done. Follow Neurology recommendations. 5. Benzodiazepine withdrawal- 1mg ativan q4h prn for Anxiety, Irritation, Agitation, Withdrawal.  Give ativan 1 mg prn seizures. 6. Afib on apixaban 5mg BID. - Held at the moment pending LP  7. Dextrose 5% NS - 150cc/hr. CK levels rising. Monitor renal function. 8. Follow procalcitonin, sed rate. 9. Vitamin B12 1000 mcg daily               WEAN PER PROTOCOL:  [] No   [] Yes  [] N/A    ICU PROPHYLAXIS:  Stress ulcer:  [x] PPI Agent  [] [id number] [] Sucralfate  [] Other:  VTE:   [] Enoxaparin  [] Unfract.  Heparin Subcut  [] EPC Cuffs    NUTRITION:  [x] NPO [] Tube Feeding (Specify: ) [] TPN  [] PO (Diet: Diet NPO)    INSULIN DRIP:   [x] No   [] Yes    CONSULTATION NEEDED:   [] No   [x] Yes    FAMILY UPDATED:    [] No   [] Yes    TRANSFER OUT OF ICU:   [x] No   [] Yes    Garo Escobar MD, PGY-1  Attending Physician: Dr. Jarrod Cummings  5/27/2022, 2:46 PM    I personally saw, examined and provided care for the patient. Radiographs, labs and medication list were reviewed by me independently. I spoke with bedside nursing, therapists and consultants. Critical care services and times documented are independent of procedures and multidisciplinary rounds with Residents. Additionally comprehensive, multidisciplinary rounds were conducted with the MICU team. The case was discussed in detail and plans for care were established. Review of Residents documentation was conducted and revisions were made as appropriate. I agree with the above documented exam, problem list and plan of care.   Luly Smart MD   CCT excluding procedures:40'

## 2022-05-27 NOTE — CONSULTS
Medical Intensive Care Unit     [facility]  Resident Consult Note    Date and time: 5/27/2022 8:31 AM  Patient's name:  Laura Acevedo Record Number: [id number]  Patient's account/billing number: [de-identified]  Patient's YOB: 1945  Age: 76 y.o. Date of Admission: 5/25/2022  1:41 PM  Length of stay during current admission: 2    Primary Care Physician: Rao Farrar MD  ICU Attending Physician: Dr. Eddie Escobar Status: Full Code    Reason for ICU admission: altered mental status    History of Present Illness: The patient is a 76year old female with a past medical history of HTN, hypothyroidism, atrial fibrillation, and anxiety who presented to the ER on 5/25 with altered mental status. The patient was found by neighbors in her home on the couch in her underwear, surrounded by broken glass. It is unknown when she was last well. She lives alone and is independent at baseline. She also has a history of a ruptured appendix in 2020 and R knee replacement in March 2022, in which she has not felt well since either procedure. She was treated for a UTI a couple weeks ago. Unsure of what antibiotic she was prescribed. It is noted that she takes Ativan 1mg up to 3x/day as needed for anxiety. In the ER, the patient was febrile and labs were significant for leukocytosis (14.7) and hypokalemia. Potassium was replaced and once dose of rocephin was given. BP was stable. A large abrasion was discovered on her back. She was admitted to the floors for further management. This morning, the patient had 2 witnessed seizures that lasted <30 seconds. There was some tonic clonic activity and saccadic eye movements. The patient was then given Ativan. She was transferred to the MICU. Today, she was conversating with herself. (similar to how she was on the floors). The patient seemed to be having visual hallucinations.  She stated and kept repeating \"the place is a dump\" and \"let me tell you what happened\" when I saw her this AM. She is not following commands. CURRENT VENTILATION STATUS:   [] Ventilator  [] BIPAP  [] Nasal Cannula [x] Room Air      SECRETIONS   Amount:  [] Small [] Moderate  [] Large [x] None    Color:     [] White [] Colored  [] Bloody    SEDATION:  RAAS Score:  [] Propofol gtt  [] Versed gtt  [] Fentanyl gtt  [x] No Sedation    PARALYZED:  [x] No    [] Yes    VASOPRESSORS:  [x] No    [] Yes    If yes -   [] Levophed       [] Dopamine     [] Vasopressin       [] Dobutamine  [] Phenylephrine         [] Epinephrine    CENTRAL LINES:     [x] No   [] Yes   (Date of Insertion:   )           If yes -     [] Right IJ     [] Left IJ [] Right Femoral [] Left Femoral                   [] Right Subclavian [] Left Subclavian     HUYNH'S CATHETER:   [] No   [x] Yes      URINE OUTPUT:            [x] Good   [] Low              [] Anuric    REVIEW OF SYSTEMS:    · Limited due to altered mental status, cannot assess at this time. OBJECTIVE:     VITAL SIGNS:  BP (!) 144/58   Pulse 56   Temp 98.2 °F (36.8 °C) (Oral)   Resp 17   Ht 5' 4\" (1.626 m)   Wt 160 lb 15 oz (73 kg)   SpO2 97%   BMI 27.62 kg/m²   Tmax over 24 hours:  Temp (24hrs), Av.5 °F (36.4 °C), Min:97 °F (36.1 °C), Max:98.2 °F (36.8 °C)      Patient Vitals for the past 6 hrs:   BP Temp Temp src Pulse Resp SpO2 Height   22 0800 (!) 144/58 -- -- 56 17 97 % --   22 0730 124/67 98.2 °F (36.8 °C) Oral 74 18 99 % 5' 4\" (1.626 m)   22 0632 (!) 160/76 -- -- 82 17 94 % --   22 0615 (!) 160/76 97 °F (36.1 °C) Temporal 72 15 94 % --         Intake/Output Summary (Last 24 hours) at 2022 0831  Last data filed at 2022 0730  Gross per 24 hour   Intake --   Output 700 ml   Net -700 ml     Wt Readings from Last 2 Encounters:   22 160 lb 15 oz (73 kg)   22 169 lb (76.7 kg)     Body mass index is 27.62 kg/m².       PHYSICAL EXAMINATION:  General appearance - awake, in no acute distress, actively hallucinating, pressured speech  Mental status - confused, disoriented to person, place, and time, uncooperative  Eyes - pupils constricted 1-2mm and reactive, extraocular eye movements intact, sclera anicteric  Chest - hard to assess due to patient talking throughout exam, clear to auscultation, no wheezes, rales or rhonchi, symmetric air entry  Heart - normal rate, regular rhythm, normal S1, S2, no murmurs, rubs, clicks or gallops  Abdomen - soft, nontender, nondistended, no masses or organomegaly  Neurological - awake, not oriented, pressured speech, no focal findings or movement disorder noted, neck supple without rigidity, normal muscle tone, no tremors, could not assess strength  Extremities - peripheral pulses normal, no pedal edema, no clubbing or cyanosis  Skin - normal coloration and turgor, no rashes, LESIONS NOTED: excoriated, lesions on the back     MEDICATIONS:  Scheduled Meds:   levETIRAcetam  500 mg IntraVENous Q12H    vitamin B-12  1,000 mcg Oral Daily    sodium chloride flush  5-40 mL IntraVENous 2 times per day    potassium bicarb-citric acid  40 mEq Oral Once    apixaban  5 mg Oral BID    levothyroxine  50 mcg Oral Daily    pantoprazole  40 mg Oral Daily    sodium chloride flush  5-40 mL IntraVENous 2 times per day    cefTRIAXone (ROCEPHIN) IV  1,000 mg IntraVENous Q24H     Continuous Infusions:   sodium chloride      sodium chloride      sodium chloride 75 mL/hr at 05/26/22 2014     PRN Meds:   LORazepam, 2 mg, Q4H PRN  sodium chloride flush, 5-40 mL, PRN  sodium chloride, , PRN  docusate sodium, 100 mg, BID PRN  sodium chloride flush, 5-40 mL, PRN  sodium chloride, , PRN  ondansetron, 4 mg, Q8H PRN   Or  ondansetron, 4 mg, Q6H PRN  polyethylene glycol, 17 g, Daily PRN  acetaminophen, 650 mg, Q6H PRN   Or  acetaminophen, 650 mg, Q6H PRN        VENT SETTINGS (Comprehensive) (if applicable):      Additional Respiratory Assessments  Heart Rate: 56  Resp: 17  SpO2: 97 %    ABGs:   No results for input(s): PH, PCO2, PO2, HCO3, BE, O2SAT in the last 72 hours. Laboratory findings:  Complete Blood Count:   Recent Labs     05/25/22 1418 05/26/22  0552 05/27/22  0641   WBC 14.7* 10.9 12.9*   HGB 10.6* 9.9* 9.3*   HCT 32.9* 30.4* 28.7*    258 227        Last 3 Blood Glucose:   Recent Labs     05/25/22 1418 05/26/22  0552 05/27/22  0641   GLUCOSE 140* 129* 141*        PT/INR:    Lab Results   Component Value Date    PROTIME 16.1 05/25/2022    INR 1.5 05/25/2022     PTT:    Lab Results   Component Value Date    APTT 34.8 05/25/2022       Comprehensive Metabolic Profile:   Recent Labs     05/25/22 1418 05/26/22  0552 05/27/22  0641    138 138   K 3.1* 2.8* 3.3*    101 100   CO2 27 25 22   BUN 9 8 19   CREATININE 0.8 0.8 0.9   GLUCOSE 140* 129* 141*   CALCIUM 8.9 8.0* 8.2*   PROT 7.9  --   --    LABALBU 4.0  --   --    BILITOT 0.3  --   --    ALKPHOS 61  --   --    AST 40*  --   --    ALT 30  --   --       Magnesium:   Lab Results   Component Value Date    MG 1.9 05/27/2022     Phosphorus: No results found for: PHOS  Ionized Calcium: No results found for: CAION   Troponin: No results for input(s): TROPONINI in the last 72 hours. Radiology/Imaging:   CT HEAD WO CONTRAST   Final Result   Generalized atrophy and chronic changes seen within the brain with no acute   intracranial abnormality. MRI BRAIN WO CONTRAST   Final Result   Mild chronic microvascular disease within the periventricular white matter. No acute ischemia. RECOMMENDATIONS:   Unavailable         CT Head WO Contrast   Final Result   No acute intracranial abnormality or hemorrhage. Right parieto-occipital scalp hematoma. CT CERVICAL SPINE WO CONTRAST   Final Result   No acute abnormality of the cervical spine. Multilevel degenerative changes and facet arthrosis.          CT CHEST WO CONTRAST   Final Result   No acute process identified in the chest. Incidental cholelithiasis. CT ABDOMEN PELVIS WO CONTRAST Additional Contrast? None   Final Result   1. Cholelithiasis      2. Large right peripelvic renal cortical cyst.  No renal obstruction or   calculus. 3.  Evidence of previous appendectomy. XR CHEST PORTABLE   Final Result   Borderline cardiomegaly. Nothing active. ASSESSMENT  And PLAN:        Neurologic  1. Altered Mental Status, likely metabolic or infectious etiology  - Structural causes (stroke, mass) are unlikely -> MRI head and CT head unremarkable for acute changes, significant for diffuse atrophy  - Medication causes could be possible -> had prescription for tramadol and ativan, unaware of other medications the patient took on a daily basis and how much she was taking of prescribed meds  - Metabolic causes are possible -> although TSH wnl, ammonia wnl, electrolytes wnl, patient not significantly hyper- or hypo- glycemic, vitamin B12 wnl, lactic acidosis (5.1)  - Infectious causes are possible -> blood cultures not showing growth, urine cultures pending, chest x-ray unremarkable; want to rule out meningitis and encephalitis   - Consult infectious disease  - Consider lumbar puncture  - Routine labs, monitor closely    2. Seizures (metabolic vs infectious vs benzodiazepine withdrawal)  - Patient did not receive benzodiazepines during hospitalization which could have predisposed the patient to seizures secondary to withdrawal  - Increased CK and prolactin prove likelihood of seizure event, add fluids for increasing CK levels  - EEG pending  - Continue Keppra   - Neurology following  - Neuro checks to monitor for further seizures or acute changes in mental status  - Ativan 1mg PRN     Cardiovascular  1.  Atrial fibrillation; patient is on Eliquis at home  - It is not safe for patient to have oral medications at this time due to AMS and risk of aspiration  - Hold on anticoagulants for intended future lumbar

## 2022-05-27 NOTE — PROGRESS NOTES
Hospital Medicine    Subjective:  Pt seen this am in micu transferred to micu after seizure      Current Facility-Administered Medications:     levETIRAcetam (KEPPRA) 500 mg/100 mL IVPB, 500 mg, IntraVENous, Q12H, CHERRIE Gutierrez CNP, Stopped at 05/27/22 0915    vitamin B-12 (CYANOCOBALAMIN) tablet 1,000 mcg, 1,000 mcg, Oral, Daily, CHERRIE Gutierrez CNP    insulin lispro (HUMALOG) injection vial 0-6 Units, 0-6 Units, SubCUTAneous, TID WC, Erwin Mack MD    insulin lispro (HUMALOG) injection vial 0-3 Units, 0-3 Units, SubCUTAneous, Nightly, Erwin Mack MD    glucose chewable tablet 16 g, 4 tablet, Oral, PRN, Erwin Mack MD    dextrose bolus 10% 125 mL, 125 mL, IntraVENous, PRN **OR** dextrose bolus 10% 250 mL, 250 mL, IntraVENous, PRN, Erwin Mack MD    glucagon (rDNA) injection 1 mg, 1 mg, IntraMUSCular, PRN, Erwin Mack MD    dextrose 5 % solution, 100 mL/hr, IntraVENous, PRN, Erwin Mack MD    folic acid injection 1 mg, 1 mg, IntraVENous, Daily, Erwin Mack MD, 1 mg at 05/27/22 1035    thiamine (B-1) injection 100 mg, 100 mg, IntraVENous, Daily, Erwin Mack MD, 100 mg at 05/27/22 0910    dextrose 5 % and 0.9 % sodium chloride infusion, , IntraVENous, Continuous, Erwin Mack MD, Last Rate: 150 mL/hr at 05/27/22 1120, Rate Change at 05/27/22 1120    vancomycin 1500 mg in dextrose 5% 300 mL IVPB, 20 mg/kg, IntraVENous, Once, [place], MD, Last Rate: 150 mL/hr at 05/27/22 1448, 1,500 mg at 05/27/22 1448    vancomycin (VANCOCIN) intermittent dosing (tyrese), , Other, RX Placeholder, [place], MD, Given at 05/27/22 1345    LORazepam (ATIVAN) injection 1 mg, 1 mg, IntraVENous, Q4H PRN, Erwin Mack MD, 1 mg at 05/27/22 1404    sodium chloride flush 0.9 % injection 5-40 mL, 5-40 mL, IntraVENous, 2 times per day, Cherry Michaud MD, 10 mL at 05/27/22 0756    sodium chloride flush 0.9 % injection 5-40 mL, 5-40 mL, IntraVENous, PRN, Bee Sheth MD    0.9 % sodium chloride infusion, , IntraVENous, PRN, Bee Sheth MD    apixaban Flaca Moment) tablet 5 mg, 5 mg, Oral, BID, Dalila Sorto, DO, 5 mg at 05/26/22 2016    docusate sodium (COLACE) capsule 100 mg, 100 mg, Oral, BID PRN, Dalila Sorto, DO    levothyroxine (SYNTHROID) tablet 50 mcg, 50 mcg, Oral, Daily, Dalila Sorto, DO, 50 mcg at 05/26/22 0600    pantoprazole (PROTONIX) tablet 40 mg, 40 mg, Oral, Daily, Dalila Sorto, DO    ondansetron (ZOFRAN-ODT) disintegrating tablet 4 mg, 4 mg, Oral, Q8H PRN **OR** ondansetron (ZOFRAN) injection 4 mg, 4 mg, IntraVENous, Q6H PRN, Dalila Sorto, DO, 4 mg at 05/26/22 1103    polyethylene glycol (GLYCOLAX) packet 17 g, 17 g, Oral, Daily PRN, Dalila Sorto, DO    acetaminophen (TYLENOL) tablet 650 mg, 650 mg, Oral, Q6H PRN, 650 mg at 05/25/22 2350 **OR** acetaminophen (TYLENOL) suppository 650 mg, 650 mg, Rectal, Q6H PRN, Dalila Sorto, DO    cefTRIAXone (ROCEPHIN) 1,000 mg in sterile water 10 mL IV syringe, 1,000 mg, IntraVENous, Q24H, Dalila Sorto, DO, 1,000 mg at 05/27/22 0908    Objective:    /64   Pulse 60   Temp 99 °F (37.2 °C) (Oral)   Resp 15   Ht 5' 4\" (1.626 m)   Wt 160 lb 15 oz (73 kg)   SpO2 99%   BMI 27.62 kg/m²     Heart:  reg  Lungs:  ctab  Abd: + bs soft nontender  Extrem:  Min edema legs    CBC with Differential:    Lab Results   Component Value Date    WBC 12.9 05/27/2022    RBC 3.13 05/27/2022    HGB 9.3 05/27/2022    HCT 28.7 05/27/2022     05/27/2022    MCV 91.7 05/27/2022    MCH 29.7 05/27/2022    MCHC 32.4 05/27/2022    RDW 15.9 05/27/2022    SEGSPCT 67 11/24/2011    LYMPHOPCT 11.0 05/27/2022    MONOPCT 7.9 05/27/2022    BASOPCT 0.1 05/27/2022    MONOSABS 1.02 05/27/2022    LYMPHSABS 1.42 05/27/2022    EOSABS 0.00 05/27/2022    BASOSABS 0.01 05/27/2022     CMP:    Lab Results   Component Value Date     05/27/2022    K 3.3 05/27/2022    K 3.1 05/25/2022  05/27/2022    CO2 22 05/27/2022    BUN 19 05/27/2022    CREATININE 0.9 05/27/2022    GFRAA >60 05/27/2022    LABGLOM >60 05/27/2022    GLUCOSE 141 05/27/2022    GLUCOSE 132 11/24/2011    PROT 7.9 05/25/2022    LABALBU 4.0 05/25/2022    LABALBU 3.4 01/23/2011    CALCIUM 8.2 05/27/2022    BILITOT 0.3 05/25/2022    ALKPHOS 61 05/25/2022    AST 40 05/25/2022    ALT 30 05/25/2022     Warfarin PT/INR:    Lab Results   Component Value Date    INR 1.5 05/25/2022    INR 0.9 07/06/2020    PROTIME 16.1 (H) 05/25/2022    PROTIME 11.0 07/06/2020       Assessment:    Principal Problem:    Stroke-like symptoms  Resolved Problems:    * No resolved hospital problems.  *      Plan:  Cont atb/supportive care / appreciate id input        Beena Good DO  3:01 PM  5/27/2022

## 2022-05-27 NOTE — PROCEDURES
EEG Report  Lakshmi Pires is a 76 y.o. female      Appointment Date 5/27/2022   Appointment Time 8:30am     Facility Location [facility] EEG Number [id number]   Type of Study portable Floor 4428     Technical Specifications  Technician [id number] [facility] Awake/sleep   Sleep deprived? no   Hyperventilation tested? no   Photic stim tested? no   EEG recording Standard 10-20 electrode placement    Duration of recording 25mins   EEG complete? Yes         Clinical History  76 y.o.  female presenting for evaluation of AMS. The patient is a 76year-old-female who presents to the ED complaining of altered mental status. She was found down by her neighbor. Last known well unknown. She is reportedly being treated for UTI.  When EMS arrived, patient was on couch in her underwear with shards of glass on the chair.     Medications    Current Facility-Administered Medications:     LORazepam (ATIVAN) injection 2 mg, 2 mg, IntraVENous, Q4H PRN, Justo Meng MD    levETIRAcetam (KEPPRA) 500 mg/100 mL IVPB, 500 mg, IntraVENous, Q12H, CHERRIE Ferreira CNP, Stopped at 05/27/22 0915    vitamin B-12 (CYANOCOBALAMIN) tablet 1,000 mcg, 1,000 mcg, Oral, Daily, CHERRIE Ferreira CNP    potassium chloride 10 mEq/100 mL IVPB (Peripheral Line), 10 mEq, IntraVENous, Q1H, Norma Nayak MD    insulin lispro (HUMALOG) injection vial 0-6 Units, 0-6 Units, SubCUTAneous, TID WC, Norma Nayak MD    insulin lispro (HUMALOG) injection vial 0-3 Units, 0-3 Units, SubCUTAneous, Nightly, Norma Nayak MD    glucose chewable tablet 16 g, 4 tablet, Oral, PRN, Norma Nayak MD    dextrose bolus 10% 125 mL, 125 mL, IntraVENous, PRN **OR** dextrose bolus 10% 250 mL, 250 mL, IntraVENous, PRNNorma MD    glucagon (rDNA) injection 1 mg, 1 mg, IntraMUSCular, PRN, Norma Nayak MD    dextrose 5 % solution, 100 mL/hr, IntraVENous, PRN, Norma Nayak MD    folic acid injection 1 mg, 1 mg, IntraVENous, Daily, Inga Cassidy MD    thiamine (B-1) injection 100 mg, 100 mg, IntraVENous, Daily, Inga Cassidy MD, 100 mg at 05/27/22 0910    dextrose 5 % and 0.9 % sodium chloride infusion, , IntraVENous, Continuous, Inga Cassidy MD, Last Rate: 75 mL/hr at 05/27/22 0856, New Bag at 05/27/22 0856    sodium chloride flush 0.9 % injection 5-40 mL, 5-40 mL, IntraVENous, 2 times per day, Beck Trinidad MD, 10 mL at 05/27/22 0756    sodium chloride flush 0.9 % injection 5-40 mL, 5-40 mL, IntraVENous, PRN, Beck Trinidad MD    0.9 % sodium chloride infusion, , IntraVENous, PRN, Beck Trinidad MD    potassium bicarb-citric acid (EFFER-K) effervescent tablet 40 mEq, 40 mEq, Oral, Once, Aixa Alegre DO    apixaban (ELIQUIS) tablet 5 mg, 5 mg, Oral, BID, Tammyine Borobert Sorto, DO, 5 mg at 05/26/22 2016    docusate sodium (COLACE) capsule 100 mg, 100 mg, Oral, BID PRN, Judith Sorto, DO    levothyroxine (SYNTHROID) tablet 50 mcg, 50 mcg, Oral, Daily, Jolaine Bougie Malmer, DO, 50 mcg at 05/26/22 0600    pantoprazole (PROTONIX) tablet 40 mg, 40 mg, Oral, Daily, Tammyine Ca Luciamer, DO    sodium chloride flush 0.9 % injection 5-40 mL, 5-40 mL, IntraVENous, 2 times per day, Jolaine Ca Sorto, DO, 10 mL at 05/27/22 0820    sodium chloride flush 0.9 % injection 5-40 mL, 5-40 mL, IntraVENous, PRN, Judith Sorto, DO    0.9 % sodium chloride infusion, , IntraVENous, PRN, Tammyine Ca Luciamer, DO    ondansetron (ZOFRAN-ODT) disintegrating tablet 4 mg, 4 mg, Oral, Q8H PRN **OR** ondansetron (ZOFRAN) injection 4 mg, 4 mg, IntraVENous, Q6H PRN, Judith Sorto, , 4 mg at 05/26/22 1103    polyethylene glycol (GLYCOLAX) packet 17 g, 17 g, Oral, Daily PRN, Judith Sorto, DO    acetaminophen (TYLENOL) tablet 650 mg, 650 mg, Oral, Q6H PRN, 650 mg at 05/25/22 7010 **OR** acetaminophen (TYLENOL) suppository 650 mg, 650 mg, Rectal, Q6H PRN, Judith Sorto, DO    cefTRIAXone (ROCEPHIN) 1,000 mg in sterile water 10 mL IV syringe, 1,000 mg, IntraVENous, Q24H, Cary Sorto DO, 1,000 mg at 05/27/22 0908        Physician Interpretation    General EEG Report  EEG study was performed using the 10-20 electrode placement system in patient who was awake and responsive at time of study. No abnormal behavior or movements noted during the study. Activation procedures included photic stimulation and hyperventilation. Type of EEG:   Routine Inpatient EEG with video    Description   Wakefulness: Low amplitude fast background activity presence of asymmetric. Greater activity noted on the left versus right hemisphere. Poorly formed posterior alpha lower EXTR mostly on the left with 9-10 Hz activity that attenuated with eye opening. Fast Activity: Prominent frontal beta with increased amplitude consistent with muscle artifact. .  Diffuse low amplitude beta across all leads). Frequency consistent with medication effect. Slowing: No significant slowing appreciated  Non-Epileptiform Abnormality: None  Epileptiform Discharge: None    Sleep: N1 sleep with no significant abnormal activity between sleep and wake transition. Photic stimulation: Not performed  Hyperventilation: Not performed    General Impression  Normal awake and asleep EEG. Decreased activity on the right relative to the left side with possible structural abnormality versus decreased metabolism on the right side. No epileptiform activity was appreciated. The absence of epileptiform activity during EEG study does not exclude the possibility of seizures or epilepsy given limited duration of study and lack of epileptic type activity during study. Clinical correlation is indicated.     MD Ernie Gardner

## 2022-05-27 NOTE — PROGRESS NOTES
Pharmacy Consultation Note  (Antibiotic Dosing and Monitoring)    Initial consult date: 5/27/22  Consulting physician/provider: Dr. Chetna Rothman  Drug: Vancomycin  Indication: Empiric/CNS infection    Age/  Gender Height Weight IBW  Allergy Information   77 y.o./female 5' 4\" (162.6 cm) 160 lb 15 oz (73 kg)     Ideal body weight: 54.7 kg (120 lb 9.5 oz)  Adjusted ideal body weight: 62 kg (136 lb 11.7 oz)   Other      Renal Function:  Recent Labs     05/25/22  1418 05/26/22  0552 05/27/22  0641   BUN 9 8 19   CREATININE 0.8 0.8 0.9       Intake/Output Summary (Last 24 hours) at 5/27/2022 1450  Last data filed at 5/27/2022 1400  Gross per 24 hour   Intake --   Output 940 ml   Net -940 ml       Vancomycin Monitoring:  Trough:  No results for input(s): VANCOTROUGH in the last 72 hours. Random:  No results for input(s): VANCORANDOM in the last 72 hours. Vancomycin Administration Times:  Recent vancomycin administrations                   vancomycin 1500 mg in dextrose 5% 300 mL IVPB (mg) 1,500 mg New Bag 05/27/22 1448    vancomycin (VANCOCIN) intermittent dosing (placeholder) ()  Given 05/27/22 1345                Assessment:  · Patient is a 76 y.o. female who has been initiated on vancomycin  · Estimated Creatinine Clearance: 50 mL/min (based on SCr of 0.9 mg/dL).     Plan:  · Will continue vancomycin 1500 mg IV every 24 hours  · Will check vancomycin levels when appropriate  · Will continue to monitor renal function   · Clinical pharmacy to follow      KARLA Sam [facility] 5/27/2022 2:50 PM

## 2022-05-27 NOTE — PROGRESS NOTES
Idris Mclean is a 76 y.o. right handed female       Neurology following for AMS possibly related to possible metabolic derangement due to sepsis. She was recently being treated for a UTI. She had reportedly told son that she thought antibiotic was messing with her brain and she felt off. She has previous history of alcohol abuse but has not had issues for years. Alcohol screen negative,  Serum drug screen + benzo, (on Ativan prn prior to admission)    Presented to the ER on 5/25 for evaluation of AMS. The patient is a 76year-old-female who presents to the ED complaining of altered mental status. She was found down by her neighbor. Last known well unknown. She is reportedly being treated for UTI.  When EMS arrived, patient was on couch in her underwear with shards of glass on the chair.       5/26 - She is awake and alert with a flight of ideas. She does not follow any commands or answer any questions. She moves all of her extremities purposefully, but not to commands. Her speech is clear, however she is having a conversation with herself. She was unable to be redirected during exam.     5/27 - Patient experienced 2 witnessed seizures which lasted < 30 seconds early this am.  An RRT was called, and when they arrived, she was was post ictal and non responsive. The RRT team gave 1 mg ativan for saccadic eye movements at 6.42 am.  Patient was then transferred to MICU. She is somunlent today, however the bedside nurse said that earlier, she was talking like she was yesterday. Currently, she opens her eyes to voice, but keeps them closed during the exam. She still doesn't follow commands. She is not speaking to me or having a conversation with herself like she was yesterday. Prior to Visit Medications    Medication Sig Taking?  Authorizing Provider   levothyroxine (SYNTHROID) 50 MCG tablet Take 100 mcg by mouth once a week Yes Historical Provider, MD   lisinopril-hydroCHLOROthiazide (PRINZIDE;ZESTORETIC) 20-12.5 MG per tablet Take 2 tablets by mouth daily Yes Historical Provider, MD   docusate sodium (COLACE) 100 mg capsule Take 100 mg by mouth 2 times daily as needed for Constipation Yes Historical Provider, MD   apixaban (ELIQUIS) 5 MG TABS tablet Take 1 tablet by mouth 2 times daily  Ebonie Fernandez MD   ALPRAZolam (XANAX) 1 MG tablet Take 1 mg by mouth 3 times daily as needed for Anxiety.    Historical Provider, MD   omeprazole (PRILOSEC) 40 MG delayed release capsule Take 40 mg by mouth daily  Historical Provider, MD   levothyroxine (SYNTHROID) 50 MCG tablet Take 50 mcg by mouth Six times weekly   Historical Provider, MD       Allergies as of 05/25/2022 - Fully Reviewed 05/25/2022   Allergen Reaction Noted    Other  06/24/2020       Objective:     /79   Pulse (!) 47   Temp 98.2 °F (36.8 °C) (Oral)   Resp 16   Ht 5' 4\" (1.626 m)   Wt 160 lb 15 oz (73 kg)   SpO2 98%   BMI 27.62 kg/m²      General appearance: lethargic, appears stated age and uncooperative  Head: Normocephalic, without obvious abnormality, atraumatic  Neck: supple, trachea midline  Lungs: Unlabored  Heart: regular rate and rhythm on monitor  Extremities: no cyanosis or edema  Pulses: 2+ and symmetric  Skin: Large linear abrasion right back    Mental Status: Lethargic, oriented to name  Speech: She is not speaking at this time  Language: She is not speaking at this time    Cranial Nerves:  I: smell    II: visual acuity     II: visual fields    II: pupils FLAQUITA   III,VII: ptosis None   III,IV,VI: extraocular muscles  EOMI without nystagmus    V: mastication    V: facial light touch sensation     V,VII: corneal reflex  Present   VII: facial muscle function - upper     VII: facial muscle function - lower    VIII: hearing Normal   IX: soft palate elevation  Normal   IX,X: gag reflex Present   XI: trapezius strength     XI: sternocleidomastoid strength    XI: neck extension strength     XII: tongue strength       Motor:  Normal bulk and tone    Sensory:  Unable to perform due to lack of patient cooperation    Coordination:   Unable to perform due to lack of patient cooperation    Gait:  Deferred for safety    DTR:   2+ throughout    No Babinski    No Lowry's     Laboratory/Radiology:     CBC with Differential:    Lab Results   Component Value Date    WBC 12.9 05/27/2022    RBC 3.13 05/27/2022    HGB 9.3 05/27/2022    HCT 28.7 05/27/2022     05/27/2022    MCV 91.7 05/27/2022    MCH 29.7 05/27/2022    MCHC 32.4 05/27/2022    RDW 15.9 05/27/2022    SEGSPCT 67 11/24/2011    LYMPHOPCT 11.0 05/27/2022    MONOPCT 7.9 05/27/2022    BASOPCT 0.1 05/27/2022    MONOSABS 1.02 05/27/2022    LYMPHSABS 1.42 05/27/2022    EOSABS 0.00 05/27/2022    BASOSABS 0.01 05/27/2022     BMP:    Lab Results   Component Value Date     05/27/2022    K 3.3 05/27/2022    K 3.1 05/25/2022     05/27/2022    CO2 22 05/27/2022    BUN 19 05/27/2022    LABALBU 4.0 05/25/2022    LABALBU 3.4 01/23/2011    CREATININE 0.9 05/27/2022    CALCIUM 8.2 05/27/2022    GFRAA >60 05/27/2022    LABGLOM >60 05/27/2022    GLUCOSE 141 05/27/2022    GLUCOSE 132 11/24/2011     TSH:    Lab Results   Component Value Date    TSH 0.599 05/26/2022     VITAMIN B12: 289  AMMONIA 32  Lab Results   Component Value Date    FOLATE 18.9 05/26/2022     MRI Brain  Mild chronic microvascular disease within the periventricular white matter. No acute ischemia. CTH  Generalized atrophy and chronic changes seen within the brain with no acute  intracranial abnormality. I personally reviewed the patient's lab and imaging studies at this time.     Assessment:     New onset Seizures in the setting of metabolic derrangement and possible UTI  - 2 witnessed seizures lasting < 30 seconds  - AMS possible due to postictal state  - Lactic acid 5.1, procalcitonin 0.16, , prolactin 20.14  - Was recently treated for a UTI      Patient Active Problem List   Diagnosis    [facility]    Acute medial meniscal tear    Anxiety    Arthritis    Asthma    Cholelithiasis    Depressive disorder    Diverticular disease    Fracture of tibia    Gastroesophageal reflux disease    Hiatal hernia    Hypercholesterolemia    Hypertension    Hypothyroidism    Insomnia    Lipoma    Obesity    Osteopenia    Overweight with body mass index (BMI) 25.0-29.9    Restless legs syndrome    Rupture of appendix    Secondary non-renal hyperparathyroidism (HCC)    Stage 3 chronic kidney disease (HCC)    Steatosis of liver    Tubular adenoma of colon    Acute encephalopathy    Stroke-like symptoms       Plan:   - EEG  - Keppra 500 mg BID  - Seizures precautions  - Vitamin B12 1000 mcg daily  - Neurology to follow        CHERRIE Enrique - CNP  9:14 AM  5/27/2022

## 2022-05-27 NOTE — SIGNIFICANT EVENT
Rapid Response Team Note  Date of event: 5/27/2022   Time of event: 6.20 am  Toribio Flynn 76y.o. year old female   YOB: 1945   Admit date:  5/25/2022   Location: 6451/6698-T   Witnessed? : [x]Yes  [] No  Monitored? : [x]Yes  [] No  Code status: [x] Full  [] DNR-CCA  []DNR-CC  ______________________________________________________________________  Reason for RRT:    [] RR < 8     [] RR > 28   [] SpO2 <90%   [] HR < 40 bpm   [] HR > 130 bpm  [] SBP < 90 mmHg    [] SpO2 <90%   [] LOC   [x] Seizures    [] Significant Bleeding Event    [] Other:     Subjective:   CTSP regarding the above event, had generalised tonic clonic seizures as per the RN, lasting < 30 sec, she had atleast two seizures, RRT was called for the same, pt was post ictal, non responsive 1 mg ativan given for saccadic eye movements at 6.42 am, had MRI brain done on 05/26/22 which was unremarkable for any structural lesions or mass,   As per the family she had been not feeling well since the ruptured appendix, and was having nausea and vomiting, since then, orange colured stools,     Also after her L knee replacement, in march she has been feeling worse, and did not tolerate any pain meds, and she stopped taking it, she was repeating herself and friends have been telling her that she had been not doing well, and son and daughter denied any drug abuse or alcohol abuse as per them, thye dont live with the patient so not sure 100%, she also is on xanax 1 mg , TID for anxiety.  Her drug screen was + for BZD    She did have another episode of seizure on her return, saccadic eye movements,  but she was given ativan which resolved the episode transferred for observation to MICU  Objective:   Vital signs: Temp: 100/BP: 160/76/RR: 17/HR: 82  Initial Condition:  Conscious   [] Yes  [x] No     Breathing [x] Yes  [] No     Pulse  [x] Yes  [] No    Airway:   [x] Open/ Clear     Intervention: [] None  [] Pooled secretions     [] Suctioned  [] Stridor      [] Intubation    Lungs:   [x] Symmetrical chest rise/ CTABL Intervention: [] None  [] Use of accessory muscles    [] NIV (CPAP/BiPAP)  [] Cyanosis      [] Nasal Oxygen/Mask  [] Wheezing       [] ABG             [] CXR  [] Other:     Circulation:   Rhythm:  [] Sinus [] Other:   Intervention: [] None            [] IV Access  [] Peripheral              [] Central            [] EKG            [] Cardioversion            [] Defibrillation     Capillary Refill:  [] > 2 seconds [] < 2 seconds    Neurologic:   [] NIHSS      [] Pupillary Response:   present  Response to pain:   [x] Yes  [] No  Follow commands:  [] Yes  [x] No  Facial asymmetry:  [] Yes  [x] No  Motor strength:  [] Equal  [] Focal deficit:   Diagnostic Test:  Blood Sugar:  151 mg/dL  EKG:      ABG:      Lab Results   Component Value Date    PH 7.451 01/22/2011    PCO2 41.4 01/22/2011    PO2 56.9 01/22/2011    HCO3 28.2 01/22/2011     Medication(s) Given:  []  Narcan (Naloxone)   []  Romazicon (Flumazenil)    []  Breathing Treatment    []  Steroids (Prednisone, Solu-Medrol)  []  Adenosine  [] Cardiac Medicines:    Infusion(s):   [] Normal Saline    Amount:  cc/hr      [] Lactate Ringers      [] Other:     Imaging:   [] CXR:   [] Normal         [] Pneumothorax         [] Pulmonary Edema  [] Infiltrate          [x] CT Head  [x] Normal          [] ICB          [] [facility]          [] Other:     Laboratory Tests:     CBC with Differential:    Lab Results   Component Value Date    WBC 12.9 05/27/2022    RBC 3.13 05/27/2022    HGB 9.3 05/27/2022    HCT 28.7 05/27/2022     05/27/2022    MCV 91.7 05/27/2022    MCH 29.7 05/27/2022    MCHC 32.4 05/27/2022    RDW 15.9 05/27/2022    SEGSPCT 67 11/24/2011    LYMPHOPCT 11.0 05/27/2022    MONOPCT 7.9 05/27/2022    BASOPCT 0.1 05/27/2022    MONOSABS 1.02 05/27/2022    LYMPHSABS 1.42 05/27/2022    EOSABS 0.00 05/27/2022    BASOSABS 0.01 05/27/2022     BMP:    Lab Results   Component Value Date     05/27/2022    K 3.3 05/27/2022    K 3.1 05/25/2022     05/27/2022    CO2 22 05/27/2022    BUN 19 05/27/2022    LABALBU 4.0 05/25/2022    LABALBU 3.4 01/23/2011    CREATININE 0.9 05/27/2022    CALCIUM 8.2 05/27/2022    GFRAA >60 05/27/2022    LABGLOM >60 05/27/2022    GLUCOSE 141 05/27/2022    GLUCOSE 132 11/24/2011     Magnesium:    Lab Results   Component Value Date    MG 1.9 05/27/2022         Teams Assessment and Plan:  Seizures, generalised tonic clonicx 4 in last one hour  AMS  Hx of HTN  Afib on Anticogulation  Hx of ruptured appendix  L knee replacement in 03/22    Plan  Given ativan PRN for seizures  keppra bolus  EEG made stat  CT head unremarkable  CBC, BMP, mg, LA and prolactin, were orderd  Follow labs  ? ?  ?   Disposition:  [] No transfer   [] Transfer to monitor floor  [x] Transfer to: [x] MICU [] NICU [] CCU [] SICU    Patients family updated: [x] Yes  [] No   Discussed with:  [] Critical Care Intensivist: Dr. Maxime Akers       [] Primary Care Provider: Dr. Beatriz Still      [] Other: ?    Grover Arce MD PGY-3  5/27/2022 7:17 AM  Attending Physician: Dr Beatriz Still

## 2022-05-27 NOTE — PROGRESS NOTES
Comprehensive Nutrition Assessment    Type and Reason for Visit:  Initial (LOS ICU)    Nutrition Recommendations/Plan:      Continue NPO (Please consult if nutrition recs are needed)     Malnutrition Assessment:  Malnutrition Status: Moderate malnutrition (05/27/22 1542)    Context:  Acute Illness     Findings of the 6 clinical characteristics of malnutrition:  Energy Intake:  75% or less of estimated energy requirements for 7 or more days  Weight Loss:   (Mild wt loss 7% x 3 mon)     Body Fat Loss:  Mild body fat loss Orbital   Muscle Mass Loss:  Mild muscle mass loss Temples (temporalis),Clavicles (pectoralis & deltoids)  Fluid Accumulation:  No significant fluid accumulation    Strength:  Not Performed    Nutrition Assessment:    Pt admit 2/2 AMS/ Stroke-like symptoms now s/p RRT transfer to MICU for seizure. Noted recent knee sx 2 months ago, also recent UTI. Noted ETOH abuse year back. Pt meets criteria for Moderate Malnutrition. Pt currently NPO. Will monitor nutrition progression/ provide recs as needed. Nutrition Related Findings:    Pt disoriented, MAP WNL, -I/O's, generalized edema, hypoactive BS, N/V Wound Type: Surgical Incision (recent knee sx)       Current Nutrition Intake & Therapies:    Average Meal Intake: NPO     Diet NPO    Anthropometric Measures:  Height: 5' 4\" (162.6 cm)  Ideal Body Weight (IBW): 120 lbs (55 kg)    Admission Body Weight: 160 lb (72.6 kg) (5/26 first measured)  Current Body Weight: 160 lb (72.6 kg) (5/26 measured), 133.3 % IBW. Current BMI (kg/m2): 27.5  Usual Body Weight: 172 lb (78 kg) (2/22 EMR measured wt)  % Weight Change (Calculated): -7 wt loss x 3 mon per EMR                    BMI Categories: Overweight (BMI 25.0-29. 9)    Estimated Daily Nutrient Needs:  Energy Requirements Based On: Formula  Weight Used for Energy Requirements: Current  Energy (kcal/day): MSJ 1191 x 1.2 SF= 2919-4708  Weight Used for Protein Requirements: Ideal  Protein (g/day): 1.3-1.5 g/kg IBW; 70-85  Fluid (ml/day): per critical care    Nutrition Diagnosis:   · Moderate malnutrition,In context of acute illness or injury related to cognitive or neurological impairment (AMS living alone) as evidenced by poor intake prior to admission,mild loss of subcutaneous fat,mild muscle loss,weight loss (7% x 3 mon)      Nutrition Interventions:   Nutrition Education/Counseling: Education not appropriate  Coordination of Nutrition Care: Continue to monitor while inpatient       Goals:     Goals: other (specify)  Specify Other Goals: Nutrition Progression    Nutrition Monitoring and Evaluation:      Food/Nutrient Intake Outcomes: Diet Advancement/Tolerance  Physical Signs/Symptoms Outcomes: Biochemical Data,Nutrition Focused Physical Findings,Skin,Weight,GI Status,Nausea or Vomiting,Fluid Status or Edema,Hemodynamic Status    Discharge Planning:     Too soon to determine     Bulmaro Carey RD, LD  Contact: Ext [phone]

## 2022-05-27 NOTE — PROGRESS NOTES
Physical Therapy    PT consult to evaluate/treat received and appreciated. Pt chart reviewed and evaluation attempted. Pt is currently on hold following RRT for seizures at time of attempt. Will check back as able. Thank you.         Alexandre Batista, PT, DPT   [id number]

## 2022-05-27 NOTE — PROGRESS NOTES
OT consult received and appreciated. Chart reviewed. Will hold evaluation due to pt having seizures this AM with RRT called. Will evaluate at a later time. Thank you.  Dangelo Felix, OTR/L # [id number]

## 2022-05-27 NOTE — PROGRESS NOTES
Witnessed seizure at bedside - pt postictal, vitals stable, Neurology Allison Raymond) notified via perfect serve.

## 2022-05-28 LAB
ALBUMIN SERPL-MCNC: 3.1 G/DL (ref 3.5–5.2)
ALP BLD-CCNC: 44 U/L (ref 35–104)
ALT SERPL-CCNC: 56 U/L (ref 0–32)
ANION GAP SERPL CALCULATED.3IONS-SCNC: 14 MMOL/L (ref 7–16)
ANION GAP SERPL CALCULATED.3IONS-SCNC: 8 MMOL/L (ref 7–16)
AST SERPL-CCNC: 54 U/L (ref 0–31)
BILIRUB SERPL-MCNC: 0.3 MG/DL (ref 0–1.2)
BUN BLDV-MCNC: 12 MG/DL (ref 6–23)
BUN BLDV-MCNC: 7 MG/DL (ref 6–23)
CALCIUM IONIZED: 1.16 MMOL/L (ref 1.15–1.33)
CALCIUM SERPL-MCNC: 7.8 MG/DL (ref 8.6–10.2)
CALCIUM SERPL-MCNC: 7.9 MG/DL (ref 8.6–10.2)
CHLORIDE BLD-SCNC: 103 MMOL/L (ref 98–107)
CHLORIDE BLD-SCNC: 105 MMOL/L (ref 98–107)
CO2: 23 MMOL/L (ref 22–29)
CO2: 25 MMOL/L (ref 22–29)
CREAT SERPL-MCNC: 0.7 MG/DL (ref 0.5–1)
CREAT SERPL-MCNC: 0.7 MG/DL (ref 0.5–1)
GFR AFRICAN AMERICAN: >60
GFR AFRICAN AMERICAN: >60
GFR NON-AFRICAN AMERICAN: >60 ML/MIN/1.73
GFR NON-AFRICAN AMERICAN: >60 ML/MIN/1.73
GLUCOSE BLD-MCNC: 145 MG/DL (ref 74–99)
GLUCOSE BLD-MCNC: 148 MG/DL (ref 74–99)
HCT VFR BLD CALC: 27.7 % (ref 34–48)
HEMOGLOBIN: 8.9 G/DL (ref 11.5–15.5)
INR BLD: 1.8
LACTIC ACID: 1.4 MMOL/L (ref 0.5–2.2)
LACTIC ACID: 1.4 MMOL/L (ref 0.5–2.2)
MAGNESIUM: 1.8 MG/DL (ref 1.6–2.6)
MCH RBC QN AUTO: 29.4 PG (ref 26–35)
MCHC RBC AUTO-ENTMCNC: 32.1 % (ref 32–34.5)
MCV RBC AUTO: 91.4 FL (ref 80–99.9)
METER GLUCOSE: 134 MG/DL (ref 74–99)
METER GLUCOSE: 134 MG/DL (ref 74–99)
METER GLUCOSE: 135 MG/DL (ref 74–99)
PDW BLD-RTO: 15.4 FL (ref 11.5–15)
PLATELET # BLD: 201 E9/L (ref 130–450)
PMV BLD AUTO: 10 FL (ref 7–12)
POTASSIUM REFLEX MAGNESIUM: 3.4 MMOL/L (ref 3.5–5)
POTASSIUM SERPL-SCNC: 3 MMOL/L (ref 3.5–5)
PROTHROMBIN TIME: 19.2 SEC (ref 9.3–12.4)
RBC # BLD: 3.03 E12/L (ref 3.5–5.5)
SODIUM BLD-SCNC: 138 MMOL/L (ref 132–146)
SODIUM BLD-SCNC: 140 MMOL/L (ref 132–146)
STREP PNEUMONIAE ANTIGEN, URINE: NORMAL
TOTAL CK: 840 U/L (ref 20–180)
TOTAL PROTEIN: 6.5 G/DL (ref 6.4–8.3)
WBC # BLD: 13.1 E9/L (ref 4.5–11.5)

## 2022-05-28 PROCEDURE — 2000000000 HC ICU R&B

## 2022-05-28 PROCEDURE — 6360000002 HC RX W HCPCS

## 2022-05-28 PROCEDURE — 82550 ASSAY OF CK (CPK): CPT

## 2022-05-28 PROCEDURE — 2500000003 HC RX 250 WO HCPCS: Performed by: INTERNAL MEDICINE

## 2022-05-28 PROCEDURE — 6360000002 HC RX W HCPCS: Performed by: STUDENT IN AN ORGANIZED HEALTH CARE EDUCATION/TRAINING PROGRAM

## 2022-05-28 PROCEDURE — 6360000002 HC RX W HCPCS: Performed by: INTERNAL MEDICINE

## 2022-05-28 PROCEDURE — 6370000000 HC RX 637 (ALT 250 FOR IP): Performed by: INTERNAL MEDICINE

## 2022-05-28 PROCEDURE — 80053 COMPREHEN METABOLIC PANEL: CPT

## 2022-05-28 PROCEDURE — 85027 COMPLETE CBC AUTOMATED: CPT

## 2022-05-28 PROCEDURE — 99232 SBSQ HOSP IP/OBS MODERATE 35: CPT | Performed by: PHYSICIAN ASSISTANT

## 2022-05-28 PROCEDURE — 2580000003 HC RX 258: Performed by: STUDENT IN AN ORGANIZED HEALTH CARE EDUCATION/TRAINING PROGRAM

## 2022-05-28 PROCEDURE — 2500000003 HC RX 250 WO HCPCS: Performed by: STUDENT IN AN ORGANIZED HEALTH CARE EDUCATION/TRAINING PROGRAM

## 2022-05-28 PROCEDURE — 83735 ASSAY OF MAGNESIUM: CPT

## 2022-05-28 PROCEDURE — 82962 GLUCOSE BLOOD TEST: CPT

## 2022-05-28 PROCEDURE — 36415 COLL VENOUS BLD VENIPUNCTURE: CPT

## 2022-05-28 PROCEDURE — 83605 ASSAY OF LACTIC ACID: CPT

## 2022-05-28 PROCEDURE — 80048 BASIC METABOLIC PNL TOTAL CA: CPT

## 2022-05-28 PROCEDURE — 2700000000 HC OXYGEN THERAPY PER DAY

## 2022-05-28 PROCEDURE — 82330 ASSAY OF CALCIUM: CPT

## 2022-05-28 PROCEDURE — 85610 PROTHROMBIN TIME: CPT

## 2022-05-28 PROCEDURE — 2580000003 HC RX 258: Performed by: INTERNAL MEDICINE

## 2022-05-28 RX ORDER — SODIUM CHLORIDE 9 MG/ML
INJECTION, SOLUTION INTRAVENOUS PRN
Status: DISCONTINUED | OUTPATIENT
Start: 2022-05-28 | End: 2022-05-30 | Stop reason: SDUPTHER

## 2022-05-28 RX ORDER — MAGNESIUM SULFATE IN WATER 40 MG/ML
2000 INJECTION, SOLUTION INTRAVENOUS ONCE
Status: COMPLETED | OUTPATIENT
Start: 2022-05-28 | End: 2022-05-28

## 2022-05-28 RX ORDER — POTASSIUM CHLORIDE 7.45 MG/ML
10 INJECTION INTRAVENOUS
Status: COMPLETED | OUTPATIENT
Start: 2022-05-28 | End: 2022-05-28

## 2022-05-28 RX ORDER — DEXTROSE, SODIUM CHLORIDE, AND POTASSIUM CHLORIDE 5; .45; .15 G/100ML; G/100ML; G/100ML
INJECTION INTRAVENOUS CONTINUOUS
Status: DISCONTINUED | OUTPATIENT
Start: 2022-05-28 | End: 2022-05-31

## 2022-05-28 RX ORDER — SODIUM CHLORIDE 0.9 % (FLUSH) 0.9 %
5-40 SYRINGE (ML) INJECTION EVERY 12 HOURS SCHEDULED
Status: DISCONTINUED | OUTPATIENT
Start: 2022-05-28 | End: 2022-05-31

## 2022-05-28 RX ORDER — SODIUM CHLORIDE 0.9 % (FLUSH) 0.9 %
5-40 SYRINGE (ML) INJECTION PRN
Status: DISCONTINUED | OUTPATIENT
Start: 2022-05-28 | End: 2022-05-31

## 2022-05-28 RX ADMIN — POTASSIUM CHLORIDE, DEXTROSE MONOHYDRATE AND SODIUM CHLORIDE: 150; 5; 450 INJECTION, SOLUTION INTRAVENOUS at 23:23

## 2022-05-28 RX ADMIN — POTASSIUM CHLORIDE 10 MEQ: 7.46 INJECTION, SOLUTION INTRAVENOUS at 08:56

## 2022-05-28 RX ADMIN — POTASSIUM CHLORIDE 10 MEQ: 7.46 INJECTION, SOLUTION INTRAVENOUS at 13:41

## 2022-05-28 RX ADMIN — FOLIC ACID 1 MG: 5 INJECTION, SOLUTION INTRAMUSCULAR; INTRAVENOUS; SUBCUTANEOUS at 08:38

## 2022-05-28 RX ADMIN — LORAZEPAM 1 MG: 2 INJECTION INTRAMUSCULAR; INTRAVENOUS at 02:31

## 2022-05-28 RX ADMIN — POTASSIUM CHLORIDE 10 MEQ: 7.46 INJECTION, SOLUTION INTRAVENOUS at 20:10

## 2022-05-28 RX ADMIN — SODIUM CHLORIDE: 9 INJECTION, SOLUTION INTRAVENOUS at 05:28

## 2022-05-28 RX ADMIN — Medication 1500 MG: at 14:58

## 2022-05-28 RX ADMIN — Medication 10 ML: at 11:15

## 2022-05-28 RX ADMIN — DEXTROSE AND SODIUM CHLORIDE: 5; 900 INJECTION, SOLUTION INTRAVENOUS at 00:59

## 2022-05-28 RX ADMIN — MAGNESIUM SULFATE HEPTAHYDRATE 2000 MG: 40 INJECTION, SOLUTION INTRAVENOUS at 20:07

## 2022-05-28 RX ADMIN — POTASSIUM CHLORIDE, DEXTROSE MONOHYDRATE AND SODIUM CHLORIDE: 150; 5; 450 INJECTION, SOLUTION INTRAVENOUS at 09:56

## 2022-05-28 RX ADMIN — POTASSIUM CHLORIDE 10 MEQ: 7.46 INJECTION, SOLUTION INTRAVENOUS at 21:01

## 2022-05-28 RX ADMIN — LEVETIRACETAM 500 MG: 5 INJECTION INTRAVENOUS at 20:04

## 2022-05-28 RX ADMIN — POTASSIUM CHLORIDE 10 MEQ: 7.46 INJECTION, SOLUTION INTRAVENOUS at 10:09

## 2022-05-28 RX ADMIN — POTASSIUM CHLORIDE 10 MEQ: 7.46 INJECTION, SOLUTION INTRAVENOUS at 11:15

## 2022-05-28 RX ADMIN — Medication 10 ML: at 07:23

## 2022-05-28 RX ADMIN — CEFTRIAXONE 2000 MG: 2 INJECTION, POWDER, FOR SOLUTION INTRAMUSCULAR; INTRAVENOUS at 08:38

## 2022-05-28 RX ADMIN — DEXTROSE AND SODIUM CHLORIDE: 5; 900 INJECTION, SOLUTION INTRAVENOUS at 04:49

## 2022-05-28 RX ADMIN — CEFTRIAXONE 2000 MG: 2 INJECTION, POWDER, FOR SOLUTION INTRAMUSCULAR; INTRAVENOUS at 20:24

## 2022-05-28 RX ADMIN — POTASSIUM BICARBONATE 40 MEQ: 782 TABLET, EFFERVESCENT ORAL at 20:14

## 2022-05-28 RX ADMIN — Medication 10 ML: at 20:24

## 2022-05-28 RX ADMIN — POTASSIUM CHLORIDE 10 MEQ: 7.46 INJECTION, SOLUTION INTRAVENOUS at 12:36

## 2022-05-28 RX ADMIN — LEVETIRACETAM 500 MG: 5 INJECTION INTRAVENOUS at 08:38

## 2022-05-28 RX ADMIN — THIAMINE HYDROCHLORIDE 100 MG: 100 INJECTION, SOLUTION INTRAMUSCULAR; INTRAVENOUS at 08:38

## 2022-05-28 ASSESSMENT — PAIN SCALES - WONG BAKER
WONGBAKER_NUMERICALRESPONSE: 0

## 2022-05-28 NOTE — FLOWSHEET NOTE
Patient attempts to pull lines and get out of bed despite verbal redirection. Patient remains in soft bilateral wrist and ankle restraints.

## 2022-05-28 NOTE — PROGRESS NOTES
(PRINZIDE;ZESTORETIC) 20-12.5 MG per tablet Take 2 tablets by mouth daily Yes Historical Provider, MD   docusate sodium (COLACE) 100 mg capsule Take 100 mg by mouth 2 times daily as needed for Constipation Yes Historical Provider, MD   apixaban (ELIQUIS) 5 MG TABS tablet Take 1 tablet by mouth 2 times daily  Jolynn Esparza MD   ALPRAZolam (XANAX) 1 MG tablet Take 1 mg by mouth 3 times daily as needed for Anxiety. Historical Provider, MD   omeprazole (PRILOSEC) 40 MG delayed release capsule Take 40 mg by mouth daily  Historical Provider, MD   levothyroxine (SYNTHROID) 50 MCG tablet Take 50 mcg by mouth Six times weekly   Historical Provider, MD       Allergies as of 05/25/2022 - Fully Reviewed 05/25/2022   Allergen Reaction Noted    Other  06/24/2020       Objective:     BP (!) 146/60   Pulse 74   Temp 98.7 °F (37.1 °C) (Axillary)   Resp 20   Ht 5' 4\" (1.626 m)   Wt 160 lb 15 oz (73 kg)   SpO2 99%   BMI 27.62 kg/m²      General appearance: lethargic, appears stated age and uncooperative  Head: Normocephalic, without obvious abnormality, atraumatic  Neck: supple, trachea midline  Lungs: Unlabored  Heart: regular rate and rhythm on monitor  Extremities: no cyanosis or edema  Pulses: 2+ and symmetric  Skin: Large linear abrasion right back    Mental Status: Agitated. In restraints. Oriented to self, year and month. Not to place or situation. Follows simple commands fairly well. Confused.      Cranial Nerves:  I: smell    II: visual acuity     II: visual fields    II: pupils FLAQUITA   III,VII: ptosis None   III,IV,VI: extraocular muscles  EOMI without nystagmus    V: mastication    V: facial light touch sensation     V,VII: corneal reflex     VII: facial muscle function - upper  Normal    VII: facial muscle function - lower Normal    VIII: hearing Normal   IX: soft palate elevation  Normal   IX,X: gag reflex    XI: trapezius strength     XI: sternocleidomastoid strength    XI: neck extension strength     XII: tongue strength  Normal      Motor:  Grossly moves all extremities against gravity equally   Normal tone and bulk   No abnormal movements     Sensory:  Responds to pain in all limbs     Coordination:   Unable to perform due to lack of patient cooperation    Gait:  Deferred for safety    DTR:   No Babinski    No Lowry's     Laboratory/Radiology:     CBC with Differential:    Lab Results   Component Value Date    WBC 13.1 05/28/2022    RBC 3.03 05/28/2022    HGB 8.9 05/28/2022    HCT 27.7 05/28/2022     05/28/2022    MCV 91.4 05/28/2022    MCH 29.4 05/28/2022    MCHC 32.1 05/28/2022    RDW 15.4 05/28/2022    SEGSPCT 67 11/24/2011    LYMPHOPCT 11.0 05/27/2022    MONOPCT 7.9 05/27/2022    BASOPCT 0.1 05/27/2022    MONOSABS 1.02 05/27/2022    LYMPHSABS 1.42 05/27/2022    EOSABS 0.00 05/27/2022    BASOSABS 0.01 05/27/2022     BMP:    Lab Results   Component Value Date     05/28/2022    K 3.0 05/28/2022    K 3.1 05/25/2022     05/28/2022    CO2 23 05/28/2022    BUN 12 05/28/2022    LABALBU 3.1 05/28/2022    LABALBU 3.4 01/23/2011    CREATININE 0.7 05/28/2022    CALCIUM 7.9 05/28/2022    GFRAA >60 05/28/2022    LABGLOM >60 05/28/2022    GLUCOSE 148 05/28/2022    GLUCOSE 132 11/24/2011     TSH:    Lab Results   Component Value Date    TSH 0.599 05/26/2022     VITAMIN B12: 289  AMMONIA 32  Lab Results   Component Value Date    FOLATE 18.9 05/26/2022     MRI Brain  Mild chronic microvascular disease within the periventricular white matter. No acute ischemia. CTH  Generalized atrophy and chronic changes seen within the brain with no acute  intracranial abnormality. EEG- normal     I personally reviewed the patient's lab and imaging studies at this time.     Assessment:     New onset seizure activity     LP planned given fever, leukocytosis and AMS -- r/o meningitis       Plan:     LP planned     Continue Keppra     Will follow     JENA Bonilla  3:43 PM  5/28/2022

## 2022-05-28 NOTE — PROGRESS NOTES
Infectious Disease  Progress Note  [facility]    Chief Complaint: no complaint    Subjective: rule out meningitis    Scheduled Meds:   potassium chloride  10 mEq IntraVENous Q1H    sodium chloride flush  5-40 mL IntraVENous 2 times per day    levETIRAcetam  500 mg IntraVENous Q12H    vitamin B-12  1,000 mcg Oral Daily    insulin lispro  0-6 Units SubCUTAneous TID WC    insulin lispro  0-3 Units SubCUTAneous Nightly    folic acid  1 mg IntraVENous Daily    thiamine  100 mg IntraVENous Daily    vancomycin  1,500 mg IntraVENous Q24H    cefTRIAXone (ROCEPHIN) IV  2,000 mg IntraVENous Q12H    [Held by provider] apixaban  5 mg Oral BID    levothyroxine  50 mcg Oral Daily    pantoprazole  40 mg Oral Daily     Continuous Infusions:   dextrose 5% and 0.45% NaCl with KCl 20 mEq 75 mL/hr at 05/28/22 0956    sodium chloride      dextrose       PRN Meds:sodium chloride flush, sodium chloride, glucose, dextrose bolus **OR** dextrose bolus, glucagon (rDNA), dextrose, LORazepam, docusate sodium, ondansetron **OR** ondansetron, polyethylene glycol, acetaminophen **OR** acetaminophen    Patient Vitals for the past 24 hrs:   BP Temp Temp src Pulse Resp SpO2 Height   05/28/22 1100 (!) 148/77 -- -- 58 22 99 % --   05/28/22 1000 (!) 144/67 -- -- 52 22 99 % --   05/28/22 0900 (!) 144/90 -- -- 77 23 99 % --   05/28/22 0800 (!) 124/107 -- -- 57 19 100 % --   05/28/22 0700 (!) 142/77 -- -- (!) 49 20 99 % --   05/28/22 0600 (!) 140/62 -- -- 53 19 99 % --   05/28/22 0500 (!) 144/67 -- -- 63 24 99 % --   05/28/22 0400 (!) 121/106 98 °F (36.7 °C) Axillary 52 23 98 % --   05/28/22 0300 (!) 148/72 -- -- 58 16 99 % --   05/28/22 0200 133/77 -- -- 62 20 97 % --   05/28/22 0100 (!) 140/82 -- -- 52 19 99 % --   05/28/22 0000 (!) 147/53 97.9 °F (36.6 °C) Axillary 67 20 98 % --   05/27/22 2300 90/60 -- -- (!) 49 20 97 % --   05/27/22 2200 (!) 166/71 -- -- 87 21 96 % --   05/27/22 2100 136/87 -- -- (!) 42 18 98 % --   05/27/22 2000 127/63 98.6 °F (37 °C) Axillary (!) 40 18 97 % --   05/27/22 1900 135/61 -- -- (!) 44 20 98 % --   05/27/22 1800 135/60 -- -- 50 16 99 % --   05/27/22 1700 128/62 -- -- (!) 44 19 -- --   05/27/22 1600 (!) 140/54 98.8 °F (37.1 °C) Oral 72 26 99 % --   05/27/22 1526 -- -- -- -- -- -- 5' 4\" (1.626 m)   05/27/22 1500 121/62 -- -- 61 20 98 % --   05/27/22 1400 120/64 -- -- 60 15 99 % --   05/27/22 1300 (!) 113/49 -- -- (!) 48 16 99 % --   05/27/22 1200 (!) 118/52 99 °F (37.2 °C) Oral (!) 44 17 99 % --       CBC with Differential:    Lab Results   Component Value Date    WBC 13.1 05/28/2022    RBC 3.03 05/28/2022    HGB 8.9 05/28/2022    HCT 27.7 05/28/2022     05/28/2022    MCV 91.4 05/28/2022    MCH 29.4 05/28/2022    MCHC 32.1 05/28/2022    RDW 15.4 05/28/2022    SEGSPCT 67 11/24/2011    LYMPHOPCT 11.0 05/27/2022    MONOPCT 7.9 05/27/2022    BASOPCT 0.1 05/27/2022    MONOSABS 1.02 05/27/2022    LYMPHSABS 1.42 05/27/2022    EOSABS 0.00 05/27/2022    BASOSABS 0.01 05/27/2022     CMP:    Lab Results   Component Value Date     05/28/2022    K 3.0 05/28/2022    K 3.1 05/25/2022     05/28/2022    CO2 23 05/28/2022    BUN 12 05/28/2022    CREATININE 0.7 05/28/2022    GFRAA >60 05/28/2022    LABGLOM >60 05/28/2022    GLUCOSE 148 05/28/2022    GLUCOSE 132 11/24/2011    PROT 6.5 05/28/2022    LABALBU 3.1 05/28/2022    LABALBU 3.4 01/23/2011    CALCIUM 7.9 05/28/2022    BILITOT 0.3 05/28/2022    ALKPHOS 44 05/28/2022    AST 54 05/28/2022    ALT 56 05/28/2022       BP (!) 148/77   Pulse 58   Temp 98 °F (36.7 °C) (Axillary)   Resp 22   Ht 5' 4\" (1.626 m)   Wt 160 lb 15 oz (73 kg)   SpO2 99%   BMI 27.62 kg/m²     Physical Exam  Const/Neuro- unchanged, no signs of acute distress, Alert  ENMT- Within Normal Limits, Normocephalic, mucous membranes pink/moist, No thrush  Neck: Neck supple  Heart- Regular, Rate, Rhythm- no murmur appreciated. Lungs- clear to ascultation. Respirations even and nonlabored.   Abdomen- Soft, bowel sounds positive, non tender  Musculo/Extremities-  Equal and symmetrical, no edema. No tenderness. Skin:  Warm and dry, free from rashes. Cultures reviewed    Radiology reviewed  CT HEAD WO CONTRAST   Final Result   Generalized atrophy and chronic changes seen within the brain with no acute   intracranial abnormality. MRI BRAIN WO CONTRAST   Final Result   Mild chronic microvascular disease within the periventricular white matter. No acute ischemia. RECOMMENDATIONS:   Unavailable         CT Head WO Contrast   Final Result   No acute intracranial abnormality or hemorrhage. Right parieto-occipital scalp hematoma. CT CERVICAL SPINE WO CONTRAST   Final Result   No acute abnormality of the cervical spine. Multilevel degenerative changes and facet arthrosis. CT CHEST WO CONTRAST   Final Result   No acute process identified in the chest.      Incidental cholelithiasis. CT ABDOMEN PELVIS WO CONTRAST Additional Contrast? None   Final Result   1. Cholelithiasis      2. Large right peripelvic renal cortical cyst.  No renal obstruction or   calculus. 3.  Evidence of previous appendectomy. XR CHEST PORTABLE   Final Result   Borderline cardiomegaly. Nothing active.              Assessment  AMS with a fever   Leucocytosis 46629  MRI without contrast No temp lobe involvement   BC pending   Urine culture pending   U/a not Impressive for infection   Be sure to get HSV by PCR in LP  Much more alert and awake        PlanCont rocephin I would add iv vancomycin for now   Await LP results Hold off on acyclovir for now   Technically at her age she should also be on ampicilin but reluctant to add that right now   Check cultures   Baseline ESR, CRP   Monitor labs   Will follow with you  Talked to critcal care team   Maybe tap tomorrow  I talked to neuro today as well           Electronically signed by Hu Duffy MD on 5/28/2022 at 11:13 AM

## 2022-05-28 NOTE — PLAN OF CARE
Problem: Discharge Planning  Goal: Discharge to home or other facility with appropriate resources  Outcome: Progressing     Problem: Pain  Goal: Verbalizes/displays adequate comfort level or baseline comfort level  Outcome: Progressing     Problem: Safety - Medical Restraint  Goal: Remains free of injury from restraints (Restraint for Interference with Medical Device)  Description: INTERVENTIONS:  1. Determine that other, less restrictive measures have been tried or would not be effective before applying the restraint  2. Evaluate the patient's condition at the time of restraint application  3. Inform patient/family regarding the reason for restraint  4. Q2H: Monitor safety, psychosocial status, comfort, nutrition and hydration  5/28/2022 0807 by Tyrone Vargas RN  Outcome: Progressing  5/27/2022 2155 by Carlos Orozco RN  Outcome: Progressing     Problem: Skin/Tissue Integrity  Goal: Absence of new skin breakdown  Description: 1. Monitor for areas of redness and/or skin breakdown  2. Assess vascular access sites hourly  3. Every 4-6 hours minimum:  Change oxygen saturation probe site  4. Every 4-6 hours:  If on nasal continuous positive airway pressure, respiratory therapy assess nares and determine need for appliance change or resting period.   5/28/2022 0807 by Tyrone Vargas RN  Outcome: Progressing  5/27/2022 2155 by Carlos Orozco RN  Outcome: Progressing     Problem: Safety - Adult  Goal: Free from fall injury  5/28/2022 0807 by Tyrone Vargas RN  Outcome: Progressing  5/27/2022 2155 by Carlos Orozco RN  Outcome: Progressing     Problem: Nutrition Deficit:  Goal: Optimize nutritional status  5/28/2022 0807 by Tyrone Vargas RN  Outcome: Progressing  5/27/2022 2155 by Carlos Orozco RN  Outcome: Progressing     Problem: ABCDS Injury Assessment  Goal: Absence of physical injury  5/28/2022 0807 by Tyrone Vargas RN  Outcome: Progressing  5/27/2022 2155 by Carlos Orozco RN  Outcome: Progressing

## 2022-05-28 NOTE — PROGRESS NOTES
[address] [facility]   Department of Internal Medicine   Internal Medicine Residency  MICU Progress Note    Patient:  Andrew Dillon 76 y.o. female   MRN: [id number]       Date of Service: 2022    Allergy: Other    Subjective     Patient was seen and examined this morning. Son at bedside. During morning, more awake and alert. Following commands and making sense when talking. Son also stating he noticed improvement from before. Upon reexamination during rounds, now more disoriented and not making sense. Son again at bedside confirming her mentation change. Planned for lumbar puncture tomorrow or Monday. Objective     TEMPERATURE:  Current - Temp: 97.7 °F (36.5 °C); Max - Temp  Av.2 °F (36.8 °C)  Min: 97.7 °F (36.5 °C)  Max: 98.7 °F (37.1 °C)  RESPIRATIONS RANGE: Resp  Av.9  Min: 16  Max: 24  PULSE RANGE: Pulse  Av  Min: 40  Max: 87  BLOOD PRESSURE RANGE:  Systolic (30HHZ), GSW:502 , Min:90 , XDV:798   ; Diastolic (64IYY), WRX:53, Min:53, Max:151    PULSE OXIMETRY RANGE: SpO2  Av.4 %  Min: 96 %  Max: 100 %    I & O - 24hr:    Intake/Output Summary (Last 24 hours) at 2022 1831  Last data filed at 2022 1800  Gross per 24 hour   Intake 4575.99 ml   Output 2025 ml   Net 2550.99 ml     I/O last 3 completed shifts:   In: 3000 [I.V.:4456; IV Piggyback:120]  Out: 2110 [Urine:2110] I/O this shift:  In: -   Out: 4371 [Urine:1040]   Weight change:        PHYSICAL EXAMINATION:  General appearance - awake, in no acute distress, actively hallucinating, pressured speech  Mental status - confused, disoriented to person, place, and time, uncooperative  Eyes - pupils constricted 1-2mm and reactive, extraocular eye movements intact, sclera anicteric  Chest -  clear to auscultation, no wheezes, rales or rhonchi, symmetric air entry  Heart - bradycardic, regular rhythm, normal S1, S2, no murmurs, rubs, clicks or gallops  Abdomen - soft, nontender, nondistended, no masses or organomegaly  Neurological - awake, not oriented, pressured speech, no focal findings or movement disorder noted, neck supple without rigidity, normal muscle tone, no tremors, could not assess strength  Extremities - peripheral pulses normal, no pedal edema, no clubbing or cyanosis  Skin - normal coloration and turgor, no rashes.                             Medications     Continuous Infusions:   dextrose 5% and 0.45% NaCl with KCl 20 mEq 75 mL/hr at 05/28/22 0956    sodium chloride      dextrose       Scheduled Meds:   sodium chloride flush  5-40 mL IntraVENous 2 times per day    levETIRAcetam  500 mg IntraVENous Q12H    vitamin B-12  1,000 mcg Oral Daily    insulin lispro  0-6 Units SubCUTAneous TID WC    insulin lispro  0-3 Units SubCUTAneous Nightly    folic acid  1 mg IntraVENous Daily    thiamine  100 mg IntraVENous Daily    vancomycin  1,500 mg IntraVENous Q24H    cefTRIAXone (ROCEPHIN) IV  2,000 mg IntraVENous Q12H    [Held by provider] apixaban  5 mg Oral BID    levothyroxine  50 mcg Oral Daily    pantoprazole  40 mg Oral Daily     PRN Meds: sodium chloride flush, sodium chloride, glucose, dextrose bolus **OR** dextrose bolus, glucagon (rDNA), dextrose, LORazepam, docusate sodium, ondansetron **OR** ondansetron, polyethylene glycol, acetaminophen **OR** acetaminophen  Nutrition:   NG/OG tube TF type: Pulmocare/Nephro/Glucerna/Jevity          Labs and Imaging Studies     CBC:   Recent Labs     05/26/22  0552 05/27/22  0641 05/28/22  0518   WBC 10.9 12.9* 13.1*   HGB 9.9* 9.3* 8.9*   HCT 30.4* 28.7* 27.7*   MCV 90.2 91.7 91.4    227 201       BMP:    Recent Labs     05/27/22  1820 05/28/22  0518 05/28/22  1617    140 138   K 3.4* 3.0* 3.4*    103 105   CO2 25 23 25   BUN 17 12 7   CREATININE 0.8 0.7 0.7   GLUCOSE 137* 148* 145*       LIVER PROFILE:   Recent Labs     05/28/22  0518   AST 54*   ALT 56*   BILITOT 0.3   ALKPHOS 44       PT/INR:   Recent Labs     05/28/22  1003 PROTIME 19.2*   INR 1.8       APTT:   No results for input(s): APTT in the last 72 hours. Fasting Lipid Panel:    No results found for: CHOL, TRIG, HDL    Cardiac Enzymes:    Lab Results   Component Value Date    CKTOTAL 840 (H) 05/28/2022    CKTOTAL 872 (H) 05/27/2022    CKTOTAL 502 (H) 05/26/2022    CKMB 5.7 (H) 01/22/2011    CKMB 3.6 01/22/2011    CKMB 2.7 01/22/2011    TROPONINI <0.01 05/15/2020    TROPONINI <0.01 02/12/2020    TROPONINI 0.01 01/22/2011       Notable Cultures:      Blood cultures   Blood Culture, Routine   Date Value Ref Range Status   05/25/2022 24 Hours no growth  Preliminary     Respiratory cultures No results found for: RESPCULTURE   Gram Stain Result   Date Value Ref Range Status   02/12/2020 Refer to ordered Gram stain for results  Final     Urine   Urine Culture, Routine   Date Value Ref Range Status   02/12/2020 Growth not present  Final     Legionella No results found for: LABLEGI  C Diff PCR No results found for: CDIFPCR  Wound culture/abscess: No results for input(s): WNDABS in the last 72 hours. Tip culture:No results for input(s): CXCATHTIP in the last 72 hours. Oxygen: Additional Respiratory Assessments  Heart Rate: 58  Resp: 22  SpO2: 99 %       Urinary Catheter Gonzales-Output (mL): 150 mL (emptied)    Imaging Studies:  CT HEAD WO CONTRAST   Final Result   Generalized atrophy and chronic changes seen within the brain with no acute   intracranial abnormality. MRI BRAIN WO CONTRAST   Final Result   Mild chronic microvascular disease within the periventricular white matter. No acute ischemia. RECOMMENDATIONS:   Unavailable         CT Head WO Contrast   Final Result   No acute intracranial abnormality or hemorrhage. Right parieto-occipital scalp hematoma. CT CERVICAL SPINE WO CONTRAST   Final Result   No acute abnormality of the cervical spine. Multilevel degenerative changes and facet arthrosis.          CT CHEST WO CONTRAST   Final Result   No acute process identified in the chest.      Incidental cholelithiasis. CT ABDOMEN PELVIS WO CONTRAST Additional Contrast? None   Final Result   1. Cholelithiasis      2. Large right peripelvic renal cortical cyst.  No renal obstruction or   calculus. 3.  Evidence of previous appendectomy. XR CHEST PORTABLE   Final Result   Borderline cardiomegaly. Nothing active. Resident's Assessment and Plan     Assessment and Plan:         ASSESSMENT:  1. AMS   · Meningitis vs encephalitis vs seizure disorder vs Benzodiazepine withdrawal vs other metabolic, infectious. · CT head, MRI brain no acute process, chronic atrophy. · Lactic acid 5.1, , Prolactin 20.14 post seizure. · On Vancomycin, rocephin. No acyclovir, no ampicillin. ID, Neuro following. Await LP and cultures. · EEG done. Await neuro recommendations. On Keppra 500 mg bid, prn ativan. · 1mg ativan q4h prn for Anxiety, Irritation, Agitation, Withdrawal.   · Ammonia, electrolytes, TSH, B12 wnl.     2. Atrial fibrillation on chronic anticoagulation therapy. 5mg BID  ·   Hold apixaban pending LP procedure. Resume when able.                                                                      3. Hypothyroidism   · Continue Synthroid 50 mcg.     4.   History of anxiety  · On Xanax 1 mg every 8 hours at home  · UDS + for benzodiazepines.        5. Hypertension  · On Lisinopril- HCTZ at home 20-12.5mg  · Stable      PLAN:  1. Follow cultures. 2. LP to be done soon. Follow up analysis, results etc.   3. Continue vancomycin, rocephin. No acyclovir, ampicillin per ID recs. 4. EEG done. Follow Neurology recommendations. 5. Benzodiazepine withdrawal- 1mg ativan q4h prn for Anxiety, Irritation, Agitation, Withdrawal.  Give ativan 1 mg prn seizures. 6. Afib on apixaban 5mg BID. - Held at the moment pending LP  7. Dextrose 5% NS - 150cc/hr. Monitor renal function. 8. Follow procalcitonin, sed rate.   9. Vitamin B12 1000 mcg daily            # Peptic ulcer prophylaxis: Protonix  # DVT Prophylaxis:  Hold until LP  # Disposition: Cont current care     Nikunj Nesbitt MD, PGY-1    Attending Physician: Dr. Aileen Smith      I personally saw, examined and provided care for the patient. Radiographs, labs and medication list were reviewed by me independently. I spoke with bedside nursing, therapists and consultants. Critical care services and times documented are independent of procedures and multidisciplinary rounds with Residents. Additionally comprehensive, multidisciplinary rounds were conducted with the MICU team. The case was discussed in detail and plans for care were established. Review of Residents documentation was conducted and revisions were made as appropriate. I agree with the above documented exam, problem list and plan of care.   Kayla Alvarado MD   CCT excluding procedures:36'

## 2022-05-28 NOTE — PROGRESS NOTES
Pharmacy Consultation Note  (Antibiotic Dosing and Monitoring)    Initial consult date: 5/27/22  Consulting physician/provider: Dr. Marcos Giron  Drug: Vancomycin  Indication: Empiric/CNS infection    Age/  Gender Height Weight IBW  Allergy Information   77 y.o./female 5' 4\" (162.6 cm) 160 lb 15 oz (73 kg)     Ideal body weight: 54.7 kg (120 lb 9.5 oz)  Adjusted ideal body weight: 62 kg (136 lb 11.7 oz)   Other      Renal Function:  Recent Labs     05/27/22  0641 05/27/22  1820 05/28/22  0518   BUN 19 17 12   CREATININE 0.9 0.8 0.7       Intake/Output Summary (Last 24 hours) at 5/28/2022 0730  Last data filed at 5/28/2022 0600  Gross per 24 hour   Intake 4575.99 ml   Output 1350 ml   Net 3225.99 ml       Vancomycin Monitoring:  Trough:  No results for input(s): VANCOTROUGH in the last 72 hours. Random:  No results for input(s): VANCORANDOM in the last 72 hours. Vancomycin Administration Times:  Recent vancomycin administrations                   vancomycin 1500 mg in dextrose 5% 300 mL IVPB (mg) 1,500 mg New Bag 05/27/22 1448    vancomycin (VANCOCIN) intermittent dosing (placeholder) ()  Given 05/27/22 1345              Assessment:  · Patient is a 76 y.o. female who has been initiated on vancomycin  Estimated Creatinine Clearance: 64 mL/min (based on SCr of 0.7 mg/dL).     Plan:  · Will continue vancomycin 1500 mg IV every 24 hours  · Will check vancomycin levels when appropriate  · Will continue to monitor renal function   · Clinical pharmacy to follow      Mello VieraD   Pharmacy Resident   Phone: [phone]  5/28/2022 7:32 AM

## 2022-05-28 NOTE — FLOWSHEET NOTE
Patient attempts to pull lines and tubes when not restrained. Patient also attempts to get out of bed and throw legs over side rails. Unable to be verbally redirected. Patient remains in bilateral soft wrist and ankle restraints.

## 2022-05-28 NOTE — PROGRESS NOTES
Hospital Medicine    Subjective:  Pt seen in micu family members at bedside pt alert confused restrained currently npo      Current Facility-Administered Medications:     potassium chloride 10 mEq/100 mL IVPB (Peripheral Line), 10 mEq, IntraVENous, Q1H, Aditya Lyn MD, Last Rate: 100 mL/hr at 05/28/22 0856, 10 mEq at 05/28/22 0856    dextrose 5 % and 0.45 % NaCl with KCl 20 mEq infusion, , IntraVENous, Continuous, Justus Sorto DO    levETIRAcetam (KEPPRA) 500 mg/100 mL IVPB, 500 mg, IntraVENous, Q12H, Dot Prom, APRN - CNP, Stopped at 05/28/22 1539    vitamin B-12 (CYANOCOBALAMIN) tablet 1,000 mcg, 1,000 mcg, Oral, Daily, Dot Prom, APRN - CNP    insulin lispro (HUMALOG) injection vial 0-6 Units, 0-6 Units, SubCUTAneous, TID WC, Beti Wooten MD    insulin lispro (HUMALOG) injection vial 0-3 Units, 0-3 Units, SubCUTAneous, Nightly, Beti Wooten MD    glucose chewable tablet 16 g, 4 tablet, Oral, PRN, Beti Wooten MD    dextrose bolus 10% 125 mL, 125 mL, IntraVENous, PRN **OR** dextrose bolus 10% 250 mL, 250 mL, IntraVENous, PRN, Beti Wooten MD    glucagon (rDNA) injection 1 mg, 1 mg, IntraMUSCular, PRN, Beti Wooten MD    dextrose 5 % solution, 100 mL/hr, IntraVENous, PRN, Beti Wooten MD    folic acid injection 1 mg, 1 mg, IntraVENous, Daily, Beti Wooten MD, 1 mg at 05/28/22 7168    thiamine (B-1) injection 100 mg, 100 mg, IntraVENous, Daily, Beti Wooten MD, 100 mg at 05/28/22 0838    LORazepam (ATIVAN) injection 1 mg, 1 mg, IntraVENous, Q4H PRN, Btei Wooten MD, 1 mg at 05/28/22 0231    vancomycin 1500 mg in dextrose 5% 300 mL IVPB, 1,500 mg, IntraVENous, Q24H, Beti Wooten MD    cefTRIAXone (ROCEPHIN) 2,000 mg in sterile water 20 mL IV syringe, 2,000 mg, IntraVENous, Q12H, Beti Wooten MD, 2,000 mg at 05/28/22 0838    sodium chloride flush 0.9 % injection 5-40 mL, 5-40 mL, IntraVENous, 2 times per day, Adrian Sommers, 05/28/2022    K 3.1 05/25/2022     05/28/2022    CO2 23 05/28/2022    BUN 12 05/28/2022    CREATININE 0.7 05/28/2022    GFRAA >60 05/28/2022    LABGLOM >60 05/28/2022    GLUCOSE 148 05/28/2022    GLUCOSE 132 11/24/2011    PROT 6.5 05/28/2022    LABALBU 3.1 05/28/2022    LABALBU 3.4 01/23/2011    CALCIUM 7.9 05/28/2022    BILITOT 0.3 05/28/2022    ALKPHOS 44 05/28/2022    AST 54 05/28/2022    ALT 56 05/28/2022     Warfarin PT/INR:    Lab Results   Component Value Date    INR 1.5 05/25/2022    INR 0.9 07/06/2020    PROTIME 16.1 (H) 05/25/2022    PROTIME 11.0 07/06/2020       Assessment:    Principal Problem:    Stroke-like symptoms  Active Problems: Moderate protein-calorie malnutrition ([address]  Resolved Problems:    * No resolved hospital problems. *      Plan:   Iv fluids while npo swallow eval pt/ot discussed case with family in detail        Ira Duran DO  9:49 AM  5/28/2022

## 2022-05-28 NOTE — PLAN OF CARE
Problem: Safety - Medical Restraint  Goal: Remains free of injury from restraints (Restraint for Interference with Medical Device)  Description: INTERVENTIONS:  1. Determine that other, less restrictive measures have been tried or would not be effective before applying the restraint  2. Evaluate the patient's condition at the time of restraint application  3. Inform patient/family regarding the reason for restraint  4. Q2H: Monitor safety, psychosocial status, comfort, nutrition and hydration  Outcome: Progressing     Problem: Skin/Tissue Integrity  Goal: Absence of new skin breakdown  Description: 1. Monitor for areas of redness and/or skin breakdown  2. Assess vascular access sites hourly  3. Every 4-6 hours minimum:  Change oxygen saturation probe site  4. Every 4-6 hours:  If on nasal continuous positive airway pressure, respiratory therapy assess nares and determine need for appliance change or resting period.   Outcome: Progressing     Problem: Safety - Adult  Goal: Free from fall injury  Outcome: Progressing     Problem: Nutrition Deficit:  Goal: Optimize nutritional status  Outcome: Progressing     Problem: ABCDS Injury Assessment  Goal: Absence of physical injury  Outcome: Progressing

## 2022-05-29 ENCOUNTER — APPOINTMENT (OUTPATIENT)
Dept: GENERAL RADIOLOGY | Age: 77
DRG: 871 | End: 2022-05-29
Payer: MEDICARE

## 2022-05-29 LAB
ALBUMIN SERPL-MCNC: 3 G/DL (ref 3.5–5.2)
ALP BLD-CCNC: 49 U/L (ref 35–104)
ALT SERPL-CCNC: 58 U/L (ref 0–32)
ANION GAP SERPL CALCULATED.3IONS-SCNC: 11 MMOL/L (ref 7–16)
AST SERPL-CCNC: 50 U/L (ref 0–31)
BILIRUB SERPL-MCNC: 0.3 MG/DL (ref 0–1.2)
BUN BLDV-MCNC: 4 MG/DL (ref 6–23)
CALCIUM SERPL-MCNC: 7.9 MG/DL (ref 8.6–10.2)
CHLORIDE BLD-SCNC: 105 MMOL/L (ref 98–107)
CO2: 24 MMOL/L (ref 22–29)
CREAT SERPL-MCNC: 0.7 MG/DL (ref 0.5–1)
GFR AFRICAN AMERICAN: >60
GFR NON-AFRICAN AMERICAN: >60 ML/MIN/1.73
GLUCOSE BLD-MCNC: 138 MG/DL (ref 74–99)
HCT VFR BLD CALC: 27.8 % (ref 34–48)
HEMOGLOBIN: 8.8 G/DL (ref 11.5–15.5)
INR BLD: 1.9
MAGNESIUM: 2.1 MG/DL (ref 1.6–2.6)
MCH RBC QN AUTO: 28.9 PG (ref 26–35)
MCHC RBC AUTO-ENTMCNC: 31.7 % (ref 32–34.5)
MCV RBC AUTO: 91.1 FL (ref 80–99.9)
METER GLUCOSE: 116 MG/DL (ref 74–99)
METER GLUCOSE: 128 MG/DL (ref 74–99)
METER GLUCOSE: 132 MG/DL (ref 74–99)
METER GLUCOSE: 134 MG/DL (ref 74–99)
PDW BLD-RTO: 14.9 FL (ref 11.5–15)
PLATELET # BLD: 222 E9/L (ref 130–450)
PMV BLD AUTO: 9.9 FL (ref 7–12)
POTASSIUM SERPL-SCNC: 3.7 MMOL/L (ref 3.5–5)
PROTHROMBIN TIME: 21.3 SEC (ref 9.3–12.4)
RBC # BLD: 3.05 E12/L (ref 3.5–5.5)
SODIUM BLD-SCNC: 140 MMOL/L (ref 132–146)
TOTAL CK: 450 U/L (ref 20–180)
TOTAL PROTEIN: 6.4 G/DL (ref 6.4–8.3)
WBC # BLD: 8.1 E9/L (ref 4.5–11.5)

## 2022-05-29 PROCEDURE — 6360000002 HC RX W HCPCS: Performed by: INTERNAL MEDICINE

## 2022-05-29 PROCEDURE — 2000000000 HC ICU R&B

## 2022-05-29 PROCEDURE — 6360000002 HC RX W HCPCS: Performed by: STUDENT IN AN ORGANIZED HEALTH CARE EDUCATION/TRAINING PROGRAM

## 2022-05-29 PROCEDURE — 2580000003 HC RX 258: Performed by: INTERNAL MEDICINE

## 2022-05-29 PROCEDURE — 51701 INSERT BLADDER CATHETER: CPT

## 2022-05-29 PROCEDURE — 6370000000 HC RX 637 (ALT 250 FOR IP): Performed by: INTERNAL MEDICINE

## 2022-05-29 PROCEDURE — 92610 EVALUATE SWALLOWING FUNCTION: CPT

## 2022-05-29 PROCEDURE — 82550 ASSAY OF CK (CPK): CPT

## 2022-05-29 PROCEDURE — 71045 X-RAY EXAM CHEST 1 VIEW: CPT

## 2022-05-29 PROCEDURE — 6360000002 HC RX W HCPCS

## 2022-05-29 PROCEDURE — 51798 US URINE CAPACITY MEASURE: CPT

## 2022-05-29 PROCEDURE — 2500000003 HC RX 250 WO HCPCS: Performed by: STUDENT IN AN ORGANIZED HEALTH CARE EDUCATION/TRAINING PROGRAM

## 2022-05-29 PROCEDURE — 82962 GLUCOSE BLOOD TEST: CPT

## 2022-05-29 PROCEDURE — 2580000003 HC RX 258: Performed by: STUDENT IN AN ORGANIZED HEALTH CARE EDUCATION/TRAINING PROGRAM

## 2022-05-29 PROCEDURE — 36415 COLL VENOUS BLD VENIPUNCTURE: CPT

## 2022-05-29 PROCEDURE — 2700000000 HC OXYGEN THERAPY PER DAY

## 2022-05-29 PROCEDURE — 51702 INSERT TEMP BLADDER CATH: CPT

## 2022-05-29 PROCEDURE — 80053 COMPREHEN METABOLIC PANEL: CPT

## 2022-05-29 PROCEDURE — 85610 PROTHROMBIN TIME: CPT

## 2022-05-29 PROCEDURE — 87088 URINE BACTERIA CULTURE: CPT

## 2022-05-29 PROCEDURE — 85027 COMPLETE CBC AUTOMATED: CPT

## 2022-05-29 PROCEDURE — 6370000000 HC RX 637 (ALT 250 FOR IP)

## 2022-05-29 PROCEDURE — 83735 ASSAY OF MAGNESIUM: CPT

## 2022-05-29 PROCEDURE — 2500000003 HC RX 250 WO HCPCS: Performed by: INTERNAL MEDICINE

## 2022-05-29 RX ORDER — POTASSIUM CHLORIDE 7.45 MG/ML
10 INJECTION INTRAVENOUS
Status: DISPENSED | OUTPATIENT
Start: 2022-05-29 | End: 2022-05-29

## 2022-05-29 RX ADMIN — CEFTRIAXONE 2000 MG: 2 INJECTION, POWDER, FOR SOLUTION INTRAMUSCULAR; INTRAVENOUS at 11:20

## 2022-05-29 RX ADMIN — LEVETIRACETAM 500 MG: 5 INJECTION INTRAVENOUS at 21:42

## 2022-05-29 RX ADMIN — SODIUM CHLORIDE: 9 INJECTION, SOLUTION INTRAVENOUS at 21:39

## 2022-05-29 RX ADMIN — LEVETIRACETAM 500 MG: 5 INJECTION INTRAVENOUS at 09:02

## 2022-05-29 RX ADMIN — PHYTONADIONE 10 MG: 10 INJECTION, EMULSION INTRAMUSCULAR; INTRAVENOUS; SUBCUTANEOUS at 12:44

## 2022-05-29 RX ADMIN — POTASSIUM CHLORIDE 10 MEQ: 7.46 INJECTION, SOLUTION INTRAVENOUS at 08:53

## 2022-05-29 RX ADMIN — CEFTRIAXONE 2000 MG: 2 INJECTION, POWDER, FOR SOLUTION INTRAMUSCULAR; INTRAVENOUS at 21:47

## 2022-05-29 RX ADMIN — DOCUSATE SODIUM 100 MG: 100 CAPSULE, LIQUID FILLED ORAL at 11:13

## 2022-05-29 RX ADMIN — POTASSIUM CHLORIDE, DEXTROSE MONOHYDRATE AND SODIUM CHLORIDE: 150; 5; 450 INJECTION, SOLUTION INTRAVENOUS at 12:51

## 2022-05-29 RX ADMIN — LEVOTHYROXINE SODIUM 50 MCG: 0.05 TABLET ORAL at 06:24

## 2022-05-29 RX ADMIN — CYANOCOBALAMIN TAB 1000 MCG 1000 MCG: 1000 TAB at 11:13

## 2022-05-29 RX ADMIN — PANTOPRAZOLE SODIUM 40 MG: 40 TABLET, DELAYED RELEASE ORAL at 11:13

## 2022-05-29 RX ADMIN — Medication 10 ML: at 09:13

## 2022-05-29 RX ADMIN — Medication 1500 MG: at 14:43

## 2022-05-29 RX ADMIN — POTASSIUM CHLORIDE 10 MEQ: 7.46 INJECTION, SOLUTION INTRAVENOUS at 06:50

## 2022-05-29 RX ADMIN — THIAMINE HYDROCHLORIDE 100 MG: 100 INJECTION, SOLUTION INTRAMUSCULAR; INTRAVENOUS at 09:11

## 2022-05-29 RX ADMIN — FOLIC ACID 1 MG: 5 INJECTION, SOLUTION INTRAMUSCULAR; INTRAVENOUS; SUBCUTANEOUS at 09:12

## 2022-05-29 ASSESSMENT — PAIN SCALES - GENERAL
PAINLEVEL_OUTOF10: 0

## 2022-05-29 ASSESSMENT — PAIN SCALES - WONG BAKER
WONGBAKER_NUMERICALRESPONSE: 0

## 2022-05-29 NOTE — PLAN OF CARE
Problem: Discharge Planning  Goal: Discharge to home or other facility with appropriate resources  Outcome: Progressing     Problem: Pain  Goal: Verbalizes/displays adequate comfort level or baseline comfort level  Outcome: Progressing     Problem: Safety - Medical Restraint  Goal: Remains free of injury from restraints (Restraint for Interference with Medical Device)  Description: INTERVENTIONS:  1. Determine that other, less restrictive measures have been tried or would not be effective before applying the restraint  2. Evaluate the patient's condition at the time of restraint application  3. Inform patient/family regarding the reason for restraint  4. Q2H: Monitor safety, psychosocial status, comfort, nutrition and hydration  Outcome: Not Progressing     Problem: Skin/Tissue Integrity  Goal: Absence of new skin breakdown  Description: 1. Monitor for areas of redness and/or skin breakdown  2. Assess vascular access sites hourly  3. Every 4-6 hours minimum:  Change oxygen saturation probe site  4. Every 4-6 hours:  If on nasal continuous positive airway pressure, respiratory therapy assess nares and determine need for appliance change or resting period.   Outcome: Progressing     Problem: Safety - Adult  Goal: Free from fall injury  Outcome: Progressing     Problem: Nutrition Deficit:  Goal: Optimize nutritional status  Outcome: Progressing     Problem: Nutrition Deficit:  Goal: Optimize nutritional status  Outcome: Progressing     Problem: ABCDS Injury Assessment  Goal: Absence of physical injury  Outcome: Progressing

## 2022-05-29 NOTE — PROGRESS NOTES
[address] [facility]   Department of Internal Medicine   Internal Medicine Residency  MICU Progress Note    Patient:  Yeimi Carpenter 76 y.o. female   MRN: [id number]       Date of Service: 2022    Allergy: Other    Subjective     Patient was seen and examined this morning. Son at bedside. Afebrile, leukocytosis imrpoving. Mentation seems to be imrpoving. INR today 1.9, Given Vit K, planned for LP on Monday. Objective     TEMPERATURE:  Current - Temp: 97.7 °F (36.5 °C); Max - Temp  Av.8 °F (36.6 °C)  Min: 97.7 °F (36.5 °C)  Max: 98 °F (36.7 °C)  RESPIRATIONS RANGE: Resp  Av.1  Min: 13  Max: 33  PULSE RANGE: Pulse  Av.2  Min: 54  Max: 118  BLOOD PRESSURE RANGE:  Systolic (60VAR), YCS:417 , Min:137 , DNH:441   ; Diastolic (05RZA), TBF:34, Min:67, Max:115    PULSE OXIMETRY RANGE: SpO2  Av.4 %  Min: 89 %  Max: 99 %    I & O - 24hr:    Intake/Output Summary (Last 24 hours) at 2022 1408  Last data filed at 2022 1100  Gross per 24 hour   Intake 2718.95 ml   Output 2830 ml   Net -111.05 ml     I/O last 3 completed shifts:   In: 7294.9 [I.V.:6386.1; IV Piggyback:908.9]  Out: 5991 [Urine:3550] I/O this shift:  In: -   Out: 725 [Urine:725]   Weight change:        PHYSICAL EXAMINATION:  General appearance - awake, in no acute distress, actively hallucinating, pressured speech  Mental status - confused, disoriented to person, place, and time, uncooperative  Eyes - pupils constricted 1-2mm and reactive, extraocular eye movements intact, sclera anicteric  Chest -  clear to auscultation, no wheezes, rales or rhonchi, symmetric air entry  Heart - bradycardic, regular rhythm, normal S1, S2, no murmurs, rubs, clicks or gallops  Abdomen - soft, nontender, nondistended, no masses or organomegaly  Neurological - awake, not oriented, pressured speech, no focal findings or movement disorder noted, neck supple without rigidity, normal muscle tone, no tremors, could not assess strength  Extremities - peripheral pulses normal, no pedal edema, no clubbing or cyanosis  Skin - normal coloration and turgor, no rashes. Medications     Continuous Infusions:   dextrose 5% and 0.45% NaCl with KCl 20 mEq 75 mL/hr at 05/29/22 1251    sodium chloride      dextrose       Scheduled Meds:   sodium chloride flush  5-40 mL IntraVENous 2 times per day    levETIRAcetam  500 mg IntraVENous Q12H    vitamin B-12  1,000 mcg Oral Daily    insulin lispro  0-6 Units SubCUTAneous TID WC    insulin lispro  0-3 Units SubCUTAneous Nightly    folic acid  1 mg IntraVENous Daily    thiamine  100 mg IntraVENous Daily    vancomycin  1,500 mg IntraVENous Q24H    cefTRIAXone (ROCEPHIN) IV  2,000 mg IntraVENous Q12H    [Held by provider] apixaban  5 mg Oral BID    levothyroxine  50 mcg Oral Daily    pantoprazole  40 mg Oral Daily     PRN Meds: sodium chloride flush, sodium chloride, glucose, dextrose bolus **OR** dextrose bolus, glucagon (rDNA), dextrose, LORazepam, docusate sodium, ondansetron **OR** ondansetron, polyethylene glycol, acetaminophen **OR** acetaminophen  Nutrition:   NG/OG tube TF type: Pulmocare/Nephro/Glucerna/Jevity          Labs and Imaging Studies     CBC:   Recent Labs     05/27/22  0641 05/28/22  0518 05/29/22 0426   WBC 12.9* 13.1* 8.1   HGB 9.3* 8.9* 8.8*   HCT 28.7* 27.7* 27.8*   MCV 91.7 91.4 91.1    201 222       BMP:    Recent Labs     05/28/22  0518 05/28/22  1617 05/29/22  0426    138 140   K 3.0* 3.4* 3.7    105 105   CO2 23 25 24   BUN 12 7 4*   CREATININE 0.7 0.7 0.7   GLUCOSE 148* 145* 138*       LIVER PROFILE:   Recent Labs     05/28/22  0518 05/29/22  0426   AST 54* 50*   ALT 56* 58*   BILITOT 0.3 0.3   ALKPHOS 44 49       PT/INR:   Recent Labs     05/28/22  1003 05/29/22  0426   PROTIME 19.2* 21.3*   INR 1.8 1.9       APTT:   No results for input(s): APTT in the last 72 hours.     Fasting Lipid Panel:    No results found for: CHOL, TRIG, HDL    Cardiac Enzymes:    Lab Results   Component Value Date    CKTOTAL 450 (H) 05/29/2022    CKTOTAL 840 (H) 05/28/2022    CKTOTAL 872 (H) 05/27/2022    CKMB 5.7 (H) 01/22/2011    CKMB 3.6 01/22/2011    CKMB 2.7 01/22/2011    TROPONINI <0.01 05/15/2020    TROPONINI <0.01 02/12/2020    TROPONINI 0.01 01/22/2011       Notable Cultures:      Blood cultures   Blood Culture, Routine   Date Value Ref Range Status   05/25/2022 24 Hours no growth  Preliminary     Respiratory cultures No results found for: RESPCULTURE   Gram Stain Result   Date Value Ref Range Status   02/12/2020 Refer to ordered Gram stain for results  Final     Urine   Urine Culture, Routine   Date Value Ref Range Status   05/27/2022 Growth not present, incubation continues  Preliminary     Legionella No results found for: LABLEGI  C Diff PCR No results found for: CDIFPCR  Wound culture/abscess: No results for input(s): WNDABS in the last 72 hours. Tip culture:No results for input(s): CXCATHTIP in the last 72 hours. Oxygen: Additional Respiratory Assessments  Heart Rate: 65  Resp: 20  SpO2: (!) 89 %       Urinary Catheter Gonzales-Output (mL): 200 mL    Imaging Studies:  XR CHEST PORTABLE   Final Result   Increased volume loss in the left lower chest compatible with worsening   atelectasis or pneumonia. CT HEAD WO CONTRAST   Final Result   Generalized atrophy and chronic changes seen within the brain with no acute   intracranial abnormality. MRI BRAIN WO CONTRAST   Final Result   Mild chronic microvascular disease within the periventricular white matter. No acute ischemia. RECOMMENDATIONS:   Unavailable         CT Head WO Contrast   Final Result   No acute intracranial abnormality or hemorrhage. Right parieto-occipital scalp hematoma. CT CERVICAL SPINE WO CONTRAST   Final Result   No acute abnormality of the cervical spine. Multilevel degenerative changes and facet arthrosis.          CT CHEST WO CONTRAST   Final Result   No acute process identified in the chest.      Incidental cholelithiasis. CT ABDOMEN PELVIS WO CONTRAST Additional Contrast? None   Final Result   1. Cholelithiasis      2. Large right peripelvic renal cortical cyst.  No renal obstruction or   calculus. 3.  Evidence of previous appendectomy. XR CHEST PORTABLE   Final Result   Borderline cardiomegaly. Nothing active. Resident's Assessment and Plan     Assessment and Plan:         ASSESSMENT:  1. AMS   · Meningitis vs encephalitis vs seizure disorder vs Benzodiazepine withdrawal vs other metabolic, infectious. · CT head, MRI brain no acute process, chronic atrophy. · Lactic acid 5.1, , Prolactin 20.14 post seizure. · On Vancomycin, rocephin. No acyclovir, no ampicillin. ID, Neuro following. Await LP and cultures. · EEG done. Await neuro recommendations. On Keppra 500 mg bid, prn ativan. · 1mg ativan q4h prn for Anxiety, Irritation, Agitation, Withdrawal.   · Ammonia, electrolytes, TSH, B12 wnl.     2. Atrial fibrillation on chronic anticoagulation therapy. 5mg BID  ·   Hold apixaban pending LP procedure. Resume when able.                                                                      3. Hypothyroidism   · Continue Synthroid 50 mcg.     4.   History of anxiety  · On Xanax 1 mg every 8 hours at home  · UDS + for benzodiazepines.        5. Hypertension  · On Lisinopril- HCTZ at home 20-12.5mg  · Stable      PLAN:  1. Follow cultures. 2. LP to be done Monday. Follow up analysis, results etc.   3. Continue vancomycin, rocephin. No acyclovir, ampicillin per ID recs. 4. EEG done. Follow Neurology recommendations. 5. Benzodiazepine withdrawal- 1mg ativan q4h prn for Anxiety, Irritation, Agitation, Withdrawal.  Give ativan 1 mg prn seizures. 6. Afib on apixaban 5mg BID. - Held at the moment pending LP  7. Dextrose 5% NS - 150cc/hr. Monitor renal function.    8. Follow procalcitonin, sed rate. 9. Vitamin B12 1000 mcg daily  10. Started on Clear liquid diet. Adv as toleraed. 11. Vit K for Goal INR <1.5 for LP          # Peptic ulcer prophylaxis: Protonix  # DVT Prophylaxis:  Hold until LP  # Disposition: Cont current care     Yuliya Valadez MD, PGY-1    Attending Physician: Dr. Cristopher Otero      I personally saw, examined and provided care for the patient. Radiographs, labs and medication list were reviewed by me independently. I spoke with bedside nursing, therapists and consultants. Critical care services and times documented are independent of procedures and multidisciplinary rounds with Residents. Additionally comprehensive, multidisciplinary rounds were conducted with the MICU team. The case was discussed in detail and plans for care were established. Review of Residents documentation was conducted and revisions were made as appropriate. I agree with the above documented exam, problem list and plan of care.   Wilmer Harris MD   CCT excluding procedures:36'

## 2022-05-29 NOTE — PROGRESS NOTES
Hospital Medicine    Subjective:  Pt more alert today conversive      Current Facility-Administered Medications:     potassium chloride 10 mEq/100 mL IVPB (Peripheral Line), 10 mEq, IntraVENous, Q1H, Taylor Ocasio MD, Last Rate: 100 mL/hr at 05/29/22 0650, 10 mEq at 05/29/22 0650    dextrose 5 % and 0.45 % NaCl with KCl 20 mEq infusion, , IntraVENous, Continuous, Mishel Sorto DO, Last Rate: 75 mL/hr at 05/29/22 0600, Rate Verify at 05/29/22 0600    sodium chloride flush 0.9 % injection 5-40 mL, 5-40 mL, IntraVENous, 2 times per day, Taylor Ocasio MD, 10 mL at 05/28/22 2024    sodium chloride flush 0.9 % injection 5-40 mL, 5-40 mL, IntraVENous, PRN, Taylor Ocasio MD    0.9 % sodium chloride infusion, , IntraVENous, PRN, Taylor Ocasio MD    levETIRAcetam (KEPPRA) 500 mg/100 mL IVPB, 500 mg, IntraVENous, Q12H, CHERRIE Brumfield CNP, Stopped at 05/28/22 2019    vitamin B-12 (CYANOCOBALAMIN) tablet 1,000 mcg, 1,000 mcg, Oral, Daily, CHERRIE Brumfield CNP    insulin lispro (HUMALOG) injection vial 0-6 Units, 0-6 Units, SubCUTAneous, TID WC, Travis John MD    insulin lispro (HUMALOG) injection vial 0-3 Units, 0-3 Units, SubCUTAneous, Nightly, Travis John MD    glucose chewable tablet 16 g, 4 tablet, Oral, PRN, Travis John MD    dextrose bolus 10% 125 mL, 125 mL, IntraVENous, PRN **OR** dextrose bolus 10% 250 mL, 250 mL, IntraVENous, PRN, Travis John MD    glucagon (rDNA) injection 1 mg, 1 mg, IntraMUSCular, PRN, Travis John MD    dextrose 5 % solution, 100 mL/hr, IntraVENous, PRN, Travis John MD    folic acid injection 1 mg, 1 mg, IntraVENous, Daily, Travis John MD, 1 mg at 05/28/22 3168    thiamine (B-1) injection 100 mg, 100 mg, IntraVENous, Daily, Travis John MD, 100 mg at 05/28/22 0838    LORazepam (ATIVAN) injection 1 mg, 1 mg, IntraVENous, Q4H PRN, Travis John MD, 1 mg at 05/28/22 0231    vancomycin 1500 mg in dextrose 5% 300 mL IVPB, 1,500 mg, IntraVENous, Q24H, Norma Nayak MD, Stopped at 05/28/22 1521    cefTRIAXone (ROCEPHIN) 2,000 mg in sterile water 20 mL IV syringe, 2,000 mg, IntraVENous, Q12H, Norma Nayak MD, 2,000 mg at 05/28/22 2024    [Held by provider] apixaban (ELIQUIS) tablet 5 mg, 5 mg, Oral, BID, Betzy Man, DO, 5 mg at 05/26/22 2016    docusate sodium (COLACE) capsule 100 mg, 100 mg, Oral, BID PRN, Betzy Man, DO    levothyroxine (SYNTHROID) tablet 50 mcg, 50 mcg, Oral, Daily, Betzy Man DO, 50 mcg at 05/29/22 5770    pantoprazole (PROTONIX) tablet 40 mg, 40 mg, Oral, Daily, Betzy Man, DO    ondansetron (ZOFRAN-ODT) disintegrating tablet 4 mg, 4 mg, Oral, Q8H PRN **OR** ondansetron (ZOFRAN) injection 4 mg, 4 mg, IntraVENous, Q6H PRN, Betzy Man, DO, 4 mg at 05/26/22 1103    polyethylene glycol (GLYCOLAX) packet 17 g, 17 g, Oral, Daily PRN, Betzy Man, DO    acetaminophen (TYLENOL) tablet 650 mg, 650 mg, Oral, Q6H PRN, 650 mg at 05/25/22 2350 **OR** acetaminophen (TYLENOL) suppository 650 mg, 650 mg, Rectal, Q6H PRN, Betzy Man DO    Objective:    BP (!) 154/75   Pulse 60   Temp 97.7 °F (36.5 °C) (Temporal)   Resp 23   Ht 5' 4\" (1.626 m)   Wt 160 lb 15 oz (73 kg)   SpO2 97%   BMI 27.62 kg/m²     Heart:  reg  Lungs:  ctab  Abd: + bs soft nontender  Extrem:  Min edema legs    CBC with Differential:    Lab Results   Component Value Date    WBC 8.1 05/29/2022    RBC 3.05 05/29/2022    HGB 8.8 05/29/2022    HCT 27.8 05/29/2022     05/29/2022    MCV 91.1 05/29/2022    MCH 28.9 05/29/2022    MCHC 31.7 05/29/2022    RDW 14.9 05/29/2022    SEGSPCT 67 11/24/2011    LYMPHOPCT 11.0 05/27/2022    MONOPCT 7.9 05/27/2022    BASOPCT 0.1 05/27/2022    MONOSABS 1.02 05/27/2022    LYMPHSABS 1.42 05/27/2022    EOSABS 0.00 05/27/2022    BASOSABS 0.01 05/27/2022     CMP:    Lab Results   Component Value Date     05/29/2022    K 3.7 05/29/2022    K 3.4 05/28/2022  05/29/2022    CO2 24 05/29/2022    BUN 4 05/29/2022    CREATININE 0.7 05/29/2022    GFRAA >60 05/29/2022    LABGLOM >60 05/29/2022    GLUCOSE 138 05/29/2022    GLUCOSE 132 11/24/2011    PROT 6.4 05/29/2022    LABALBU 3.0 05/29/2022    LABALBU 3.4 01/23/2011    CALCIUM 7.9 05/29/2022    BILITOT 0.3 05/29/2022    ALKPHOS 49 05/29/2022    AST 50 05/29/2022    ALT 58 05/29/2022     Warfarin PT/INR:    Lab Results   Component Value Date    INR 1.9 05/29/2022    INR 1.8 05/28/2022    INR 1.5 05/25/2022    PROTIME 21.3 (H) 05/29/2022    PROTIME 19.2 (H) 05/28/2022    PROTIME 16.1 (H) 05/25/2022       Assessment:    Principal Problem:    Stroke-like symptoms  Active Problems: Moderate protein-calorie malnutrition ([facility] [address]  Resolved Problems:    * No resolved hospital problems.  *      Plan:  Bedside swallow / formal swallow eval when speech available cont atb per id        Blanche Dyer DO  8:01 AM  5/29/2022

## 2022-05-29 NOTE — PROGRESS NOTES
Infectious Disease  Progress Note  [facility]    Chief Complaint: no complaint    Subjective: rule out meningitis    Scheduled Meds:   phytonadione (VITAMIN K)  IVPB  10 mg IntraVENous Once    sodium chloride flush  5-40 mL IntraVENous 2 times per day    levETIRAcetam  500 mg IntraVENous Q12H    vitamin B-12  1,000 mcg Oral Daily    insulin lispro  0-6 Units SubCUTAneous TID WC    insulin lispro  0-3 Units SubCUTAneous Nightly    folic acid  1 mg IntraVENous Daily    thiamine  100 mg IntraVENous Daily    vancomycin  1,500 mg IntraVENous Q24H    cefTRIAXone (ROCEPHIN) IV  2,000 mg IntraVENous Q12H    [Held by provider] apixaban  5 mg Oral BID    levothyroxine  50 mcg Oral Daily    pantoprazole  40 mg Oral Daily     Continuous Infusions:   dextrose 5% and 0.45% NaCl with KCl 20 mEq 75 mL/hr at 05/29/22 0600    sodium chloride      dextrose       PRN Meds:sodium chloride flush, sodium chloride, glucose, dextrose bolus **OR** dextrose bolus, glucagon (rDNA), dextrose, LORazepam, docusate sodium, ondansetron **OR** ondansetron, polyethylene glycol, acetaminophen **OR** acetaminophen    Patient Vitals for the past 24 hrs:   BP Temp Temp src Pulse Resp SpO2   05/29/22 0600 (!) 154/75 -- -- 60 23 97 %   05/29/22 0500 (!) 150/86 -- -- 58 19 97 %   05/29/22 0400 (!) 157/79 97.7 °F (36.5 °C) Temporal 54 18 98 %   05/29/22 0300 (!) 155/77 -- -- 56 18 97 %   05/29/22 0200 (!) 155/73 -- -- 63 18 97 %   05/29/22 0154 -- -- -- 60 18 97 %   05/29/22 0100 (!) 147/77 -- -- 60 18 96 %   05/29/22 0000 (!) 162/71 97.8 °F (36.6 °C) Temporal 55 20 96 %   05/28/22 2300 (!) 159/77 -- -- 69 21 98 %   05/28/22 2200 (!) 151/99 -- -- 69 23 99 %   05/28/22 2100 (!) 146/77 -- -- 71 20 99 %   05/28/22 2000 (!) 156/82 98 °F (36.7 °C) Temporal 78 21 99 %   05/28/22 1900 (!) 151/78 -- -- 58 19 98 %   05/28/22 1800 -- -- -- 58 22 99 %   05/28/22 1700 (!) 139/104 -- -- 65 22 97 %   05/28/22 1600 (!) 165/67 97.7 °F (36.5 °C) Temporal 57 18 99 %   05/28/22 1500 137/88 -- -- 74 20 99 %   05/28/22 1400 (!) 146/60 -- -- (!) 46 18 99 %   05/28/22 1300 (!) 173/151 -- -- 53 16 99 %   05/28/22 1200 (!) 158/95 98.7 °F (37.1 °C) Axillary 76 18 98 %       CBC with Differential:    Lab Results   Component Value Date    WBC 8.1 05/29/2022    RBC 3.05 05/29/2022    HGB 8.8 05/29/2022    HCT 27.8 05/29/2022     05/29/2022    MCV 91.1 05/29/2022    MCH 28.9 05/29/2022    MCHC 31.7 05/29/2022    RDW 14.9 05/29/2022    SEGSPCT 67 11/24/2011    LYMPHOPCT 11.0 05/27/2022    MONOPCT 7.9 05/27/2022    BASOPCT 0.1 05/27/2022    MONOSABS 1.02 05/27/2022    LYMPHSABS 1.42 05/27/2022    EOSABS 0.00 05/27/2022    BASOSABS 0.01 05/27/2022     CMP:    Lab Results   Component Value Date     05/29/2022    K 3.7 05/29/2022    K 3.4 05/28/2022     05/29/2022    CO2 24 05/29/2022    BUN 4 05/29/2022    CREATININE 0.7 05/29/2022    GFRAA >60 05/29/2022    LABGLOM >60 05/29/2022    GLUCOSE 138 05/29/2022    GLUCOSE 132 11/24/2011    PROT 6.4 05/29/2022    LABALBU 3.0 05/29/2022    LABALBU 3.4 01/23/2011    CALCIUM 7.9 05/29/2022    BILITOT 0.3 05/29/2022    ALKPHOS 49 05/29/2022    AST 50 05/29/2022    ALT 58 05/29/2022       BP (!) 154/75   Pulse 60   Temp 97.7 °F (36.5 °C) (Temporal)   Resp 23   Ht 5' 4\" (1.626 m)   Wt 160 lb 15 oz (73 kg)   SpO2 97%   BMI 27.62 kg/m²     Physical Exam  Const/Neuro- unchanged, no signs of acute distress, Alert  ENMT- Within Normal Limits, Normocephalic, mucous membranes pink/moist, No thrush  Neck: Neck supple  Heart- Regular, Rate, Rhythm- no murmur appreciated. Lungs- clear to ascultation. Respirations even and nonlabored. Abdomen- Soft, bowel sounds positive, non tender  Musculo/Extremities-  Equal and symmetrical, no edema. No tenderness. Skin:  Warm and dry, free from rashes.       Cultures reviewed    Radiology reviewed  CT HEAD WO CONTRAST   Final Result   Generalized atrophy and chronic changes seen within the brain with no acute   intracranial abnormality. MRI BRAIN WO CONTRAST   Final Result   Mild chronic microvascular disease within the periventricular white matter. No acute ischemia. RECOMMENDATIONS:   Unavailable         CT Head WO Contrast   Final Result   No acute intracranial abnormality or hemorrhage. Right parieto-occipital scalp hematoma. CT CERVICAL SPINE WO CONTRAST   Final Result   No acute abnormality of the cervical spine. Multilevel degenerative changes and facet arthrosis. CT CHEST WO CONTRAST   Final Result   No acute process identified in the chest.      Incidental cholelithiasis. CT ABDOMEN PELVIS WO CONTRAST Additional Contrast? None   Final Result   1. Cholelithiasis      2. Large right peripelvic renal cortical cyst.  No renal obstruction or   calculus. 3.  Evidence of previous appendectomy. XR CHEST PORTABLE   Final Result   Borderline cardiomegaly. Nothing active.          XR CHEST PORTABLE    (Results Pending)       Assessment  AMS with a fever   Leucocytosis [id number]  MRI without contrast No temp lobe involvement   BC pending   Urine culture pending   U/a not Impressive for infection   Be sure to get HSV by PCR in LP  Much more alert and awake        PlanCont rocephin I would add iv vancomycin for now   Await LP results Hold off on acyclovir for now   Technically at her age she should also be on ampicilin but reluctant to add that right now   Check cultures   Baseline ESR, CRP   Monitor labs   Will follow with you  Talked to critcal care team   Maybe tap tomorrow  I talked to neuro today as well  LP to be done on Tuesday    All notes noted   No new cultures  WBC 8100           Electronically signed by Enoc Shipley MD on 5/29/2022 at 11:35 AM

## 2022-05-29 NOTE — PROGRESS NOTES
SPEECH/LANGUAGE PATHOLOGY  CLINICAL ASSESSMENT OF SWALLOWING FUNCTION   and PLAN OF CARE    PATIENT NAME:  Sid Rebollar  (female)     MRN:  [id number]    :  1945  (76 y.o.)  STATUS:  Inpatient: Room 4428/4428-A    TODAY'S DATE:  2022  REFERRING PROVIDER:   Dr. Niru Lisa: SLP swallowing-dysphagia (IP) Date of order:  2022  REASON FOR REFERRAL: assess swallowing   EVALUATING THERAPIST: Hilliard Goodpasture, SLP                 RESULTS:    DYSPHAGIA DIAGNOSIS:   Clinical indicators of normal swallow function      DIET RECOMMENDATIONS:  Pureed consistency solids (IDDSI level 4) with  thin liquids (IDDSI level 0) and Administer medication crushed, as able, with pudding/applesauce     FEEDING RECOMMENDATIONS:     Assistance level:  Full assistance is needed during all oral intake      Compensatory strategies recommended: Double swallow, Small bites/sips, Alternate solids and liquids, Check for oral pocketing and No straw      Discussed recommendations with nursing and/or faxed report to referring provider: Yes    SPEECH THERAPY  PLAN OF CARE   The dysphagia POC is established based on physician order, dysphagia diagnosis and results of clinical assessment     Skilled SLP intervention for dysphagia management up to 5x per week until goals met, pt plateaus in function and/or discharged from hospital    Conditions Requiring Skilled Therapeutic Intervention for dysphagia:    Inability to self feed increases risk aspiration pneumonia (Amanda et al.,1998.)  Higher risk of aspiration in individuals unable to follow 1- step commands SIMONE Sheppard., ANA Valdes, & Corrine Corrigan HAdelitaL. 2009.)    Specific dysphagia interventions to include:     Compensatory strategy training   Meal time assessment for 1-2 sessions to provide diet modification and compensatory strategy implementation due to need to ensure proper implementation of compensatory strategies during PO intake  (further assess once pt has dentures)    Specific instructions for next treatment:  therapeutic po trial to determine safety of advanced diet textures and consistencies and ongoing PO analysis to upgrade diet and evaluate tolerance of current PO recommendation (once pt has dentures)    Patient Treatment Goals:    Short Term Goals:  Pt will participate in ongoing mealtime assessment to provide diet modification and compensatory strategy implementation to minimize risk of aspiration associated with PO intake  Pt will complete PO trials of upgraded diet textures with SLP only to determine the least restrictive PO diet to maintain adequate nutrition/hydration with no more than 1 overt s/s of pen/asp. (once pt has dentures)    Long Term Goals:   Pt will maintain adequate nutrition/hydration via PO intake of the least restrictive oral diet with implementation of safe swallow/ compensatory strategies      Patient/family Goal:    To be able to eat/drink better consistency foods/liquids    Plan of care discussed with Patient/family  The Family understand(s) the diagnosis, prognosis and plan of care     Rehabilitation Potential/Prognosis: good                    ADMITTING DIAGNOSIS: Acute encephalopathy [G93.40]  Altered mental status, unspecified altered mental status type [R41.82]    VISIT DIAGNOSIS:   Visit Diagnoses       Codes    Septicemia ([facility] [address]    -  Primary A41.9    Altered mental status, unspecified altered mental status type     R41.82    Acute cystitis with hematuria     N30.01    Hypokalemia     E87.6           PATIENT REPORT/COMPLAINT: patient not able to accurately report  RN cleared patient for participation in assessment     yes     PRIOR LEVEL OF SWALLOW FUNCTION:    PAST HISTORY OF DYSPHAGIA?: none reported    Home diet: Regular consistency solids (IDDSI level 7) with  thin liquids (IDDSI level 0)  Current Diet Order:  ADULT DIET;  Clear Liquid    PROCEDURE:  Consistencies Administered During the Evaluation   Liquids: thin liquid   Solids:  pureed foods (pt had only upper dentures and they were loose fitting)    Method of Intake:   cup, spoon  Self fed, Fed by clinician      Position:   Seated, upright    CLINICAL ASSESSMENT:  Oral Stage: The oral stage of swallowing was within functional limits for consistencies administered      Pharyngeal Stage:    No signs of aspiration were noted during this evaluation however, silent aspiration cannot be ruled out at bedside. If silent aspiration is suspected, a Videofluoroscopic Study of Swallowing (MBS) is recommended and requires a physician order. Cognition:   Follows 1 - step directions appropriate for this assessment with consistent cues, Confusion noted, Attention impaired, , Language impaired,  and Insight to current deficits impaired,    Oral Peripheral Examination   Left lingual deviation     Current Respiratory Status    room air     Parameters of Speech Production  Respiration:  Adequate for speech production  Quality:   Within functional limits  Intensity: Within functional limits    Volitional Swallow: present     Volitional Cough:   present     Pain: No pain reported. EDUCATION:   The Speech Language Pathologist (SLP) completed education regarding results of evaluation and that intervention is warranted at this time. Learner: Patient and Family  Education: Reviewed results and recommendations of this evaluation, Reviewed diet and strategies and Reviewed recommendations for follow-up  Evaluation of Education:  Family present    This plan may be re-evaluated and revised as warranted. Evaluation Time includes thorough review of current medical information, gathering information on past medical history/social history and prior level of function, completion of standardized testing/informal observation of tasks, assessment of data and education on plan of care and goals.     [x]The admitting diagnosis and active problem list, have been reviewed prior to initiation of this evaluation.         ACTIVE PROBLEM LIST:   Patient Active Problem List   Diagnosis    Atrial fibrillation (HCC)    Acute medial meniscal tear    Anxiety    Arthritis    Asthma    Cholelithiasis    Depressive disorder    Diverticular disease    Fracture of tibia    Gastroesophageal reflux disease    Hiatal hernia    Hypercholesterolemia    Hypertension    Hypothyroidism    Insomnia    Lipoma    Obesity    Osteopenia    Overweight with body mass index (BMI) 25.0-29.9    Restless legs syndrome    Rupture of appendix    Secondary non-renal hyperparathyroidism (HCC)    Stage 3 chronic kidney disease (HCC)    Steatosis of liver    Tubular adenoma of colon    Acute encephalopathy    Stroke-like symptoms    Moderate protein-calorie malnutrition ([facility]         CPT code:  74135  bedside swallow mckinley Nino M.A., CCC-SLP  Speech Pathologist  5/29/2022

## 2022-05-29 NOTE — PROGRESS NOTES
Pharmacy Consultation Note  (Antibiotic Dosing and Monitoring)    Initial consult date: 5/27/22  Consulting physician/provider: Dr. Bautista Griggs  Drug: Vancomycin  Indication: Empiric/CNS infection    Age/  Gender Height Weight IBW  Allergy Information   77 y.o./female 5' 4\" (162.6 cm) 160 lb 15 oz (73 kg)     Ideal body weight: 54.7 kg (120 lb 9.5 oz)  Adjusted ideal body weight: 62 kg (136 lb 11.7 oz)   Other      Renal Function:  Recent Labs     05/28/22  0518 05/28/22  1617 05/29/22  0426   BUN 12 7 4*   CREATININE 0.7 0.7 0.7       Intake/Output Summary (Last 24 hours) at 5/29/2022 0811  Last data filed at 5/29/2022 0600  Gross per 24 hour   Intake 2718.95 ml   Output 2455 ml   Net 263.95 ml       Vancomycin Monitoring:  Trough:  No results for input(s): VANCOTROUGH in the last 72 hours. Random:  No results for input(s): VANCORANDOM in the last 72 hours. Vancomycin Administration Times:  Recent vancomycin administrations                   vancomycin 1500 mg in dextrose 5% 300 mL IVPB (mg) 1,500 mg New Bag 05/28/22 1458    vancomycin 1500 mg in dextrose 5% 300 mL IVPB (mg) 1,500 mg New Bag 05/27/22 1448    vancomycin (VANCOCIN) intermittent dosing (placeholder) ()  Given 05/27/22 1345              Assessment:  · Patient is a 76 y.o. female who has been initiated on vancomycin  Estimated Creatinine Clearance: 64 mL/min (based on SCr of 0.7 mg/dL).     Plan:  · Will continue vancomycin 1500 mg IV every 24 hours  · Will check vancomycin levels when appropriate  · Will continue to monitor renal function   · Clinical pharmacy to follow      Payal Henry PharmD   Pharmacy Resident   Phone: [phone]  5/29/2022 8:11 AM

## 2022-05-29 NOTE — PLAN OF CARE
Notes reviewed. No further seizure activity    Awaiting LP to be completed  - will f/u after.      Call with questions in the interim

## 2022-05-30 LAB
ALBUMIN SERPL-MCNC: 3.1 G/DL (ref 3.5–5.2)
ALP BLD-CCNC: 53 U/L (ref 35–104)
ALT SERPL-CCNC: 70 U/L (ref 0–32)
ANION GAP SERPL CALCULATED.3IONS-SCNC: 13 MMOL/L (ref 7–16)
AST SERPL-CCNC: 51 U/L (ref 0–31)
BILIRUB SERPL-MCNC: 0.4 MG/DL (ref 0–1.2)
BLOOD CULTURE, ROUTINE: NORMAL
BUN BLDV-MCNC: 4 MG/DL (ref 6–23)
CALCIUM SERPL-MCNC: 8.2 MG/DL (ref 8.6–10.2)
CHLORIDE BLD-SCNC: 99 MMOL/L (ref 98–107)
CHLORIDE URINE RANDOM: 59 MMOL/L
CHLORIDE URINE RANDOM: 83 MMOL/L
CO2: 25 MMOL/L (ref 22–29)
CORTISOL TOTAL: 27.66 MCG/DL (ref 2.68–18.4)
CREAT SERPL-MCNC: 0.7 MG/DL (ref 0.5–1)
GFR AFRICAN AMERICAN: >60
GFR NON-AFRICAN AMERICAN: >60 ML/MIN/1.73
GLUCOSE BLD-MCNC: 133 MG/DL (ref 74–99)
HCT VFR BLD CALC: 30.2 % (ref 34–48)
HEMOGLOBIN: 9.7 G/DL (ref 11.5–15.5)
INR BLD: 1.2
MAGNESIUM: 1.7 MG/DL (ref 1.6–2.6)
MCH RBC QN AUTO: 29.1 PG (ref 26–35)
MCHC RBC AUTO-ENTMCNC: 32.1 % (ref 32–34.5)
MCV RBC AUTO: 90.7 FL (ref 80–99.9)
METER GLUCOSE: 124 MG/DL (ref 74–99)
METER GLUCOSE: 125 MG/DL (ref 74–99)
METER GLUCOSE: 128 MG/DL (ref 74–99)
METER GLUCOSE: 131 MG/DL (ref 74–99)
PDW BLD-RTO: 14.9 FL (ref 11.5–15)
PLATELET # BLD: 284 E9/L (ref 130–450)
PMV BLD AUTO: 10.2 FL (ref 7–12)
POTASSIUM SERPL-SCNC: 3.4 MMOL/L (ref 3.5–5)
POTASSIUM, UR: 10.9 MMOL/L
POTASSIUM, UR: 15.2 MMOL/L
PROTHROMBIN TIME: 12.9 SEC (ref 9.3–12.4)
RBC # BLD: 3.33 E12/L (ref 3.5–5.5)
SODIUM BLD-SCNC: 137 MMOL/L (ref 132–146)
SODIUM URINE: 67 MMOL/L
SODIUM URINE: 81 MMOL/L
TOTAL CK: 295 U/L (ref 20–180)
TOTAL PROTEIN: 6.7 G/DL (ref 6.4–8.3)
URINE CULTURE, ROUTINE: NORMAL
VANCOMYCIN TROUGH: 9.9 MCG/ML (ref 5–16)
WBC # BLD: 6.2 E9/L (ref 4.5–11.5)

## 2022-05-30 PROCEDURE — 82962 GLUCOSE BLOOD TEST: CPT

## 2022-05-30 PROCEDURE — 80202 ASSAY OF VANCOMYCIN: CPT

## 2022-05-30 PROCEDURE — 82436 ASSAY OF URINE CHLORIDE: CPT

## 2022-05-30 PROCEDURE — 6370000000 HC RX 637 (ALT 250 FOR IP)

## 2022-05-30 PROCEDURE — 76937 US GUIDE VASCULAR ACCESS: CPT

## 2022-05-30 PROCEDURE — 84133 ASSAY OF URINE POTASSIUM: CPT

## 2022-05-30 PROCEDURE — 82533 TOTAL CORTISOL: CPT

## 2022-05-30 PROCEDURE — 83735 ASSAY OF MAGNESIUM: CPT

## 2022-05-30 PROCEDURE — 87040 BLOOD CULTURE FOR BACTERIA: CPT

## 2022-05-30 PROCEDURE — 85027 COMPLETE CBC AUTOMATED: CPT

## 2022-05-30 PROCEDURE — 80053 COMPREHEN METABOLIC PANEL: CPT

## 2022-05-30 PROCEDURE — 82550 ASSAY OF CK (CPK): CPT

## 2022-05-30 PROCEDURE — 2580000003 HC RX 258: Performed by: CHIROPRACTOR

## 2022-05-30 PROCEDURE — 99232 SBSQ HOSP IP/OBS MODERATE 35: CPT | Performed by: PHYSICIAN ASSISTANT

## 2022-05-30 PROCEDURE — 2580000003 HC RX 258: Performed by: STUDENT IN AN ORGANIZED HEALTH CARE EDUCATION/TRAINING PROGRAM

## 2022-05-30 PROCEDURE — 2580000003 HC RX 258: Performed by: INTERNAL MEDICINE

## 2022-05-30 PROCEDURE — 02HV33Z INSERTION OF INFUSION DEVICE INTO SUPERIOR VENA CAVA, PERCUTANEOUS APPROACH: ICD-10-PCS | Performed by: INTERNAL MEDICINE

## 2022-05-30 PROCEDURE — 2000000000 HC ICU R&B

## 2022-05-30 PROCEDURE — 83921 ORGANIC ACID SINGLE QUANT: CPT

## 2022-05-30 PROCEDURE — 6360000002 HC RX W HCPCS: Performed by: STUDENT IN AN ORGANIZED HEALTH CARE EDUCATION/TRAINING PROGRAM

## 2022-05-30 PROCEDURE — 6360000002 HC RX W HCPCS

## 2022-05-30 PROCEDURE — 2500000003 HC RX 250 WO HCPCS: Performed by: CHIROPRACTOR

## 2022-05-30 PROCEDURE — 6370000000 HC RX 637 (ALT 250 FOR IP): Performed by: INTERNAL MEDICINE

## 2022-05-30 PROCEDURE — 36415 COLL VENOUS BLD VENIPUNCTURE: CPT

## 2022-05-30 PROCEDURE — 85610 PROTHROMBIN TIME: CPT

## 2022-05-30 PROCEDURE — 2500000003 HC RX 250 WO HCPCS: Performed by: STUDENT IN AN ORGANIZED HEALTH CARE EDUCATION/TRAINING PROGRAM

## 2022-05-30 PROCEDURE — 2500000003 HC RX 250 WO HCPCS: Performed by: INTERNAL MEDICINE

## 2022-05-30 PROCEDURE — 84300 ASSAY OF URINE SODIUM: CPT

## 2022-05-30 PROCEDURE — C1751 CATH, INF, PER/CENT/MIDLINE: HCPCS

## 2022-05-30 PROCEDURE — 86592 SYPHILIS TEST NON-TREP QUAL: CPT

## 2022-05-30 PROCEDURE — 36569 INSJ PICC 5 YR+ W/O IMAGING: CPT

## 2022-05-30 RX ORDER — SODIUM CHLORIDE 9 MG/ML
INJECTION, SOLUTION INTRAVENOUS PRN
Status: DISCONTINUED | OUTPATIENT
Start: 2022-05-30 | End: 2022-06-07 | Stop reason: HOSPADM

## 2022-05-30 RX ORDER — LISINOPRIL 20 MG/1
20 TABLET ORAL DAILY
Status: DISCONTINUED | OUTPATIENT
Start: 2022-05-30 | End: 2022-06-07 | Stop reason: HOSPADM

## 2022-05-30 RX ORDER — SODIUM CHLORIDE 0.9 % (FLUSH) 0.9 %
5-40 SYRINGE (ML) INJECTION PRN
Status: DISCONTINUED | OUTPATIENT
Start: 2022-05-30 | End: 2022-06-07 | Stop reason: HOSPADM

## 2022-05-30 RX ORDER — HEPARIN SODIUM (PORCINE) LOCK FLUSH IV SOLN 100 UNIT/ML 100 UNIT/ML
3 SOLUTION INTRAVENOUS PRN
Status: DISCONTINUED | OUTPATIENT
Start: 2022-05-30 | End: 2022-06-07 | Stop reason: HOSPADM

## 2022-05-30 RX ORDER — LIDOCAINE HYDROCHLORIDE 10 MG/ML
5 INJECTION, SOLUTION EPIDURAL; INFILTRATION; INTRACAUDAL; PERINEURAL ONCE
Status: COMPLETED | OUTPATIENT
Start: 2022-05-30 | End: 2022-05-30

## 2022-05-30 RX ORDER — HEPARIN SODIUM (PORCINE) LOCK FLUSH IV SOLN 100 UNIT/ML 100 UNIT/ML
3 SOLUTION INTRAVENOUS EVERY 12 HOURS SCHEDULED
Status: DISCONTINUED | OUTPATIENT
Start: 2022-05-30 | End: 2022-06-07 | Stop reason: HOSPADM

## 2022-05-30 RX ORDER — SODIUM CHLORIDE 0.9 % (FLUSH) 0.9 %
5-40 SYRINGE (ML) INJECTION EVERY 12 HOURS SCHEDULED
Status: DISCONTINUED | OUTPATIENT
Start: 2022-05-30 | End: 2022-06-07 | Stop reason: HOSPADM

## 2022-05-30 RX ADMIN — POTASSIUM CHLORIDE, DEXTROSE MONOHYDRATE AND SODIUM CHLORIDE: 150; 5; 450 INJECTION, SOLUTION INTRAVENOUS at 16:45

## 2022-05-30 RX ADMIN — LISINOPRIL 20 MG: 20 TABLET ORAL at 10:23

## 2022-05-30 RX ADMIN — DOCUSATE SODIUM 100 MG: 100 CAPSULE, LIQUID FILLED ORAL at 10:11

## 2022-05-30 RX ADMIN — CYANOCOBALAMIN TAB 1000 MCG 1000 MCG: 1000 TAB at 10:11

## 2022-05-30 RX ADMIN — PANTOPRAZOLE SODIUM 40 MG: 40 TABLET, DELAYED RELEASE ORAL at 10:11

## 2022-05-30 RX ADMIN — THIAMINE HYDROCHLORIDE 100 MG: 100 INJECTION, SOLUTION INTRAMUSCULAR; INTRAVENOUS at 10:11

## 2022-05-30 RX ADMIN — LIDOCAINE HYDROCHLORIDE 5 ML: 10 INJECTION, SOLUTION EPIDURAL; INFILTRATION; INTRACAUDAL; PERINEURAL at 12:58

## 2022-05-30 RX ADMIN — SODIUM CHLORIDE, PRESERVATIVE FREE 10 ML: 5 INJECTION INTRAVENOUS at 10:13

## 2022-05-30 RX ADMIN — LEVETIRACETAM 500 MG: 5 INJECTION INTRAVENOUS at 21:37

## 2022-05-30 RX ADMIN — CEFTRIAXONE 2000 MG: 2 INJECTION, POWDER, FOR SOLUTION INTRAMUSCULAR; INTRAVENOUS at 21:33

## 2022-05-30 RX ADMIN — LEVOTHYROXINE SODIUM 50 MCG: 0.05 TABLET ORAL at 05:09

## 2022-05-30 RX ADMIN — LEVETIRACETAM 500 MG: 5 INJECTION INTRAVENOUS at 08:15

## 2022-05-30 RX ADMIN — Medication 10 ML: at 10:24

## 2022-05-30 RX ADMIN — Medication 1500 MG: at 14:17

## 2022-05-30 RX ADMIN — CEFTRIAXONE 2000 MG: 2 INJECTION, POWDER, FOR SOLUTION INTRAMUSCULAR; INTRAVENOUS at 10:12

## 2022-05-30 RX ADMIN — FOLIC ACID 1 MG: 5 INJECTION, SOLUTION INTRAMUSCULAR; INTRAVENOUS; SUBCUTANEOUS at 10:11

## 2022-05-30 ASSESSMENT — PAIN SCALES - GENERAL
PAINLEVEL_OUTOF10: 0

## 2022-05-30 NOTE — PROGRESS NOTES
Chg double lumen picc Placement 5/30/2022    Product number: [id number]   Lot Number: [id number]      Ultrasound: yes   Right Basilic vein:                Upper Arm Circumference: 38cm    Size: 40cm    Exposed Length: 4cm    Internal Length: 36cm   Cut: 15cm   Vein Measurement: 0.62cm    Aleksey River RN  5/30/2022  12:47 PM      nicole

## 2022-05-30 NOTE — PROGRESS NOTES
Pharmacy Consultation Note  (Antibiotic Dosing and Monitoring)    Initial consult date: 5/27/22  Consulting physician/provider: Dr. Alba Aguilar  Drug: Vancomycin  Indication: Empiric/CNS infection    Age/  Gender Height Weight IBW  Allergy Information   77 y.o./female 5' 4\" (162.6 cm) 160 lb 15 oz (73 kg)     Ideal body weight: 54.7 kg (120 lb 9.5 oz)  Adjusted ideal body weight: 62 kg (136 lb 11.7 oz)   Other      Renal Function:  Recent Labs     05/28/22  1617 05/29/22  0426 05/30/22  0433   BUN 7 4* 4*   CREATININE 0.7 0.7 0.7       Intake/Output Summary (Last 24 hours) at 5/30/2022 0804  Last data filed at 5/30/2022 0600  Gross per 24 hour   Intake 2070.51 ml   Output 5275 ml   Net -3204.49 ml       Vancomycin Monitoring:  Trough:  No results for input(s): VANCOTROUGH in the last 72 hours. Random:  No results for input(s): VANCORANDOM in the last 72 hours. Recent vancomycin administrations                   vancomycin 1500 mg in dextrose 5% 300 mL IVPB (mg) 1,500 mg New Bag 05/29/22 1443     1,500 mg New Bag 05/28/22 1458    vancomycin 1500 mg in dextrose 5% 300 mL IVPB (mg) 1,500 mg New Bag 05/27/22 1448    vancomycin (VANCOCIN) intermittent dosing (placeholder) ()  Given 05/27/22 1345                Assessment:  · Patient is a 76 y.o. female who has been initiated on vancomycin  Estimated Creatinine Clearance: 64 mL/min (based on SCr of 0.7 mg/dL).     Plan:  · Will continue vancomycin 1500 mg IV every 24 hours - level tomorrow at 1330  · Will check vancomycin levels when appropriate  · Will continue to monitor renal function   · Clinical pharmacy to follow      Mello BenítezD, BCPS 5/30/2022 8:05 AM

## 2022-05-30 NOTE — PROGRESS NOTES
Leah Gama is a 76 y.o. right handed female       Neurology following for AMS. She was recently being treated for a UTI. She had reportedly told son that she thought antibiotic was messing with her brain and she felt off. She has previous history of alcohol abuse but has not had issues for years. Alcohol screen negative,  Serum drug screen + benzo, (on Ativan prn prior to admission)    Presented to the ER on 5/25 for evaluation of AMS. The patient is a 76year-old-female who presents to the ED complaining of altered mental status. She was found down by her neighbor. Last known well unknown. She is reportedly being treated for UTI.  When EMS arrived, patient was on couch in her underwear with shards of glass on the chair.     5/26 - She is awake and alert with a flight of ideas. She does not follow any commands or answer any questions. She moves all of her extremities purposefully, but not to commands. Her speech is clear, however she is having a conversation with herself. She was unable to be redirected during exam.     5/27 - Patient experienced 2 witnessed seizures which lasted < 30 seconds early this am.  An RRT was called, and when they arrived, she was was post ictal and non responsive. The RRT team gave 1 mg ativan for saccadic eye movements at 6.42 am.  Patient was then transferred to MICU. She is somunlent today, however the bedside nurse said that earlier, she was talking like she was yesterday. Currently, she opens her eyes to voice, but keeps them closed during the exam. She still doesn't follow commands. She is not speaking to me or having a conversation with herself like she was yesterday. 5/28- no seizures reported. Planned for LP tomorrow. ID following on abx.     5/29- LP planned for 5/31. No further seizure activity. BC + gram positive rods diphtheroid-like. ID following. Son at bedside.  He also reports today that prior to her recent UTI, she was entirely independent (driving, living alone, managing fiances) with no memory issues at baseline. Does report that he thinks she may have been taking more than prescribed of her Xanax. Prior to Visit Medications    Medication Sig Taking? Authorizing Provider   levothyroxine (SYNTHROID) 50 MCG tablet Take 100 mcg by mouth once a week Yes Historical Provider, MD   lisinopril-hydroCHLOROthiazide (PRINZIDE;ZESTORETIC) 20-12.5 MG per tablet Take 2 tablets by mouth daily Yes Historical Provider, MD   docusate sodium (COLACE) 100 mg capsule Take 100 mg by mouth 2 times daily as needed for Constipation Yes Historical Provider, MD   apixaban (ELIQUIS) 5 MG TABS tablet Take 1 tablet by mouth 2 times daily  [place] MD Isaias   ALPRAZolam (XANAX) 1 MG tablet Take 1 mg by mouth 3 times daily as needed for Anxiety. Historical Provider, MD   omeprazole (PRILOSEC) 40 MG delayed release capsule Take 40 mg by mouth daily  Historical Provider, MD   levothyroxine (SYNTHROID) 50 MCG tablet Take 50 mcg by mouth Six times weekly   Historical Provider, MD       Allergies as of 05/25/2022 - Fully Reviewed 05/25/2022   Allergen Reaction Noted    Other  06/24/2020       Objective:     BP (!) 160/69   Pulse 75   Temp 98.8 °F (37.1 °C) (Temporal)   Resp 21   Ht 5' 4\" (1.626 m)   Wt 160 lb 15 oz (73 kg)   SpO2 98%   BMI 27.62 kg/m²      General appearance: lethargic, appears stated age and uncooperative  Head: Normocephalic, without obvious abnormality, atraumatic  Neck: supple, trachea midline  Lungs: Unlabored  Heart: regular rate and rhythm on monitor  Extremities: no cyanosis or edema  Pulses: 2+ and symmetric  Skin: Large linear abrasion right back    Mental Status: Alert. In restraints. Oriented to self, year and month. Not to place or situation. Follows simple commands fairly well. Confused.      Cranial Nerves:  I: smell    II: visual acuity     II: visual fields    II: pupils FLAQUITA   III,VII: ptosis None   III,IV,VI: extraocular muscles  EOMI without nystagmus    V: mastication    V: facial light touch sensation     V,VII: corneal reflex     VII: facial muscle function - upper  Normal    VII: facial muscle function - lower Normal    VIII: hearing Normal   IX: soft palate elevation  Normal   IX,X: gag reflex    XI: trapezius strength     XI: sternocleidomastoid strength    XI: neck extension strength     XII: tongue strength  Normal      Motor:  Grossly moves all extremities against gravity equally   Normal tone and bulk   No abnormal movements     Sensory:  Responds to pain in all limbs     Coordination:   Unable to perform due to lack of patient cooperation    Gait:  Deferred for safety    DTR:   No Babinski    No Lowry's     Laboratory/Radiology:     CBC with Differential:    Lab Results   Component Value Date    WBC 6.2 05/30/2022    RBC 3.33 05/30/2022    HGB 9.7 05/30/2022    HCT 30.2 05/30/2022     05/30/2022    MCV 90.7 05/30/2022    MCH 29.1 05/30/2022    MCHC 32.1 05/30/2022    RDW 14.9 05/30/2022    SEGSPCT 67 11/24/2011    LYMPHOPCT 11.0 05/27/2022    MONOPCT 7.9 05/27/2022    BASOPCT 0.1 05/27/2022    MONOSABS 1.02 05/27/2022    LYMPHSABS 1.42 05/27/2022    EOSABS 0.00 05/27/2022    BASOSABS 0.01 05/27/2022     BMP:    Lab Results   Component Value Date     05/30/2022    K 3.4 05/30/2022    K 3.4 05/28/2022    CL 99 05/30/2022    CO2 25 05/30/2022    BUN 4 05/30/2022    LABALBU 3.1 05/30/2022    LABALBU 3.4 01/23/2011    CREATININE 0.7 05/30/2022    CALCIUM 8.2 05/30/2022    GFRAA >60 05/30/2022    LABGLOM >60 05/30/2022    GLUCOSE 133 05/30/2022    GLUCOSE 132 11/24/2011     TSH:    Lab Results   Component Value Date    TSH 0.599 05/26/2022     VITAMIN B12: 289  AMMONIA 32  Lab Results   Component Value Date    FOLATE 18.9 05/26/2022     MRI Brain  Mild chronic microvascular disease within the periventricular white matter. No acute ischemia.     CTH  Generalized atrophy and chronic changes seen within the brain with no acute  intracranial abnormality. EEG- normal     I personally reviewed the patient's lab and imaging studies at this time. Assessment:     AMS in a 76year old woman who presented after being found down with broken glass around her. Was witnessed to have new onset seizure activity on 5/27  described as GTC lasting < 30 seconds with following postictal state. She had recently been treated for a UTI. Due to AMS, fever and leukocytosis--LP is planned.      Her son at bedside today says that prior to her recent UTI, she was entirely independent at baseline with no cognitive impairment     Plan:     MRI brain W contrast     LP planned     Abx per ID     Continue Keppra     Will follow     JENA Evans  11:07 AM  5/30/2022

## 2022-05-30 NOTE — PROGRESS NOTES
[address] [facility]   Department of Internal Medicine   Internal Medicine Residency  MICU Progress Note    Patient:  Owen Ng 76 y.o. female   MRN: [id number]       Date of Service: 2022    Allergy: Other    Subjective     Patient was seen and examined this morning. Afebrile, No leukocytosis. INR today 1. 2. after receiving Vit K yesterday. Blood cultures from  growing Gram positive diphtheroid rods. Repeat cultures and MRI ordered today. Planned for LP tomorrow. Objective     TEMPERATURE:  Current - Temp: 99.5 °F (37.5 °C); Max - Temp  Av.9 °F (36.6 °C)  Min: 96.8 °F (36 °C)  Max: 99.5 °F (37.5 °C)  RESPIRATIONS RANGE: Resp  Av.9  Min: 15  Max: 29  PULSE RANGE: Pulse  Av.3  Min: 60  Max: 90  BLOOD PRESSURE RANGE:  Systolic (13HWN), ZZW:806 , Min:97 , TIV:330   ; Diastolic (23FOZ), CJP:75, Min:69, Max:126    PULSE OXIMETRY RANGE: SpO2  Av.3 %  Min: 91 %  Max: 98 %    I & O - 24hr:    Intake/Output Summary (Last 24 hours) at 2022 1630  Last data filed at 2022 1300  Gross per 24 hour   Intake 2070.51 ml   Output 5850 ml   Net -3779.49 ml     I/O last 3 completed shifts: In: 4789.5 [I.V.:3102.8;  IV Piggyback:1686.7]  Out: 7750 [Urine:7750] I/O this shift:  In: -   Out: 1550 [Urine:1550]   Weight change:        PHYSICAL EXAMINATION:  General appearance - awake, in no acute distress, actively hallucinating, pressured speech  Mental status - confused, disoriented to person, place, and time, uncooperative  Eyes - pupils constricted 1-2mm and reactive, extraocular eye movements intact, sclera anicteric  Chest -  clear to auscultation, no wheezes, rales or rhonchi, symmetric air entry  Heart - bradycardic, regular rhythm, normal S1, S2, no murmurs, rubs, clicks or gallops  Abdomen - soft, nontender, nondistended, no masses or organomegaly  Neurological - awake, not oriented, pressured speech, no focal findings or movement disorder noted, neck supple without rigidity, normal muscle tone, no tremors, could not assess strength  Extremities - peripheral pulses normal, no pedal edema, no clubbing or cyanosis  Skin - normal coloration and turgor, no rashes.                             Medications     Continuous Infusions:   sodium chloride      dextrose 5% and 0.45% NaCl with KCl 20 mEq 75 mL/hr at 05/29/22 2145    dextrose       Scheduled Meds:   sodium chloride flush  5-40 mL IntraVENous 2 times per day    heparin flush  3 mL IntraVENous 2 times per day    lisinopril  20 mg Oral Daily    sodium chloride flush  5-40 mL IntraVENous 2 times per day    levETIRAcetam  500 mg IntraVENous Q12H    vitamin B-12  1,000 mcg Oral Daily    insulin lispro  0-6 Units SubCUTAneous TID WC    insulin lispro  0-3 Units SubCUTAneous Nightly    folic acid  1 mg IntraVENous Daily    thiamine  100 mg IntraVENous Daily    vancomycin  1,500 mg IntraVENous Q24H    cefTRIAXone (ROCEPHIN) IV  2,000 mg IntraVENous Q12H    [Held by provider] apixaban  5 mg Oral BID    levothyroxine  50 mcg Oral Daily    pantoprazole  40 mg Oral Daily     PRN Meds: white petrolatum, sodium chloride flush, sodium chloride, heparin flush, sodium chloride flush, glucose, dextrose bolus **OR** dextrose bolus, glucagon (rDNA), dextrose, LORazepam, docusate sodium, ondansetron **OR** ondansetron, polyethylene glycol, acetaminophen **OR** acetaminophen  Nutrition:   NG/OG tube TF type: Pulmocare/Nephro/Glucerna/Jevity          Labs and Imaging Studies     CBC:   Recent Labs     05/28/22 0518 05/29/22 0426 05/30/22  0433   WBC 13.1* 8.1 6.2   HGB 8.9* 8.8* 9.7*   HCT 27.7* 27.8* 30.2*   MCV 91.4 91.1 90.7    222 284       BMP:    Recent Labs     05/28/22  1617 05/29/22 0426 05/30/22  0433    140 137   K 3.4* 3.7 3.4*    105 99   CO2 25 24 25   BUN 7 4* 4*   CREATININE 0.7 0.7 0.7   GLUCOSE 145* 138* 133*       LIVER PROFILE:   Recent Labs     05/28/22  0518 05/29/22 0426 05/30/22  0433   AST 54* 50* 51*   ALT 56* 58* 70*   BILITOT 0.3 0.3 0.4   ALKPHOS 44 49 53       PT/INR:   Recent Labs     05/28/22  1003 05/29/22  0426 05/30/22  0433   PROTIME 19.2* 21.3* 12.9*   INR 1.8 1.9 1.2       APTT:   No results for input(s): APTT in the last 72 hours. Fasting Lipid Panel:    No results found for: CHOL, TRIG, HDL    Cardiac Enzymes:    Lab Results   Component Value Date    CKTOTAL 295 (H) 05/30/2022    CKTOTAL 450 (H) 05/29/2022    CKTOTAL 840 (H) 05/28/2022    CKMB 5.7 (H) 01/22/2011    CKMB 3.6 01/22/2011    CKMB 2.7 01/22/2011    TROPONINI <0.01 05/15/2020    TROPONINI <0.01 02/12/2020    TROPONINI 0.01 01/22/2011       Notable Cultures:      Blood cultures   Blood Culture, Routine   Date Value Ref Range Status   05/25/2022 5 Days no growth  Final     Respiratory cultures No results found for: RESPCULTURE   Gram Stain Result   Date Value Ref Range Status   02/12/2020 Refer to ordered Gram stain for results  Final     Urine   Urine Culture, Routine   Date Value Ref Range Status   05/29/2022 Growth not present, incubation continues  Preliminary     Legionella No results found for: LABLEGI  C Diff PCR No results found for: CDIFPCR  Wound culture/abscess: No results for input(s): WNDABS in the last 72 hours. Tip culture:No results for input(s): CXCATHTIP in the last 72 hours. Oxygen: Additional Respiratory Assessments  Heart Rate: 64  Resp: 24  SpO2: 93 %       Urinary Catheter Ajmvd-Lmitdogjkqq-Sxadsh (mL): 175 mL  [REMOVED] Urinary Catheter Gonzales-Output (mL): 300 mL    Imaging Studies:  XR CHEST PORTABLE   Final Result   Increased volume loss in the left lower chest compatible with worsening   atelectasis or pneumonia. CT HEAD WO CONTRAST   Final Result   Generalized atrophy and chronic changes seen within the brain with no acute   intracranial abnormality. MRI BRAIN WO CONTRAST   Final Result   Mild chronic microvascular disease within the periventricular white matter.       No growing diphtheroid gram positive rods. Repeat cultures send  2. Follow MRI brain  3. LP to be done Monday. Follow up analysis, results etc.   4. Continue vancomycin, rocephin. No acyclovir, ampicillin per ID recs. 5. EEG done. Follow Neurology recommendations. 6. Benzodiazepine withdrawal- 1mg ativan q4h prn for Anxiety, Irritation, Agitation, Withdrawal.  Give ativan 1 mg prn seizures. 7. Afib on apixaban 5mg BID. - Held at the moment pending LP  8. Dextrose 5% NS - 150cc/hr. Monitor renal function. UOP  Of 5L last 24h. Urine studies send. 9. Follow procalcitonin, sed rate. 10. Vitamin B12 1000 mcg daily  11. Started on Clear liquid diet. Adv as toleraed. 12. Vit K for Goal INR <1.5 for LP  13. Resume home lisinopril for bp management. # Peptic ulcer prophylaxis: Protonix  # DVT Prophylaxis:  Hold until LP  # Disposition: Cont current care     Damon Darnell MD, PGY-1    Attending Physician: Dr. Lyric Brooks  Department of Pulmonary, Critical Care and Sleep Medicine  [address] [place] [address]  Department of Internal Medicine      During multidisciplinary team rounds Annamarie Moody is a 76 y.o. female was seen, examined and discussed. This is confirmation that I have personally seen and examined the patient and that the key elements of the encounter were performed by me (> 85 % time). The medications & laboratory data was discussed and adjusted where necessary. The radiographic images were reviewed or with radiologist or consultant if felt dis-concordant with the exam or history. The above findings were corroborated, plans confirmed and changes made if needed. Family is updated at the bedside as available. Key issues of the case were discussed among consultants. Critical Care time is documented if appropriate.       Deb Santos DO, FACP, FCCP, [facility],

## 2022-05-30 NOTE — PROGRESS NOTES
[facility]    Subjective:  Pt seen in icu alert conversive confused      Current Facility-Administered Medications:     white petrolatum ointment, , Topical, BID PRN, Frank Keys MD    lidocaine PF 1 % injection 5 mL, 5 mL, IntraDERmal, Once, Cruz Keys MD    sodium chloride flush 0.9 % injection 5-40 mL, 5-40 mL, IntraVENous, 2 times per day, Devang Nelson MD    sodium chloride flush 0.9 % injection 5-40 mL, 5-40 mL, IntraVENous, PRN, Cruz Keys MD    0.9 % sodium chloride infusion, , IntraVENous, PRN, Devang Nelson MD    heparin flush 100 UNIT/ML injection 300 Units, 3 mL, IntraVENous, 2 times per day, Devang Nelson MD    heparin flush 100 UNIT/ML injection 300 Units, 3 mL, IntraCATHeter, PRN, Devang Nelson MD    dextrose 5 % and 0.45 % NaCl with KCl 20 mEq infusion, , IntraVENous, Continuous, Emery Carrel Malmer, DO, Last Rate: 75 mL/hr at 05/29/22 2145, Restarted at 05/29/22 2145    sodium chloride flush 0.9 % injection 5-40 mL, 5-40 mL, IntraVENous, 2 times per day, Bipin Santa MD, 10 mL at 05/29/22 0913    sodium chloride flush 0.9 % injection 5-40 mL, 5-40 mL, IntraVENous, PRN, Bipin Santa MD    levETIRAcetam (KEPPRA) 500 mg/100 mL IVPB, 500 mg, IntraVENous, Q12H, CHERRIE Gutierrez CNP, Last Rate: 400 mL/hr at 05/30/22 0815, 500 mg at 05/30/22 0815    vitamin B-12 (CYANOCOBALAMIN) tablet 1,000 mcg, 1,000 mcg, Oral, Daily, CHERRIE Gutierrez CNP, 1,000 mcg at 05/29/22 1113    insulin lispro (HUMALOG) injection vial 0-6 Units, 0-6 Units, SubCUTAneous, TID WC, Erwin Mack MD    insulin lispro (HUMALOG) injection vial 0-3 Units, 0-3 Units, SubCUTAneous, Nightly, Erwin Mack MD    glucose chewable tablet 16 g, 4 tablet, Oral, PRN, Erwin Mack MD    dextrose bolus 10% 125 mL, 125 mL, IntraVENous, PRN **OR** dextrose bolus 10% 250 mL, 250 mL, IntraVENous, PRN, Erwin Mack MD    glucagon (rDNA) injection 1 mg, 1 mg, IntraMUSCular, PRN, Jesica Carrillo Faustino Triplett MD    dextrose 5 % solution, 100 mL/hr, IntraVENous, PRN, Lei Whitehead MD    folic acid injection 1 mg, 1 mg, IntraVENous, Daily, Lei Whitehead MD, 1 mg at 05/29/22 0912    thiamine (B-1) injection 100 mg, 100 mg, IntraVENous, Daily, Lei Whitehead MD, 100 mg at 05/29/22 0911    LORazepam (ATIVAN) injection 1 mg, 1 mg, IntraVENous, Q4H PRN, Lei Whitehead MD, 1 mg at 05/28/22 0231    vancomycin 1500 mg in dextrose 5% 300 mL IVPB, 1,500 mg, IntraVENous, Q24H, Lei Whitehead MD, Stopped at 05/29/22 1643    cefTRIAXone (ROCEPHIN) 2,000 mg in sterile water 20 mL IV syringe, 2,000 mg, IntraVENous, Q12H, Lei Whitehead MD, 2,000 mg at 05/29/22 2147    [Held by provider] apixaban (ELIQUIS) tablet 5 mg, 5 mg, Oral, BID, Molly Sorto DO, 5 mg at 05/26/22 2016    docusate sodium (COLACE) capsule 100 mg, 100 mg, Oral, BID PRN, Molly Sorto DO, 100 mg at 05/29/22 1113    levothyroxine (SYNTHROID) tablet 50 mcg, 50 mcg, Oral, Daily, Molly Sorto DO, 50 mcg at 05/30/22 0509    pantoprazole (PROTONIX) tablet 40 mg, 40 mg, Oral, Daily, Molly Sorto DO, 40 mg at 05/29/22 1113    ondansetron (ZOFRAN-ODT) disintegrating tablet 4 mg, 4 mg, Oral, Q8H PRN **OR** ondansetron (ZOFRAN) injection 4 mg, 4 mg, IntraVENous, Q6H PRN, Molly Sorto DO, 4 mg at 05/26/22 1103    polyethylene glycol (GLYCOLAX) packet 17 g, 17 g, Oral, Daily PRN, Molly Sorto DO    acetaminophen (TYLENOL) tablet 650 mg, 650 mg, Oral, Q6H PRN, 650 mg at 05/25/22 2350 **OR** acetaminophen (TYLENOL) suppository 650 mg, 650 mg, Rectal, Q6H PRN, Molly Sorto DO    Objective:    BP (!) 181/98   Pulse 73   Temp 98.8 °F (37.1 °C) (Temporal)   Resp 18   Ht 5' 4\" (1.626 m)   Wt 160 lb 15 oz (73 kg)   SpO2 97%   BMI 27.62 kg/m²     Heart:  reg  Lungs:  ctab  Abd: + bs soft nontender  Extrem:  Edema legs    CBC with Differential:    Lab Results   Component Value Date    WBC 6.2 05/30/2022    RBC 3.33 05/30/2022    HGB 9.7 05/30/2022    HCT 30.2 05/30/2022     05/30/2022    MCV 90.7 05/30/2022    MCH 29.1 05/30/2022    MCHC 32.1 05/30/2022    RDW 14.9 05/30/2022    SEGSPCT 67 11/24/2011    LYMPHOPCT 11.0 05/27/2022    MONOPCT 7.9 05/27/2022    BASOPCT 0.1 05/27/2022    MONOSABS 1.02 05/27/2022    LYMPHSABS 1.42 05/27/2022    EOSABS 0.00 05/27/2022    BASOSABS 0.01 05/27/2022     CMP:    Lab Results   Component Value Date     05/30/2022    K 3.4 05/30/2022    K 3.4 05/28/2022    CL 99 05/30/2022    CO2 25 05/30/2022    BUN 4 05/30/2022    CREATININE 0.7 05/30/2022    GFRAA >60 05/30/2022    LABGLOM >60 05/30/2022    GLUCOSE 133 05/30/2022    GLUCOSE 132 11/24/2011    PROT 6.7 05/30/2022    LABALBU 3.1 05/30/2022    LABALBU 3.4 01/23/2011    CALCIUM 8.2 05/30/2022    BILITOT 0.4 05/30/2022    ALKPHOS 53 05/30/2022    AST 51 05/30/2022    ALT 70 05/30/2022     Warfarin PT/INR:    Lab Results   Component Value Date    INR 1.2 05/30/2022    INR 1.9 05/29/2022    INR 1.8 05/28/2022    PROTIME 12.9 (H) 05/30/2022    PROTIME 21.3 (H) 05/29/2022    PROTIME 19.2 (H) 05/28/2022       Assessment:    Principal Problem:    Stroke-like symptoms  Active Problems: Moderate protein-calorie malnutrition ([address]  Resolved Problems:    * No resolved hospital problems.  *      Plan:  Cont supportive care diet per speech therapy        Ira Duran DO  8:36 AM  5/30/2022

## 2022-05-30 NOTE — PROGRESS NOTES
Perfect served for picc line insertion. Spoke with RN, tiny. Discussed 2nd blood culture bottle positive on 5/25Gram positive rods Diphtheroid-like   . Please check with dr tellez for approval, before we place line andplease let us know.

## 2022-05-30 NOTE — PLAN OF CARE
Patient continues to pull at lines while unrestrained. Patient remains in soft bilateral wrist restraints at this time for safety. Problem: Safety - Medical Restraint  Goal: Remains free of injury from restraints (Restraint for Interference with Medical Device)  Description: INTERVENTIONS:  1. Determine that other, less restrictive measures have been tried or would not be effective before applying the restraint  2. Evaluate the patient's condition at the time of restraint application  3. Inform patient/family regarding the reason for restraint  4. Q2H: Monitor safety, psychosocial status, comfort, nutrition and hydration  5/30/2022 0256 by Catracho Paz RN  Outcome: Progressing     Problem: Skin/Tissue Integrity  Goal: Absence of new skin breakdown  Description: 1. Monitor for areas of redness and/or skin breakdown  2. Assess vascular access sites hourly  3. Every 4-6 hours minimum:  Change oxygen saturation probe site  4. Every 4-6 hours:  If on nasal continuous positive airway pressure, respiratory therapy assess nares and determine need for appliance change or resting period.   5/30/2022 0256 by Maurice Christie RN  Outcome: Progressing     Problem: Safety - Adult  Goal: Free from fall injury  5/30/2022 0256 by Maurice Christie RN  Outcome: Progressing

## 2022-05-30 NOTE — PROGRESS NOTES
Infectious Disease  Progress Note  [facility]    Chief Complaint: no complaint    Subjective: rule out meningitis    Scheduled Meds:   lidocaine PF  5 mL IntraDERmal Once    sodium chloride flush  5-40 mL IntraVENous 2 times per day    heparin flush  3 mL IntraVENous 2 times per day    lisinopril  20 mg Oral Daily    sodium chloride flush  5-40 mL IntraVENous 2 times per day    levETIRAcetam  500 mg IntraVENous Q12H    vitamin B-12  1,000 mcg Oral Daily    insulin lispro  0-6 Units SubCUTAneous TID WC    insulin lispro  0-3 Units SubCUTAneous Nightly    folic acid  1 mg IntraVENous Daily    thiamine  100 mg IntraVENous Daily    vancomycin  1,500 mg IntraVENous Q24H    cefTRIAXone (ROCEPHIN) IV  2,000 mg IntraVENous Q12H    [Held by provider] apixaban  5 mg Oral BID    levothyroxine  50 mcg Oral Daily    pantoprazole  40 mg Oral Daily     Continuous Infusions:   sodium chloride      dextrose 5% and 0.45% NaCl with KCl 20 mEq 75 mL/hr at 05/29/22 2145    dextrose       PRN Meds:white petrolatum, sodium chloride flush, sodium chloride, heparin flush, sodium chloride flush, glucose, dextrose bolus **OR** dextrose bolus, glucagon (rDNA), dextrose, LORazepam, docusate sodium, ondansetron **OR** ondansetron, polyethylene glycol, acetaminophen **OR** acetaminophen    Patient Vitals for the past 24 hrs:   BP Temp Temp src Pulse Resp SpO2   05/30/22 0800 (!) 181/98 98.8 °F (37.1 °C) Temporal 73 18 97 %   05/30/22 0700 -- -- -- 67 25 95 %   05/30/22 0600 (!) 150/86 -- -- 66 20 93 %   05/30/22 0500 (!) 178/84 -- -- 63 28 95 %   05/30/22 0400 97/81 97.2 °F (36.2 °C) Temporal 60 20 92 %   05/30/22 0300 (!) 136/126 -- -- 63 21 93 %   05/30/22 0200 (!) 152/101 -- -- 63 17 93 %   05/30/22 0100 (!) 175/81 -- -- 61 22 93 %   05/30/22 0000 (!) 153/95 96.8 °F (36 °C) Temporal 64 16 93 %   05/29/22 2300 (!) 162/76 -- -- 61 19 91 %   05/29/22 2200 (!) 163/77 -- -- 63 24 93 %   05/29/22 2100 -- -- -- 64 15 93 %   05/29/22 2000 (!) 162/79 97.2 °F (36.2 °C) Temporal 63 29 91 %   05/29/22 1900 (!) 167/90 -- -- 85 18 95 %   05/29/22 1806 (!) 140/86 -- -- 90 24 97 %   05/29/22 1800 -- -- -- 65 23 96 %   05/29/22 1700 -- -- -- 62 15 95 %   05/29/22 1600 (!) 150/103 97 °F (36.1 °C) Temporal 60 16 93 %   05/29/22 1541 (!) 150/103 -- -- 59 18 93 %   05/29/22 1500 (!) 177/82 -- -- 65 26 90 %   05/29/22 1400 (!) 173/101 -- -- 69 28 91 %   05/29/22 1300 (!) 160/74 -- -- 65 20 (!) 89 %   05/29/22 1200 (!) 159/95 97.7 °F (36.5 °C) Temporal 73 23 94 %   05/29/22 1100 (!) 155/69 -- -- 66 14 99 %       CBC with Differential:    Lab Results   Component Value Date    WBC 6.2 05/30/2022    RBC 3.33 05/30/2022    HGB 9.7 05/30/2022    HCT 30.2 05/30/2022     05/30/2022    MCV 90.7 05/30/2022    MCH 29.1 05/30/2022    MCHC 32.1 05/30/2022    RDW 14.9 05/30/2022    SEGSPCT 67 11/24/2011    LYMPHOPCT 11.0 05/27/2022    MONOPCT 7.9 05/27/2022    BASOPCT 0.1 05/27/2022    MONOSABS 1.02 05/27/2022    LYMPHSABS 1.42 05/27/2022    EOSABS 0.00 05/27/2022    BASOSABS 0.01 05/27/2022     CMP:    Lab Results   Component Value Date     05/30/2022    K 3.4 05/30/2022    K 3.4 05/28/2022    CL 99 05/30/2022    CO2 25 05/30/2022    BUN 4 05/30/2022    CREATININE 0.7 05/30/2022    GFRAA >60 05/30/2022    LABGLOM >60 05/30/2022    GLUCOSE 133 05/30/2022    GLUCOSE 132 11/24/2011    PROT 6.7 05/30/2022    LABALBU 3.1 05/30/2022    LABALBU 3.4 01/23/2011    CALCIUM 8.2 05/30/2022    BILITOT 0.4 05/30/2022    ALKPHOS 53 05/30/2022    AST 51 05/30/2022    ALT 70 05/30/2022       BP (!) 181/98   Pulse 73   Temp 98.8 °F (37.1 °C) (Temporal)   Resp 18   Ht 5' 4\" (1.626 m)   Wt 160 lb 15 oz (73 kg)   SpO2 97%   BMI 27.62 kg/m²     Physical Exam  Const/Neuro- unchanged, no signs of acute distress, Alert  ENMT- Within Normal Limits, Normocephalic, mucous membranes pink/moist, No thrush  Neck: Neck supple  Heart- Regular, Rate, Rhythm- no murmur appreciated. Lungs- clear to ascultation. Respirations even and nonlabored. Abdomen- Soft, bowel sounds positive, non tender  Musculo/Extremities-  Equal and symmetrical, no edema. No tenderness. Skin:  Warm and dry, free from rashes. Cultures reviewed    Radiology reviewed  XR CHEST PORTABLE   Final Result   Increased volume loss in the left lower chest compatible with worsening   atelectasis or pneumonia. CT HEAD WO CONTRAST   Final Result   Generalized atrophy and chronic changes seen within the brain with no acute   intracranial abnormality. MRI BRAIN WO CONTRAST   Final Result   Mild chronic microvascular disease within the periventricular white matter. No acute ischemia. RECOMMENDATIONS:   Unavailable         CT Head WO Contrast   Final Result   No acute intracranial abnormality or hemorrhage. Right parieto-occipital scalp hematoma. CT CERVICAL SPINE WO CONTRAST   Final Result   No acute abnormality of the cervical spine. Multilevel degenerative changes and facet arthrosis. CT CHEST WO CONTRAST   Final Result   No acute process identified in the chest.      Incidental cholelithiasis. CT ABDOMEN PELVIS WO CONTRAST Additional Contrast? None   Final Result   1. Cholelithiasis      2. Large right peripelvic renal cortical cyst.  No renal obstruction or   calculus. 3.  Evidence of previous appendectomy. XR CHEST PORTABLE   Final Result   Borderline cardiomegaly. Nothing active.              Assessment  AMS with a fever   Leucocytosis 34131  MRI without contrast No temp lobe involvement   BC pending   Urine culture pending   U/a not Impressive for infection   Be sure to get HSV by PCR in LP  BC   diptheroid like  Gm pos rods   One of four   Micro does not think listeria  picc line can be placed          PlanCont rocephin I would add iv vancomycin for now   Await LP results Hold off on acyclovir for now   Technically at her age she should also be on ampicilin but reluctant to add that right now   Check cultures   Baseline ESR, CRP   Monitor labs   Will follow with you  Talked to critcal care team   Maybe tap tomorow  LP to be done on Tuesday    All notes noted   No new cultures  WBC 8100  She is awake and alert            Electronically signed by Ricardo Evangelista MD on 5/30/2022 at 10:09 AM

## 2022-05-31 ENCOUNTER — APPOINTMENT (OUTPATIENT)
Dept: MRI IMAGING | Age: 77
DRG: 871 | End: 2022-05-31
Payer: MEDICARE

## 2022-05-31 LAB
ALBUMIN SERPL-MCNC: 3 G/DL (ref 3.5–5.2)
ALP BLD-CCNC: 51 U/L (ref 35–104)
ALT SERPL-CCNC: 59 U/L (ref 0–32)
ANION GAP SERPL CALCULATED.3IONS-SCNC: 12 MMOL/L (ref 7–16)
ANION GAP SERPL CALCULATED.3IONS-SCNC: 3 MMOL/L (ref 7–16)
ANION GAP SERPL CALCULATED.3IONS-SCNC: 8 MMOL/L (ref 7–16)
AST SERPL-CCNC: 37 U/L (ref 0–31)
BILIRUB SERPL-MCNC: 0.4 MG/DL (ref 0–1.2)
BUN BLDV-MCNC: 4 MG/DL (ref 6–23)
CALCIUM SERPL-MCNC: 6.5 MG/DL (ref 8.6–10.2)
CALCIUM SERPL-MCNC: 8.1 MG/DL (ref 8.6–10.2)
CALCIUM SERPL-MCNC: 8.2 MG/DL (ref 8.6–10.2)
CHLORIDE BLD-SCNC: 97 MMOL/L (ref 98–107)
CHLORIDE BLD-SCNC: 98 MMOL/L (ref 98–107)
CHLORIDE BLD-SCNC: 98 MMOL/L (ref 98–107)
CO2: 26 MMOL/L (ref 22–29)
CO2: 28 MMOL/L (ref 22–29)
CO2: 29 MMOL/L (ref 22–29)
CREAT SERPL-MCNC: 0.6 MG/DL (ref 0.5–1)
CREAT SERPL-MCNC: 0.7 MG/DL (ref 0.5–1)
CREAT SERPL-MCNC: 0.8 MG/DL (ref 0.5–1)
GFR AFRICAN AMERICAN: >60
GFR NON-AFRICAN AMERICAN: >60 ML/MIN/1.73
GLUCOSE BLD-MCNC: 130 MG/DL (ref 74–99)
GLUCOSE BLD-MCNC: 164 MG/DL (ref 74–99)
GLUCOSE BLD-MCNC: 792 MG/DL (ref 74–99)
HCT VFR BLD CALC: 30.4 % (ref 34–48)
HEMOGLOBIN: 9.6 G/DL (ref 11.5–15.5)
INR BLD: 1.2
MAGNESIUM: 1.5 MG/DL (ref 1.6–2.6)
MAGNESIUM: 1.7 MG/DL (ref 1.6–2.6)
MAGNESIUM: 2 MG/DL (ref 1.6–2.6)
MCH RBC QN AUTO: 29 PG (ref 26–35)
MCHC RBC AUTO-ENTMCNC: 31.6 % (ref 32–34.5)
MCV RBC AUTO: 91.8 FL (ref 80–99.9)
METER GLUCOSE: 113 MG/DL (ref 74–99)
METER GLUCOSE: 115 MG/DL (ref 74–99)
METER GLUCOSE: 123 MG/DL (ref 74–99)
METER GLUCOSE: 140 MG/DL (ref 74–99)
PDW BLD-RTO: 14.9 FL (ref 11.5–15)
PLATELET # BLD: 307 E9/L (ref 130–450)
PMV BLD AUTO: 9.6 FL (ref 7–12)
POTASSIUM SERPL-SCNC: 2.9 MMOL/L (ref 3.5–5)
POTASSIUM SERPL-SCNC: 3.7 MMOL/L (ref 3.5–5)
POTASSIUM SERPL-SCNC: 7.4 MMOL/L (ref 3.5–5)
PROTHROMBIN TIME: 12.9 SEC (ref 9.3–12.4)
RBC # BLD: 3.31 E12/L (ref 3.5–5.5)
RPR: NORMAL
SODIUM BLD-SCNC: 127 MMOL/L (ref 132–146)
SODIUM BLD-SCNC: 135 MMOL/L (ref 132–146)
SODIUM BLD-SCNC: 137 MMOL/L (ref 132–146)
TOTAL CK: 307 U/L (ref 20–180)
TOTAL PROTEIN: 6.9 G/DL (ref 6.4–8.3)
URINE CULTURE, ROUTINE: NORMAL
WBC # BLD: 7.9 E9/L (ref 4.5–11.5)

## 2022-05-31 PROCEDURE — 2580000003 HC RX 258: Performed by: STUDENT IN AN ORGANIZED HEALTH CARE EDUCATION/TRAINING PROGRAM

## 2022-05-31 PROCEDURE — 6370000000 HC RX 637 (ALT 250 FOR IP): Performed by: STUDENT IN AN ORGANIZED HEALTH CARE EDUCATION/TRAINING PROGRAM

## 2022-05-31 PROCEDURE — 2500000003 HC RX 250 WO HCPCS: Performed by: STUDENT IN AN ORGANIZED HEALTH CARE EDUCATION/TRAINING PROGRAM

## 2022-05-31 PROCEDURE — 83735 ASSAY OF MAGNESIUM: CPT

## 2022-05-31 PROCEDURE — 6360000002 HC RX W HCPCS

## 2022-05-31 PROCEDURE — A9579 GAD-BASE MR CONTRAST NOS,1ML: HCPCS | Performed by: RADIOLOGY

## 2022-05-31 PROCEDURE — 82962 GLUCOSE BLOOD TEST: CPT

## 2022-05-31 PROCEDURE — 2500000003 HC RX 250 WO HCPCS: Performed by: INTERNAL MEDICINE

## 2022-05-31 PROCEDURE — 97530 THERAPEUTIC ACTIVITIES: CPT

## 2022-05-31 PROCEDURE — 82550 ASSAY OF CK (CPK): CPT

## 2022-05-31 PROCEDURE — 80053 COMPREHEN METABOLIC PANEL: CPT

## 2022-05-31 PROCEDURE — 85027 COMPLETE CBC AUTOMATED: CPT

## 2022-05-31 PROCEDURE — 6370000000 HC RX 637 (ALT 250 FOR IP)

## 2022-05-31 PROCEDURE — 70552 MRI BRAIN STEM W/DYE: CPT

## 2022-05-31 PROCEDURE — 99233 SBSQ HOSP IP/OBS HIGH 50: CPT | Performed by: CLINICAL NURSE SPECIALIST

## 2022-05-31 PROCEDURE — 2000000000 HC ICU R&B

## 2022-05-31 PROCEDURE — 85610 PROTHROMBIN TIME: CPT

## 2022-05-31 PROCEDURE — 97162 PT EVAL MOD COMPLEX 30 MIN: CPT

## 2022-05-31 PROCEDURE — 80048 BASIC METABOLIC PNL TOTAL CA: CPT

## 2022-05-31 PROCEDURE — 6360000002 HC RX W HCPCS: Performed by: STUDENT IN AN ORGANIZED HEALTH CARE EDUCATION/TRAINING PROGRAM

## 2022-05-31 PROCEDURE — 36415 COLL VENOUS BLD VENIPUNCTURE: CPT

## 2022-05-31 PROCEDURE — 2580000003 HC RX 258: Performed by: CHIROPRACTOR

## 2022-05-31 PROCEDURE — 6360000002 HC RX W HCPCS: Performed by: CHIROPRACTOR

## 2022-05-31 PROCEDURE — 6370000000 HC RX 637 (ALT 250 FOR IP): Performed by: INTERNAL MEDICINE

## 2022-05-31 PROCEDURE — 6360000002 HC RX W HCPCS: Performed by: INTERNAL MEDICINE

## 2022-05-31 PROCEDURE — 99291 CRITICAL CARE FIRST HOUR: CPT | Performed by: INTERNAL MEDICINE

## 2022-05-31 PROCEDURE — 6360000004 HC RX CONTRAST MEDICATION: Performed by: RADIOLOGY

## 2022-05-31 RX ORDER — POTASSIUM CHLORIDE 29.8 MG/ML
40 INJECTION INTRAVENOUS ONCE
Status: COMPLETED | OUTPATIENT
Start: 2022-05-31 | End: 2022-05-31

## 2022-05-31 RX ORDER — MAGNESIUM SULFATE IN WATER 40 MG/ML
2000 INJECTION, SOLUTION INTRAVENOUS ONCE
Status: COMPLETED | OUTPATIENT
Start: 2022-05-31 | End: 2022-05-31

## 2022-05-31 RX ADMIN — LEVETIRACETAM 500 MG: 5 INJECTION INTRAVENOUS at 20:35

## 2022-05-31 RX ADMIN — CYANOCOBALAMIN TAB 1000 MCG 1000 MCG: 1000 TAB at 09:13

## 2022-05-31 RX ADMIN — Medication 1500 MG: at 14:20

## 2022-05-31 RX ADMIN — MAGNESIUM SULFATE HEPTAHYDRATE 2000 MG: 40 INJECTION, SOLUTION INTRAVENOUS at 07:05

## 2022-05-31 RX ADMIN — CEFTRIAXONE 2000 MG: 2 INJECTION, POWDER, FOR SOLUTION INTRAMUSCULAR; INTRAVENOUS at 09:14

## 2022-05-31 RX ADMIN — LISINOPRIL 20 MG: 20 TABLET ORAL at 09:13

## 2022-05-31 RX ADMIN — LEVOTHYROXINE SODIUM 50 MCG: 0.05 TABLET ORAL at 07:31

## 2022-05-31 RX ADMIN — SODIUM CHLORIDE, PRESERVATIVE FREE 10 ML: 5 INJECTION INTRAVENOUS at 09:14

## 2022-05-31 RX ADMIN — PANTOPRAZOLE SODIUM 40 MG: 40 TABLET, DELAYED RELEASE ORAL at 09:13

## 2022-05-31 RX ADMIN — THIAMINE HYDROCHLORIDE 100 MG: 100 INJECTION, SOLUTION INTRAMUSCULAR; INTRAVENOUS at 09:13

## 2022-05-31 RX ADMIN — CEFTRIAXONE 2000 MG: 2 INJECTION, POWDER, FOR SOLUTION INTRAMUSCULAR; INTRAVENOUS at 21:00

## 2022-05-31 RX ADMIN — POTASSIUM CHLORIDE 40 MEQ: 29.8 INJECTION, SOLUTION INTRAVENOUS at 07:04

## 2022-05-31 RX ADMIN — FOLIC ACID 1 MG: 5 INJECTION, SOLUTION INTRAMUSCULAR; INTRAVENOUS; SUBCUTANEOUS at 09:14

## 2022-05-31 RX ADMIN — LEVETIRACETAM 500 MG: 5 INJECTION INTRAVENOUS at 09:20

## 2022-05-31 RX ADMIN — ONDANSETRON 4 MG: 2 INJECTION INTRAMUSCULAR; INTRAVENOUS at 12:05

## 2022-05-31 RX ADMIN — SODIUM CHLORIDE, PRESERVATIVE FREE 10 ML: 5 INJECTION INTRAVENOUS at 09:00

## 2022-05-31 RX ADMIN — GADOTERIDOL 15 ML: 279.3 INJECTION, SOLUTION INTRAVENOUS at 17:51

## 2022-05-31 RX ADMIN — SODIUM CHLORIDE, PRESERVATIVE FREE 10 ML: 5 INJECTION INTRAVENOUS at 20:24

## 2022-05-31 RX ADMIN — POTASSIUM CHLORIDE, DEXTROSE MONOHYDRATE AND SODIUM CHLORIDE: 150; 5; 450 INJECTION, SOLUTION INTRAVENOUS at 06:20

## 2022-05-31 RX ADMIN — POTASSIUM CHLORIDE 40 MEQ: 29.8 INJECTION, SOLUTION INTRAVENOUS at 11:04

## 2022-05-31 RX ADMIN — INSULIN LISPRO 1 UNITS: 100 INJECTION, SOLUTION INTRAVENOUS; SUBCUTANEOUS at 16:38

## 2022-05-31 ASSESSMENT — PAIN SCALES - GENERAL
PAINLEVEL_OUTOF10: 0

## 2022-05-31 NOTE — PROGRESS NOTES
Infectious Disease  Progress Note  [facility]    Chief Complaint: no complaint    Subjective: rule out meningitis    Scheduled Meds:   potassium chloride  40 mEq IntraVENous Once    sodium chloride flush  5-40 mL IntraVENous 2 times per day    heparin flush  3 mL IntraVENous 2 times per day    lisinopril  20 mg Oral Daily    sodium chloride flush  5-40 mL IntraVENous 2 times per day    levETIRAcetam  500 mg IntraVENous Q12H    vitamin B-12  1,000 mcg Oral Daily    insulin lispro  0-6 Units SubCUTAneous TID WC    insulin lispro  0-3 Units SubCUTAneous Nightly    folic acid  1 mg IntraVENous Daily    thiamine  100 mg IntraVENous Daily    vancomycin  1,500 mg IntraVENous Q24H    cefTRIAXone (ROCEPHIN) IV  2,000 mg IntraVENous Q12H    [Held by provider] apixaban  5 mg Oral BID    levothyroxine  50 mcg Oral Daily    pantoprazole  40 mg Oral Daily     Continuous Infusions:   sodium chloride      dextrose 5% and 0.45% NaCl with KCl 20 mEq 75 mL/hr at 05/31/22 0620    dextrose       PRN Meds:white petrolatum, sodium chloride flush, sodium chloride, heparin flush, sodium chloride flush, glucose, dextrose bolus **OR** dextrose bolus, glucagon (rDNA), dextrose, LORazepam, docusate sodium, ondansetron **OR** ondansetron, polyethylene glycol, acetaminophen **OR** acetaminophen    Patient Vitals for the past 24 hrs:   BP Temp Temp src Pulse Resp SpO2   05/31/22 1000 (!) 159/123 -- -- 64 16 93 %   05/31/22 0913 (!) 155/74 -- -- -- -- --   05/31/22 0900 (!) 155/74 -- -- 71 20 91 %   05/31/22 0800 (!) 157/71 99.3 °F (37.4 °C) Bladder 67 18 91 %   05/31/22 0708 (!) 166/77 -- -- 68 18 95 %   05/31/22 0700 (!) 174/90 -- -- 79 18 96 %   05/31/22 0600 116/69 -- -- 89 15 93 %   05/31/22 0500 (!) 116/96 -- -- (!) 111 -- 93 %   05/31/22 0400 (!) 134/111 100 °F (37.8 °C) Bladder 81 27 91 %   05/31/22 0300 (!) 170/72 -- -- 66 16 93 %   05/31/22 0200 (!) 161/73 -- -- 65 17 92 %   05/31/22 0100 (!) 157/75 -- -- 63 22 92 % 05/31/22 0000 138/73 100.2 °F (37.9 °C) Bladder 63 15 93 %   05/30/22 2300 (!) 163/122 -- -- 76 22 95 %   05/30/22 2200 (!) 159/85 -- -- 88 16 96 %   05/30/22 2100 (!) 159/145 -- -- 85 24 96 %   05/30/22 2000 (!) 164/92 100 °F (37.8 °C) Bladder 88 15 96 %   05/30/22 1900 (!) 149/127 -- -- 79 17 97 %   05/30/22 1800 (!) 136/121 -- -- 65 17 98 %   05/30/22 1700 (!) 176/81 -- -- 63 (!) 34 --   05/30/22 1600 (!) 163/83 99.5 °F (37.5 °C) Bladder 63 25 --   05/30/22 1500 (!) 151/75 -- -- 73 17 96 %   05/30/22 1400 (!) 161/86 -- -- 65 25 95 %   05/30/22 1306 (!) 154/86 -- -- 64 24 93 %   05/30/22 1300 (!) 154/88 -- -- 81 19 94 %   05/30/22 1200 (!) 148/86 99.5 °F (37.5 °C) Bladder 65 21 96 %       CBC with Differential:    Lab Results   Component Value Date    WBC 7.9 05/31/2022    RBC 3.31 05/31/2022    HGB 9.6 05/31/2022    HCT 30.4 05/31/2022     05/31/2022    MCV 91.8 05/31/2022    MCH 29.0 05/31/2022    MCHC 31.6 05/31/2022    RDW 14.9 05/31/2022    SEGSPCT 67 11/24/2011    LYMPHOPCT 11.0 05/27/2022    MONOPCT 7.9 05/27/2022    BASOPCT 0.1 05/27/2022    MONOSABS 1.02 05/27/2022    LYMPHSABS 1.42 05/27/2022    EOSABS 0.00 05/27/2022    BASOSABS 0.01 05/27/2022     CMP:    Lab Results   Component Value Date     05/31/2022    K 2.9 05/31/2022    K 3.4 05/28/2022    CL 97 05/31/2022    CO2 28 05/31/2022    BUN 4 05/31/2022    CREATININE 0.7 05/31/2022    GFRAA >60 05/31/2022    LABGLOM >60 05/31/2022    GLUCOSE 130 05/31/2022    GLUCOSE 132 11/24/2011    PROT 6.9 05/31/2022    LABALBU 3.0 05/31/2022    LABALBU 3.4 01/23/2011    CALCIUM 8.2 05/31/2022    BILITOT 0.4 05/31/2022    ALKPHOS 51 05/31/2022    AST 37 05/31/2022    ALT 59 05/31/2022       BP (!) 159/123   Pulse 64   Temp 99.3 °F (37.4 °C) (Bladder)   Resp 16   Ht 5' 4\" (1.626 m)   Wt 160 lb 15 oz (73 kg)   SpO2 93%   BMI 27.62 kg/m²     Physical Exam  Const/Neuro- unchanged, no signs of acute distress, Alert  ENMT- Within Normal Limits, Normocephalic, mucous membranes pink/moist, No thrush  Neck: Neck supple  Heart- Regular, Rate, Rhythm- no murmur appreciated. Lungs- clear to ascultation. Respirations even and nonlabored. Abdomen- Soft, bowel sounds positive, non tender  Musculo/Extremities-  Equal and symmetrical, no edema. No tenderness. Skin:  Warm and dry, free from rashes. Cultures reviewed    Radiology reviewed  XR CHEST PORTABLE   Final Result   Increased volume loss in the left lower chest compatible with worsening   atelectasis or pneumonia. CT HEAD WO CONTRAST   Final Result   Generalized atrophy and chronic changes seen within the brain with no acute   intracranial abnormality. MRI BRAIN WO CONTRAST   Final Result   Mild chronic microvascular disease within the periventricular white matter. No acute ischemia. RECOMMENDATIONS:   Unavailable         CT Head WO Contrast   Final Result   No acute intracranial abnormality or hemorrhage. Right parieto-occipital scalp hematoma. CT CERVICAL SPINE WO CONTRAST   Final Result   No acute abnormality of the cervical spine. Multilevel degenerative changes and facet arthrosis. CT CHEST WO CONTRAST   Final Result   No acute process identified in the chest.      Incidental cholelithiasis. CT ABDOMEN PELVIS WO CONTRAST Additional Contrast? None   Final Result   1. Cholelithiasis      2. Large right peripelvic renal cortical cyst.  No renal obstruction or   calculus. 3.  Evidence of previous appendectomy. XR CHEST PORTABLE   Final Result   Borderline cardiomegaly. Nothing active.          MRI BRAIN W CONTRAST    (Results Pending)       Assessment  AMS with a fever   Leucocytosis 81995  MRI without contrast No temp lobe involvement   BC pending   Urine culture pending   U/a not Impressive for infection   Be sure to get HSV by PCR in LP  BC   diptheroid like  Gm pos rods   One of four   Micro does not think listeria  picc line can be placed          PlanCont rocephin I would add iv vancomycin for now   Await LP results Hold off on acyclovir for now   Technically at her age she should also be on ampicilin but reluctant to add that right now   Check cultures   Baseline ESR, CRP   Monitor labs   Will follow with you  Talked to critcal care team   Maybe tap tomorow  LP to be done on Tuesday today    All notes noted   No new cultures  WBC 8100  She is awake and alert   If LP is neg   Will talk to pharmacy about deescalation           Electronically signed by Tayler Lewis MD on 5/31/2022 at 11:30 AM

## 2022-05-31 NOTE — PROGRESS NOTES
Notified Dr. Renetta Baer due to physical therapist made nurse aware that when he stood her up she had 10 seconds where she was staring ahead and he was unable to get her attention.

## 2022-05-31 NOTE — PROGRESS NOTES
Paige Spatz CNS here and aware of vision problems. Patient states, \"I cant see what that sign says. \"

## 2022-05-31 NOTE — CARE COORDINATION
Care Coordination:  Patient experienced witnessed seizures 5/27 and transferred to MICU. LP planned today to r/o meningitis. [facility] MRI with contrast also pending. ID following . PT  OT have been ordered but yet to be completed secondary to medical reasons. Requested evals be completed today. SW spoke to daughter in law Yoni Pi  [phone]. Patient lives alone in a mobile home in [place]. Was independent and driving. Family and friends noted decreased mental status starting with a UTI  about a month ago. Patient has a walker from knee surgery. She had home health after a recent knee replacement but family cannot recall agency. Her PCP is Dr. Kenisha Ahmadi  and she uses a few local pharmacies. Family would like to see patient receive rehab prior to returning home. Referrals have been called to [facility] and [facility] in [place] by ER . SW will follow pending LP results and therapy evaluations and contact facilities re acceptance   Will require pre cert prior to transfer.

## 2022-05-31 NOTE — PROGRESS NOTES
Physical Therapy  Physical Therapy Initial Assessment     Name: Robb Bartlett  : 1945  MRN: [id number]      Date of Service: 2022    Evaluating PT:  Antonio Durant, PT, DPT  [id number]     Room #:  5076/2971-B  Diagnosis:  Acute encephalopathy [G93.40]  Altered mental status, unspecified altered mental status type [R41.82]  PMHx/PSHx:  HTN, Hypothyroidism, PVC's, Anxiety, Asthma, Atrial fibrillation, History of supraventricular tachycardia, Macular degeneration  Procedure/Surgery:  Pending LP    Precautions:  Falls, Seizure precautions, Bilateral soft wrist restraints, Cognition  Equipment Needs:  TBD    SUBJECTIVE:    Pt is a poor historian. Per chart, pt lives alone in a 1 floor plan with 4 steps and 1 rail. Pt was independent with no AD. Owns Walker from previous surgery. OBJECTIVE:   Initial Evaluation  Date: 22 Treatment Short Term/ Long Term   Goals   AM-PAC 6 Clicks 92/32     Was pt agreeable to Eval/treatment? Yes     Does pt have pain? No c/o pain     Bed Mobility  Rolling: Min A  Supine to sit: Min A  Sit to supine: Min A  Scooting: Min A  Rolling: Independent  Supine to sit:  Independent  Sit to supine: Independent  Scooting: Independent   Transfers Sit to stand: Min A  Stand to sit: Min A  Stand pivot: NT  Sit to stand: Supervision  Stand to sit: Supervision  Stand pivot: Supervision   Ambulation   1 step fwd/bwd with FWW Min A  >50 feet with AAD SBA   Stair negotiation: ascended and descended  NT  >2 steps with 1 rail Min A   ROM BUE:  Per OT eval  BLE:  WFL     Strength BUE:  Per OT eval  BLE:  Grossly 4-/5     Balance Sitting EOB:  Min A  Dynamic Standing:  NT  Sitting EOB:  Independent  Dynamic Standing:  Supervision with AAD     Pt is A & O x 2-3 (self, year, and location; occasionally confused about location)  RASS:  0  CAM-ICU:  Positive  Sensation:  Pt denies numbness and tingling to extremities  Edema:  Unremarkable    Vitals:  Blood Pressure at rest 145/119 mmHg Blood Pressure post session 126/104 mmHg   Heart Rate at rest 63 bpm  Heart Rate post session 68 bpm    SPO2 at rest 98% on RA SPO2 post session 97% on RA         Functional Status Score-Intensive Care Unit (FSS-ICU)   Rolling 4/7   Supine to sit transfer 4/7   Unsupported sitting  4/7   Sit to stand transfers 4/7   Ambulation 1/7   Total  17/35     Therapeutic Exercises:    BLE ROM  STS x 3    Patient education  Pt educated on PT role, safety during functional mobility    Patient response to education:   Pt verbalized understanding Pt demonstrated skill Pt requires further education in this area   Yes Yes Yes     ASSESSMENT:    Conditions Requiring Skilled Therapeutic Intervention:    [x]Decreased strength     []Decreased ROM  [x]Decreased functional mobility  [x]Decreased balance   []Decreased endurance   []Decreased posture  []Decreased sensation  []Decreased coordination   [x]Decreased vision  [x]Decreased safety awareness   []Increased pain       Comments:  Pt received supine in bed and agreeable to PT evaluation. Pt cleared for participation by RN prior to session. Vitals monitored during session. Pt limited by cognition, double vision, and potential seizure activity. Pt displayed increasing confusion throughout session. Pt reported double vision throughout entire session. Assistance with trunk was required during supine>sit. Sitting EOB and in standing with [employer] pt reported mild dizziness. Pt was impulsive at EOB. Pt performed STS and remained standing while bed pads were changed and hygiene care was completed d/t incontinence. When asked to ambulate pt became tachycardic and demonstrated a change in mental status accompanied by a blank stare. Pt did not verbalize during this episode lasting about 10 seconds. D/t this change in mental status pt was returned to bed. HR returned to normal quickly after change in status. Pt left in chair position in bed with call button in reach, lines attached, and needs met. RN notified about change in mental status during session. Treatment:  Patient practiced and was instructed in the following treatment:     Bed mobility training - pt given verbal and tactile cues to facilitate proper sequencing and safety during rolling and supine>sit as well as provided with physical assistance to complete task    Sitting EOB for >8 minutes for upright tolerance, postural awareness and BLE ROM   STS- pt educated on proper hand and foot placement, safety and sequencing to safely complete sit<>stand and pivot transfers with hands on assistance to complete task safely       Pt's/ family goals   1. Home vs rehab    Prognosis is fair for reaching above PT goals. Patient and or family understand(s) diagnosis, prognosis, and plan of care.   Yes    PHYSICAL THERAPY PLAN OF CARE:    PT POC is established based on physician order and patient diagnosis     Referring provider/PT Order:  Jay Elkins, DO/PT eval and treat  Diagnosis:  Acute encephalopathy [G93.40]  Altered mental status, unspecified altered mental status type [R41.82]  Specific instructions for next treatment:  Progress transfer and gait training as appropriate    Current Treatment Recommendations:     [x] Strengthening to improve independence with functional mobility   [] ROM to improve independence with functional mobility   [x] Balance Training to improve static/dynamic balance and to reduce fall risk  [x] Endurance Training to improve activity tolerance during functional mobility   [x] Transfer Training to improve safety and independence with all functional transfers   [x] Gait Training to improve gait mechanics, endurance and asses need for appropriate assistive device  [x] Stair Training in preparation for safe discharge home and/or into the community   [] Positioning to prevent skin breakdown and contractures  [x] Safety and Education Training   [] Patient/Caregiver Education   [] HEP  [] Other     PT long term treatment goals are located in above grid    Frequency of treatments: 2-5x/week x 1-2 weeks. Time in  1408  Time out  1448    Total Treatment Time  28 minutes     Evaluation Time includes thorough review of current medical information, gathering information on past medical history/social history and prior level of function, completion of standardized testing/informal observation of tasks, assessment of data and education on plan of care and goals.     CPT codes:  [] Low Complexity PT evaluation 85211  [x] Moderate Complexity PT evaluation 87787  [] High Complexity PT evaluation 18786  [] PT Re-evaluation 14925  [] Gait training 41120 -- minutes  [] Manual therapy [id number] -- minutes  [x] Therapeutic activities 53214 28 minutes  [] Therapeutic exercises 38757 -- minutes  [] Neuromuscular reeducation 68357 -- minutes     [address], SPT  Yuni Silvestre, PT, DPT  [id number]

## 2022-05-31 NOTE — PLAN OF CARE
Problem: Pain  Goal: Verbalizes/displays adequate comfort level or baseline comfort level  5/31/2022 0828 by Yang Wood RN  Outcome: Progressing  5/31/2022 0219 by Daniel Yepez RN  Outcome: Progressing     Problem: Safety - Medical Restraint  Goal: Remains free of injury from restraints (Restraint for Interference with Medical Device)  Description: INTERVENTIONS:  1. Determine that other, less restrictive measures have been tried or would not be effective before applying the restraint  2. Evaluate the patient's condition at the time of restraint application  3. Inform patient/family regarding the reason for restraint  4. Q2H: Monitor safety, psychosocial status, comfort, nutrition and hydration  5/31/2022 0827 by Yang Wood RN  Outcome: Progressing  5/31/2022 0219 by Leonila Paz RN  Outcome: Progressing     Problem: Skin/Tissue Integrity  Goal: Absence of new skin breakdown  Description: 1. Monitor for areas of redness and/or skin breakdown  2. Assess vascular access sites hourly  3. Every 4-6 hours minimum:  Change oxygen saturation probe site  4. Every 4-6 hours:  If on nasal continuous positive airway pressure, respiratory therapy assess nares and determine need for appliance change or resting period.   5/31/2022 0827 by aYng Wood RN  Outcome: Progressing  5/31/2022 0219 by Leonila Paz RN  Outcome: Progressing     Problem: Safety - Adult  Goal: Free from fall injury  5/31/2022 0827 by Yang Wood RN  Outcome: Progressing  5/31/2022 0219 by Leonila Paz RN  Outcome: Progressing     Problem: Nutrition Deficit:  Goal: Optimize nutritional status  Outcome: Not Progressing

## 2022-05-31 NOTE — PROGRESS NOTES
When released soft wrist restraints patient attempts to pull out IV. Soft wrist restraints reapplied.

## 2022-05-31 NOTE — PROGRESS NOTES
[facility]    Subjective:  Pt seen this am alert confused      Current Facility-Administered Medications:     white petrolatum ointment, , Topical, BID PRN, Flavio Miller MD    sodium chloride flush 0.9 % injection 5-40 mL, 5-40 mL, IntraVENous, 2 times per day, Flavio Miller MD, 10 mL at 05/31/22 0914    sodium chloride flush 0.9 % injection 5-40 mL, 5-40 mL, IntraVENous, PRN, Cruz Keys MD    0.9 % sodium chloride infusion, , IntraVENous, PRN, Flavio Miller MD    heparin flush 100 UNIT/ML injection 300 Units, 3 mL, IntraVENous, 2 times per day, Flavio Miller MD    heparin flush 100 UNIT/ML injection 300 Units, 3 mL, IntraCATHeter, PRN, Flavio Miller MD    lisinopril (PRINIVIL;ZESTRIL) tablet 20 mg, 20 mg, Oral, Daily, Deep Munguia MD, 20 mg at 05/31/22 0913    dextrose 5 % and 0.45 % NaCl with KCl 20 mEq infusion, , IntraVENous, Continuous, Diego Coach Macario DO, Last Rate: 75 mL/hr at 05/31/22 0620, New Bag at 05/31/22 0620    levETIRAcetam (KEPPRA) 500 mg/100 mL IVPB, 500 mg, IntraVENous, Q12H, CHERRIE Enrique CNP, Stopped at 05/31/22 0950    vitamin B-12 (CYANOCOBALAMIN) tablet 1,000 mcg, 1,000 mcg, Oral, Daily, CHERRIE Enrique CNP, 1,000 mcg at 05/31/22 0913    insulin lispro (HUMALOG) injection vial 0-6 Units, 0-6 Units, SubCUTAneous, TID WC, Kain Quiñonez MD    insulin lispro (HUMALOG) injection vial 0-3 Units, 0-3 Units, SubCUTAneous, Nightly, Kain Quiñonez MD    glucose chewable tablet 16 g, 4 tablet, Oral, PRN, Kain Quiñonez MD    dextrose bolus 10% 125 mL, 125 mL, IntraVENous, PRN **OR** dextrose bolus 10% 250 mL, 250 mL, IntraVENous, PRN, Kain Quiñonez MD    glucagon (rDNA) injection 1 mg, 1 mg, IntraMUSCular, PRN, Kain Quiñonez MD    dextrose 5 % solution, 100 mL/hr, IntraVENous, PRN, Kain Quiñonez MD    folic acid injection 1 mg, 1 mg, IntraVENous, Daily, Kain Quiñonez MD, 1 mg at 05/31/22 0914    thiamine (B-1) injection 100 mg, 100 mg, IntraVENous, Daily, Mary Crocker MD, 100 mg at 05/31/22 0913    LORazepam (ATIVAN) injection 1 mg, 1 mg, IntraVENous, Q4H PRN, Mary Crocker MD, 1 mg at 05/28/22 0231    vancomycin 1500 mg in dextrose 5% 300 mL IVPB, 1,500 mg, IntraVENous, Q24H, Mary Crocker MD, Stopped at 05/30/22 1617    cefTRIAXone (ROCEPHIN) 2,000 mg in sterile water 20 mL IV syringe, 2,000 mg, IntraVENous, Q12H, Mary Crocker MD, 2,000 mg at 05/31/22 0914    [Held by provider] apixaban (ELIQUIS) tablet 5 mg, 5 mg, Oral, BID, Jayden Sorto DO, 5 mg at 05/26/22 2016    docusate sodium (COLACE) capsule 100 mg, 100 mg, Oral, BID PRN, Jayden Sorto DO, 100 mg at 05/30/22 1011    levothyroxine (SYNTHROID) tablet 50 mcg, 50 mcg, Oral, Daily, Jayden Sorto DO, 50 mcg at 05/31/22 1553    pantoprazole (PROTONIX) tablet 40 mg, 40 mg, Oral, Daily, Jayden Sorto DO, 40 mg at 05/31/22 0913    ondansetron (ZOFRAN-ODT) disintegrating tablet 4 mg, 4 mg, Oral, Q8H PRN **OR** ondansetron (ZOFRAN) injection 4 mg, 4 mg, IntraVENous, Q6H PRN, Jayden Sorto DO, 4 mg at 05/31/22 1205    polyethylene glycol (GLYCOLAX) packet 17 g, 17 g, Oral, Daily PRN, Jayden Sorto DO    acetaminophen (TYLENOL) tablet 650 mg, 650 mg, Oral, Q6H PRN, 650 mg at 05/25/22 3310 **OR** acetaminophen (TYLENOL) suppository 650 mg, 650 mg, Rectal, Q6H PRN, Jayden Sorto DO    Objective:    BP (!) 177/94   Pulse 61   Temp 99.3 °F (37.4 °C) (Bladder)   Resp 16   Ht 5' 4\" (1.626 m)   Wt 160 lb 15 oz (73 kg)   SpO2 93%   BMI 27.62 kg/m²     Heart:  reg  Lungs:  ctab  Abd: + bs soft nontender  Extrem:  Edema legs    CBC with Differential:    Lab Results   Component Value Date    WBC 7.9 05/31/2022    RBC 3.31 05/31/2022    HGB 9.6 05/31/2022    HCT 30.4 05/31/2022     05/31/2022    MCV 91.8 05/31/2022    MCH 29.0 05/31/2022    MCHC 31.6 05/31/2022    RDW 14.9 05/31/2022    SEGSPCT 67 11/24/2011    LYMPHOPCT 11.0 05/27/2022    MONOPCT 7.9 05/27/2022    BASOPCT 0.1 05/27/2022    MONOSABS 1.02 05/27/2022    LYMPHSABS 1.42 05/27/2022    EOSABS 0.00 05/27/2022    BASOSABS 0.01 05/27/2022     CMP:    Lab Results   Component Value Date     05/31/2022    K 2.9 05/31/2022    K 3.4 05/28/2022    CL 97 05/31/2022    CO2 28 05/31/2022    BUN 4 05/31/2022    CREATININE 0.7 05/31/2022    GFRAA >60 05/31/2022    LABGLOM >60 05/31/2022    GLUCOSE 130 05/31/2022    GLUCOSE 132 11/24/2011    PROT 6.9 05/31/2022    LABALBU 3.0 05/31/2022    LABALBU 3.4 01/23/2011    CALCIUM 8.2 05/31/2022    BILITOT 0.4 05/31/2022    ALKPHOS 51 05/31/2022    AST 37 05/31/2022    ALT 59 05/31/2022     Warfarin PT/INR:    Lab Results   Component Value Date    INR 1.2 05/31/2022    INR 1.2 05/30/2022    INR 1.9 05/29/2022    PROTIME 12.9 (H) 05/31/2022    PROTIME 12.9 (H) 05/30/2022    PROTIME 21.3 (H) 05/29/2022       Assessment:    Principal Problem:    Stroke-like symptoms  Active Problems: Moderate protein-calorie malnutrition ([facility] [address]    Altered mental status  Resolved Problems:    * No resolved hospital problems.  *      Plan:  Replace k mag dc iv fluids advance diet as per speech rec / LP/MRI with contrast planned / bp control        Gerald Arvizus, DO  1:41 PM  5/31/2022

## 2022-05-31 NOTE — PLAN OF CARE
Patient remains confused and reaching for lines while unrestrained. Patent remains in soft bilateral wrist restraints at this time for safety. Problem: Safety - Medical Restraint  Goal: Remains free of injury from restraints (Restraint for Interference with Medical Device)  Description: INTERVENTIONS:  1. Determine that other, less restrictive measures have been tried or would not be effective before applying the restraint  2. Evaluate the patient's condition at the time of restraint application  3. Inform patient/family regarding the reason for restraint  4. Q2H: Monitor safety, psychosocial status, comfort, nutrition and hydration  Outcome: Progressing     Problem: Pain  Goal: Verbalizes/displays adequate comfort level or baseline comfort level  Outcome: Progressing     Problem: Skin/Tissue Integrity  Goal: Absence of new skin breakdown  Description: 1. Monitor for areas of redness and/or skin breakdown  2. Assess vascular access sites hourly  3. Every 4-6 hours minimum:  Change oxygen saturation probe site  4. Every 4-6 hours:  If on nasal continuous positive airway pressure, respiratory therapy assess nares and determine need for appliance change or resting period.   Outcome: Progressing     Problem: Safety - Adult  Goal: Free from fall injury  Outcome: Progressing

## 2022-05-31 NOTE — PROGRESS NOTES
Ashley Esparza is a 76 y.o. right handed female     Neurology is following for alteration in mentation. Prior to admission patient was being treated for a UTI and told her son that she had felt that the antibiotic was messing with her brain. He began noticing that she was more confused at home. She reportedly has a history of alcohol abuse but has not had issues for many years. She presented to the ED on May 25 for increasing confusion. She was found by the neighbor sitting on the couch in her underwear with shards of glass on the chair. On May 26 she was found to have a flight of ideas not following commands or answering questions but her speech was clear and she is appeared to be having a conversation with herself. On May 27 she had 2 witnessed generalized tonic-clonic seizures and RRT was called and she was transferred to the ICU. MRI of her brain was obtained which was unrevealing for acute infarction. LP has been ordered and is reportedly being completed today. Staff is at the bedside still notes marked confusion.       Allergies as of 05/25/2022 - Fully Reviewed 05/25/2022   Allergen Reaction Noted    Other  06/24/2020       Objective:     BP (!) 159/123   Pulse 64   Temp 99.3 °F (37.4 °C) (Bladder)   Resp 16   Ht 5' 4\" (1.626 m)   Wt 160 lb 15 oz (73 kg)   SpO2 93%   BMI 27.62 kg/m²      General appearance: alert - follows a few commands   Head: Normocephalic, without obvious abnormality, atraumatic  Extremities: no cyanosis or edema  Pulses: 2+ and symmetric  Skin: no rashes or lesions    Mental Status: awake - alert to self     Speech: clear  Language: some echolalia appreciated and perseveration     Cranial Nerves:  I: smell    II: visual acuity     II: visual fields Bilateral threat   II: pupils FLAQUITA   III,VII: ptosis None   III,IV,VI: extraocular muscles  Looks around the room   V: mastication Normal   V: facial light touch sensation  Normal   V,VII: corneal reflex Present   VII: facial muscle function - upper     VII: facial muscle function - lower Normal   VIII: hearing Normal   IX: soft palate elevation     IX,X: gag reflex    XI: trapezius strength     XI: sternocleidomastoid strength    XI: neck extension strength     XII: tongue strength       Motor:  Moving all limbs symmetrically     Sensory:  Appreciates LT in all limbs     Coordination:   FN, FFM and KRISTOFER - no ataxia appreciated     DTR:   Right Brachioradialis reflex 2+  Left Brachioradialis reflex 2+  Right Biceps reflex 2+  Left Biceps reflex 2+  Right Quadriceps reflex 2+  Left Quadriceps reflex 2+  Right Achilles reflex 2+  Left Achilles reflex 2+    No Babinski  No Lowry's     Laboratory/Radiology:     CBC with Differential:    Lab Results   Component Value Date    WBC 7.9 05/31/2022    RBC 3.31 05/31/2022    HGB 9.6 05/31/2022    HCT 30.4 05/31/2022     05/31/2022    MCV 91.8 05/31/2022    MCH 29.0 05/31/2022    MCHC 31.6 05/31/2022    RDW 14.9 05/31/2022    SEGSPCT 67 11/24/2011    LYMPHOPCT 11.0 05/27/2022    MONOPCT 7.9 05/27/2022    BASOPCT 0.1 05/27/2022    MONOSABS 1.02 05/27/2022    LYMPHSABS 1.42 05/27/2022    EOSABS 0.00 05/27/2022    BASOSABS 0.01 05/27/2022     CMP:    Lab Results   Component Value Date     05/31/2022    K 2.9 05/31/2022    K 3.4 05/28/2022    CL 97 05/31/2022    CO2 28 05/31/2022    BUN 4 05/31/2022    CREATININE 0.7 05/31/2022    GFRAA >60 05/31/2022    LABGLOM >60 05/31/2022    GLUCOSE 130 05/31/2022    GLUCOSE 132 11/24/2011    PROT 6.9 05/31/2022    LABALBU 3.0 05/31/2022    LABALBU 3.4 01/23/2011    CALCIUM 8.2 05/31/2022    BILITOT 0.4 05/31/2022    ALKPHOS 51 05/31/2022    AST 37 05/31/2022    ALT 59 05/31/2022       MRI Brain:  Mild chronic microvascular disease within the periventricular white matter. No acute ischemia. I personally reviewed the patient's lab and imaging studies at this time.     Assessment:     Patient with altered mental status who is being treated for a recent UTI. There is no baseline cognitive impairment reported    On exam, she does display perseveration as well as some echolalia however MRI of her brain was unremarkable for acute infarction.     No recurrent seizures maintained on Keppra      Plan:     Continue Keppra at this time which is dosed at 500 mg twice a day    MRI of her brain with contrast is pending    LP is also pending      CHERRIE Posadas - CNS  11:08 AM  5/31/2022

## 2022-05-31 NOTE — PROGRESS NOTES
Comprehensive Nutrition Assessment    Type and Reason for Visit:  Reassess    Nutrition Recommendations/Plan: Minimal nutrition x 6 days    Advance diet as tolerated. SLP Recs pureed consistency diet w/ thin liquids    Start Oral Nutrition Supplement to help restore nutritional status     Malnutrition Assessment:  Malnutrition Status: Moderate malnutrition (05/27/22 1542)    Context:  Acute Illness     Findings of the 6 clinical characteristics of malnutrition:  Energy Intake:  75% or less of estimated energy requirements for 7 or more days  Weight Loss:   (Mild wt loss 7% x 3 mon)     Body Fat Loss:  Mild body fat loss Orbital   Muscle Mass Loss:  Mild muscle mass loss Temples (temporalis),Clavicles (pectoralis & deltoids)  Fluid Accumulation:  No significant fluid accumulation    Strength:  Not Performed    Nutrition Assessment:    Pt remains at nutritional risk d/t ongoing need for ICU care. Admit 2/2 Stroke-like symptoms/ Seizure pending r/o meningitis LP. Noted recent knee sx 2 months ago, also recent UTI. Noted ETOH abuse year back. Pt meets criteria for Moderate Malnutrition. Noted dysphagia, CLD advanced. Will add Gelatein BID to comply w/ diet order    Nutrition Related Findings:    Poor attention/ confusion, MAP WNL, -I/O's 4L, generalized edema, active BS, previous N/V, dysphagia Wound Type: Surgical Incision (recent knee sx)       Current Nutrition Intake & Therapies:    Average Meal Intake: 1-25% (Clear liquids)     ADULT DIET; Clear Liquid    Anthropometric Measures:  Height: 5' 4\" (162.6 cm)  Ideal Body Weight (IBW): 120 lbs (55 kg)    Admission Body Weight: 160 lb (72.6 kg) (5/26 first measured)  Current Body Weight: 160 lb (72.6 kg) (5/26 measured), 133.3 % IBW. Current BMI (kg/m2): 27.5  Usual Body Weight: 172 lb (78 kg) (2/22 EMR measured wt)  % Weight Change (Calculated): -7 wt loss x 3 mon                    BMI Categories: Overweight (BMI 25.0-29. 9)    Estimated Daily Nutrient Needs:  Energy Requirements Based On: Formula  Weight Used for Energy Requirements: Current  Energy (kcal/day): MSJ 1191 x 1.2 SF= 2864-7802  Weight Used for Protein Requirements: Ideal  Protein (g/day): 1.3-1.5 g/kg IBW; 70-85  Fluid (ml/day): per critical care    Nutrition Diagnosis:   · Moderate malnutrition,In context of acute illness or injury related to cognitive or neurological impairment (AMS living alone) as evidenced by poor intake prior to admission,mild loss of subcutaneous fat,mild muscle loss,weight loss (7% x 3 mon)    Nutrition Interventions:   Nutrition Education/Counseling: Education not appropriate  Coordination of Nutrition Care: Continue to monitor while inpatient       Goals:  Previous Goal Met: Progressing toward Goal(s)  Specify Other Goals: further diet progression & tolerance    Nutrition Monitoring and Evaluation:      Food/Nutrient Intake Outcomes: Diet Advancement/Tolerance,Food and Nutrient Intake,Supplement Intake  Physical Signs/Symptoms Outcomes: Biochemical Data,Chewing or Swallowing,Nutrition Focused Physical Findings,Skin,Weight,GI Status,Fluid Status or Edema,Hemodynamic Status,Nausea or Vomiting    Discharge Planning:     Too soon to determine     Rafaela Pederson RD, LD  Contact: Ext [phone]

## 2022-05-31 NOTE — PROGRESS NOTES
Pharmacy Consultation Note  (Antibiotic Dosing and Monitoring)    Initial consult date: 5/27/22  Consulting physician/provider: Dr. Keyana Cartagena  Drug: Vancomycin  Indication: Empiric/CNS infection    Age/  Gender Height Weight IBW  Allergy Information   77 y.o./female 5' 4\" (162.6 cm) 160 lb 15 oz (73 kg)     Ideal body weight: 54.7 kg (120 lb 9.5 oz)  Adjusted ideal body weight: 62 kg (136 lb 11.7 oz)   Other      Renal Function:  Recent Labs     05/29/22  0426 05/30/22  0433 05/31/22  0446   BUN 4* 4* 4*   CREATININE 0.7 0.7 0.7       Intake/Output Summary (Last 24 hours) at 5/31/2022 1214  Last data filed at 5/31/2022 0752  Gross per 24 hour   Intake 2192.12 ml   Output 2550 ml   Net -357.88 ml       Vancomycin Monitoring:  Trough:    Recent Labs     05/30/22  1311   VANCOTROUGH 9.9     Random:  No results for input(s): VANCORANDOM in the last 72 hours. Recent vancomycin administrations                   vancomycin 1500 mg in dextrose 5% 300 mL IVPB (mg) 1,500 mg New Bag 05/31/22 1420     1,500 mg New Bag 05/30/22 1417     1,500 mg New Bag 05/29/22 1443                Assessment:  · Patient is a 76 y.o. female who has been initiated on vancomycin  Estimated Creatinine Clearance: 64 mL/min (based on SCr of 0.7 mg/dL).     Plan:  · Will continue vancomycin 1750 mg IV every 24 hours  · Will check vancomycin levels when appropriate  · Will continue to monitor renal function   · Clinical pharmacy to follow      Charlee Durand, PharmD, BCPS 5/31/2022 12:14 PM

## 2022-05-31 NOTE — PROGRESS NOTES
[address] [facility]   Department of Internal Medicine   Internal Medicine Residency  MICU Progress Note    Patient:  Adilia Bell 76 y.o. female   MRN: [id number]       Date of Service: 2022    Allergy: Other    Subjective     Patient was seen and examined this morning. Low grade fevers, No leukocytosis. INR today 1.2. Has remained seizure free since RRT. [place] MRI still pending, IR consulted for LP. Objective     TEMPERATURE:  Current - Temp: 99.3 °F (37.4 °C); Max - Temp  Av.7 °F (37.6 °C)  Min: 99.3 °F (37.4 °C)  Max: 100.2 °F (37.9 °C)  RESPIRATIONS RANGE: Resp  Av.7  Min: 15  Max: 27  PULSE RANGE: Pulse  Av.1  Min: 58  Max: 111  BLOOD PRESSURE RANGE:  Systolic (60FJB), GSM:246 , Min:116 , RWN:084   ; Diastolic (22ALU), LDX:06, Min:60, Max:145    PULSE OXIMETRY RANGE: SpO2  Av.7 %  Min: 91 %  Max: 97 %    I & O - 24hr:    Intake/Output Summary (Last 24 hours) at 2022 1837  Last data filed at 2022 1634  Gross per 24 hour   Intake 3555.66 ml   Output 3025 ml   Net 530.66 ml     I/O last 3 completed shifts: In: 2497 [I.V.:2014; IV Piggyback:483]  Out: 9101 [KWEDO:2109] I/O this shift:  In: 1363.5 [P.O.:120; I.V.:601.2; IV Piggyback:642.4]  Out: 1775 [Urine:1775]   Weight change:        PHYSICAL EXAMINATION:  General appearance - awake, in no acute distress, disoriented.  pressured speech  Mental status - confused, disoriented to person, place, and time, uncooperative  Eyes - pupils constricted 1-2mm and reactive, extraocular eye movements intact, sclera anicteric  Chest -  clear to auscultation, no wheezes, rales or rhonchi, symmetric air entry  Heart - bradycardic, regular rhythm, normal S1, S2, no murmurs, rubs, clicks or gallops  Abdomen - soft, nontender, nondistended, no masses or organomegaly  Neurological - awake, not oriented, pressured speech, no focal findings or movement disorder noted, neck supple without rigidity, normal muscle tone, no tremors, could not assess strength  Extremities - peripheral pulses normal, no pedal edema, no clubbing or cyanosis  Skin - normal coloration and turgor, no rashes.                             Medications     Continuous Infusions:   sodium chloride      dextrose       Scheduled Meds:   [START ON 6/1/2022] vancomycin  1,750 mg IntraVENous Q24H    sodium chloride flush  5-40 mL IntraVENous 2 times per day    heparin flush  3 mL IntraVENous 2 times per day    lisinopril  20 mg Oral Daily    levETIRAcetam  500 mg IntraVENous Q12H    vitamin B-12  1,000 mcg Oral Daily    insulin lispro  0-6 Units SubCUTAneous TID WC    insulin lispro  0-3 Units SubCUTAneous Nightly    folic acid  1 mg IntraVENous Daily    thiamine  100 mg IntraVENous Daily    cefTRIAXone (ROCEPHIN) IV  2,000 mg IntraVENous Q12H    [Held by provider] apixaban  5 mg Oral BID    levothyroxine  50 mcg Oral Daily    pantoprazole  40 mg Oral Daily     PRN Meds: white petrolatum, sodium chloride flush, sodium chloride, heparin flush, glucose, dextrose bolus **OR** dextrose bolus, glucagon (rDNA), dextrose, LORazepam, docusate sodium, ondansetron **OR** ondansetron, polyethylene glycol, acetaminophen **OR** acetaminophen  Nutrition:   NG/OG tube TF type: Pulmocare/Nephro/Glucerna/Jevity          Labs and Imaging Studies     CBC:   Recent Labs     05/29/22 0426 05/30/22  0433 05/31/22  0446   WBC 8.1 6.2 7.9   HGB 8.8* 9.7* 9.6*   HCT 27.8* 30.2* 30.4*   MCV 91.1 90.7 91.8    284 307       BMP:    Recent Labs     05/31/22  0446 05/31/22  1410 05/31/22  1518    127* 135   K 2.9* 7.4* 3.7   CL 97* 98 98   CO2 28 26 29   BUN 4* 4* 4*   CREATININE 0.7 0.6 0.8   GLUCOSE 130* 792* 164*       LIVER PROFILE:   Recent Labs     05/29/22  0426 05/30/22  0433 05/31/22  0446   AST 50* 51* 37*   ALT 58* 70* 59*   BILITOT 0.3 0.4 0.4   ALKPHOS 49 53 51       PT/INR:   Recent Labs     05/29/22  0426 05/30/22  0433 05/31/22  0446   PROTIME 21.3* 12.9* 12.9*   INR 1.9 1. 2 1.2       APTT:   No results for input(s): APTT in the last 72 hours. Fasting Lipid Panel:    No results found for: CHOL, TRIG, HDL    Cardiac Enzymes:    Lab Results   Component Value Date    CKTOTAL 307 (H) 05/31/2022    CKTOTAL 295 (H) 05/30/2022    CKTOTAL 450 (H) 05/29/2022    CKMB 5.7 (H) 01/22/2011    CKMB 3.6 01/22/2011    CKMB 2.7 01/22/2011    TROPONINI <0.01 05/15/2020    TROPONINI <0.01 02/12/2020    TROPONINI 0.01 01/22/2011       Notable Cultures:      Blood cultures   Blood Culture, Routine   Date Value Ref Range Status   05/30/2022 24 Hours no growth  Preliminary     Respiratory cultures No results found for: RESPCULTURE   Gram Stain Result   Date Value Ref Range Status   02/12/2020 Refer to ordered Gram stain for results  Final     Urine   Urine Culture, Routine   Date Value Ref Range Status   05/29/2022 Growth not present  Final     Legionella No results found for: LABLEGI  C Diff PCR No results found for: CDIFPCR  Wound culture/abscess: No results for input(s): WNDABS in the last 72 hours. Tip culture:No results for input(s): CXCATHTIP in the last 72 hours. Oxygen: Additional Respiratory Assessments  Heart Rate: 69  Resp: 20  SpO2: 95 %       Urinary Catheter [facility] (mL): 325 mL  [REMOVED] Urinary Catheter [place]-Output (mL): 300 mL    Imaging Studies:  XR CHEST PORTABLE   Final Result   Increased volume loss in the left lower chest compatible with worsening   atelectasis or pneumonia. CT HEAD WO CONTRAST   Final Result   Generalized atrophy and chronic changes seen within the brain with no acute   intracranial abnormality. MRI BRAIN WO CONTRAST   Final Result   Mild chronic microvascular disease within the periventricular white matter. No acute ischemia. RECOMMENDATIONS:   Unavailable         CT Head WO Contrast   Final Result   No acute intracranial abnormality or hemorrhage. Right parieto-occipital scalp hematoma.          CT CERVICAL SPINE WO CONTRAST   Final Result   No acute abnormality of the cervical spine. Multilevel degenerative changes and facet arthrosis. CT CHEST WO CONTRAST   Final Result   No acute process identified in the chest.      Incidental cholelithiasis. CT ABDOMEN PELVIS WO CONTRAST Additional Contrast? None   Final Result   1. Cholelithiasis      2. Large right peripelvic renal cortical cyst.  No renal obstruction or   calculus. 3.  Evidence of previous appendectomy. XR CHEST PORTABLE   Final Result   Borderline cardiomegaly. Nothing active. MRI BRAIN W CONTRAST    (Results Pending)   IR INTERVENTIONAL RADIOLOGY PROCEDURE REQUEST    (Results Pending)        Resident's Assessment and Plan     Assessment and Plan:         ASSESSMENT:  1. AMS   · Meningitis vs encephalitis vs seizure disorder vs Benzodiazepine withdrawal vs other metabolic, infectious. · CT head, MRI brain no acute process, chronic atrophy. Repeat MRI brain today. · Lactic acid 5.1, , Prolactin 20.14 post seizure. · On Vancomycin, rocephin. No acyclovir, no ampicillin. ID, Neuro following. Await LP and cultures. · EEG done. Await neuro recommendations. On Keppra 500 mg bid, prn ativan. · 1mg ativan q4h prn for Anxiety, Irritation, Agitation, Withdrawal.   · Ammonia, electrolytes, TSH, B12 wnl.     2. Atrial fibrillation on chronic anticoagulation therapy. 5mg BID  ·   Hold apixaban pending LP procedure. Resume when able.                                                                      3. Hypothyroidism   · Continue Synthroid 50 mcg.     4.   History of anxiety  · On Xanax 1 mg every 8 hours at home  · UDS + for benzodiazepines.        5. Hypertension  · On Lisinopril- HCTZ at home 20-12.5mg  · Stable      PLAN:  1. Follow cultures. Blood cultures from 5/25 growing diphtheroid gram positive rods. Repeat cultures send  2. Follow MRI brain  3. IR consulted for LP.  Follow up analysis, results etc.   4. Continue vancomycin, rocephin per ID recommendations  5. EEG done. Follow Neurology recommendations. 6. Benzodiazepine withdrawal- 1mg ativan q4h prn for Anxiety, Irritation, Agitation, Withdrawal.  Give ativan 1 mg prn seizures. 7. Afib on apixaban 5mg BID. - Held at the moment pending LP  8. Dextrose 5% NS - 150cc/hr. Monitor renal function. 9. Follow procalcitonin, sed rate. 10. Vitamin B12 1000 mcg daily  11. Adv diet as toleraed. 12.  Goal INR <1.5 for LP  13. lisinopril for bp management. 20mg qd. - Add hypertensives as needed. # Peptic ulcer prophylaxis: Protonix  # DVT Prophylaxis:  Hold until LP  # Disposition: Cont current care     Macie Martinez MD, PGY-1    Attending Physician: Alivia Connor  Department of Pulmonary, Critical Care and Sleep Medicine  [address] [facility]  Department of Internal Medicine      During multidisciplinary team rounds Adilia Bell is a 76 y.o. female was seen, examined and discussed. This is confirmation that I have personally seen and examined the patient and that the key elements of the encounter were performed by me (> 85 % time). The medications & laboratory data was discussed and adjusted where necessary. The radiographic images were reviewed or with radiologist or consultant if felt dis-concordant with the exam or history. The above findings were corroborated, plans confirmed and changes made if needed. Family is updated at the bedside as available. Key issues of the case were discussed among consultants. Critical Care time is documented if appropriate. Patient seen and examined. While she is able to tell me her name and the year she does continue to have quite a bit of confabulation versus word salad. May be a component of delirium. Continue current antibiotics. MRI ordered. Critical Care time: 35 minutes.     Luna Phillips DO

## 2022-05-31 NOTE — PLAN OF CARE
Problem: Safety - Medical Restraint  Goal: Remains free of injury from restraints (Restraint for Interference with Medical Device)  Description: INTERVENTIONS:  1. Determine that other, less restrictive measures have been tried or would not be effective before applying the restraint  2. Evaluate the patient's condition at the time of restraint application  3. Inform patient/family regarding the reason for restraint  4.  Q2H: Monitor safety, psychosocial status, comfort, nutrition and hydration  5/31/2022 0829 by Liss Anthony RN  Outcome: Progressing  5/31/2022 0827 by Liss Anthony RN  Outcome: Progressing  5/31/2022 0219 by Kimo Santos RN  Outcome: Progressing

## 2022-06-01 ENCOUNTER — APPOINTMENT (OUTPATIENT)
Dept: GENERAL RADIOLOGY | Age: 77
DRG: 871 | End: 2022-06-01
Payer: MEDICARE

## 2022-06-01 LAB
ALBUMIN SERPL-MCNC: 3.1 G/DL (ref 3.5–5.2)
ALP BLD-CCNC: 49 U/L (ref 35–104)
ALT SERPL-CCNC: 47 U/L (ref 0–32)
ANION GAP SERPL CALCULATED.3IONS-SCNC: 14 MMOL/L (ref 7–16)
APPEARANCE CSF: CLEAR
AST SERPL-CCNC: 24 U/L (ref 0–31)
BILIRUB SERPL-MCNC: 0.4 MG/DL (ref 0–1.2)
BUN BLDV-MCNC: 6 MG/DL (ref 6–23)
CALCIUM SERPL-MCNC: 8.2 MG/DL (ref 8.6–10.2)
CHLORIDE BLD-SCNC: 97 MMOL/L (ref 98–107)
CO2: 28 MMOL/L (ref 22–29)
COLOR CSF: COLORLESS
CREAT SERPL-MCNC: 0.9 MG/DL (ref 0.5–1)
CULTURE, BLOOD 2: ABNORMAL
GFR AFRICAN AMERICAN: >60
GFR NON-AFRICAN AMERICAN: >60 ML/MIN/1.73
GLUCOSE BLD-MCNC: 109 MG/DL (ref 74–99)
GLUCOSE, CSF: 59 MG/DL (ref 40–70)
GRAM STAIN ORDERABLE: NORMAL
HCT VFR BLD CALC: 27.9 % (ref 34–48)
HEMOGLOBIN: 8.8 G/DL (ref 11.5–15.5)
INR BLD: 1.1
MAGNESIUM: 1.9 MG/DL (ref 1.6–2.6)
MCH RBC QN AUTO: 28.9 PG (ref 26–35)
MCHC RBC AUTO-ENTMCNC: 31.5 % (ref 32–34.5)
MCV RBC AUTO: 91.5 FL (ref 80–99.9)
METER GLUCOSE: 109 MG/DL (ref 74–99)
METER GLUCOSE: 92 MG/DL (ref 74–99)
METER GLUCOSE: 99 MG/DL (ref 74–99)
METER GLUCOSE: 99 MG/DL (ref 74–99)
MONOCYTE, CSF: 100 % (ref 10–70)
NEUTROPHILS, CSF: 0 % (ref 0–10)
ORGANISM: ABNORMAL
PDW BLD-RTO: 15.1 FL (ref 11.5–15)
PLATELET # BLD: 315 E9/L (ref 130–450)
PMV BLD AUTO: 9.7 FL (ref 7–12)
POTASSIUM SERPL-SCNC: 3.3 MMOL/L (ref 3.5–5)
PROTEIN CSF: 32 MG/DL (ref 15–40)
PROTHROMBIN TIME: 12.7 SEC (ref 9.3–12.4)
RBC # BLD: 3.05 E12/L (ref 3.5–5.5)
RBC CSF: <2000 /UL
SODIUM BLD-SCNC: 139 MMOL/L (ref 132–146)
TOTAL CK: 189 U/L (ref 20–180)
TOTAL PROTEIN: 6.4 G/DL (ref 6.4–8.3)
TUBE NUMBER CSF: ABNORMAL
WBC # BLD: 8.1 E9/L (ref 4.5–11.5)
WBC CSF: 4 /UL (ref 0–2)

## 2022-06-01 PROCEDURE — 85610 PROTHROMBIN TIME: CPT

## 2022-06-01 PROCEDURE — 97530 THERAPEUTIC ACTIVITIES: CPT

## 2022-06-01 PROCEDURE — 87205 SMEAR GRAM STAIN: CPT

## 2022-06-01 PROCEDURE — 97130 THER IVNTJ EA ADDL 15 MIN: CPT | Performed by: SPEECH-LANGUAGE PATHOLOGIST

## 2022-06-01 PROCEDURE — 009U3ZX DRAINAGE OF SPINAL CANAL, PERCUTANEOUS APPROACH, DIAGNOSTIC: ICD-10-PCS | Performed by: STUDENT IN AN ORGANIZED HEALTH CARE EDUCATION/TRAINING PROGRAM

## 2022-06-01 PROCEDURE — 89051 BODY FLUID CELL COUNT: CPT

## 2022-06-01 PROCEDURE — 99233 SBSQ HOSP IP/OBS HIGH 50: CPT | Performed by: INTERNAL MEDICINE

## 2022-06-01 PROCEDURE — 6360000002 HC RX W HCPCS: Performed by: CHIROPRACTOR

## 2022-06-01 PROCEDURE — 2060000000 HC ICU INTERMEDIATE R&B

## 2022-06-01 PROCEDURE — 86592 SYPHILIS TEST NON-TREP QUAL: CPT

## 2022-06-01 PROCEDURE — 86593 SYPHILIS TEST NON-TREP QUANT: CPT

## 2022-06-01 PROCEDURE — 82945 GLUCOSE OTHER FLUID: CPT

## 2022-06-01 PROCEDURE — 6360000002 HC RX W HCPCS: Performed by: STUDENT IN AN ORGANIZED HEALTH CARE EDUCATION/TRAINING PROGRAM

## 2022-06-01 PROCEDURE — 2580000003 HC RX 258: Performed by: CHIROPRACTOR

## 2022-06-01 PROCEDURE — 82550 ASSAY OF CK (CPK): CPT

## 2022-06-01 PROCEDURE — 82962 GLUCOSE BLOOD TEST: CPT

## 2022-06-01 PROCEDURE — 2500000003 HC RX 250 WO HCPCS: Performed by: STUDENT IN AN ORGANIZED HEALTH CARE EDUCATION/TRAINING PROGRAM

## 2022-06-01 PROCEDURE — 2709999900 FL LUMBAR PUNCTURE DIAG

## 2022-06-01 PROCEDURE — 83735 ASSAY OF MAGNESIUM: CPT

## 2022-06-01 PROCEDURE — 85027 COMPLETE CBC AUTOMATED: CPT

## 2022-06-01 PROCEDURE — 99232 SBSQ HOSP IP/OBS MODERATE 35: CPT | Performed by: CLINICAL NURSE SPECIALIST

## 2022-06-01 PROCEDURE — 6360000002 HC RX W HCPCS

## 2022-06-01 PROCEDURE — 86788 WEST NILE VIRUS AB IGM: CPT

## 2022-06-01 PROCEDURE — 6370000000 HC RX 637 (ALT 250 FOR IP): Performed by: INTERNAL MEDICINE

## 2022-06-01 PROCEDURE — 88108 CYTOPATH CONCENTRATE TECH: CPT

## 2022-06-01 PROCEDURE — 97129 THER IVNTJ 1ST 15 MIN: CPT | Performed by: SPEECH-LANGUAGE PATHOLOGIST

## 2022-06-01 PROCEDURE — 84157 ASSAY OF PROTEIN OTHER: CPT

## 2022-06-01 PROCEDURE — 80053 COMPREHEN METABOLIC PANEL: CPT

## 2022-06-01 PROCEDURE — 6370000000 HC RX 637 (ALT 250 FOR IP)

## 2022-06-01 PROCEDURE — 87070 CULTURE OTHR SPECIMN AEROBIC: CPT

## 2022-06-01 PROCEDURE — 87529 HSV DNA AMP PROBE: CPT

## 2022-06-01 PROCEDURE — 2580000003 HC RX 258: Performed by: STUDENT IN AN ORGANIZED HEALTH CARE EDUCATION/TRAINING PROGRAM

## 2022-06-01 PROCEDURE — 86618 LYME DISEASE ANTIBODY: CPT

## 2022-06-01 RX ORDER — ANTIOX #8/OM3/DHA/EPA/LUT/ZEAX 250-2.5 MG
1 CAPSULE ORAL DAILY
COMMUNITY

## 2022-06-01 RX ORDER — POTASSIUM CHLORIDE 29.8 MG/ML
40 INJECTION INTRAVENOUS ONCE
Status: COMPLETED | OUTPATIENT
Start: 2022-06-01 | End: 2022-06-01

## 2022-06-01 RX ORDER — ALPRAZOLAM 0.25 MG/1
1 TABLET ORAL 3 TIMES DAILY PRN
Status: DISCONTINUED | OUTPATIENT
Start: 2022-06-01 | End: 2022-06-03

## 2022-06-01 RX ADMIN — SODIUM CHLORIDE, PRESERVATIVE FREE 10 ML: 5 INJECTION INTRAVENOUS at 20:53

## 2022-06-01 RX ADMIN — VANCOMYCIN HYDROCHLORIDE 1750 MG: 10 INJECTION, POWDER, LYOPHILIZED, FOR SOLUTION INTRAVENOUS at 11:50

## 2022-06-01 RX ADMIN — LEVETIRACETAM 500 MG: 5 INJECTION INTRAVENOUS at 21:04

## 2022-06-01 RX ADMIN — FOLIC ACID 1 MG: 5 INJECTION, SOLUTION INTRAMUSCULAR; INTRAVENOUS; SUBCUTANEOUS at 09:49

## 2022-06-01 RX ADMIN — APIXABAN 5 MG: 5 TABLET, FILM COATED ORAL at 20:57

## 2022-06-01 RX ADMIN — LEVETIRACETAM 500 MG: 5 INJECTION INTRAVENOUS at 09:30

## 2022-06-01 RX ADMIN — CEFTRIAXONE 2000 MG: 2 INJECTION, POWDER, FOR SOLUTION INTRAMUSCULAR; INTRAVENOUS at 09:32

## 2022-06-01 RX ADMIN — SODIUM CHLORIDE, PRESERVATIVE FREE 300 UNITS: 5 INJECTION INTRAVENOUS at 20:59

## 2022-06-01 RX ADMIN — LISINOPRIL 20 MG: 20 TABLET ORAL at 09:31

## 2022-06-01 RX ADMIN — CYANOCOBALAMIN TAB 1000 MCG 1000 MCG: 1000 TAB at 09:32

## 2022-06-01 RX ADMIN — LEVOTHYROXINE SODIUM 50 MCG: 0.05 TABLET ORAL at 06:52

## 2022-06-01 RX ADMIN — PANTOPRAZOLE SODIUM 40 MG: 40 TABLET, DELAYED RELEASE ORAL at 09:31

## 2022-06-01 RX ADMIN — POTASSIUM CHLORIDE 40 MEQ: 29.8 INJECTION, SOLUTION INTRAVENOUS at 06:53

## 2022-06-01 RX ADMIN — POTASSIUM CHLORIDE 40 MEQ: 29.8 INJECTION, SOLUTION INTRAVENOUS at 09:07

## 2022-06-01 RX ADMIN — THIAMINE HYDROCHLORIDE 100 MG: 100 INJECTION, SOLUTION INTRAMUSCULAR; INTRAVENOUS at 09:32

## 2022-06-01 RX ADMIN — CEFTRIAXONE 2000 MG: 2 INJECTION, POWDER, FOR SOLUTION INTRAMUSCULAR; INTRAVENOUS at 20:53

## 2022-06-01 ASSESSMENT — PAIN SCALES - GENERAL
PAINLEVEL_OUTOF10: 0

## 2022-06-01 NOTE — CARE COORDINATION
6/1/22 Update CM note: Patient remains in intensive care. She remains a full code. She remains on vancocin iv qd/iv rocephin qd/iv ativan/iv keppra q12. She had a LP done today. Cultures pending. Referral has been made to [facility] and [facility]. She will require PT. OT evals when able.  Electronically signed by Olive Salgado RN CM on 6/1/2022 at 1:50 PM

## 2022-06-01 NOTE — PROGRESS NOTES
SPEECH LANGUAGE PATHOLOGY  DAILY PROGRESS NOTE        PATIENT NAME:  [place]      :  1945          TODAY'S DATE:  2022 ROOM:  [address] [place] [address]    Ongoing speech/cog therapy. Pt just returned for completing LP. Pt required mod cues to elicit place and month during orientation questioning. Pt independently reported correct name and year. Pt reported correct names of her sons during long-term memory tasks. Pt used semantic paraphasias, jargon words, and was reported as impulsive during conversations. Pt had difficulty maintaining attention and organizing thoughts while telling a story about how she sold her house. She reported she has difficulty with word finding. Pt reported sometimes she says things she does not mean to say.     CPT code(s) [id number]  therapeutic interventions that focus on cognitive function , initial  15 min  12998  therapeutic interventions that focus on cognitive function, each additional 15 min    Total minutes :  30 minutes

## 2022-06-01 NOTE — PROGRESS NOTES
[address] [facility]   Department of Internal Medicine   Internal Medicine Residency  MICU Progress Note    Patient:  Amandeep Ta 76 y.o. female   MRN: [id number]       Date of Service: 2022    Allergy: Other    Subjective     Patient was seen and examined in bedside not in acute distress. Patient was alert, awake, following all the commands but only oriented to herself. Lumbar puncture planned for today. MRI unremarkable. Overnight there was no active issue. LP showed no infectious issues. Eliquis has been resumed. Patient is for transfer to the general floor. Objective     TEMPERATURE:  Current - Temp: 98.6 °F (37 °C); Max - Temp  Av.9 °F (37.2 °C)  Min: 98.4 °F (36.9 °C)  Max: 99.3 °F (37.4 °C)  RESPIRATIONS RANGE: Resp  Av.1  Min: 14  Max: 20  PULSE RANGE: Pulse  Av.8  Min: 60  Max: 90  BLOOD PRESSURE RANGE:  Systolic (11YQA), GFM:663 , Min:124 , PEP:494   ; Diastolic (73RAV), RR, Min:63, Max:106    PULSE OXIMETRY RANGE: SpO2  Av.3 %  Min: 88 %  Max: 96 %    I & O - 24hr:    Intake/Output Summary (Last 24 hours) at 2022 1446  Last data filed at 2022 1208  Gross per 24 hour   Intake 220 ml   Output 1600 ml   Net -1380 ml     I/O last 3 completed shifts: In: 2298.3 [P.O.:120; I.V.:1435.4; IV Piggyback:742.9]  Out: 3810 [Urine:3810] I/O this shift:  In: -   Out: 290 [Urine:290]   Weight change:        PHYSICAL EXAMINATION:  General appearance - awake, in no acute distress, disoriented.  pressured speech  Mental status - confused, disoriented to person, place, and time, uncooperative  Eyes - pupils constricted 1-2mm and reactive, extraocular eye movements intact, sclera anicteric  Chest -  clear to auscultation, no wheezes, rales or rhonchi, symmetric air entry  Heart - bradycardic, regular rhythm, normal S1, S2, no murmurs, rubs, clicks or gallops  Abdomen - soft, nontender, nondistended, no masses or organomegaly  Neurological - awake, not oriented, pressured speech, no focal findings or movement disorder noted, neck supple without rigidity, normal muscle tone, no tremors, could not assess strength  Extremities - peripheral pulses normal, no pedal edema, no clubbing or cyanosis  Skin - normal coloration and turgor, no rashes.                             Medications     Continuous Infusions:   sodium chloride      dextrose       Scheduled Meds:   vancomycin  1,750 mg IntraVENous Q24H    sodium chloride flush  5-40 mL IntraVENous 2 times per day    heparin flush  3 mL IntraVENous 2 times per day    lisinopril  20 mg Oral Daily    levETIRAcetam  500 mg IntraVENous Q12H    vitamin B-12  1,000 mcg Oral Daily    insulin lispro  0-6 Units SubCUTAneous TID WC    insulin lispro  0-3 Units SubCUTAneous Nightly    folic acid  1 mg IntraVENous Daily    thiamine  100 mg IntraVENous Daily    cefTRIAXone (ROCEPHIN) IV  2,000 mg IntraVENous Q12H    apixaban  5 mg Oral BID    levothyroxine  50 mcg Oral Daily    pantoprazole  40 mg Oral Daily     PRN Meds: ALPRAZolam, white petrolatum, sodium chloride flush, sodium chloride, heparin flush, glucose, dextrose bolus **OR** dextrose bolus, glucagon (rDNA), dextrose, docusate sodium, ondansetron **OR** ondansetron, polyethylene glycol, acetaminophen **OR** acetaminophen  Nutrition:   NG/OG tube TF type: Pulmocare/Nephro/Glucerna/Jevity          Labs and Imaging Studies     CBC:   Recent Labs     05/30/22  0433 05/31/22  0446 06/01/22  0500   WBC 6.2 7.9 8.1   HGB 9.7* 9.6* 8.8*   HCT 30.2* 30.4* 27.9*   MCV 90.7 91.8 91.5    307 315       BMP:    Recent Labs     05/31/22  1410 05/31/22  1518 06/01/22  0500   * 135 139   K 7.4* 3.7 3.3*   CL 98 98 97*   CO2 26 29 28   BUN 4* 4* 6   CREATININE 0.6 0.8 0.9   GLUCOSE 792* 164* 109*       LIVER PROFILE:   Recent Labs     05/30/22  0433 05/31/22  0446 06/01/22  0500   AST 51* 37* 24   ALT 70* 59* 47*   BILITOT 0.4 0.4 0.4   ALKPHOS 53 51 49       PT/INR: Recent Labs     05/30/22  0433 05/31/22  0446 06/01/22  0500   PROTIME 12.9* 12.9* 12.7*   INR 1.2 1.2 1.1       APTT:   No results for input(s): APTT in the last 72 hours. Fasting Lipid Panel:    No results found for: CHOL, TRIG, HDL    Cardiac Enzymes:    Lab Results   Component Value Date    CKTOTAL 189 (H) 06/01/2022    CKTOTAL 307 (H) 05/31/2022    CKTOTAL 295 (H) 05/30/2022    CKMB 5.7 (H) 01/22/2011    CKMB 3.6 01/22/2011    CKMB 2.7 01/22/2011    TROPONINI <0.01 05/15/2020    TROPONINI <0.01 02/12/2020    TROPONINI 0.01 01/22/2011           Oxygen: Additional Respiratory Assessments  Heart Rate: 78  Resp: 18  SpO2: 96 %       Urinary Catheter Kkcey-Qjhdkskkiny-Zcbcbx (mL): 290 mL  [REMOVED] Urinary Catheter Gonzales-Output (mL): 300 mL    Imaging Studies:  FL LUMBAR PUNCTURE DIAG   Final Result   Fluoroscopic-guided lumbar puncture. Opening pressure was 11.5 cm of water         MRI BRAIN W CONTRAST   Final Result   Motion artifact limits evaluation. Allowing for this limitation, the findings   are as follows: No evidence of recent infarct, intracranial hemorrhage, mass effect, or   hydrocephalus. No abnormal intracranial enhancement. Mild chronic microvascular ischemic changes. XR CHEST PORTABLE   Final Result   Increased volume loss in the left lower chest compatible with worsening   atelectasis or pneumonia. CT HEAD WO CONTRAST   Final Result   Generalized atrophy and chronic changes seen within the brain with no acute   intracranial abnormality. MRI BRAIN WO CONTRAST   Final Result   Mild chronic microvascular disease within the periventricular white matter. No acute ischemia. RECOMMENDATIONS:   Unavailable         CT Head WO Contrast   Final Result   No acute intracranial abnormality or hemorrhage. Right parieto-occipital scalp hematoma. CT CERVICAL SPINE WO CONTRAST   Final Result   No acute abnormality of the cervical spine. Multilevel degenerative changes and facet arthrosis. CT CHEST WO CONTRAST   Final Result   No acute process identified in the chest.      Incidental cholelithiasis. CT ABDOMEN PELVIS WO CONTRAST Additional Contrast? None   Final Result   1. Cholelithiasis      2. Large right peripelvic renal cortical cyst.  No renal obstruction or   calculus. 3.  Evidence of previous appendectomy. XR CHEST PORTABLE   Final Result   Borderline cardiomegaly. Nothing active. Resident's Assessment and Plan     Assessment and Plan:         ASSESSMENT:  1. AMS   · Meningitis vs encephalitis vs seizure disorder vs Benzodiazepine withdrawal vs other metabolic, infectious. · CT head, MRI brain no acute process, chronic atrophy. Repeat MRI brain today. · Lactic acid 5.1, , Prolactin 20.14 post seizure. · On Vancomycin, rocephin. No acyclovir, no ampicillin. ID, Neuro following. Await LP and cultures. · EEG done. Await neuro recommendations. On Keppra 500 mg bid, prn ativan. · 1mg ativan q4h prn for Anxiety, Irritation, Agitation, Withdrawal.   · Ammonia, electrolytes, TSH, B12 wnl.     2. Atrial fibrillation on chronic anticoagulation therapy                                                               3. Hypothyroidism   · Continue Synthroid 50 mcg.     4.   History of anxiety  · On Xanax 1 mg every 8 hours at home  · UDS + for benzodiazepines.        5. Hypertension  · On Lisinopril- HCTZ at home 20-12.5mg  · Stable      PLAN:  1. Initial LP did not show any infectious process. Glucose, protein, further work-up for CSF is pending. 2. Eliquis has been resumed. 3. Continue vancomycin, rocephin per ID recommendations  4. Afib on apixaban 5mg BID.  5. On Xanax as needed for anxiety. 6. Vitamin B12 1000 mcg daily  7. On vitamin B-1 and folic acid supplement  8. Continue lisinopril for blood pressure management  9. Adv diet as toleraed.     # Peptic ulcer prophylaxis: Protonix  # DVT Prophylaxis: Eliquis  # Disposition: Cont current care     Wendy Fuentes MD, PGY-1    Attending Physician: [id number] [address]  Department of Pulmonary, Critical Care and Sleep Medicine  [address] [facility]  Department of Internal Medicine      During multidisciplinary team rounds Lakshmi Pires is a 76 y.o. female was seen, examined and discussed. This is confirmation that I have personally seen and examined the patient and that the key elements of the encounter were performed by me (> 85 % time). The medications & laboratory data was discussed and adjusted where necessary. The radiographic images were reviewed or with radiologist or consultant if felt dis-concordant with the exam or history. The above findings were corroborated, plans confirmed and changes made if needed. Family is updated at the bedside as available. Key issues of the case were discussed among consultants. Critical Care time is documented if appropriate. Ok to transfer to floor.      Luna Phillips DO

## 2022-06-01 NOTE — PROGRESS NOTES
[facility] Medicine    Subjective:  Pt seen this am / sched for LP today      Current Facility-Administered Medications:     vancomycin (VANCOCIN) 1,750 mg in dextrose 5 % 500 mL IVPB, 1,750 mg, IntraVENous, Q24H, Stepan Bauman MD    ALPRAZolam Evaline Beams) tablet 1 mg, 1 mg, Oral, TID PRN, Stepan Bauman MD    white petrolatum ointment, , Topical, BID PRN, Roseanna Manning MD    sodium chloride flush 0.9 % injection 5-40 mL, 5-40 mL, IntraVENous, 2 times per day, Roseanna Manning MD, 10 mL at 05/31/22 2024    sodium chloride flush 0.9 % injection 5-40 mL, 5-40 mL, IntraVENous, PRN, Cruz Keys MD    0.9 % sodium chloride infusion, , IntraVENous, PRN, Roseanna Manning MD    heparin flush 100 UNIT/ML injection 300 Units, 3 mL, IntraVENous, 2 times per day, Roseanan Manning MD    heparin flush 100 UNIT/ML injection 300 Units, 3 mL, IntraCATHeter, PRN, Roseanna Manning MD    lisinopril (PRINIVIL;ZESTRIL) tablet 20 mg, 20 mg, Oral, Daily, Margarita Gibbs MD, 20 mg at 06/01/22 0931    levETIRAcetam (KEPPRA) 500 mg/100 mL IVPB, 500 mg, IntraVENous, Q12H, CHERRIE Banks - CNP, Stopped at 06/01/22 0945    vitamin B-12 (CYANOCOBALAMIN) tablet 1,000 mcg, 1,000 mcg, Oral, Daily, Raghu Griggs, CHERRIE - CNP, 1,000 mcg at 06/01/22 0932    insulin lispro (HUMALOG) injection vial 0-6 Units, 0-6 Units, SubCUTAneous, TID WC, Stepan Bauman MD, 1 Units at 05/31/22 1638    insulin lispro (HUMALOG) injection vial 0-3 Units, 0-3 Units, SubCUTAneous, Nightly, Stepan Bauman MD    glucose chewable tablet 16 g, 4 tablet, Oral, PRN, Stepan Bauman MD    dextrose bolus 10% 125 mL, 125 mL, IntraVENous, PRN **OR** dextrose bolus 10% 250 mL, 250 mL, IntraVENous, PRN, Stepan Bauman MD    glucagon (rDNA) injection 1 mg, 1 mg, IntraMUSCular, PRN, Stepan Bauman MD    dextrose 5 % solution, 100 mL/hr, IntraVENous, PRN, Stepan Bauman MD    folic acid injection 1 mg, 1 mg, IntraVENous, Daily, Stepan Bauman MD, 1 mg at 06/01/22 0949    thiamine (B-1) injection 100 mg, 100 mg, IntraVENous, Daily, Mary Crocker MD, 100 mg at 06/01/22 0932    cefTRIAXone (ROCEPHIN) 2,000 mg in sterile water 20 mL IV syringe, 2,000 mg, IntraVENous, Q12H, Mary Crocker MD, 2,000 mg at 06/01/22 0932    apixaban (ELIQUIS) tablet 5 mg, 5 mg, Oral, BID, Jayden Sorto, DO, 5 mg at 05/26/22 2016    docusate sodium (COLACE) capsule 100 mg, 100 mg, Oral, BID PRN, Jayden Sorto, DO, 100 mg at 05/30/22 1011    levothyroxine (SYNTHROID) tablet 50 mcg, 50 mcg, Oral, Daily, Jayden Sorto, DO, 50 mcg at 06/01/22 8916    pantoprazole (PROTONIX) tablet 40 mg, 40 mg, Oral, Daily, Jayden Sorto, DO, 40 mg at 06/01/22 0931    ondansetron (ZOFRAN-ODT) disintegrating tablet 4 mg, 4 mg, Oral, Q8H PRN **OR** ondansetron (ZOFRAN) injection 4 mg, 4 mg, IntraVENous, Q6H PRN, Jayden Sorto, DO, 4 mg at 05/31/22 1205    polyethylene glycol (GLYCOLAX) packet 17 g, 17 g, Oral, Daily PRN, Jayden Sorto, DO    acetaminophen (TYLENOL) tablet 650 mg, 650 mg, Oral, Q6H PRN, 650 mg at 05/25/22 6550 **OR** acetaminophen (TYLENOL) suppository 650 mg, 650 mg, Rectal, Q6H PRN, Jayden Sorto, DO    Objective:    BP (!) 173/66   Pulse 78   Temp 98.6 °F (37 °C) (Oral)   Resp 18   Ht 5' 4\" (1.626 m)   Wt 160 lb 15 oz (73 kg)   SpO2 96%   BMI 27.62 kg/m²     Heart:  reg  Lungs:  ctab  Abd: + bs soft nontender  Extrem:  Edema legs    CBC with Differential:    Lab Results   Component Value Date    WBC 8.1 06/01/2022    RBC 3.05 06/01/2022    HGB 8.8 06/01/2022    HCT 27.9 06/01/2022     06/01/2022    MCV 91.5 06/01/2022    MCH 28.9 06/01/2022    MCHC 31.5 06/01/2022    RDW 15.1 06/01/2022    SEGSPCT 67 11/24/2011    LYMPHOPCT 11.0 05/27/2022    MONOPCT 7.9 05/27/2022    BASOPCT 0.1 05/27/2022    MONOSABS 1.02 05/27/2022    LYMPHSABS 1.42 05/27/2022    EOSABS 0.00 05/27/2022    BASOSABS 0.01 05/27/2022     CMP:    Lab Results   Component Value Date     06/01/2022    K 3.3 06/01/2022    K 3.4 05/28/2022    CL 97 06/01/2022    CO2 28 06/01/2022    BUN 6 06/01/2022    CREATININE 0.9 06/01/2022    GFRAA >60 06/01/2022    LABGLOM >60 06/01/2022    GLUCOSE 109 06/01/2022    GLUCOSE 132 11/24/2011    PROT 6.4 06/01/2022    LABALBU 3.1 06/01/2022    LABALBU 3.4 01/23/2011    CALCIUM 8.2 06/01/2022    BILITOT 0.4 06/01/2022    ALKPHOS 49 06/01/2022    AST 24 06/01/2022    ALT 47 06/01/2022     Warfarin PT/INR:    Lab Results   Component Value Date    INR 1.1 06/01/2022    INR 1.2 05/31/2022    INR 1.2 05/30/2022    PROTIME 12.7 (H) 06/01/2022    PROTIME 12.9 (H) 05/31/2022    PROTIME 12.9 (H) 05/30/2022       Assessment:    Principal Problem:    Stroke-like symptoms  Active Problems: Moderate protein-calorie malnutrition ([address]    Altered mental status    Septicemia ([address]  Resolved Problems:    * No resolved hospital problems. *      Plan:   For LP today pt/ot         Sultana Day DO  2:38 PM  6/1/2022

## 2022-06-01 NOTE — PROGRESS NOTES
Son concerned about patients stomach complaints, diarrhea, projectile vomiting, 30 pound unintended weight loss, poor appetite. Patient didn't want to eat because she did not want to get sick. Resident to speak with family.

## 2022-06-01 NOTE — PLAN OF CARE
Pt continues to attempt to pull at critical lines due to confusion. Bilateral wrist restraints remain in place for pt safety. Problem: Safety - Medical Restraint  Goal: Remains free of injury from restraints (Restraint for Interference with Medical Device)  Description: INTERVENTIONS:  1. Determine that other, less restrictive measures have been tried or would not be effective before applying the restraint  2. Evaluate the patient's condition at the time of restraint application  3. Inform patient/family regarding the reason for restraint  4.  Q2H: Monitor safety, psychosocial status, comfort, nutrition and hydration  6/1/2022 0649 by Peter Vera RN  Outcome: Progressing  Flowsheets (Taken 6/1/2022 0600)  Remains free of injury from restraints (restraint for interference with medical device): Every 2 hours: Monitor safety, psychosocial status, comfort, nutrition and hydration  6/1/2022 0504 by Peter Vera RN  Outcome: Progressing  Flowsheets  Taken 6/1/2022 0400 by Peter Vera RN  Remains free of injury from restraints (restraint for interference with medical device): Every 2 hours: Monitor safety, psychosocial status, comfort, nutrition and hydration  Taken 6/1/2022 0200 by Peter Vera RN  Remains free of injury from restraints (restraint for interference with medical device): Every 2 hours: Monitor safety, psychosocial status, comfort, nutrition and hydration  Taken 6/1/2022 0000 by Peter Vera RN  Remains free of injury from restraints (restraint for interference with medical device): Every 2 hours: Monitor safety, psychosocial status, comfort, nutrition and hydration  Taken 5/31/2022 2200 by Peter Vera RN  Remains free of injury from restraints (restraint for interference with medical device): Every 2 hours: Monitor safety, psychosocial status, comfort, nutrition and hydration  Taken 5/31/2022 2000 by Peter Vera RN  Remains free of injury from restraints (restraint for interference with medical device): Every 2 hours: Monitor safety, psychosocial status, comfort, nutrition and hydration  Taken 5/31/2022 1800 by Marcell Smyth RN  Remains free of injury from restraints (restraint for interference with medical device): Every 2 hours: Monitor safety, psychosocial status, comfort, nutrition and hydration  Taken 5/31/2022 1600 by Marcell Smyth RN  Remains free of injury from restraints (restraint for interference with medical device): Every 2 hours: Monitor safety, psychosocial status, comfort, nutrition and hydration

## 2022-06-01 NOTE — PLAN OF CARE
Problem: Pain  Goal: Verbalizes/displays adequate comfort level or baseline comfort level  6/1/2022 1054 by Norma Bolden RN  Outcome: Progressing  6/1/2022 0504 by Srini Rich RN  Outcome: Progressing     Problem: Safety - Medical Restraint  Goal: Remains free of injury from restraints (Restraint for Interference with Medical Device)  Description: INTERVENTIONS:  1. Determine that other, less restrictive measures have been tried or would not be effective before applying the restraint  2. Evaluate the patient's condition at the time of restraint application  3. Inform patient/family regarding the reason for restraint  4.  Q2H: Monitor safety, psychosocial status, comfort, nutrition and hydration  6/1/2022 1054 by Norma Bolden RN  Outcome: Completed  Flowsheets (Taken 6/1/2022 0156)  Remains free of injury from restraints (restraint for interference with medical device): Every 2 hours: Monitor safety, psychosocial status, comfort, nutrition and hydration  6/1/2022 0649 by Srini Rich RN  Outcome: Progressing  Flowsheets (Taken 6/1/2022 0600)  Remains free of injury from restraints (restraint for interference with medical device): Every 2 hours: Monitor safety, psychosocial status, comfort, nutrition and hydration  6/1/2022 0504 by Srini Rich RN  Outcome: Progressing  Flowsheets  Taken 6/1/2022 0400 by Srini Rich RN  Remains free of injury from restraints (restraint for interference with medical device): Every 2 hours: Monitor safety, psychosocial status, comfort, nutrition and hydration  Taken 6/1/2022 0200 by Srini Rich RN  Remains free of injury from restraints (restraint for interference with medical device): Every 2 hours: Monitor safety, psychosocial status, comfort, nutrition and hydration  Taken 6/1/2022 0000 by Srini Rich RN  Remains free of injury from restraints (restraint for interference with medical device): Every 2 hours: Monitor safety, psychosocial status, comfort, nutrition and hydration  Taken 5/31/2022 2200 by Josette Sanchez RN  Remains free of injury from restraints (restraint for interference with medical device): Every 2 hours: Monitor safety, psychosocial status, comfort, nutrition and hydration  Taken 5/31/2022 2000 by Josette Sanchez RN  Remains free of injury from restraints (restraint for interference with medical device): Every 2 hours: Monitor safety, psychosocial status, comfort, nutrition and hydration  Taken 5/31/2022 1800 by Andrea Bello RN  Remains free of injury from restraints (restraint for interference with medical device): Every 2 hours: Monitor safety, psychosocial status, comfort, nutrition and hydration  Taken 5/31/2022 1600 by Andrea Bello RN  Remains free of injury from restraints (restraint for interference with medical device): Every 2 hours: Monitor safety, psychosocial status, comfort, nutrition and hydration     Problem: Skin/Tissue Integrity  Goal: Absence of new skin breakdown  Description: 1. Monitor for areas of redness and/or skin breakdown  2. Assess vascular access sites hourly  3. Every 4-6 hours minimum:  Change oxygen saturation probe site  4. Every 4-6 hours:  If on nasal continuous positive airway pressure, respiratory therapy assess nares and determine need for appliance change or resting period.   6/1/2022 1054 by Andrea Bello RN  Outcome: Progressing  6/1/2022 0504 by Josette Sanchez RN  Outcome: Progressing     Problem: Safety - Adult  Goal: Free from fall injury  6/1/2022 1054 by Andrea Bello RN  Outcome: Progressing  6/1/2022 0504 by Josette Sanchez RN  Outcome: Progressing     Problem: Nutrition Deficit:  Goal: Optimize nutritional status  6/1/2022 1054 by Andrea Bello RN  Outcome: Progressing  6/1/2022 0504 by Josette Sanchez RN  Outcome: Progressing     Problem: ABCDS Injury Assessment  Goal: Absence of physical injury  6/1/2022 1054 by Andrea Bello RN  Outcome: Progressing  6/1/2022 0504 by Josette Sanchez RN  Outcome: Progressing

## 2022-06-01 NOTE — PROGRESS NOTES
Lakshmi Pires is a 76 y.o. right handed female     Neurology is following for alteration in mentation. Prior to admission patient was being treated for a UTI and told her son that she had felt that the antibiotic was messing with her brain. He began noticing that she was more confused at home. She reportedly has a history of alcohol abuse but has not had issues for many years. She presented to the ED on May 25 for increasing confusion. She was found by the neighbor sitting on the couch in her underwear with shards of glass on the chair. On May 26 she was found to have a flight of ideas not following commands or answering questions but her speech was clear and she is appeared to be having a conversation with herself. On May 27 she had 2 witnessed generalized tonic-clonic seizures and RRT was called and she was transferred to the ICU. MRI of her brain was obtained which was unrevealing for acute infarction. LP has been ordered and is reportedly being completed today -by interventional radiology    She states that she feels better today  Recalls being in the intensive care unit however did not appreciate the fact that she was still there      Allergies as of 05/25/2022 - Fully Reviewed 05/25/2022   Allergen Reaction Noted    Other  06/24/2020       Objective:     BP (!) 129/90   Pulse 62   Temp 98.4 °F (36.9 °C) (Oral)   Resp 16   Ht 5' 4\" (1.626 m)   Wt 160 lb 15 oz (73 kg)   SpO2 91%   BMI 27.62 kg/m²      General appearance: alert - follows a few commands today  Head: Normocephalic, without obvious abnormality, atraumatic  Extremities: no cyanosis or edema  Pulses: 2+ and symmetric  Skin: no rashes or lesions    Mental Status: awake -oriented to person year and month.   Believes she was back home and no longer in the intensive care unit    Speech: clear  Language: No echolalia perseveration appreciated    No anomia    Cranial Nerves:  I: smell    II: visual acuity     II: visual fields Bilateral threat   II: pupils FLAQUITA   III,VII: ptosis None   III,IV,VI: extraocular muscles  Looks around the room   V: mastication Normal   V: facial light touch sensation  Normal   V,VII: corneal reflex  Present   VII: facial muscle function - upper     VII: facial muscle function - lower Normal   VIII: hearing Normal   IX: soft palate elevation   normal   IX,X: gag reflex    XI: trapezius strength   5/5   XI: sternocleidomastoid strength  5/5   XI: neck extension strength   5/5   XII: tongue strength   normal     Motor:  Moving all limbs symmetrically     Sensory:  Appreciates LT in all limbs     Coordination:   FN, FFM and KRISTOFER - no ataxia appreciated     DTR:   Right Brachioradialis reflex 2+  Left Brachioradialis reflex 2+  Right Biceps reflex 2+  Left Biceps reflex 2+  Right Quadriceps reflex 2+  Left Quadriceps reflex 2+  Right Achilles reflex 2+  Left Achilles reflex 2+    No Babinski  No Lowry's     Laboratory/Radiology:     CBC with Differential:    Lab Results   Component Value Date    WBC 8.1 06/01/2022    RBC 3.05 06/01/2022    HGB 8.8 06/01/2022    HCT 27.9 06/01/2022     06/01/2022    MCV 91.5 06/01/2022    MCH 28.9 06/01/2022    MCHC 31.5 06/01/2022    RDW 15.1 06/01/2022    SEGSPCT 67 11/24/2011    LYMPHOPCT 11.0 05/27/2022    MONOPCT 7.9 05/27/2022    BASOPCT 0.1 05/27/2022    MONOSABS 1.02 05/27/2022    LYMPHSABS 1.42 05/27/2022    EOSABS 0.00 05/27/2022    BASOSABS 0.01 05/27/2022     CMP:    Lab Results   Component Value Date     06/01/2022    K 3.3 06/01/2022    K 3.4 05/28/2022    CL 97 06/01/2022    CO2 28 06/01/2022    BUN 6 06/01/2022    CREATININE 0.9 06/01/2022    GFRAA >60 06/01/2022    LABGLOM >60 06/01/2022    GLUCOSE 109 06/01/2022    GLUCOSE 132 11/24/2011    PROT 6.4 06/01/2022    LABALBU 3.1 06/01/2022    LABALBU 3.4 01/23/2011    CALCIUM 8.2 06/01/2022    BILITOT 0.4 06/01/2022    ALKPHOS 49 06/01/2022    AST 24 06/01/2022    ALT 47 06/01/2022       MRI Brain: Mild chronic microvascular disease within the periventricular white matter. No acute ischemia. I personally reviewed the patient's lab and imaging studies at this time. Assessment:     Patient with altered mental status who is being treated for a recent UTI. There is no baseline cognitive impairment reported    On exam yesterday, she displayed perseveration as well as some echolalia however MRI of her brain was unremarkable for acute infarction.    Today, markedly improved    No recurrent seizures maintained on Keppra      Plan:     Continue Keppra at this time which is dosed at 500 mg twice a day    LP is also pending    CHERRIE Smith - CNS  11:19 AM  6/1/2022

## 2022-06-01 NOTE — PROGRESS NOTES
[facility] Quality Flow/Interdisciplinary Rounds Progress Note        Quality Flow Rounds held on June 1, 2022    Disciplines Attending:  Bedside nurse, charge nurse, , PT/OT, pharmacy, nursing unit leadership, , Medical residents    [place] was admitted on 5/25/2022  1:41 PM    Anticipated Discharge Date:  Expected Discharge Date: 05/31/22    Disposition:    Jorge Score:  Jorge Scale Score: 18    Readmission Risk              Risk of Unplanned Readmission:  19           Discussed patient goal for the day, patient clinical progression, and barriers to discharge.   The following Goal(s) of the Day/Commitment(s) have been identified:  LP done, patient safety, continue ICU care, possible transfer      Romel Kraft RN  June 1, 2022

## 2022-06-01 NOTE — PROGRESS NOTES
Infectious Disease  Progress Note  [facility]    Chief Complaint: no complaint    Subjective: rule out meningitis    Scheduled Meds:   potassium chloride  40 mEq IntraVENous Once    vancomycin  1,750 mg IntraVENous Q24H    sodium chloride flush  5-40 mL IntraVENous 2 times per day    heparin flush  3 mL IntraVENous 2 times per day    lisinopril  20 mg Oral Daily    levETIRAcetam  500 mg IntraVENous Q12H    vitamin B-12  1,000 mcg Oral Daily    insulin lispro  0-6 Units SubCUTAneous TID WC    insulin lispro  0-3 Units SubCUTAneous Nightly    folic acid  1 mg IntraVENous Daily    thiamine  100 mg IntraVENous Daily    cefTRIAXone (ROCEPHIN) IV  2,000 mg IntraVENous Q12H    [Held by provider] apixaban  5 mg Oral BID    levothyroxine  50 mcg Oral Daily    pantoprazole  40 mg Oral Daily     Continuous Infusions:   sodium chloride      dextrose       PRN Meds:white petrolatum, sodium chloride flush, sodium chloride, heparin flush, glucose, dextrose bolus **OR** dextrose bolus, glucagon (rDNA), dextrose, LORazepam, docusate sodium, ondansetron **OR** ondansetron, polyethylene glycol, acetaminophen **OR** acetaminophen    Patient Vitals for the past 24 hrs:   BP Temp Temp src Pulse Resp SpO2   06/01/22 1000 (!) 129/90 -- -- 62 16 91 %   06/01/22 0931 (!) 146/85 -- -- -- -- --   06/01/22 0900 (!) 146/85 -- -- 63 15 91 %   06/01/22 0800 (!) 141/63 98.4 °F (36.9 °C) Oral 61 14 92 %   06/01/22 0700 (!) 150/70 -- -- 65 18 91 %   06/01/22 0600 (!) 162/78 -- -- 60 18 90 %   06/01/22 0500 (!) 142/70 -- -- 63 18 (!) 88 %   06/01/22 0400 (!) 158/64 99.1 °F (37.3 °C) Axillary 61 16 93 %   06/01/22 0300 (!) 162/106 -- -- 61 15 92 %   06/01/22 0200 124/73 -- -- 66 18 (!) 89 %   06/01/22 0100 (!) 144/100 -- -- 66 14 93 %   06/01/22 0000 (!) 159/82 99 °F (37.2 °C) Axillary 70 20 (!) 88 %   05/31/22 2300 138/73 -- -- 64 17 93 %   05/31/22 2200 (!) 150/78 -- -- 66 18 92 %   05/31/22 2100 (!) 141/67 -- -- 65 17 95 %   05/31/22 2000 (!) 135/106 98.9 °F (37.2 °C) Axillary 77 16 91 %   05/31/22 1900 (!) 142/79 -- -- 70 18 96 %   05/31/22 1808 (!) 156/82 -- -- 82 18 96 %   05/31/22 1800 -- -- -- 90 18 --   05/31/22 1600 -- 99.3 °F (37.4 °C) Bladder 69 20 95 %   05/31/22 1500 (!) 161/83 -- -- 67 18 94 %   05/31/22 1400 (!) 145/119 -- -- 58 21 94 %   05/31/22 1300 125/82 -- -- 63 22 94 %   05/31/22 1200 132/60 99.3 °F (37.4 °C) Bladder 61 16 93 %       CBC with Differential:    Lab Results   Component Value Date    WBC 8.1 06/01/2022    RBC 3.05 06/01/2022    HGB 8.8 06/01/2022    HCT 27.9 06/01/2022     06/01/2022    MCV 91.5 06/01/2022    MCH 28.9 06/01/2022    MCHC 31.5 06/01/2022    RDW 15.1 06/01/2022    SEGSPCT 67 11/24/2011    LYMPHOPCT 11.0 05/27/2022    MONOPCT 7.9 05/27/2022    BASOPCT 0.1 05/27/2022    MONOSABS 1.02 05/27/2022    LYMPHSABS 1.42 05/27/2022    EOSABS 0.00 05/27/2022    BASOSABS 0.01 05/27/2022     CMP:    Lab Results   Component Value Date     06/01/2022    K 3.3 06/01/2022    K 3.4 05/28/2022    CL 97 06/01/2022    CO2 28 06/01/2022    BUN 6 06/01/2022    CREATININE 0.9 06/01/2022    GFRAA >60 06/01/2022    LABGLOM >60 06/01/2022    GLUCOSE 109 06/01/2022    GLUCOSE 132 11/24/2011    PROT 6.4 06/01/2022    LABALBU 3.1 06/01/2022    LABALBU 3.4 01/23/2011    CALCIUM 8.2 06/01/2022    BILITOT 0.4 06/01/2022    ALKPHOS 49 06/01/2022    AST 24 06/01/2022    ALT 47 06/01/2022       BP (!) 129/90   Pulse 62   Temp 98.4 °F (36.9 °C) (Oral)   Resp 16   Ht 5' 4\" (1.626 m)   Wt 160 lb 15 oz (73 kg)   SpO2 91%   BMI 27.62 kg/m²     Physical Exam  Const/Neuro- unchanged, no signs of acute distress, Alert  ENMT- Within Normal Limits, Normocephalic, mucous membranes pink/moist, No thrush  Neck: Neck supple  Heart- Regular, Rate, Rhythm- no murmur appreciated. Lungs- clear to ascultation. Respirations even and nonlabored.   Abdomen- Soft, bowel sounds positive, non tender  Musculo/Extremities-  Equal and symmetrical, no edema. No tenderness. Skin:  Warm and dry, free from rashes. Cultures reviewed    Radiology reviewed  MRI BRAIN W CONTRAST   Final Result   Motion artifact limits evaluation. Allowing for this limitation, the findings   are as follows: No evidence of recent infarct, intracranial hemorrhage, mass effect, or   hydrocephalus. No abnormal intracranial enhancement. Mild chronic microvascular ischemic changes. XR CHEST PORTABLE   Final Result   Increased volume loss in the left lower chest compatible with worsening   atelectasis or pneumonia. CT HEAD WO CONTRAST   Final Result   Generalized atrophy and chronic changes seen within the brain with no acute   intracranial abnormality. MRI BRAIN WO CONTRAST   Final Result   Mild chronic microvascular disease within the periventricular white matter. No acute ischemia. RECOMMENDATIONS:   Unavailable         CT Head WO Contrast   Final Result   No acute intracranial abnormality or hemorrhage. Right parieto-occipital scalp hematoma. CT CERVICAL SPINE WO CONTRAST   Final Result   No acute abnormality of the cervical spine. Multilevel degenerative changes and facet arthrosis. CT CHEST WO CONTRAST   Final Result   No acute process identified in the chest.      Incidental cholelithiasis. CT ABDOMEN PELVIS WO CONTRAST Additional Contrast? None   Final Result   1. Cholelithiasis      2. Large right peripelvic renal cortical cyst.  No renal obstruction or   calculus. 3.  Evidence of previous appendectomy. XR CHEST PORTABLE   Final Result   Borderline cardiomegaly. Nothing active.          FL LUMBAR PUNCTURE DIAG    (Results Pending)       Assessment  AMS with a fever   Leucocytosis 07988  MRI without contrast No temp lobe involvement   BC pending   Urine culture pending   U/a not Impressive for infection   Be sure to get HSV by PCR in LP  BC   diptheroid like  Gm pos rods   One of four   Micro does not think listeria  picc line can be placed          PlanCont rocephin I would add iv vancomycin for now   Await LP results Hold off on acyclovir for now   Technically at her age she should also be on ampicilin but reluctant to add that right now   Check cultures   Baseline ESR, CRP   Monitor labs   Will follow with you  Talked to critcal care team   Maybe tap tomorow  LP to be done on Tuesday today    All notes noted   No new cultures  WBC 8100  She is awake and alert   If LP is neg   Will talk to pharmacy about deescalation  She will almostbe done with antibiotics before the LP.   Stay the course  Ten days of antibiotics will be approx rocio 5           Electronically signed by González Menezes MD on 6/1/2022 at 11:06 AM

## 2022-06-01 NOTE — PROGRESS NOTES
Sticky note in to Dr. Royal Simental in regards to Sons concerns and resident in room speaking with family.

## 2022-06-01 NOTE — PROGRESS NOTES
Called son Abdi Ward and bottom dentures are with his brother. His brother to bring them in tonight.

## 2022-06-01 NOTE — PROGRESS NOTES
Physical Therapy  Physical Therapy Treatment Note    Name: Amandeep Ta  : 1945  MRN: [id number]      Date of Service: 2022    Evaluating PT:  Carter Jimenez, PT, DPT  [id number]     Room #:  1370/6496-I  Diagnosis:  Acute encephalopathy [G93.40]  Altered mental status, unspecified altered mental status type [R41.82]  PMHx/PSHx:  HTN, Hypothyroidism, PVC's, Anxiety, Asthma, Atrial fibrillation, History of supraventricular tachycardia, Macular degeneration  Procedure/Surgery: LP    Precautions:  Falls, Seizure precautions, Cognition  Equipment Needs:  TBD    SUBJECTIVE:    Pt is a poor historian. Per chart, pt lives alone in a 1 floor plan with 4 steps and 1 rail. Pt was independent with no AD. Owns Walker from previous surgery. OBJECTIVE:   Initial Evaluation  Date: 22 Treatment  Date: 22 Short Term/ Long Term   Goals   AM-PAC 6 Clicks 32/00 49/27    Was pt agreeable to Eval/treatment? Yes Yes    Does pt have pain? No c/o pain No c/o pain    Bed Mobility  Rolling: Min A  Supine to sit: Min A  Sit to supine: Min A  Scooting: Min A Rolling: Min A  Supine to sit: Min A  Sit to supine: Min A  Scooting: Min A Rolling: Independent  Supine to sit:  Independent  Sit to supine: Independent  Scooting: Independent   Transfers Sit to stand: Min A  Stand to sit: Min A  Stand pivot: NT Sit to stand: NT  Stand to sit: NT  Stand pivot: NT Sit to stand: Supervision  Stand to sit: Supervision  Stand pivot: Supervision   Ambulation   1 step fwd/bwd with FWW Min A NT >50 feet with AAD SBA   Stair negotiation: ascended and descended  NT NT >2 steps with 1 rail Min A   ROM BUE:  Per OT eval  BLE:  WFL     Strength BUE:  Per OT eval  BLE:  Grossly 4-/5     Balance Sitting EOB:  Min A  Dynamic Standing:  NT  Sitting EOB:  Independent  Dynamic Standing:  Supervision with AAD     Pt is A & O x 2-3 (self, year, and location; occasionally confused about location)  RASS:  0  CAM-ICU:  NT  Sensation:  Pt denies numbness and tingling to extremities  Edema:  Unremarkable    Therapeutic Exercises:    BLE ROM    Patient education  Pt educated on PT role, safety during functional mobility    Patient response to education:   Pt verbalized understanding Pt demonstrated skill Pt requires further education in this area   Yes Yes Yes     ASSESSMENT:    Conditions Requiring Skilled Therapeutic Intervention:    [x]Decreased strength     []Decreased ROM  [x]Decreased functional mobility  [x]Decreased balance   []Decreased endurance   []Decreased posture  []Decreased sensation  []Decreased coordination   [x]Decreased vision  [x]Decreased safety awareness   []Increased pain       Comments:  Pt received supine in bed and agreeable to PT treatment. Pt cleared for participation by RN prior to session. Vitals monitored during session. Pt limited by cognition, double vision, and bed rest d/t LP. Speech therapy present upon entering. No orders were present in chart for bed rest after LP. Discussed with RN who stated pt OK to get up to EOB. Assistance with trunk was required during supine>sit. LE ROM performed at EOB. At EOB, notified by nursing that pt may potentially be on bedrest following LP and requested to refrain from STS. Therefore, further activity was deferred. Pt assisted back to bed and session concluded. Pt left in chair position in bed with call button in reach, lines attached, and needs met.      Treatment:  Patient practiced and was instructed in the following treatment:     Bed mobility training - pt given verbal and tactile cues to facilitate proper sequencing and safety during rolling and supine>sit as well as provided with physical assistance to complete task    Sitting EOB for >8 minutes for upright tolerance, postural awareness and BLE ROM   STS- pt educated on proper hand and foot placement, safety and sequencing to safely complete sit<>stand and pivot transfers with hands on assistance to complete task safely       Pt's/ family goals   1. Home vs rehab    Prognosis is fair for reaching above PT goals. Patient and or family understand(s) diagnosis, prognosis, and plan of care. Yes    PHYSICAL THERAPY PLAN OF CARE:    Pt is making progress towards established goals. Continue PT POC. Time in  1340  Time out  1405    Total Treatment Time  25 minutes     Evaluation Time includes thorough review of current medical information, gathering information on past medical history/social history and prior level of function, completion of standardized testing/informal observation of tasks, assessment of data and education on plan of care and goals.     CPT codes:  [] Low Complexity PT evaluation 96933  [] Moderate Complexity PT evaluation 90928  [] High Complexity PT evaluation 74797  [] PT Re-evaluation 76874  [] Gait training 48792 -- minutes  [] Manual therapy [id number] -- minutes  [x] Therapeutic activities 06158 25 minutes  [] Therapeutic exercises 12495 -- minutes  [] Neuromuscular reeducation 93415 -- minutes     [address], SPT  Diego Fowler, PT, DPT  [id number]

## 2022-06-01 NOTE — PROGRESS NOTES
Pharmacy Consultation Note  (Antibiotic Dosing and Monitoring)    Initial consult date: 5/27/22  Consulting physician/provider: Dr. Praveena Velez  Drug: Vancomycin  Indication: Empiric/CNS infection    Age/  Gender Height Weight IBW  Allergy Information   77 y.o./female 5' 4\" (162.6 cm) 160 lb 15 oz (73 kg)     Ideal body weight: 54.7 kg (120 lb 9.5 oz)  Adjusted ideal body weight: 62 kg (136 lb 11.7 oz)   Other      Renal Function:  Recent Labs     05/31/22  1410 05/31/22  1518 06/01/22  0500   BUN 4* 4* 6   CREATININE 0.6 0.8 0.9       Intake/Output Summary (Last 24 hours) at 6/1/2022 1126  Last data filed at 6/1/2022 0600  Gross per 24 hour   Intake 1321.16 ml   Output 1810 ml   Net -488.84 ml       Vancomycin Monitoring:  Trough:    Recent Labs     05/30/22  1311   VANCOTROUGH 9.9     Random:  No results for input(s): VANCORANDOM in the last 72 hours. Recent vancomycin administrations                   vancomycin 1500 mg in dextrose 5% 300 mL IVPB (mg) 1,500 mg New Bag 05/31/22 1420     1,500 mg New Bag 05/30/22 1417     1,500 mg New Bag 05/29/22 1443                Assessment:  · Patient is a 76 y.o. female who has been initiated on vancomycin  Estimated Creatinine Clearance: 50 mL/min (based on SCr of 0.9 mg/dL).     Plan:  · Will continue vancomycin 1750 mg IV every 24 hours  · Will check vancomycin levels when appropriate  · Will continue to monitor renal function   · Clinical pharmacy to follow      Teofilo Lilly PharmD, BCPS 6/1/2022 11:26 AM

## 2022-06-01 NOTE — PLAN OF CARE
Problem: Pain  Goal: Verbalizes/displays adequate comfort level or baseline comfort level  Outcome: Progressing     Problem: Skin/Tissue Integrity  Goal: Absence of new skin breakdown  Description: 1. Monitor for areas of redness and/or skin breakdown  2. Assess vascular access sites hourly  3. Every 4-6 hours minimum:  Change oxygen saturation probe site  4. Every 4-6 hours:  If on nasal continuous positive airway pressure, respiratory therapy assess nares and determine need for appliance change or resting period.   Outcome: Progressing     Problem: Safety - Adult  Goal: Free from fall injury  Outcome: Progressing     Problem: Nutrition Deficit:  Goal: Optimize nutritional status  Outcome: Progressing     Problem: ABCDS Injury Assessment  Goal: Absence of physical injury  Outcome: Progressing

## 2022-06-02 LAB
ALBUMIN SERPL-MCNC: 3.4 G/DL (ref 3.5–5.2)
ALP BLD-CCNC: 54 U/L (ref 35–104)
ALT SERPL-CCNC: 43 U/L (ref 0–32)
ANION GAP SERPL CALCULATED.3IONS-SCNC: 11 MMOL/L (ref 7–16)
AST SERPL-CCNC: 27 U/L (ref 0–31)
BILIRUB SERPL-MCNC: 0.3 MG/DL (ref 0–1.2)
BUN BLDV-MCNC: 8 MG/DL (ref 6–23)
CALCIUM SERPL-MCNC: 8.8 MG/DL (ref 8.6–10.2)
CHLORIDE BLD-SCNC: 100 MMOL/L (ref 98–107)
CO2: 29 MMOL/L (ref 22–29)
CREAT SERPL-MCNC: 0.9 MG/DL (ref 0.5–1)
GFR AFRICAN AMERICAN: >60
GFR NON-AFRICAN AMERICAN: >60 ML/MIN/1.73
GLUCOSE BLD-MCNC: 91 MG/DL (ref 74–99)
HCT VFR BLD CALC: 31.1 % (ref 34–48)
HEMOGLOBIN: 10 G/DL (ref 11.5–15.5)
INR BLD: 1.2
MAGNESIUM: 1.8 MG/DL (ref 1.6–2.6)
MCH RBC QN AUTO: 29.3 PG (ref 26–35)
MCHC RBC AUTO-ENTMCNC: 32.2 % (ref 32–34.5)
MCV RBC AUTO: 91.2 FL (ref 80–99.9)
METER GLUCOSE: 100 MG/DL (ref 74–99)
METER GLUCOSE: 115 MG/DL (ref 74–99)
METER GLUCOSE: 120 MG/DL (ref 74–99)
METER GLUCOSE: 141 MG/DL (ref 74–99)
PDW BLD-RTO: 15.3 FL (ref 11.5–15)
PLATELET # BLD: 369 E9/L (ref 130–450)
PMV BLD AUTO: 9.5 FL (ref 7–12)
POTASSIUM SERPL-SCNC: 3.4 MMOL/L (ref 3.5–5)
PROTHROMBIN TIME: 13.7 SEC (ref 9.3–12.4)
RBC # BLD: 3.41 E12/L (ref 3.5–5.5)
SODIUM BLD-SCNC: 140 MMOL/L (ref 132–146)
TOTAL CK: 310 U/L (ref 20–180)
TOTAL PROTEIN: 7.1 G/DL (ref 6.4–8.3)
WBC # BLD: 10.7 E9/L (ref 4.5–11.5)

## 2022-06-02 PROCEDURE — 6360000002 HC RX W HCPCS

## 2022-06-02 PROCEDURE — 6370000000 HC RX 637 (ALT 250 FOR IP)

## 2022-06-02 PROCEDURE — 85027 COMPLETE CBC AUTOMATED: CPT

## 2022-06-02 PROCEDURE — 97530 THERAPEUTIC ACTIVITIES: CPT

## 2022-06-02 PROCEDURE — 92526 ORAL FUNCTION THERAPY: CPT | Performed by: SPEECH-LANGUAGE PATHOLOGIST

## 2022-06-02 PROCEDURE — 2580000003 HC RX 258: Performed by: STUDENT IN AN ORGANIZED HEALTH CARE EDUCATION/TRAINING PROGRAM

## 2022-06-02 PROCEDURE — 6360000002 HC RX W HCPCS: Performed by: STUDENT IN AN ORGANIZED HEALTH CARE EDUCATION/TRAINING PROGRAM

## 2022-06-02 PROCEDURE — 82962 GLUCOSE BLOOD TEST: CPT

## 2022-06-02 PROCEDURE — 6370000000 HC RX 637 (ALT 250 FOR IP): Performed by: STUDENT IN AN ORGANIZED HEALTH CARE EDUCATION/TRAINING PROGRAM

## 2022-06-02 PROCEDURE — 6360000002 HC RX W HCPCS: Performed by: CHIROPRACTOR

## 2022-06-02 PROCEDURE — 2060000000 HC ICU INTERMEDIATE R&B

## 2022-06-02 PROCEDURE — 36415 COLL VENOUS BLD VENIPUNCTURE: CPT

## 2022-06-02 PROCEDURE — 2580000003 HC RX 258: Performed by: CHIROPRACTOR

## 2022-06-02 PROCEDURE — 97165 OT EVAL LOW COMPLEX 30 MIN: CPT

## 2022-06-02 PROCEDURE — 97129 THER IVNTJ 1ST 15 MIN: CPT | Performed by: SPEECH-LANGUAGE PATHOLOGIST

## 2022-06-02 PROCEDURE — 2500000003 HC RX 250 WO HCPCS: Performed by: STUDENT IN AN ORGANIZED HEALTH CARE EDUCATION/TRAINING PROGRAM

## 2022-06-02 PROCEDURE — 83735 ASSAY OF MAGNESIUM: CPT

## 2022-06-02 PROCEDURE — 97535 SELF CARE MNGMENT TRAINING: CPT

## 2022-06-02 PROCEDURE — 80053 COMPREHEN METABOLIC PANEL: CPT

## 2022-06-02 PROCEDURE — 82550 ASSAY OF CK (CPK): CPT

## 2022-06-02 PROCEDURE — 6360000002 HC RX W HCPCS: Performed by: INTERNAL MEDICINE

## 2022-06-02 PROCEDURE — 6370000000 HC RX 637 (ALT 250 FOR IP): Performed by: INTERNAL MEDICINE

## 2022-06-02 PROCEDURE — 85610 PROTHROMBIN TIME: CPT

## 2022-06-02 RX ORDER — POTASSIUM CHLORIDE 20 MEQ/1
40 TABLET, EXTENDED RELEASE ORAL ONCE
Status: COMPLETED | OUTPATIENT
Start: 2022-06-02 | End: 2022-06-02

## 2022-06-02 RX ORDER — MAGNESIUM SULFATE 1 G/100ML
1000 INJECTION INTRAVENOUS ONCE
Status: COMPLETED | OUTPATIENT
Start: 2022-06-02 | End: 2022-06-02

## 2022-06-02 RX ADMIN — ALPRAZOLAM 1 MG: 0.25 TABLET ORAL at 04:46

## 2022-06-02 RX ADMIN — CEFTRIAXONE 2000 MG: 2 INJECTION, POWDER, FOR SOLUTION INTRAMUSCULAR; INTRAVENOUS at 20:41

## 2022-06-02 RX ADMIN — LEVOTHYROXINE SODIUM 50 MCG: 0.05 TABLET ORAL at 05:42

## 2022-06-02 RX ADMIN — PANTOPRAZOLE SODIUM 40 MG: 40 TABLET, DELAYED RELEASE ORAL at 09:14

## 2022-06-02 RX ADMIN — SODIUM CHLORIDE, PRESERVATIVE FREE 300 UNITS: 5 INJECTION INTRAVENOUS at 20:42

## 2022-06-02 RX ADMIN — CYANOCOBALAMIN TAB 1000 MCG 1000 MCG: 1000 TAB at 09:13

## 2022-06-02 RX ADMIN — SODIUM CHLORIDE, PRESERVATIVE FREE 10 ML: 5 INJECTION INTRAVENOUS at 09:13

## 2022-06-02 RX ADMIN — VANCOMYCIN HYDROCHLORIDE 1750 MG: 10 INJECTION, POWDER, LYOPHILIZED, FOR SOLUTION INTRAVENOUS at 05:57

## 2022-06-02 RX ADMIN — LISINOPRIL 20 MG: 20 TABLET ORAL at 09:14

## 2022-06-02 RX ADMIN — MAGNESIUM SULFATE 1000 MG: 1 INJECTION INTRAVENOUS at 14:10

## 2022-06-02 RX ADMIN — APIXABAN 5 MG: 5 TABLET, FILM COATED ORAL at 09:14

## 2022-06-02 RX ADMIN — CEFTRIAXONE 2000 MG: 2 INJECTION, POWDER, FOR SOLUTION INTRAMUSCULAR; INTRAVENOUS at 09:13

## 2022-06-02 RX ADMIN — POTASSIUM CHLORIDE 40 MEQ: 20 TABLET, EXTENDED RELEASE ORAL at 14:06

## 2022-06-02 RX ADMIN — ALPRAZOLAM 1 MG: 0.25 TABLET ORAL at 17:36

## 2022-06-02 RX ADMIN — INSULIN LISPRO 1 UNITS: 100 INJECTION, SOLUTION INTRAVENOUS; SUBCUTANEOUS at 20:41

## 2022-06-02 RX ADMIN — LEVETIRACETAM 500 MG: 5 INJECTION INTRAVENOUS at 20:51

## 2022-06-02 RX ADMIN — APIXABAN 5 MG: 5 TABLET, FILM COATED ORAL at 20:52

## 2022-06-02 RX ADMIN — LEVETIRACETAM 500 MG: 5 INJECTION INTRAVENOUS at 09:31

## 2022-06-02 RX ADMIN — FOLIC ACID 1 MG: 5 INJECTION, SOLUTION INTRAMUSCULAR; INTRAVENOUS; SUBCUTANEOUS at 09:16

## 2022-06-02 RX ADMIN — SODIUM CHLORIDE, PRESERVATIVE FREE 10 ML: 5 INJECTION INTRAVENOUS at 20:53

## 2022-06-02 RX ADMIN — THIAMINE HYDROCHLORIDE 100 MG: 100 INJECTION, SOLUTION INTRAMUSCULAR; INTRAVENOUS at 09:14

## 2022-06-02 RX ADMIN — ALPRAZOLAM 1 MG: 0.25 TABLET ORAL at 23:08

## 2022-06-02 RX ADMIN — SODIUM CHLORIDE, PRESERVATIVE FREE 300 UNITS: 5 INJECTION INTRAVENOUS at 09:14

## 2022-06-02 ASSESSMENT — PAIN SCALES - GENERAL
PAINLEVEL_OUTOF10: 0
PAINLEVEL_OUTOF10: 0

## 2022-06-02 NOTE — PROGRESS NOTES
Occupational Therapy  OCCUPATIONAL THERAPY INITIAL EVALUATION     [facility] [address] [address], Formerly [facility], [facility]–1983 Huang Segovia                                                Patient Name: Kyle Fall  MRN: [id number]  : 1945  Room: [address] [place] [address] [address][id number]    Referring Provider: Pau Ruiz DO   Specific Provider Orders/Date: OT eval and treat 22    Diagnosis: Acute encephalopathy [G93.40]  Altered mental status, unspecified altered mental status type [R41.82]   Pt admitted to hospital on 22 after being found down by neighbor. AMS  RRT on  for seizures    Surgery / Procedure: lumbar puncture     Pertinent Medical History:  has a past medical history of Anxiety, Asthma, Atrial fibrillation ([address] History of supraventricular tachycardia, Hypertension, Hypothyroidism, Macular degeneration, and PVC's (premature ventricular contractions).        Precautions:  Fall Risk, cognition (see below), bed alarm, seizures    Assessment of current deficits   [x] Functional mobility  [x]ADLs  [x] Strength               [x]Cognition    [x] Functional transfers   [x] IADLs         [x] Safety Awareness   [x]Endurance    [] Fine Coordination              [x] Balance      [] Vision/perception   []Sensation     []Gross Motor Coordination  [] ROM  [] Delirium                   [] Motor Control     OT PLAN OF CARE   OT POC based on physician orders, patient diagnosis and results of clinical assessment    Frequency/Duration 1-3 days/wk for 2 weeks PRN   Specific OT Treatment Interventions to include:   * Instruction/training on adapted ADL techniques and AE recommendations to increase functional independence within precautions       * Training on energy conservation strategies, correct breathing pattern and techniques to improve independence/tolerance for self-care routine  * Functional transfer/mobility training/DME recommendations for increased independence, safety, and fall prevention  * Patient/Family education to increase follow through with safety techniques and functional independence  * Recommendation of environmental modifications for increased safety with functional transfers/mobility and ADLs  * Cognitive retraining/development of therapeutic activities to improve problem solving, judgement, memory, and attention for increased safety/participation in ADL/IADL tasks  * Therapeutic exercise to improve motor endurance, ROM, and functional strength for ADLs/functional transfers  * Therapeutic activities to facilitate/challenge dynamic balance, stand tolerance for increased safety and independence with ADLs  * Therapeutic activities to facilitate gross/fine motor skills for increased independence with ADLs      Recommended Adaptive Equipment: TBD     Home Living: Pt lives alone in a mobile home. 4 LAVELL, 1 handrail   Bathroom setup: unable to obtain d/t cognition    Equipment owned: walker    Prior Level of Function: independent with ADLs; ambulated w/o AD   Above PLOF per chart - unable to obtain from pt d/t cognition    Pain Level: Pt c/o no pain this session     Cognition: A&O: 1/4 (to self only); Follows 1 step directions inconsistently  Increased confusion noted - max cues for sequencing, attention and initiation of tasks.  Flight of ideas   Memory:  poor   Sequencing:  poor   Problem solving:  poor   Judgement/safety:  poor     Functional Assessment:  AM-PAC Daily Activity Raw Score: 14/24   Initial Eval Status  Date: 6/2/22 Treatment Status  Date: STGs = LTGs  Time frame: 10-14 days   Feeding Stand by Assist   Breakfast tray  Modified [place]    Grooming Minimal Assist   Seated EOB to wash hands and face  Standing to apply lotion on R hand  Modified [place]    UB Dressing Minimal Assist   Donned/doffed gown  Supervision    LB Dressing Moderate Assist   B socks  Stand by Assist    Bathing Moderate Assist  Stand by Assist    Toileting Moderate Assist   Stand by Assist    Bed Mobility  Supine to sit: Minimal Assist   Sit to supine: Minimal Assist   Supine to sit: Supervision   Sit to supine: Supervision    Functional Transfers Minimal Assist   Supervision    Functional Mobility Moderate Assist w/o AD  Min A w/ w/w  Light mobility in room to prep for bathroom mobility  Supervision    Balance Sitting:     Static:  SBA    Dynamic:Min A  Standing: Min A w/w/w  Mod A w/o AD       Activity Tolerance Fair  Good   Visual/  Perceptual Glasses: yes                  Hand Dominance R   AROM (PROM) Strength Additional Info:    RUE  WFL 4/5  W/ vc's good  and wfl FMC/dexterity noted during ADL tasks       LUE WFL 4/5  W/ vc's good  and wfl FMC/dexterity noted during ADL tasks       Hearing: WFL   Sensation: unable to assess d/t cognition  Tone: WFL   Edema: none noted    Comments: Upon arrival patient lying in bed. Therapist educated pt on role of OT. RN clearance. At end of session, patient lying in bed (bed alarm on, RN present) with call light and phone within reach, all lines and tubes intact. Overall patient demonstrated decreased independence and safety during completion of ADL/functional transfer/mobility tasks. Pt would benefit from continued skilled OT to increase safety and independence with completion of ADL/IADL tasks for functional independence and quality of life.     Treatment: OT treatment provided this date includes: Facilitation of bed mobility (education/cues for body mechanics, sequencing and safety), unsupported sitting balance (addressing posture, weight shifting, dynamic reaching to prep for ADL's), functional transfers (w/ education/cues for safety/hand placement), standing tolerance tasks (addressing posture, balance and activity tolerance while incorporating light functional reaching impacting ADLs and functional activity) and light functional ambulation tasks with w/w (in room - 2 trials w/ seated rest break between. Incorporated w/w for 2nd trial d/t noted unsteadiness w/o AD. Safety maintained. Education/cuing on posture, w/w management, attention, sequencing and safety). Therapist facilitated self-care retraining: UB/LB self-care tasks (gown, socks), simulated toileting task and standing/seated grooming tasks while educating/cuing pt on modified techniques, posture, safety and energy conservation techniques. Skilled monitoring of HR, O2 sats and pts response to treatment. Rehab Potential: Good for established goals     Patient / Family Goal: not stated      Patient and/or family were instructed on functional diagnosis, prognosis/goals and OT plan of care. Demonstrated poor understanding. Eval Complexity: Low    Time In: 08:38  Time Out: 09:14  Total Treatment Time: 23 minutes    Min Units   OT Eval Low 97165  X  1   OT Eval Medium 95254      OT Eval High 51727      OT Re-Eval [id number]       Therapeutic Ex 31898       Therapeutic Activities 34614  8  1   ADL/Self Care 88380  15  1   Orthotic Management 13216       Manual 04870     Neuro Re-Ed [id number]       Non-Billable Time          Evaluation Time additionally includes thorough review of current medical information, gathering information on past medical history/social history and prior level of function, interpretation of standardized testing/informal observation of tasks, assessment of data and development of plan of care and goals.         JCARLOS BenedictR/L #[id number]

## 2022-06-02 NOTE — PROGRESS NOTES
SPEECH LANGUAGE PATHOLOGY  DAILY PROGRESS NOTE        PATIENT NAME:  [place]      :  1945          TODAY'S DATE:  2022 ROOM:  [address] [place] [address]     Therapeutic PO done by request of Dr. Lisa Hernandez. Pt did well eating current diet of pureed foods. Solid food was administered, Pt cleared oral cavity of food and did not display any signs of aspiration. Pt was cued to take small bites and ensure food was completely chewed before swallowing. Pt's diet can progress to soft and bite sized foods with supervision. Pt's cognition improved from yesterday--oriented to place, home, and person. Conversations made more sense today. Pt continues to use paraphasic errors at times however noted to be improved. Pt's thought organization remains impaired and fleeting attention however Pt was more appropriate in converation and task completion.        CPT code(s) 09442  [place] [address]  therapeutic interventions that focus on cognitive function , initial  15 min    Ebbie Share [id number]  [id number]  2022

## 2022-06-02 NOTE — PROGRESS NOTES
Infectious Disease  Progress Note  [facility]    Chief Complaint: no complaint    Subjective: rule out meningitis    Scheduled Meds:   vancomycin  1,750 mg IntraVENous Q24H    sodium chloride flush  5-40 mL IntraVENous 2 times per day    heparin flush  3 mL IntraVENous 2 times per day    lisinopril  20 mg Oral Daily    levETIRAcetam  500 mg IntraVENous Q12H    vitamin B-12  1,000 mcg Oral Daily    insulin lispro  0-6 Units SubCUTAneous TID WC    insulin lispro  0-3 Units SubCUTAneous Nightly    folic acid  1 mg IntraVENous Daily    thiamine  100 mg IntraVENous Daily    cefTRIAXone (ROCEPHIN) IV  2,000 mg IntraVENous Q12H    apixaban  5 mg Oral BID    levothyroxine  50 mcg Oral Daily    pantoprazole  40 mg Oral Daily     Continuous Infusions:   sodium chloride      dextrose       PRN Meds:ALPRAZolam, white petrolatum, sodium chloride flush, sodium chloride, heparin flush, glucose, dextrose bolus **OR** dextrose bolus, glucagon (rDNA), dextrose, docusate sodium, ondansetron **OR** ondansetron, polyethylene glycol, acetaminophen **OR** acetaminophen    Patient Vitals for the past 24 hrs:   BP Temp Temp src Pulse Resp SpO2   06/01/22 2139 138/71 98.4 °F (36.9 °C) Temporal 72 16 98 %   06/01/22 1800 (!) 149/66 -- -- 72 22 --   06/01/22 1700 (!) 147/76 -- -- 91 24 --   06/01/22 1600 100/89 -- -- 90 16 (!) 85 %   06/01/22 1500 (!) 160/78 -- -- 81 16 92 %   06/01/22 1400 (!) 152/79 -- -- 63 15 94 %   06/01/22 1200 (!) 173/66 -- -- 78 18 96 %   06/01/22 1140 -- 98.6 °F (37 °C) Oral -- -- --   06/01/22 1100 (!) 137/104 -- -- 65 16 92 %   06/01/22 1000 (!) 129/90 -- -- 62 16 91 %   06/01/22 0931 (!) 146/85 -- -- -- -- --   06/01/22 0900 (!) 146/85 -- -- 63 15 91 %       CBC with Differential:    Lab Results   Component Value Date    WBC 8.1 06/01/2022    RBC 3.05 06/01/2022    HGB 8.8 06/01/2022    HCT 27.9 06/01/2022     06/01/2022    MCV 91.5 06/01/2022    MCH 28.9 06/01/2022    MCHC 31.5 06/01/2022    RDW 15.1 06/01/2022    SEGSPCT 67 11/24/2011    LYMPHOPCT 11.0 05/27/2022    MONOPCT 7.9 05/27/2022    BASOPCT 0.1 05/27/2022    MONOSABS 1.02 05/27/2022    LYMPHSABS 1.42 05/27/2022    EOSABS 0.00 05/27/2022    BASOSABS 0.01 05/27/2022     CMP:    Lab Results   Component Value Date     06/01/2022    K 3.3 06/01/2022    K 3.4 05/28/2022    CL 97 06/01/2022    CO2 28 06/01/2022    BUN 6 06/01/2022    CREATININE 0.9 06/01/2022    GFRAA >60 06/01/2022    LABGLOM >60 06/01/2022    GLUCOSE 109 06/01/2022    GLUCOSE 132 11/24/2011    PROT 6.4 06/01/2022    LABALBU 3.1 06/01/2022    LABALBU 3.4 01/23/2011    CALCIUM 8.2 06/01/2022    BILITOT 0.4 06/01/2022    ALKPHOS 49 06/01/2022    AST 24 06/01/2022    ALT 47 06/01/2022       /71   Pulse 72   Temp 98.4 °F (36.9 °C) (Temporal)   Resp 16   Ht 5' 4\" (1.626 m)   Wt 160 lb 15 oz (73 kg)   SpO2 98%   BMI 27.62 kg/m²     Physical Exam  Const/Neuro- unchanged, no signs of acute distress, Alert  ENMT- Within Normal Limits, Normocephalic, mucous membranes pink/moist, No thrush  Neck: Neck supple  Heart- Regular, Rate, Rhythm- no murmur appreciated. Lungs- clear to ascultation. Respirations even and nonlabored. Abdomen- Soft, bowel sounds positive, non tender  Musculo/Extremities-  Equal and symmetrical, no edema. No tenderness. Skin:  Warm and dry, free from rashes. Cultures reviewed    Radiology reviewed  [facility] LUMBAR PUNCTURE DIAG   Final Result   Fluoroscopic-guided lumbar puncture. Opening pressure was 11.5 cm of water         MRI BRAIN W CONTRAST   Final Result   Motion artifact limits evaluation. Allowing for this limitation, the findings   are as follows: No evidence of recent infarct, intracranial hemorrhage, mass effect, or   hydrocephalus. No abnormal intracranial enhancement. Mild chronic microvascular ischemic changes.          XR CHEST PORTABLE   Final Result   Increased volume loss in the left lower chest compatible with worsening atelectasis or pneumonia. CT HEAD WO CONTRAST   Final Result   Generalized atrophy and chronic changes seen within the brain with no acute   intracranial abnormality. MRI BRAIN WO CONTRAST   Final Result   Mild chronic microvascular disease within the periventricular white matter. No acute ischemia. RECOMMENDATIONS:   Unavailable         CT Head WO Contrast   Final Result   No acute intracranial abnormality or hemorrhage. Right parieto-occipital scalp hematoma. CT CERVICAL SPINE WO CONTRAST   Final Result   No acute abnormality of the cervical spine. Multilevel degenerative changes and facet arthrosis. CT CHEST WO CONTRAST   Final Result   No acute process identified in the chest.      Incidental cholelithiasis. CT ABDOMEN PELVIS WO CONTRAST Additional Contrast? None   Final Result   1. Cholelithiasis      2. Large right peripelvic renal cortical cyst.  No renal obstruction or   calculus. 3.  Evidence of previous appendectomy. XR CHEST PORTABLE   Final Result   Borderline cardiomegaly. Nothing active.              Assessment  AMS with a fever   Leucocytosis 27506  MRI without contrast No temp lobe involvement   BC pending   Urine culture pending   U/a not Impressive for infection   Be sure to get HSV by PCR in LP  BC   diptheroid like  Gm pos rods   One of four   Micro does not think listeria  picc line can be placed  Now on RNF  CSF   Clear  59 glucose 32 protein  4 WBC colorless 0 neutrophils          PlanCont rocephin I would add iv vancomycin for now   Await LP results Hold off on acyclovir for now   Technically at her age she should also be on ampicilin but reluctant to add that right now   Check cultures   Baseline ESR, CRP   Monitor labs   Will follow with you  Talked to critcal care team   Maybe tap tomorow  LP to be done on Tuesday today    All notes noted   No new cultures  WBC 8100  She is awake and alert   Ten days of antibiotics will be approx rocio 5  CSF  No organisms  Rare monos  Blood diptheroids           Electronically signed by Aj Moody MD on 6/2/2022 at 8:24 AM

## 2022-06-02 NOTE — CARE COORDINATION
Followed up with maliha, liaison with [facility] and [facility]. [address] [place] [address] does not have any beds at this time for transition of care. [facility] does and can accept. They will start precert for the patient for transition of care today. Patient to remain on IV Vancomycin an Rocephin for total of 10 days until 6/5 per ID's note. Patient has DL PICC in place. Diet advanced by speech therapy to soft and bite sized today. Call placed to the patient's son Michelle Mantilla and spoke to both he and his wife on the phone. Reviewed above and explained that patient accepted and will transition to [facility] when medically stable to do so. Family expressed understanding and are agreeable. HENS 7000 started and will need to be completed with discharge orders. Envelope and transfer paperwork in the soft chart. Will continue to follow. Tanesha Babcock RN.  P:  [phone]    The Plan for Transition of Care is related to the following treatment goals:  Improve functional mobility to return home. The Patient and/or patient representative, Anne Connor,  was provided with a choice of provider and agrees   with the discharge plan. [x] Yes [] No    Freedom of choice list was provided with basic dialogue that supports the patient's individualized plan of care/goals, treatment preferences and shares the quality data associated with the providers.  [x] Yes [] No

## 2022-06-02 NOTE — PROGRESS NOTES
Pharmacy Consultation Note  (Antibiotic Dosing and Monitoring)    Initial consult date: 5/27/22  Consulting physician/provider: Dr. Masha Camargo  Drug: Vancomycin  Indication: Empiric/CNS infection    Age/  Gender Height Weight IBW  Allergy Information   77 y.o./female 5' 4\" (162.6 cm) 160 lb 15 oz (73 kg)     Ideal body weight: 54.7 kg (120 lb 9.5 oz)  Adjusted ideal body weight: 62 kg (136 lb 11.7 oz)   Other      Renal Function:  Recent Labs     05/31/22  1410 05/31/22  1518 06/01/22  0500   BUN 4* 4* 6   CREATININE 0.6 0.8 0.9       Intake/Output Summary (Last 24 hours) at 6/2/2022 0926  Last data filed at 6/1/2022 1900  Gross per 24 hour   Intake 1235.83 ml   Output 1720 ml   Net -484.17 ml       Vancomycin Monitoring:  Trough:    Recent Labs     05/30/22  1311   VANCOTROUGH 9.9     Random:  No results for input(s): VANCORANDOM in the last 72 hours. Date  SCr/CrCl       or HD Drug/Dose Time   Given Level(s)   (Time)   5/27 0.8  Vancomycin 1500 mg IV q24h 1448    5/28 0.7 Vancomycin 1500 mg IV q24h 1458    5/29 0.7 Vancomycin 1500 mg IV q24h 1443    5/30 0.7 Vancomycin 1500 mg IV q24h 1417 9.9 mcg/mL  (1311)   5/31 0.6 Vancomycin 1500 mg IV q24h 1420    6/1 0.9 Vancomycin 1750 mg IV x1 1150    6/2 -- Vancomycin 1750 mg IV q24h 0557                               Assessment:  · Patient is a 76 y.o. female who has been initiated on vancomycin  Estimated Creatinine Clearance: 50 mL/min (based on SCr of 0.9 mg/dL). Plan:  · Will continue vancomycin 1750 mg IV every 24 hours  · Trough tomorrow AM  · Will continue to monitor renal function   · Clinical pharmacy to follow      Kvng GUSMAN  6/2/2022  9:31 AM

## 2022-06-02 NOTE — PROGRESS NOTES
Hospital Medicine    Subjective:  Pt seen this am more alert less confused today      Current Facility-Administered Medications:     potassium chloride (KLOR-CON M) extended release tablet 40 mEq, 40 mEq, Oral, Once, Carnella Hilliard, DO    magnesium sulfate 1000 mg in dextrose 5% 100 mL IVPB, 1,000 mg, IntraVENous, Once, Carnella Parma, DO    vancomycin (VANCOCIN) 1,750 mg in dextrose 5 % 500 mL IVPB, 1,750 mg, IntraVENous, Q24H, Beti Wooten MD, Stopped at 06/02/22 0800    ALPRAZolam (XANAX) tablet 1 mg, 1 mg, Oral, TID PRN, Beti Wooten MD, 1 mg at 06/02/22 0446    white petrolatum ointment, , Topical, BID PRN, Amy Tabor MD    sodium chloride flush 0.9 % injection 5-40 mL, 5-40 mL, IntraVENous, 2 times per day, Amy Tabor MD, 10 mL at 06/02/22 0913    sodium chloride flush 0.9 % injection 5-40 mL, 5-40 mL, IntraVENous, PRN, Cruz Keys MD    0.9 % sodium chloride infusion, , IntraVENous, PRN, Amy Tabor MD    heparin flush 100 UNIT/ML injection 300 Units, 3 mL, IntraVENous, 2 times per day, Amy Tabor MD, 300 Units at 06/02/22 0914    heparin flush 100 UNIT/ML injection 300 Units, 3 mL, IntraCATHeter, PRN, Amy Tabor MD    lisinopril (PRINIVIL;ZESTRIL) tablet 20 mg, 20 mg, Oral, Daily, Aditya Lyn MD, 20 mg at 06/02/22 0914    levETIRAcetam (KEPPRA) 500 mg/100 mL IVPB, 500 mg, IntraVENous, Q12H, Dot Prom, APRN - CNP, Stopped at 06/02/22 0950    vitamin B-12 (CYANOCOBALAMIN) tablet 1,000 mcg, 1,000 mcg, Oral, Daily, Dot Prom, APRN - CNP, 1,000 mcg at 06/02/22 0913    insulin lispro (HUMALOG) injection vial 0-6 Units, 0-6 Units, SubCUTAneous, TID WC, Beti Wooten MD, 1 Units at 05/31/22 2286    insulin lispro (HUMALOG) injection vial 0-3 Units, 0-3 Units, SubCUTAneous, Nightly, Beti Wooten MD    glucose chewable tablet 16 g, 4 tablet, Oral, PRN, Beti Wooten MD    dextrose bolus 10% 125 mL, 125 mL, IntraVENous, PRN **OR** dextrose bolus 10% 250 mL, 250 mL, IntraVENous, PRN, Yeny Camarena MD    glucagon (rDNA) injection 1 mg, 1 mg, IntraMUSCular, PRN, Yeny Camarena MD    dextrose 5 % solution, 100 mL/hr, IntraVENous, PRN, Yeny Camarena MD    folic acid injection 1 mg, 1 mg, IntraVENous, Daily, Yeny Camarena MD, 1 mg at 06/02/22 6173    thiamine (B-1) injection 100 mg, 100 mg, IntraVENous, Daily, Yeny Camarena MD, 100 mg at 06/02/22 0914    cefTRIAXone (ROCEPHIN) 2,000 mg in sterile water 20 mL IV syringe, 2,000 mg, IntraVENous, Q12H, Yeny Camarena MD, 2,000 mg at 06/02/22 0913    apixaban (ELIQUIS) tablet 5 mg, 5 mg, Oral, BID, Charlotte Distance Malmer, DO, 5 mg at 06/02/22 0053    docusate sodium (COLACE) capsule 100 mg, 100 mg, Oral, BID PRN, Charlotte Distance Malmer, DO, 100 mg at 05/30/22 1011    levothyroxine (SYNTHROID) tablet 50 mcg, 50 mcg, Oral, Daily, Charlotte Distance Malmer, DO, 50 mcg at 06/02/22 0542    pantoprazole (PROTONIX) tablet 40 mg, 40 mg, Oral, Daily, Charlotte Distance Malmer, DO, 40 mg at 06/02/22 0914    ondansetron (ZOFRAN-ODT) disintegrating tablet 4 mg, 4 mg, Oral, Q8H PRN **OR** ondansetron (ZOFRAN) injection 4 mg, 4 mg, IntraVENous, Q6H PRN, Charlotte Distance Malmer, DO, 4 mg at 05/31/22 1205    polyethylene glycol (GLYCOLAX) packet 17 g, 17 g, Oral, Daily PRN, Charlotte Distance Malmer, DO    acetaminophen (TYLENOL) tablet 650 mg, 650 mg, Oral, Q6H PRN, 650 mg at 05/25/22 9810 **OR** acetaminophen (TYLENOL) suppository 650 mg, 650 mg, Rectal, Q6H PRN, Charlotte Distance Malmer, DO    Objective:    BP (!) 150/81   Pulse 83   Temp 98.4 °F (36.9 °C) (Temporal)   Resp 20   Ht 5' 4\" (1.626 m)   Wt 160 lb 15 oz (73 kg)   SpO2 93%   BMI 27.62 kg/m²     Heart:  reg  Lungs:  ctab  Abd: + bs soft nontender  Extrem:  Edema legs    CBC with Differential:    Lab Results   Component Value Date    WBC 10.7 06/02/2022    RBC 3.41 06/02/2022    HGB 10.0 06/02/2022    HCT 31.1 06/02/2022     06/02/2022    MCV 91.2 06/02/2022    MCH 29.3 06/02/2022    MCHC 32.2 06/02/2022    RDW 15.3 06/02/2022    SEGSPCT 67 11/24/2011    LYMPHOPCT 11.0 05/27/2022    MONOPCT 7.9 05/27/2022    BASOPCT 0.1 05/27/2022    MONOSABS 1.02 05/27/2022    LYMPHSABS 1.42 05/27/2022    EOSABS 0.00 05/27/2022    BASOSABS 0.01 05/27/2022     CMP:    Lab Results   Component Value Date     06/02/2022    K 3.4 06/02/2022    K 3.4 05/28/2022     06/02/2022    CO2 29 06/02/2022    BUN 8 06/02/2022    CREATININE 0.9 06/02/2022    GFRAA >60 06/02/2022    LABGLOM >60 06/02/2022    GLUCOSE 91 06/02/2022    GLUCOSE 132 11/24/2011    PROT 7.1 06/02/2022    LABALBU 3.4 06/02/2022    LABALBU 3.4 01/23/2011    CALCIUM 8.8 06/02/2022    BILITOT 0.3 06/02/2022    ALKPHOS 54 06/02/2022    AST 27 06/02/2022    ALT 43 06/02/2022     Warfarin PT/INR:    Lab Results   Component Value Date    INR 1.2 06/02/2022    INR 1.1 06/01/2022    INR 1.2 05/31/2022    PROTIME 13.7 (H) 06/02/2022    PROTIME 12.7 (H) 06/01/2022    PROTIME 12.9 (H) 05/31/2022       Assessment:    Principal Problem:    Stroke-like symptoms  Active Problems: Moderate protein-calorie malnutrition ([address]    Altered mental status    Septicemia ([facility] [address]  Resolved Problems:    * No resolved hospital problems.  *      Plan:  Dc planning to snf cont atb per ian Cool DO  1:41 PM  6/2/2022

## 2022-06-02 NOTE — PROGRESS NOTES
Andris Essex is a 76 y.o. right handed female     Neurology is following for alteration in mentation. Prior to admission patient was being treated for a UTI and told her son that she had felt that the antibiotic was messing with her brain. He began noticing that she was more confused at home. She reportedly has a history of alcohol abuse but has not had issues for many years. She presented to the ED on May 25 for increasing confusion. She was found by the neighbor sitting on the couch in her underwear with shards of glass on the chair. On May 26 she was found to have a flight of ideas not following commands or answering questions but her speech was clear and she is appeared to be having a conversation with herself. On May 27 she had 2 witnessed generalized tonic-clonic seizures and RRT was called and she was transferred to the ICU. MRI of her brain was obtained which was unrevealing for acute infarction.     LP was obtained -4 WBCs-no growth in culture or gram stain thus far    Yesterday, her language deficits had markedly improved    Sitter in the bed states she is speaking well however appears to be still confused  No trouble eating  Also reports that she finally fell asleep   Therefore not awaken for exam today      Allergies as of 05/25/2022 - Fully Reviewed 05/25/2022   Allergen Reaction Noted    Other  06/24/2020       Objective:     BP (!) 150/81   Pulse 83   Temp 98.4 °F (36.9 °C) (Temporal)   Resp 20   Ht 5' 4\" (1.626 m)   Wt 160 lb 15 oz (73 kg)   SpO2 93%   BMI 27.62 kg/m²     Not awakened for exam this afternoon    Laboratory/Radiology:     CBC with Differential:    Lab Results   Component Value Date    WBC 10.7 06/02/2022    RBC 3.41 06/02/2022    HGB 10.0 06/02/2022    HCT 31.1 06/02/2022     06/02/2022    MCV 91.2 06/02/2022    MCH 29.3 06/02/2022    MCHC 32.2 06/02/2022    RDW 15.3 06/02/2022    SEGSPCT 67 11/24/2011    LYMPHOPCT 11.0 05/27/2022    MONOPCT 7.9 05/27/2022    BASOPCT 0.1 05/27/2022    MONOSABS 1.02 05/27/2022    LYMPHSABS 1.42 05/27/2022    EOSABS 0.00 05/27/2022    BASOSABS 0.01 05/27/2022     CMP:    Lab Results   Component Value Date     06/02/2022    K 3.4 06/02/2022    K 3.4 05/28/2022     06/02/2022    CO2 29 06/02/2022    BUN 8 06/02/2022    CREATININE 0.9 06/02/2022    GFRAA >60 06/02/2022    LABGLOM >60 06/02/2022    GLUCOSE 91 06/02/2022    GLUCOSE 132 11/24/2011    PROT 7.1 06/02/2022    LABALBU 3.4 06/02/2022    LABALBU 3.4 01/23/2011    CALCIUM 8.8 06/02/2022    BILITOT 0.3 06/02/2022    ALKPHOS 54 06/02/2022    AST 27 06/02/2022    ALT 43 06/02/2022        Ref. Range 6/1/2022 13:08   GLUCOSE,CSF Latest Ref Range: 40 - 70 mg/dL 59   PROTEIN,CSF Latest Ref Range: 15 - 40 mg/dL 32   RBC, CSF Latest Units: /uL <2000   WBC, CSF Latest Ref Range: 0 - 2 /uL 4 (H)   Neutrophils, CSF Latest Ref Range: 0 - 10 % 0   Monocytes, CSF Latest Ref Range: 10 - 70 % 100 (H)   Color, CSF Unknown Colorless   Tube Number + CELL CT + DIFF-CSF Unknown Tube 3       MRI Brain:  Mild chronic microvascular disease within the periventricular white matter. No acute ischemia. I personally reviewed the patient's lab and imaging studies at this time. Assessment:     Patient with altered mental status who is being treated for a recent UTI. There is no baseline cognitive impairment reported    On exam sh has been improving cognitively    A few days ago, she displayed perseveration as well as some echolalia however MRI of her brain was unremarkable for acute infarction.     No recurrent seizures maintained on Keppra    CSF with normal protein, 4 WBCs -gram stain and CNS with no growth thus far    I cannot also completely rule out MRI negative ischemic stroke causing an aphasia    Plan:     Continue Keppra at this time which is dosed at 500 mg twice a day    Some tests pending in CSF   Thus far essentially unrevealing     Kirsten Lomas, APRN - CNS  2:42 PM  6/2/2022

## 2022-06-02 NOTE — PROGRESS NOTES
Upon entering room to get pt off bed pan, pt was screaming she was \"going to kill us\" with barcode scanner tightly clenched in hand. Upon telling pt this RN was there to help her get off bedpan, pt swung barcode at this RN and sitter.  Therapeutic communication provided, pt agreeable to getting off bed pan and taking her as needed xanax for anxiety she stated she was experiencing

## 2022-06-02 NOTE — PROGRESS NOTES
Son, Gracie Cool, and his wife at bedside. Pt agreeable to keep heart  Monitor on and stated she is enjoying visiting with family.  All questions/concerns answered at this time

## 2022-06-03 LAB
ALBUMIN SERPL-MCNC: 2.7 G/DL (ref 3.5–5.2)
ALP BLD-CCNC: 43 U/L (ref 35–104)
ALT SERPL-CCNC: 31 U/L (ref 0–32)
ANION GAP SERPL CALCULATED.3IONS-SCNC: 9 MMOL/L (ref 7–16)
AST SERPL-CCNC: 19 U/L (ref 0–31)
BILIRUB SERPL-MCNC: 0.2 MG/DL (ref 0–1.2)
BUN BLDV-MCNC: 12 MG/DL (ref 6–23)
CALCIUM SERPL-MCNC: 8.1 MG/DL (ref 8.6–10.2)
CHLORIDE BLD-SCNC: 104 MMOL/L (ref 98–107)
CO2: 29 MMOL/L (ref 22–29)
CREAT SERPL-MCNC: 1.1 MG/DL (ref 0.5–1)
GFR AFRICAN AMERICAN: 58
GFR NON-AFRICAN AMERICAN: 48 ML/MIN/1.73
GLUCOSE BLD-MCNC: 116 MG/DL (ref 74–99)
HCT VFR BLD CALC: 29.2 % (ref 34–48)
HEMOGLOBIN: 9 G/DL (ref 11.5–15.5)
INR BLD: 1.2
MAGNESIUM: 1.9 MG/DL (ref 1.6–2.6)
MCH RBC QN AUTO: 29 PG (ref 26–35)
MCHC RBC AUTO-ENTMCNC: 30.8 % (ref 32–34.5)
MCV RBC AUTO: 94.2 FL (ref 80–99.9)
METER GLUCOSE: 100 MG/DL (ref 74–99)
METER GLUCOSE: 101 MG/DL (ref 74–99)
METER GLUCOSE: 122 MG/DL (ref 74–99)
METER GLUCOSE: 126 MG/DL (ref 74–99)
METER GLUCOSE: 138 MG/DL (ref 74–99)
METHYLMALONIC ACID: 0.32 UMOL/L (ref 0–0.4)
PDW BLD-RTO: 15.7 FL (ref 11.5–15)
PLATELET # BLD: 294 E9/L (ref 130–450)
PMV BLD AUTO: 9.2 FL (ref 7–12)
POTASSIUM SERPL-SCNC: 3.4 MMOL/L (ref 3.5–5)
PROTHROMBIN TIME: 13.1 SEC (ref 9.3–12.4)
RBC # BLD: 3.1 E12/L (ref 3.5–5.5)
SODIUM BLD-SCNC: 142 MMOL/L (ref 132–146)
TOTAL CK: 179 U/L (ref 20–180)
TOTAL PROTEIN: 6 G/DL (ref 6.4–8.3)
VANCOMYCIN RANDOM: 32.6 MCG/ML (ref 5–40)
VANCOMYCIN RANDOM: 42.9 MCG/ML (ref 5–40)
VANCOMYCIN TROUGH: 41.8 MCG/ML (ref 5–16)
WBC # BLD: 6.8 E9/L (ref 4.5–11.5)

## 2022-06-03 PROCEDURE — 82962 GLUCOSE BLOOD TEST: CPT

## 2022-06-03 PROCEDURE — 2580000003 HC RX 258: Performed by: STUDENT IN AN ORGANIZED HEALTH CARE EDUCATION/TRAINING PROGRAM

## 2022-06-03 PROCEDURE — 6360000002 HC RX W HCPCS: Performed by: CHIROPRACTOR

## 2022-06-03 PROCEDURE — 6370000000 HC RX 637 (ALT 250 FOR IP)

## 2022-06-03 PROCEDURE — 6360000002 HC RX W HCPCS: Performed by: STUDENT IN AN ORGANIZED HEALTH CARE EDUCATION/TRAINING PROGRAM

## 2022-06-03 PROCEDURE — 6360000002 HC RX W HCPCS

## 2022-06-03 PROCEDURE — 85027 COMPLETE CBC AUTOMATED: CPT

## 2022-06-03 PROCEDURE — 80053 COMPREHEN METABOLIC PANEL: CPT

## 2022-06-03 PROCEDURE — 6360000002 HC RX W HCPCS: Performed by: INTERNAL MEDICINE

## 2022-06-03 PROCEDURE — 80202 ASSAY OF VANCOMYCIN: CPT

## 2022-06-03 PROCEDURE — 83735 ASSAY OF MAGNESIUM: CPT

## 2022-06-03 PROCEDURE — 99232 SBSQ HOSP IP/OBS MODERATE 35: CPT | Performed by: CLINICAL NURSE SPECIALIST

## 2022-06-03 PROCEDURE — 82550 ASSAY OF CK (CPK): CPT

## 2022-06-03 PROCEDURE — 85610 PROTHROMBIN TIME: CPT

## 2022-06-03 PROCEDURE — 2580000003 HC RX 258: Performed by: CHIROPRACTOR

## 2022-06-03 PROCEDURE — 2500000003 HC RX 250 WO HCPCS: Performed by: STUDENT IN AN ORGANIZED HEALTH CARE EDUCATION/TRAINING PROGRAM

## 2022-06-03 PROCEDURE — 2060000000 HC ICU INTERMEDIATE R&B

## 2022-06-03 PROCEDURE — 36415 COLL VENOUS BLD VENIPUNCTURE: CPT

## 2022-06-03 PROCEDURE — 6370000000 HC RX 637 (ALT 250 FOR IP): Performed by: INTERNAL MEDICINE

## 2022-06-03 RX ORDER — ALPRAZOLAM 0.25 MG/1
0.5 TABLET ORAL 3 TIMES DAILY PRN
Status: DISCONTINUED | OUTPATIENT
Start: 2022-06-03 | End: 2022-06-07 | Stop reason: HOSPADM

## 2022-06-03 RX ORDER — MULTIVITAMIN WITH IRON
1 TABLET ORAL DAILY
Status: DISCONTINUED | OUTPATIENT
Start: 2022-06-03 | End: 2022-06-07 | Stop reason: HOSPADM

## 2022-06-03 RX ORDER — MAGNESIUM SULFATE 1 G/100ML
1000 INJECTION INTRAVENOUS ONCE
Status: COMPLETED | OUTPATIENT
Start: 2022-06-03 | End: 2022-06-03

## 2022-06-03 RX ORDER — POTASSIUM CHLORIDE 20 MEQ/1
40 TABLET, EXTENDED RELEASE ORAL ONCE
Status: COMPLETED | OUTPATIENT
Start: 2022-06-03 | End: 2022-06-03

## 2022-06-03 RX ORDER — LEVETIRACETAM 500 MG/1
500 TABLET ORAL 2 TIMES DAILY
Status: DISCONTINUED | OUTPATIENT
Start: 2022-06-03 | End: 2022-06-07 | Stop reason: HOSPADM

## 2022-06-03 RX ADMIN — Medication 1 TABLET: at 11:48

## 2022-06-03 RX ADMIN — APIXABAN 5 MG: 5 TABLET, FILM COATED ORAL at 09:12

## 2022-06-03 RX ADMIN — APIXABAN 5 MG: 5 TABLET, FILM COATED ORAL at 20:07

## 2022-06-03 RX ADMIN — CEFTRIAXONE 2000 MG: 2 INJECTION, POWDER, FOR SOLUTION INTRAMUSCULAR; INTRAVENOUS at 20:07

## 2022-06-03 RX ADMIN — SODIUM CHLORIDE, PRESERVATIVE FREE 10 ML: 5 INJECTION INTRAVENOUS at 09:11

## 2022-06-03 RX ADMIN — SODIUM CHLORIDE, PRESERVATIVE FREE 10 ML: 5 INJECTION INTRAVENOUS at 20:07

## 2022-06-03 RX ADMIN — ACETAMINOPHEN 325MG 650 MG: 325 TABLET ORAL at 14:44

## 2022-06-03 RX ADMIN — FOLIC ACID 1 MG: 5 INJECTION, SOLUTION INTRAMUSCULAR; INTRAVENOUS; SUBCUTANEOUS at 09:12

## 2022-06-03 RX ADMIN — LEVOTHYROXINE SODIUM 50 MCG: 0.05 TABLET ORAL at 05:44

## 2022-06-03 RX ADMIN — MAGNESIUM SULFATE 1000 MG: 1 INJECTION INTRAVENOUS at 11:25

## 2022-06-03 RX ADMIN — VANCOMYCIN HYDROCHLORIDE 1750 MG: 10 INJECTION, POWDER, LYOPHILIZED, FOR SOLUTION INTRAVENOUS at 05:50

## 2022-06-03 RX ADMIN — CYANOCOBALAMIN TAB 1000 MCG 1000 MCG: 1000 TAB at 09:11

## 2022-06-03 RX ADMIN — THIAMINE HYDROCHLORIDE 100 MG: 100 INJECTION, SOLUTION INTRAMUSCULAR; INTRAVENOUS at 09:12

## 2022-06-03 RX ADMIN — POTASSIUM CHLORIDE 40 MEQ: 20 TABLET, EXTENDED RELEASE ORAL at 11:25

## 2022-06-03 RX ADMIN — CEFTRIAXONE 2000 MG: 2 INJECTION, POWDER, FOR SOLUTION INTRAMUSCULAR; INTRAVENOUS at 09:13

## 2022-06-03 RX ADMIN — LEVETIRACETAM 500 MG: 5 INJECTION INTRAVENOUS at 09:33

## 2022-06-03 RX ADMIN — LEVETIRACETAM 500 MG: 500 TABLET, FILM COATED ORAL at 20:07

## 2022-06-03 RX ADMIN — SODIUM CHLORIDE, PRESERVATIVE FREE 300 UNITS: 5 INJECTION INTRAVENOUS at 09:12

## 2022-06-03 RX ADMIN — SODIUM CHLORIDE, PRESERVATIVE FREE 300 UNITS: 5 INJECTION INTRAVENOUS at 20:07

## 2022-06-03 RX ADMIN — PANTOPRAZOLE SODIUM 40 MG: 40 TABLET, DELAYED RELEASE ORAL at 09:12

## 2022-06-03 RX ADMIN — LISINOPRIL 20 MG: 20 TABLET ORAL at 09:12

## 2022-06-03 ASSESSMENT — PAIN DESCRIPTION - LOCATION
LOCATION: BACK
LOCATION: BACK

## 2022-06-03 ASSESSMENT — PAIN DESCRIPTION - ORIENTATION
ORIENTATION: LOWER
ORIENTATION: LOWER

## 2022-06-03 ASSESSMENT — PAIN SCALES - GENERAL
PAINLEVEL_OUTOF10: 0
PAINLEVEL_OUTOF10: 4
PAINLEVEL_OUTOF10: 0

## 2022-06-03 ASSESSMENT — PAIN DESCRIPTION - PAIN TYPE
TYPE: ACUTE PAIN
TYPE: ACUTE PAIN

## 2022-06-03 ASSESSMENT — PAIN DESCRIPTION - FREQUENCY: FREQUENCY: INTERMITTENT

## 2022-06-03 ASSESSMENT — PAIN DESCRIPTION - ONSET: ONSET: ON-GOING

## 2022-06-03 ASSESSMENT — PAIN DESCRIPTION - DESCRIPTORS
DESCRIPTORS: ACHING;DISCOMFORT;SORE
DESCRIPTORS: ACHING;DISCOMFORT;SORE

## 2022-06-03 NOTE — PROGRESS NOTES
Son and daughter in law Justus Danger at bedside. Family stated that they did not believe pt came up to our unit yesterday with bottom dentures. Spoke with [facility] in ICU, stated he would call if they were found.

## 2022-06-03 NOTE — CARE COORDINATION
Call placed to Dr Madiha Lawler via answering service re: calling the patient's family. Return call received and relayed family's concerns. Consult to general surgery received. Will continue to follow.      Amie Phalen, RN.  [facility]  [phone]

## 2022-06-03 NOTE — PLAN OF CARE
Problem: Discharge Planning  Goal: Discharge to home or other facility with appropriate resources  Outcome: Progressing     Problem: Pain  Goal: Verbalizes/displays adequate comfort level or baseline comfort level  Outcome: Progressing     Problem: Skin/Tissue Integrity  Goal: Absence of new skin breakdown  Description: 1. Monitor for areas of redness and/or skin breakdown  2. Assess vascular access sites hourly  3. Every 4-6 hours minimum:  Change oxygen saturation probe site  4. Every 4-6 hours:  If on nasal continuous positive airway pressure, respiratory therapy assess nares and determine need for appliance change or resting period.   Outcome: Progressing     Problem: Safety - Adult  Goal: Free from fall injury  Outcome: Progressing     Problem: Nutrition Deficit:  Goal: Optimize nutritional status  Outcome: Progressing     Problem: ABCDS Injury Assessment  Goal: Absence of physical injury  Outcome: Progressing

## 2022-06-03 NOTE — PROGRESS NOTES
Infectious Disease  Progress Note  [facility]    Chief Complaint: meningitis    Subjective:  She feels good. Patient's daughter in law is in the room. No fever overnight. No new complaints. Scheduled Meds:   vancomycin  1,750 mg IntraVENous Q24H    sodium chloride flush  5-40 mL IntraVENous 2 times per day    heparin flush  3 mL IntraVENous 2 times per day    lisinopril  20 mg Oral Daily    levETIRAcetam  500 mg IntraVENous Q12H    vitamin B-12  1,000 mcg Oral Daily    insulin lispro  0-6 Units SubCUTAneous TID WC    insulin lispro  0-3 Units SubCUTAneous Nightly    folic acid  1 mg IntraVENous Daily    thiamine  100 mg IntraVENous Daily    cefTRIAXone (ROCEPHIN) IV  2,000 mg IntraVENous Q12H    apixaban  5 mg Oral BID    levothyroxine  50 mcg Oral Daily    pantoprazole  40 mg Oral Daily     Continuous Infusions:   sodium chloride      dextrose       PRN Meds:ALPRAZolam, white petrolatum, sodium chloride flush, sodium chloride, heparin flush, glucose, dextrose bolus **OR** dextrose bolus, glucagon (rDNA), dextrose, docusate sodium, ondansetron **OR** ondansetron, polyethylene glycol, acetaminophen **OR** acetaminophen    ROS:  As mentioned in subjective, all other systems negative      BP (!) 142/78   Pulse 82   Temp 96.9 °F (36.1 °C) (Temporal)   Resp 16   Ht 5' 4\" (1.626 m)   Wt 160 lb 15 oz (73 kg)   SpO2 97%   BMI 27.62 kg/m²     Physical Exam  Const/Neuro- unchanged, no signs of acute distress, Alert  ENMT- Within Normal Limits, Normocephalic, mucous membranes pink/moist, No thrush  Neck: Neck supple, no neck stiffness  Heart- Regular, Rate, Rhythm- no murmur appreciated. Lungs- clear to ascultation. Respirations even and nonlabored. Abdomen- Soft, bowel sounds positive, non tender  Musculo/Extremities-  Equal and symmetrical, no edema. No tenderness. Neurological- No focal motor or sensory loss. No confusion  Skin:  Warm and dry, free from rashes.       Labs, Cultures reviewed    Radiology reviewed    Results for Neli Nance (MRN [id number]) as of 6/3/2022 09:33   Ref. Range 6/1/2022 13:08   Appearance, CSF Latest Ref Range: Clear  Clear   CSF Culture Unknown Growth not present, incubation continues   GLUCOSE,CSF Latest Ref Range: 40 - 70 mg/dL 59   PROTEIN,CSF Latest Ref Range: 15 - 40 mg/dL 32   RBC, CSF Latest Units: /uL <2000   WBC, CSF Latest Ref Range: 0 - 2 /uL 4 (H)   Neutrophils, CSF Latest Ref Range: 0 - 10 % 0   Monocytes, CSF Latest Ref Range: 10 - 70 % 100 (H)   Color, CSF Unknown Colorless   Tube Number + CELL CT + DIFF-CSF Unknown Tube 3     HSV PCR from the CSF and VDRL from the CSF are in process. CSF culture is no growth so far. CSF cytology is negative for malignancy    Assessment  · Possible meningitis: Patient presented with fever leukocytosis and altered mental status. LP was done after patient is being on 7 days of antibiotics. She is currently on IV vancomycin and ceftriaxone. · Contamination of blood cultures: Diphtheroids and blood cultures on 5/25 likely contamination. Plan  Continue IV vancomycin and ceftriaxone 2 g IV every 12 till 6/5 as planned.   We will continue to follow      Electronically signed by Kirk Rodríguez MD on 6/3/2022 at 9:32 AM

## 2022-06-03 NOTE — PROGRESS NOTES
Daughter in law, Publ[employer], at bedside. Updated [employer] that there was a general surgery consult ordered. [employer] expressed understanding, no further questions or concerns at this time.

## 2022-06-03 NOTE — PROGRESS NOTES
**OR** dextrose bolus 10% 250 mL, 250 mL, IntraVENous, PRN, Kyung Arciniega MD    glucagon (rDNA) injection 1 mg, 1 mg, IntraMUSCular, PRN, Kyung Arciniega MD    dextrose 5 % solution, 100 mL/hr, IntraVENous, PRN, Kyung Arciniega MD    apixaban [facility]) tablet 5 mg, 5 mg, Oral, BID, Rommel Binning Malmer, DO, 5 mg at 06/03/22 7828    docusate sodium (COLACE) capsule 100 mg, 100 mg, Oral, BID PRN, Rommel Binning Malmer, DO, 100 mg at 05/30/22 1011    levothyroxine (SYNTHROID) tablet 50 mcg, 50 mcg, Oral, Daily, Rommel Binning Malmer, DO, 50 mcg at 06/03/22 0544    pantoprazole (PROTONIX) tablet 40 mg, 40 mg, Oral, Daily, Rommel Binning Malmer, DO, 40 mg at 06/03/22 0912    ondansetron (ZOFRAN-ODT) disintegrating tablet 4 mg, 4 mg, Oral, Q8H PRN **OR** ondansetron (ZOFRAN) injection 4 mg, 4 mg, IntraVENous, Q6H PRN, Rommel Deonna Malmer, DO, 4 mg at 05/31/22 1205    polyethylene glycol (GLYCOLAX) packet 17 g, 17 g, Oral, Daily PRN, Rommel Deonna Malmer, DO    acetaminophen (TYLENOL) tablet 650 mg, 650 mg, Oral, Q6H PRN, 650 mg at 05/25/22 3020 **OR** acetaminophen (TYLENOL) suppository 650 mg, 650 mg, Rectal, Q6H PRN, Rommel Binning Malmer, DO    Objective:    BP (!) 142/78   Pulse 82   Temp 96.9 °F (36.1 °C) (Temporal)   Resp 16   Ht 5' 4\" (1.626 m)   Wt 160 lb 15 oz (73 kg)   SpO2 97%   BMI 27.62 kg/m²     Heart:  reg  Lungs:  ctab  Abd: + bs soft nontender  Extrem:  W/o edema    CBC with Differential:    Lab Results   Component Value Date    WBC 6.8 06/03/2022    RBC 3.10 06/03/2022    HGB 9.0 06/03/2022    HCT 29.2 06/03/2022     06/03/2022    MCV 94.2 06/03/2022    MCH 29.0 06/03/2022    MCHC 30.8 06/03/2022    RDW 15.7 06/03/2022    SEGSPCT 67 11/24/2011    LYMPHOPCT 11.0 05/27/2022    MONOPCT 7.9 05/27/2022    BASOPCT 0.1 05/27/2022    MONOSABS 1.02 05/27/2022    LYMPHSABS 1.42 05/27/2022    EOSABS 0.00 05/27/2022    BASOSABS 0.01 05/27/2022     CMP:    Lab Results   Component Value Date     06/03/2022    K 3.4 06/03/2022    K 3.4 05/28/2022     06/03/2022    CO2 29 06/03/2022    BUN 12 06/03/2022    CREATININE 1.1 06/03/2022    GFRAA 58 06/03/2022    LABGLOM 48 06/03/2022    GLUCOSE 116 06/03/2022    GLUCOSE 132 11/24/2011    PROT 6.0 06/03/2022    LABALBU 2.7 06/03/2022    LABALBU 3.4 01/23/2011    CALCIUM 8.1 06/03/2022    BILITOT 0.2 06/03/2022    ALKPHOS 43 06/03/2022    AST 19 06/03/2022    ALT 31 06/03/2022     Warfarin PT/INR:    Lab Results   Component Value Date    INR 1.2 06/03/2022    INR 1.2 06/02/2022    INR 1.1 06/01/2022    PROTIME 13.1 (H) 06/03/2022    PROTIME 13.7 (H) 06/02/2022    PROTIME 12.7 (H) 06/01/2022       Assessment:    Principal Problem:    Stroke-like symptoms  Active Problems: Moderate protein-calorie malnutrition ([address]    Altered mental status    Septicemia ([address]  Resolved Problems:    * No resolved hospital problems.  *  cholelithiasis    Plan:  Dc planning to rehab once ok with all / surgery to see re wt loss per family request cont iv atb per id        Blanche Dyer DO  11:10 AM  6/3/2022

## 2022-06-03 NOTE — PROGRESS NOTES
Guero Cedillo is a 76 y.o. right handed female     Neurology is following for alteration in mentation. Prior to admission patient was being treated for a UTI and told her son that she had felt that the antibiotic was messing with her brain. He began noticing that she was more confused at home. She reportedly has a history of alcohol abuse but has not had issues for many years. She presented to the ED on May 25 for increasing confusion. She was found by the neighbor sitting on the couch in her underwear with shards of glass on the chair. On May 26 she was found to have a flight of ideas not following commands or answering questions but her speech was clear and she is appeared to be having a conversation with herself. On May 27 she had 2 witnessed generalized tonic-clonic seizures and RRT was called and she was transferred to the ICU. MRI of her brain was obtained which was unrevealing for acute infarction.     LP was obtained - 4 WBCs-no growth in culture or gram stain thus far   however she was treated for 7 days prior to LP being obtained    The past few days her language has markedly improved    Reportedly her daughter-in-law was here earlier unfortunately not present at this time    Sitter in the bed states she is speaking well today  No trouble eating    Allergies as of 05/25/2022 - Fully Reviewed 05/25/2022   Allergen Reaction Noted    Other  06/24/2020     Objective:     BP (!) 142/78   Pulse 82   Temp 96.9 °F (36.1 °C) (Temporal)   Resp 16   Ht 5' 4\" (1.626 m)   Wt 160 lb 15 oz (73 kg)   SpO2 97%   BMI 27.62 kg/m²      General appearance: Awake-conversive-following all commands  Head: Normocephalic, without obvious abnormality, atraumatic  Extremities: no cyanosis or edema  Pulses: 2+ and symmetric  Skin: no rashes or lesions    Mental Status: awake -oriented to person, place and year    Speech: clear  Language: No echolalia or perseveration appreciated today    No anomia or paraphasias noted    Cranial Nerves:  I: smell    II: visual acuity     II: visual fields Bilateral threat   II: pupils FLAQUITA   III,VII: ptosis None   III,IV,VI: extraocular muscles  Looks around the room   V: mastication Normal   V: facial light touch sensation  Normal   V,VII: corneal reflex  Present   VII: facial muscle function - upper     VII: facial muscle function - lower Normal   VIII: hearing Normal   IX: soft palate elevation   normal   IX,X: gag reflex    XI: trapezius strength   5/5   XI: sternocleidomastoid strength  5/5   XI: neck extension strength   5/5   XII: tongue strength   normal     Motor:  Moving all limbs symmetrically   5/5  Normal bulk  No drift    Sensory:  Appreciates LT and vibration in all limbs     Coordination:   FN, FFM and KRISTOFER - no ataxia appreciated     DTR:   Right Brachioradialis reflex 2+  Left Brachioradialis reflex 2+  Right Biceps reflex 2+  Left Biceps reflex 2+  Right Quadriceps reflex 2+  Left Quadriceps reflex 2+  Right Achilles reflex 2+  Left Achilles reflex 2+    No Babinski  No Lowry's     Laboratory/Radiology:     CBC with Differential:    Lab Results   Component Value Date    WBC 6.8 06/03/2022    RBC 3.10 06/03/2022    HGB 9.0 06/03/2022    HCT 29.2 06/03/2022     06/03/2022    MCV 94.2 06/03/2022    MCH 29.0 06/03/2022    MCHC 30.8 06/03/2022    RDW 15.7 06/03/2022    SEGSPCT 67 11/24/2011    LYMPHOPCT 11.0 05/27/2022    MONOPCT 7.9 05/27/2022    BASOPCT 0.1 05/27/2022    MONOSABS 1.02 05/27/2022    LYMPHSABS 1.42 05/27/2022    EOSABS 0.00 05/27/2022    BASOSABS 0.01 05/27/2022     CMP:    Lab Results   Component Value Date     06/03/2022    K 3.4 06/03/2022    K 3.4 05/28/2022     06/03/2022    CO2 29 06/03/2022    BUN 12 06/03/2022    CREATININE 1.1 06/03/2022    GFRAA 58 06/03/2022    LABGLOM 48 06/03/2022    GLUCOSE 116 06/03/2022    GLUCOSE 132 11/24/2011    PROT 6.0 06/03/2022    LABALBU 2.7 06/03/2022    LABALBU 3.4 01/23/2011    CALCIUM 8.1 06/03/2022    BILITOT 0.2 06/03/2022    ALKPHOS 43 06/03/2022    AST 19 06/03/2022    ALT 31 06/03/2022        Ref. Range 6/1/2022 13:08   GLUCOSE,CSF Latest Ref Range: 40 - 70 mg/dL 59   PROTEIN,CSF Latest Ref Range: 15 - 40 mg/dL 32   RBC, CSF Latest Units: /uL <2000   WBC, CSF Latest Ref Range: 0 - 2 /uL 4 (H)   Neutrophils, CSF Latest Ref Range: 0 - 10 % 0   Monocytes, CSF Latest Ref Range: 10 - 70 % 100 (H)   Color, CSF Unknown Colorless   Tube Number + CELL CT + DIFF-CSF Unknown Tube 3     MRI Brain:  Mild chronic microvascular disease within the periventricular white matter. No acute ischemia. I personally reviewed the patient's lab and imaging studies at this time. Assessment:     Patient with altered mental status who is being treated for a recent UTI. There is no baseline cognitive impairment reported   Possible underlying meningitis-she was treated for 7 days prior to CSF analysis    On exam she has been improving cognitively over the last few days    A few days ago, she displayed perseveration as well as some echolalia however MRI of her brain was unremarkable for acute infarction.     No recurrent seizures maintained on Keppra    CSF with normal protein, 4 WBCs -gram stain and CNS with no growth thus far    I cannot also completely rule out MRI negative ischemic stroke causing an aphasia either -though unlikely with no other focal neurodeficits    Plan:     Continue Keppra at this time which is dosed at 500 mg twice a day    Some tests pending in CSF   Thus far essentially unrevealing     Neurology will follow peripherally during her admission please contact should issues arise    CHERRIE Le - CNS  2:55 PM  6/3/2022

## 2022-06-03 NOTE — CARE COORDINATION
Call received from [facility], liaison with [facility] in [place]. Authorization received from the patient's insurance for the patient to transition to their facility for rehab today. Reviewed input from this morning and new general surgery consult. Await input and treatment/testing plans from general surgery prior to transition of care. [facility] expressed understanding. Authorization is good through 6/4/2022 so patient can transition to rehab Saturday. After 6/4, patient will require a new authorization for transition to [facility]. Patient added to the weekend therapy list.  Will continue to follow.      Emma Winter RN.  [facility]  [phone]

## 2022-06-03 NOTE — PROGRESS NOTES
Pharmacy Consultation Note  (Antibiotic Dosing and Monitoring)    Initial consult date: 5/27/22  Consulting physician/provider: Dr. Alba Aguilar  Drug: Vancomycin  Indication: Empiric/CNS infection    Age/  Gender Height Weight IBW  Allergy Information   77 y.o./female 5' 4\" (162.6 cm) 160 lb 15 oz (73 kg)     Ideal body weight: 54.7 kg (120 lb 9.5 oz)  Adjusted ideal body weight: 62 kg (136 lb 11.7 oz)   Other      Renal Function:  Recent Labs     06/01/22  0500 06/02/22  0827 06/03/22  0624   BUN 6 8 12   CREATININE 0.9 0.9 1.1*       Intake/Output Summary (Last 24 hours) at 6/3/2022 0811  Last data filed at 6/2/2022 1830  Gross per 24 hour   Intake 840 ml   Output --   Net 840 ml       Vancomycin Monitoring:  Trough:    Recent Labs     06/03/22  0624   VANCOTROUGH 41.8*     Random:  No results for input(s): VANCORANDOM in the last 72 hours. Date  SCr/CrCl       or HD Drug/Dose Time   Given Level(s)   (Time)   5/27 0.8  Vancomycin 1500 mg IV q24h 1448    5/28 0.7 Vancomycin 1500 mg IV q24h 1458    5/29 0.7 Vancomycin 1500 mg IV q24h 1443    5/30 0.7 Vancomycin 1500 mg IV q24h 1417 9.9 mcg/mL  (1311)   5/31 0.6 Vancomycin 1500 mg IV q24h 1420    6/1 0.9 Vancomycin 1750 mg IV x1 1150    6/2 -- Vancomycin 1750 mg IV q24h 0557    6/3 1.1 Vancomycin 1750 mg IV q24h 0550                        Assessment:  · Patient is a 76 y.o. female who has been initiated on vancomycin  Estimated Creatinine Clearance: 41 mL/min (A) (based on SCr of 1.1 mg/dL (H)). Level drawn with Vanco infusing. Attempting to get lab to redraw. Plan:  · Will continue vancomycin 1750 mg IV every 24   · Will continue to monitor renal function   · Clinical pharmacy to follow      Kvng GUSMAN  6/3/2022  8:11 AM

## 2022-06-03 NOTE — CARE COORDINATION
Late call received from patient's son Lindsay Slater and his wife João Kerr re: transition of care. Reviewed patient accepted to [facility] in [place] and they are starting authorization with the patient's insurance. Family again expressing concerns about patients stomach complaints, diarrhea, projectile vomiting, 30 pound unintended weight loss, poor appetite.  They feel these concerns are not being addressed and would like EGD and colonoscopy to be completed. Explained that testing needed to be medically necessary to be completed while inpatient or referral could be made for this to be addressed in an outpatient setting. They would like to speak with the physician to have their concerns reviewed prior to the patient's discharge. They would like Dr Lady Escobar to call João Kerr at [phone] when he is able to. Charge nurse Abraham Matos notified of above yesterday and will notify Dr Lady Escobar this morning as well. Will continue to follow.      Goran Sharp RN.  [facility]  [phone]

## 2022-06-03 NOTE — PROGRESS NOTES
Spoke to pts son and daughter in law on the phone. They would like Dr. Madiha Lawler to contact them to answers questions and concerns they have about the pt. Their phone number is [phone].

## 2022-06-03 NOTE — PROGRESS NOTES
Pharmacy Consultation Note  (Antibiotic Dosing and Monitoring)    Initial consult date: 5/27/22  Consulting physician/provider: Dr. Sb Hurtado  Drug: Vancomycin  Indication: Empiric/CNS infection    Age/  Gender Height Weight IBW  Allergy Information   77 y.o./female 5' 4\" (162.6 cm) 160 lb 15 oz (73 kg)     Ideal body weight: 54.7 kg (120 lb 9.5 oz)  Adjusted ideal body weight: 62 kg (136 lb 11.7 oz)   Other      Renal Function:  Recent Labs     06/01/22  0500 06/02/22  0827 06/03/22  0624   BUN 6 8 12   CREATININE 0.9 0.9 1.1*       Intake/Output Summary (Last 24 hours) at 6/3/2022 1701  Last data filed at 6/3/2022 1356  Gross per 24 hour   Intake 840 ml   Output --   Net 840 ml       Vancomycin Monitoring:  Trough:    Recent Labs     06/03/22  0624   VANCOTROUGH 41.8*     Random:    Recent Labs     06/03/22  0624 06/03/22  1457   VANCORANDOM 42.9* 32.6           Date  SCr/CrCl       or HD Drug/Dose Time   Given Level(s)   (Time)   5/27 0.8  Vancomycin 1500 mg IV q24h 1448    5/28 0.7 Vancomycin 1500 mg IV q24h 1458    5/29 0.7 Vancomycin 1500 mg IV q24h 1443    5/30 0.7 Vancomycin 1500 mg IV q24h 1417 9.9 mcg/mL  (1311)   5/31 0.6 Vancomycin 1500 mg IV q24h 1420    6/1 0.9 Vancomycin 1750 mg IV x1 1150    6/2 -- Vancomycin 1750 mg IV q24h 0557    6/3 1.1 Vancomycin 1750 mg IV q24h 0550                        Assessment:  · Patient is a 76 y.o. female who has been initiated on vancomycin  Estimated Creatinine Clearance: 41 mL/min (A) (based on SCr of 1.1 mg/dL (H)). Level drawn with Vanco infusing, Redrawn random level = 32.6    Plan:  · Will continue vancomycin 1500 mg IV every 24   · Will continue to monitor renal function   · Clinical pharmacy to follow      Kvng GUSMAN  6/3/2022  5:01 PM

## 2022-06-04 LAB
ALBUMIN SERPL-MCNC: 3 G/DL (ref 3.5–5.2)
ALP BLD-CCNC: 48 U/L (ref 35–104)
ALT SERPL-CCNC: 29 U/L (ref 0–32)
ANION GAP SERPL CALCULATED.3IONS-SCNC: 11 MMOL/L (ref 7–16)
AST SERPL-CCNC: 20 U/L (ref 0–31)
BILIRUB SERPL-MCNC: <0.2 MG/DL (ref 0–1.2)
BLOOD CULTURE, ROUTINE: NORMAL
BUN BLDV-MCNC: 12 MG/DL (ref 6–23)
CALCIUM SERPL-MCNC: 8.3 MG/DL (ref 8.6–10.2)
CHLORIDE BLD-SCNC: 104 MMOL/L (ref 98–107)
CO2: 27 MMOL/L (ref 22–29)
CREAT SERPL-MCNC: 1 MG/DL (ref 0.5–1)
CULTURE, BLOOD 2: NORMAL
GFR AFRICAN AMERICAN: >60
GFR NON-AFRICAN AMERICAN: 53 ML/MIN/1.73
GLUCOSE BLD-MCNC: 138 MG/DL (ref 74–99)
HCT VFR BLD CALC: 30.8 % (ref 34–48)
HEMOGLOBIN: 9.5 G/DL (ref 11.5–15.5)
INR BLD: 1.3
MAGNESIUM: 1.9 MG/DL (ref 1.6–2.6)
MCH RBC QN AUTO: 29.1 PG (ref 26–35)
MCHC RBC AUTO-ENTMCNC: 30.8 % (ref 32–34.5)
MCV RBC AUTO: 94.2 FL (ref 80–99.9)
METER GLUCOSE: 107 MG/DL (ref 74–99)
METER GLUCOSE: 123 MG/DL (ref 74–99)
METER GLUCOSE: 148 MG/DL (ref 74–99)
PDW BLD-RTO: 15.7 FL (ref 11.5–15)
PLATELET # BLD: 319 E9/L (ref 130–450)
PMV BLD AUTO: 9 FL (ref 7–12)
POTASSIUM SERPL-SCNC: 3.8 MMOL/L (ref 3.5–5)
PROTHROMBIN TIME: 14 SEC (ref 9.3–12.4)
RBC # BLD: 3.27 E12/L (ref 3.5–5.5)
SODIUM BLD-SCNC: 142 MMOL/L (ref 132–146)
TOTAL CK: 81 U/L (ref 20–180)
TOTAL PROTEIN: 6.5 G/DL (ref 6.4–8.3)
WBC # BLD: 7.3 E9/L (ref 4.5–11.5)

## 2022-06-04 PROCEDURE — 2580000003 HC RX 258: Performed by: INTERNAL MEDICINE

## 2022-06-04 PROCEDURE — 80053 COMPREHEN METABOLIC PANEL: CPT

## 2022-06-04 PROCEDURE — 6370000000 HC RX 637 (ALT 250 FOR IP): Performed by: STUDENT IN AN ORGANIZED HEALTH CARE EDUCATION/TRAINING PROGRAM

## 2022-06-04 PROCEDURE — 2580000003 HC RX 258: Performed by: STUDENT IN AN ORGANIZED HEALTH CARE EDUCATION/TRAINING PROGRAM

## 2022-06-04 PROCEDURE — 85610 PROTHROMBIN TIME: CPT

## 2022-06-04 PROCEDURE — 6360000002 HC RX W HCPCS: Performed by: CHIROPRACTOR

## 2022-06-04 PROCEDURE — 6370000000 HC RX 637 (ALT 250 FOR IP)

## 2022-06-04 PROCEDURE — 6370000000 HC RX 637 (ALT 250 FOR IP): Performed by: INTERNAL MEDICINE

## 2022-06-04 PROCEDURE — 82550 ASSAY OF CK (CPK): CPT

## 2022-06-04 PROCEDURE — 82962 GLUCOSE BLOOD TEST: CPT

## 2022-06-04 PROCEDURE — 2580000003 HC RX 258: Performed by: CHIROPRACTOR

## 2022-06-04 PROCEDURE — 6360000002 HC RX W HCPCS: Performed by: STUDENT IN AN ORGANIZED HEALTH CARE EDUCATION/TRAINING PROGRAM

## 2022-06-04 PROCEDURE — 6360000002 HC RX W HCPCS: Performed by: INTERNAL MEDICINE

## 2022-06-04 PROCEDURE — 2060000000 HC ICU INTERMEDIATE R&B

## 2022-06-04 PROCEDURE — 36415 COLL VENOUS BLD VENIPUNCTURE: CPT

## 2022-06-04 PROCEDURE — 85027 COMPLETE CBC AUTOMATED: CPT

## 2022-06-04 PROCEDURE — 83735 ASSAY OF MAGNESIUM: CPT

## 2022-06-04 RX ADMIN — INSULIN LISPRO 1 UNITS: 100 INJECTION, SOLUTION INTRAVENOUS; SUBCUTANEOUS at 21:18

## 2022-06-04 RX ADMIN — PANTOPRAZOLE SODIUM 40 MG: 40 TABLET, DELAYED RELEASE ORAL at 08:57

## 2022-06-04 RX ADMIN — ACETAMINOPHEN 325MG 650 MG: 325 TABLET ORAL at 09:13

## 2022-06-04 RX ADMIN — SODIUM CHLORIDE, PRESERVATIVE FREE 300 UNITS: 5 INJECTION INTRAVENOUS at 20:32

## 2022-06-04 RX ADMIN — CEFTRIAXONE 2000 MG: 2 INJECTION, POWDER, FOR SOLUTION INTRAMUSCULAR; INTRAVENOUS at 08:56

## 2022-06-04 RX ADMIN — SODIUM CHLORIDE, PRESERVATIVE FREE 10 ML: 5 INJECTION INTRAVENOUS at 08:57

## 2022-06-04 RX ADMIN — LEVOTHYROXINE SODIUM 50 MCG: 0.05 TABLET ORAL at 06:06

## 2022-06-04 RX ADMIN — SODIUM CHLORIDE, PRESERVATIVE FREE 300 UNITS: 5 INJECTION INTRAVENOUS at 08:58

## 2022-06-04 RX ADMIN — APIXABAN 5 MG: 5 TABLET, FILM COATED ORAL at 08:57

## 2022-06-04 RX ADMIN — Medication 1 TABLET: at 08:57

## 2022-06-04 RX ADMIN — LISINOPRIL 20 MG: 20 TABLET ORAL at 08:57

## 2022-06-04 RX ADMIN — SODIUM CHLORIDE, PRESERVATIVE FREE 10 ML: 5 INJECTION INTRAVENOUS at 20:33

## 2022-06-04 RX ADMIN — CEFTRIAXONE 2000 MG: 2 INJECTION, POWDER, FOR SOLUTION INTRAMUSCULAR; INTRAVENOUS at 21:00

## 2022-06-04 RX ADMIN — VANCOMYCIN HYDROCHLORIDE 1500 MG: 10 INJECTION, POWDER, LYOPHILIZED, FOR SOLUTION INTRAVENOUS at 06:05

## 2022-06-04 RX ADMIN — APIXABAN 5 MG: 5 TABLET, FILM COATED ORAL at 20:33

## 2022-06-04 RX ADMIN — LEVETIRACETAM 500 MG: 500 TABLET, FILM COATED ORAL at 08:57

## 2022-06-04 RX ADMIN — LEVETIRACETAM 500 MG: 500 TABLET, FILM COATED ORAL at 20:33

## 2022-06-04 ASSESSMENT — PAIN - FUNCTIONAL ASSESSMENT: PAIN_FUNCTIONAL_ASSESSMENT: ACTIVITIES ARE NOT PREVENTED

## 2022-06-04 ASSESSMENT — PAIN DESCRIPTION - FREQUENCY: FREQUENCY: INTERMITTENT

## 2022-06-04 ASSESSMENT — PAIN DESCRIPTION - LOCATION: LOCATION: HEAD

## 2022-06-04 ASSESSMENT — PAIN SCALES - GENERAL
PAINLEVEL_OUTOF10: 0
PAINLEVEL_OUTOF10: 8

## 2022-06-04 ASSESSMENT — PAIN DESCRIPTION - ONSET: ONSET: GRADUAL

## 2022-06-04 ASSESSMENT — PAIN DESCRIPTION - ORIENTATION: ORIENTATION: ANTERIOR

## 2022-06-04 ASSESSMENT — PAIN DESCRIPTION - PAIN TYPE: TYPE: ACUTE PAIN

## 2022-06-04 ASSESSMENT — PAIN DESCRIPTION - DESCRIPTORS: DESCRIPTORS: ACHING

## 2022-06-04 NOTE — PROGRESS NOTES
Infectious Disease  Progress Note  [facility]    Chief Complaint: meningitis    Subjective:  She feels good. Patient's daughter in law/son is in the room. No fever overnight. No new complaints. She walked today and sitting in chair. Looks happy. Scheduled Meds:   cefTRIAXone (ROCEPHIN) IV  2,000 mg IntraVENous Q12H    multivitamin  1 tablet Oral Daily    levETIRAcetam  500 mg Oral BID    vancomycin  1,500 mg IntraVENous Q24H    sodium chloride flush  5-40 mL IntraVENous 2 times per day    heparin flush  3 mL IntraVENous 2 times per day    lisinopril  20 mg Oral Daily    insulin lispro  0-6 Units SubCUTAneous TID WC    insulin lispro  0-3 Units SubCUTAneous Nightly    apixaban  5 mg Oral BID    levothyroxine  50 mcg Oral Daily    pantoprazole  40 mg Oral Daily     Continuous Infusions:   sodium chloride      dextrose       PRN Meds:ALPRAZolam, white petrolatum, sodium chloride flush, sodium chloride, heparin flush, glucose, dextrose bolus **OR** dextrose bolus, glucagon (rDNA), dextrose, docusate sodium, ondansetron **OR** ondansetron, polyethylene glycol, acetaminophen **OR** acetaminophen    ROS:  As mentioned in subjective, all other systems negative      BP (!) 142/72   Pulse 70   Temp 97.2 °F (36.2 °C) (Temporal)   Resp 16   Ht 5' 4\" (1.626 m)   Wt 160 lb 15 oz (73 kg)   SpO2 97%   BMI 27.62 kg/m²     Physical Exam  Const/Neuro- unchanged, no signs of acute distress, Alert  ENMT- Within Normal Limits, Normocephalic, mucous membranes pink/moist, No thrush  Neck: Neck supple, no neck stiffness  Heart- Regular, Rate, Rhythm- no murmur appreciated. Lungs- clear to ascultation. Respirations even and nonlabored. Abdomen- Soft, bowel sounds positive, non tender  Musculo/Extremities-  Equal and symmetrical, no edema. No tenderness. Neurological- No focal motor or sensory loss. No confusion  Skin:  Warm and dry, free from rashes.     Lines: PICC right UE    Labs, Cultures reviewed    Radiology reviewed    Results for Huong Porter (MRN [id number]) as of 6/3/2022 09:33   Ref. Range 2022 13:08   Appearance, CSF Latest Ref Range: Clear  Clear   CSF Culture Unknown Growth not present, incubation continues   GLUCOSE,CSF Latest Ref Range: 40 - 70 mg/dL 59   PROTEIN,CSF Latest Ref Range: 15 - 40 mg/dL 32   RBC, CSF Latest Units: /uL <2000   WBC, CSF Latest Ref Range: 0 - 2 /uL 4 (H)   Neutrophils, CSF Latest Ref Range: 0 - 10 % 0   Monocytes, CSF Latest Ref Range: 10 - 70 % 100 (H)   Color, CSF Unknown Colorless   Tube Number + CELL CT + DIFF-CSF Unknown Tube 3     HSV PCR from the CSF and VDRL from the CSF are in process. CSF culture is no growth so far. CSF cytology is negative for malignancy    Assessment  · Possible meningitis: Patient presented with fever leukocytosis and altered mental status. LP was done after patient is being on 7 days of antibiotics. She is currently on IV vancomycin and ceftriaxone. · Contamination of blood cultures: Diphtheroids and blood cultures on  likely contamination. Plan  Continue IV vancomycin and ceftriaxone 2 g IV every 12 till  as planned. Orders in place to  tomorrow. Please remove PICC on discharge. ID signs off. Please call me If there are any questions.     Electronically signed by Sheng Lopez MD on 2022 at 9:15 AM

## 2022-06-04 NOTE — PROGRESS NOTES
Hospital Medicine    Subjective: Awake and confused pleasantly  Denies any issues  No overnight issues      Current Facility-Administered Medications:     cefTRIAXone (ROCEPHIN) 2,000 mg in sterile water 20 mL IV syringe, 2,000 mg, IntraVENous, Q12H, Mihir Patricia MD, 2,000 mg at 06/03/22 2007    ALPRAZolam (XANAX) tablet 0.5 mg, 0.5 mg, Oral, TID PRN, Sree Luciamer, DO    multivitamin 1 tablet, 1 tablet, Oral, Daily, Sree Simper Malmer, DO, 1 tablet at 06/03/22 1148    levETIRAcetam (KEPPRA) tablet 500 mg, 500 mg, Oral, BID, Sreejohn Bob Malmer, DO, 500 mg at 06/03/22 2007    vancomycin 1500 mg in dextrose 5% 300 mL IVPB, 1,500 mg, IntraVENous, Q24H, Le Do MD, Last Rate: 150 mL/hr at 06/04/22 0605, 1,500 mg at 06/04/22 0605    white petrolatum ointment, , Topical, BID PRN, [place], MD    sodium chloride flush 0.9 % injection 5-40 mL, 5-40 mL, IntraVENous, 2 times per day, Narendra Sanchez MD, 10 mL at 06/03/22 2007    sodium chloride flush 0.9 % injection 5-40 mL, 5-40 mL, IntraVENous, PRN, Cruz Keys MD    0.9 % sodium chloride infusion, , IntraVENous, PRN, [place], MD    heparin flush 100 UNIT/ML injection 300 Units, 3 mL, IntraVENous, 2 times per day, Narendra Sanchez MD, 300 Units at 06/03/22 2007    heparin flush 100 UNIT/ML injection 300 Units, 3 mL, IntraCATHeter, PRN, [place], MD    lisinopril (PRINIVIL;ZESTRIL) tablet 20 mg, 20 mg, Oral, Daily, Yessy Davis MD, 20 mg at 06/03/22 0912    insulin lispro (HUMALOG) injection vial 0-6 Units, 0-6 Units, SubCUTAneous, TID WC, Megan Barjaas MD, 1 Units at 05/31/22 1638    insulin lispro (HUMALOG) injection vial 0-3 Units, 0-3 Units, SubCUTAneous, Nightly, Megan Barajas MD, 1 Units at 06/02/22 2041    glucose chewable tablet 16 g, 4 tablet, Oral, PRN, Megan Barajas MD    dextrose bolus 10% 125 mL, 125 mL, IntraVENous, PRN **OR** dextrose bolus 10% 250 mL, 250 mL, IntraVENous, PRN, Megan Barajas MD  [facility] glucagon (rDNA) injection 1 mg, 1 mg, IntraMUSCular, PRN, Yeny Camarena MD    dextrose 5 % solution, 100 mL/hr, IntraVENous, PRN, Yeny Camarena MD    apixaban Carvel Luis Felipe) tablet 5 mg, 5 mg, Oral, BID, [place] Distance Malmer, DO, 5 mg at 06/03/22 2007    docusate sodium (COLACE) capsule 100 mg, 100 mg, Oral, BID PRN, Charlotte Distance Malmer, DO, 100 mg at 05/30/22 1011    levothyroxine (SYNTHROID) tablet 50 mcg, 50 mcg, Oral, Daily, Charlotte Distance Malmer, DO, 50 mcg at 06/04/22 0606    pantoprazole (PROTONIX) tablet 40 mg, 40 mg, Oral, Daily, Charlotte Distance Malmer, DO, 40 mg at 06/03/22 0912    ondansetron (ZOFRAN-ODT) disintegrating tablet 4 mg, 4 mg, Oral, Q8H PRN **OR** ondansetron (ZOFRAN) injection 4 mg, 4 mg, IntraVENous, Q6H PRN, Charlotte Distance Malmer, DO, 4 mg at 05/31/22 1205    polyethylene glycol (GLYCOLAX) packet 17 g, 17 g, Oral, Daily PRN, Charlotte Distance Malmer, DO    acetaminophen (TYLENOL) tablet 650 mg, 650 mg, Oral, Q6H PRN, 650 mg at 06/03/22 1444 **OR** acetaminophen (TYLENOL) suppository 650 mg, 650 mg, Rectal, Q6H PRN, Charlotte Distance Malmer, DO    Objective:    /69   Pulse 69   Temp 97.3 °F (36.3 °C) (Temporal)   Resp 16   Ht 5' 4\" (1.626 m)   Wt 160 lb 15 oz (73 kg)   SpO2 97%   BMI 27.62 kg/m²     Heart:  reg  Lungs:  ctab  Abd: + bs soft nontender  Extrem:  W/o edema    CBC with Differential:    Lab Results   Component Value Date    WBC 6.8 06/03/2022    RBC 3.10 06/03/2022    HGB 9.0 06/03/2022    HCT 29.2 06/03/2022     06/03/2022    MCV 94.2 06/03/2022    MCH 29.0 06/03/2022    MCHC 30.8 06/03/2022    RDW 15.7 06/03/2022    SEGSPCT 67 11/24/2011    LYMPHOPCT 11.0 05/27/2022    MONOPCT 7.9 05/27/2022    BASOPCT 0.1 05/27/2022    MONOSABS 1.02 05/27/2022    LYMPHSABS 1.42 05/27/2022    EOSABS 0.00 05/27/2022    BASOSABS 0.01 05/27/2022     CMP:    Lab Results   Component Value Date     06/03/2022    K 3.4 06/03/2022    K 3.4 05/28/2022     06/03/2022    CO2 29 06/03/2022    BUN 12 06/03/2022    CREATININE 1.1 06/03/2022    GFRAA 58 06/03/2022    LABGLOM 48 06/03/2022    GLUCOSE 116 06/03/2022    GLUCOSE 132 11/24/2011    PROT 6.0 06/03/2022    LABALBU 2.7 06/03/2022    LABALBU 3.4 01/23/2011    CALCIUM 8.1 06/03/2022    BILITOT 0.2 06/03/2022    ALKPHOS 43 06/03/2022    AST 19 06/03/2022    ALT 31 06/03/2022     Warfarin PT/INR:    Lab Results   Component Value Date    INR 1.2 06/03/2022    INR 1.2 06/02/2022    INR 1.1 06/01/2022    PROTIME 13.1 (H) 06/03/2022    PROTIME 13.7 (H) 06/02/2022    PROTIME 12.7 (H) 06/01/2022       Assessment:    Principal Problem:    Stroke-like symptoms  Active Problems: Moderate protein-calorie malnutrition ([address]    Altered mental status    Septicemia ([address]  Resolved Problems:    * No resolved hospital problems.  *  cholelithiasis    Plan:  Apparently family is requesting surgical consultation for recent weight loss issues  Eventual discharge to subacute rehab  Jose Harris MD  7:18 AM  6/4/2022

## 2022-06-04 NOTE — PROGRESS NOTES
Pharmacy Consultation Note  (Antibiotic Dosing and Monitoring)    Initial consult date: 5/27/22  Consulting physician/provider: Dr. Chetna Rothman  Drug: Vancomycin  Indication: Empiric/CNS infection    Age/  Gender Height Weight IBW  Allergy Information   77 y.o./female 5' 4\" (162.6 cm) 160 lb 15 oz (73 kg)     Ideal body weight: 54.7 kg (120 lb 9.5 oz)  Adjusted ideal body weight: 62 kg (136 lb 11.7 oz)   Other      Renal Function:  Recent Labs     06/02/22  0827 06/03/22  0624   BUN 8 12   CREATININE 0.9 1.1*       Intake/Output Summary (Last 24 hours) at 6/4/2022 0821  Last data filed at 6/3/2022 1930  Gross per 24 hour   Intake 720 ml   Output --   Net 720 ml       Vancomycin Monitoring:  Trough:    Recent Labs     06/03/22  0624   VANCOTROUGH 41.8*     Random:    Recent Labs     06/03/22  0624 06/03/22  1457   VANCORANDOM 42.9* 32.6           Date  SCr/CrCl       or HD Drug/Dose Time   Given Level(s)   (Time)   5/27 0.8  Vancomycin 1500 mg IV q24h 1448    5/28 0.7 Vancomycin 1500 mg IV q24h 1458    5/29 0.7 Vancomycin 1500 mg IV q24h 1443    5/30 0.7 Vancomycin 1500 mg IV q24h 1417 9.9 mcg/mL  (1311)   5/31 0.6 Vancomycin 1500 mg IV q24h 1420    6/1 0.9 Vancomycin 1750 mg IV x1 1150    6/2 -- Vancomycin 1750 mg IV q24h 0557    6/3 1.1 Vancomycin 1750 mg IV q24h 0550    6/4 -- Vancomycin 1500 mg IV q24h 0605                 Assessment:  · Patient is a 76 y.o. female who has been initiated on vancomycin  Estimated Creatinine Clearance: 41 mL/min (A) (based on SCr of 1.1 mg/dL (H)).     6/3: Level drawn with Vanco infusing, Redrawn random level = 32.6    Plan:  · Will continue vancomycin 1500 mg IV every 24 (eAUC 586)  · Will continue to monitor renal function   · Clinical pharmacy to follow      Leidy Ramírez PharmD  Pharmacy Resident  P: [id number]  6/4/2022  8:21 AM

## 2022-06-05 LAB
ALBUMIN SERPL-MCNC: 2.8 G/DL (ref 3.5–5.2)
ALP BLD-CCNC: 44 U/L (ref 35–104)
ALT SERPL-CCNC: 26 U/L (ref 0–32)
ANION GAP SERPL CALCULATED.3IONS-SCNC: 8 MMOL/L (ref 7–16)
AST SERPL-CCNC: 17 U/L (ref 0–31)
BILIRUB SERPL-MCNC: <0.2 MG/DL (ref 0–1.2)
BUN BLDV-MCNC: 15 MG/DL (ref 6–23)
CALCIUM SERPL-MCNC: 8.3 MG/DL (ref 8.6–10.2)
CHLORIDE BLD-SCNC: 103 MMOL/L (ref 98–107)
CO2: 28 MMOL/L (ref 22–29)
CREAT SERPL-MCNC: 1.2 MG/DL (ref 0.5–1)
CSF CULTURE: NORMAL
GFR AFRICAN AMERICAN: 52
GFR NON-AFRICAN AMERICAN: 43 ML/MIN/1.73
GLUCOSE BLD-MCNC: 128 MG/DL (ref 74–99)
GRAM STAIN RESULT: NORMAL
HCT VFR BLD CALC: 29.4 % (ref 34–48)
HEMOGLOBIN: 9.1 G/DL (ref 11.5–15.5)
INR BLD: 1.2
Lab: NORMAL
MCH RBC QN AUTO: 29.2 PG (ref 26–35)
MCHC RBC AUTO-ENTMCNC: 31 % (ref 32–34.5)
MCV RBC AUTO: 94.2 FL (ref 80–99.9)
METER GLUCOSE: 111 MG/DL (ref 74–99)
METER GLUCOSE: 121 MG/DL (ref 74–99)
METER GLUCOSE: 135 MG/DL (ref 74–99)
METER GLUCOSE: 148 MG/DL (ref 74–99)
PDW BLD-RTO: 15.9 FL (ref 11.5–15)
PLATELET # BLD: 323 E9/L (ref 130–450)
PMV BLD AUTO: 9.3 FL (ref 7–12)
POTASSIUM SERPL-SCNC: 4.3 MMOL/L (ref 3.5–5)
PROTHROMBIN TIME: 13.1 SEC (ref 9.3–12.4)
RBC # BLD: 3.12 E12/L (ref 3.5–5.5)
REPORT: NORMAL
SODIUM BLD-SCNC: 139 MMOL/L (ref 132–146)
THIS TEST SENT TO: NORMAL
TOTAL CK: 63 U/L (ref 20–180)
TOTAL PROTEIN: 6.2 G/DL (ref 6.4–8.3)
VDRL CSF SCREEN: NON REACTIVE
WBC # BLD: 6.9 E9/L (ref 4.5–11.5)

## 2022-06-05 PROCEDURE — 82962 GLUCOSE BLOOD TEST: CPT

## 2022-06-05 PROCEDURE — 6370000000 HC RX 637 (ALT 250 FOR IP)

## 2022-06-05 PROCEDURE — 36415 COLL VENOUS BLD VENIPUNCTURE: CPT

## 2022-06-05 PROCEDURE — 6370000000 HC RX 637 (ALT 250 FOR IP): Performed by: INTERNAL MEDICINE

## 2022-06-05 PROCEDURE — 6360000002 HC RX W HCPCS: Performed by: STUDENT IN AN ORGANIZED HEALTH CARE EDUCATION/TRAINING PROGRAM

## 2022-06-05 PROCEDURE — 6360000002 HC RX W HCPCS

## 2022-06-05 PROCEDURE — 6360000002 HC RX W HCPCS: Performed by: CHIROPRACTOR

## 2022-06-05 PROCEDURE — 80053 COMPREHEN METABOLIC PANEL: CPT

## 2022-06-05 PROCEDURE — 97530 THERAPEUTIC ACTIVITIES: CPT

## 2022-06-05 PROCEDURE — 82550 ASSAY OF CK (CPK): CPT

## 2022-06-05 PROCEDURE — 2580000003 HC RX 258: Performed by: CHIROPRACTOR

## 2022-06-05 PROCEDURE — 85610 PROTHROMBIN TIME: CPT

## 2022-06-05 PROCEDURE — 2580000003 HC RX 258: Performed by: STUDENT IN AN ORGANIZED HEALTH CARE EDUCATION/TRAINING PROGRAM

## 2022-06-05 PROCEDURE — 2580000003 HC RX 258

## 2022-06-05 PROCEDURE — 85027 COMPLETE CBC AUTOMATED: CPT

## 2022-06-05 PROCEDURE — 97535 SELF CARE MNGMENT TRAINING: CPT

## 2022-06-05 PROCEDURE — 2060000000 HC ICU INTERMEDIATE R&B

## 2022-06-05 RX ADMIN — LEVOTHYROXINE SODIUM 50 MCG: 0.05 TABLET ORAL at 06:00

## 2022-06-05 RX ADMIN — SODIUM CHLORIDE, PRESERVATIVE FREE 10 ML: 5 INJECTION INTRAVENOUS at 08:44

## 2022-06-05 RX ADMIN — SODIUM CHLORIDE, PRESERVATIVE FREE 10 ML: 5 INJECTION INTRAVENOUS at 19:59

## 2022-06-05 RX ADMIN — CEFTRIAXONE 2000 MG: 2 INJECTION, POWDER, FOR SOLUTION INTRAMUSCULAR; INTRAVENOUS at 08:44

## 2022-06-05 RX ADMIN — CEFTRIAXONE 2000 MG: 2 INJECTION, POWDER, FOR SOLUTION INTRAMUSCULAR; INTRAVENOUS at 21:00

## 2022-06-05 RX ADMIN — SODIUM CHLORIDE, PRESERVATIVE FREE 300 UNITS: 5 INJECTION INTRAVENOUS at 08:47

## 2022-06-05 RX ADMIN — ACETAMINOPHEN 325MG 650 MG: 325 TABLET ORAL at 00:00

## 2022-06-05 RX ADMIN — LEVETIRACETAM 500 MG: 500 TABLET, FILM COATED ORAL at 08:43

## 2022-06-05 RX ADMIN — ACETAMINOPHEN 325MG 650 MG: 325 TABLET ORAL at 12:46

## 2022-06-05 RX ADMIN — APIXABAN 5 MG: 5 TABLET, FILM COATED ORAL at 08:43

## 2022-06-05 RX ADMIN — VANCOMYCIN HYDROCHLORIDE 1500 MG: 1 INJECTION, POWDER, LYOPHILIZED, FOR SOLUTION INTRAVENOUS at 05:46

## 2022-06-05 RX ADMIN — SODIUM CHLORIDE, PRESERVATIVE FREE 300 UNITS: 5 INJECTION INTRAVENOUS at 21:00

## 2022-06-05 RX ADMIN — APIXABAN 5 MG: 5 TABLET, FILM COATED ORAL at 21:00

## 2022-06-05 RX ADMIN — LEVETIRACETAM 500 MG: 500 TABLET, FILM COATED ORAL at 21:00

## 2022-06-05 RX ADMIN — LISINOPRIL 20 MG: 20 TABLET ORAL at 08:43

## 2022-06-05 RX ADMIN — PANTOPRAZOLE SODIUM 40 MG: 40 TABLET, DELAYED RELEASE ORAL at 08:43

## 2022-06-05 ASSESSMENT — PAIN DESCRIPTION - LOCATION
LOCATION: HEAD
LOCATION: HEAD

## 2022-06-05 ASSESSMENT — PAIN SCALES - GENERAL
PAINLEVEL_OUTOF10: 0
PAINLEVEL_OUTOF10: 0
PAINLEVEL_OUTOF10: 5
PAINLEVEL_OUTOF10: 8

## 2022-06-05 ASSESSMENT — PAIN DESCRIPTION - ORIENTATION: ORIENTATION: POSTERIOR

## 2022-06-05 ASSESSMENT — PAIN - FUNCTIONAL ASSESSMENT: PAIN_FUNCTIONAL_ASSESSMENT: ACTIVITIES ARE NOT PREVENTED

## 2022-06-05 ASSESSMENT — PAIN DESCRIPTION - DESCRIPTORS
DESCRIPTORS: ACHING;DISCOMFORT
DESCRIPTORS: ACHING

## 2022-06-05 NOTE — PROGRESS NOTES
Physical Therapy  Treatment Note    Name: Sid Rebollar  : 1945  MRN: [id number]      Date of Service: 2022    Evaluating PT:  Lew Renae PT, DPT  [id number]     Room #:  5344/4398-D  Diagnosis:  Acute encephalopathy [G93.40]  Altered mental status, unspecified altered mental status type [R41.82]  PMHx/PSHx:  HTN, Hypothyroidism, PVC's, Anxiety, Asthma, Atrial fibrillation, History of supraventricular tachycardia, Macular degeneration  Procedure/Surgery: LP    Precautions:  Falls, Seizure precautions, Cognition  Equipment Needs:  TBD    SUBJECTIVE:    Pt is a poor historian. Per chart, pt lives alone in a 1 floor plan with 4 steps and 1 rail. Pt was independent with no AD. Owns Walker from previous surgery. OBJECTIVE:   Initial Evaluation  Date: 22 Treatment  Date: 22 Short Term/ Long Term   Goals   AM-PAC 6 Clicks 54/61     Was pt agreeable to Eval/treatment? Yes Yes    Does pt have pain? No c/o pain No pain    Bed Mobility  Rolling: Min A  Supine to sit: Min A  Sit to supine: Min A  Scooting: Min A Rolling: SBA  Supine to sit: SBA  Sit to supine: NT  Scooting: SBA Rolling: Independent  Supine to sit:  Independent  Sit to supine: Independent  Scooting: Independent   Transfers Sit to stand: Min A  Stand to sit: Min A  Stand pivot: NT Sit to stand: min A  Stand to sit: min A  Stand pivot: min A with no AD Sit to stand: Supervision  Stand to sit: Supervision  Stand pivot: Supervision   Ambulation   1 step fwd/bwd with FWW Min A 50'x4 with no AD min A >50 feet with AAD SBA   Stair negotiation: ascended and descended  NT NT >2 steps with 1 rail Min A   ROM BUE:  Per OT eval  BLE:  WFL     Strength BUE:  Per OT eval  BLE:  Grossly 4-/5     Balance Sitting EOB:  Min A  Dynamic Standing:  NT Sitting EOB:  SBA  Dynamic Standing:  Min A with no AD Sitting EOB:  Independent  Dynamic Standing:  Supervision with AAD     Pt is A & O x 3  Sensation:  Pt denies numbness and tingling to 1148  Time out  1202    Total Treatment Time  10 minutes     CPT codes:  [] Gait training 58077 -- minutes  [] Manual therapy [address] [facility] -- minutes  [x] Therapeutic activities 83328 10 minutes  [] Therapeutic exercises 23214 -- minutes  [] Neuromuscular reeducation 98542 -- minutes    Armando Plummer, PT, DPT  [id number]

## 2022-06-05 NOTE — PROGRESS NOTES
Occupational Therapy  OT BEDSIDE TREATMENT NOTE   [address] [facility], [facility]  Patient Name: Jason Palomino  MRN: [id number]  : 1945  Room: [address] [place] [address]-A     Per OT Eval:    Evaluating [address] [place], [place] [address]     Referring Provider: Gracie Dalal DO   Specific Provider Orders/Date: OT eval and treat 22     Diagnosis: Acute encephalopathy [G93.40]  Altered mental status, unspecified altered mental status type [R41.82]   Pt admitted to hospital on 22 after being found down by neighbor. AMS  RRT on  for seizures     Surgery / Procedure: lumbar puncture      Pertinent Medical History:  has a past medical history of Anxiety, Asthma, Atrial fibrillation ([facility] [address] History of supraventricular tachycardia, Hypertension, Hypothyroidism, Macular degeneration, and PVC's (premature ventricular contractions).      Precautions:  Fall Risk, cognition (see below), bed alarm, seizures     Assessment of current deficits   [x]? Functional mobility         [x]? ADLs           [x]? Strength                  [x]? Cognition    [x]? Functional transfers       [x]? IADLs         [x]? Safety Awareness   [x]? Endurance    []? Fine Coordination                      [x]? Balance      []? Vision/perception   []? Sensation      []? Gross Motor Coordination          []? ROM           []?  Delirium                   []? Motor Control      OT PLAN OF CARE   OT POC based on physician orders, patient diagnosis and results of clinical assessment     Frequency/Duration 1-3 days/wk for 2 weeks PRN   Specific OT Treatment Interventions to include:   * Instruction/training on adapted ADL techniques and AE recommendations to increase functional independence within precautions       * Training on energy conservation strategies, correct breathing pattern and techniques to improve independence/tolerance for self-care routine  * Functional transfer/mobility training/DME recommendations for increased independence, safety, and fall prevention  * Patient/Family education to increase follow through with safety techniques and functional independence  * Recommendation of environmental modifications for increased safety with functional transfers/mobility and ADLs  * Cognitive retraining/development of therapeutic activities to improve problem solving, judgement, memory, and attention for increased safety/participation in ADL/IADL tasks  * Therapeutic exercise to improve motor endurance, ROM, and functional strength for ADLs/functional transfers  * Therapeutic activities to facilitate/challenge dynamic balance, stand tolerance for increased safety and independence with ADLs  * Therapeutic activities to facilitate gross/fine motor skills for increased independence with ADLs     Recommended Adaptive Equipment: TBD      Home Living: Pt lives alone in a mobile home. 4 LAVELL, 1 handrail   Bathroom setup: unable to obtain d/t cognition    Equipment owned: walker     Prior Level of Function: independent with ADLs; ambulated w/o AD   Above PLOF per chart - unable to obtain from pt d/t cognition     Pain Level: Pt c/o no pain this session      Cognition: A&O: x 4, pleasant & cooperative, motivated.  Some delay noted with processing, Follows 2 step directions           Memory: fair+           Sequencing: fair           Problem solving: fair-           Judgement/safety: fair-             Functional Assessment:  AM-PAC Daily Activity Raw Score: 17/24    Initial Eval Status  Date: 6/2/22 Treatment Status  Date:  6/5/22 STGs = LTGs  Time frame: 10-14 days   Feeding Stand by Assist   Breakfast tray  Mod Indep  Modified [place]    Grooming Minimal Assist   Seated EOB to wash hands and face  Standing to apply lotion on R hand Min A  For steadiness at sink   Modified [place]    UB Dressing Minimal Assist   Donned/doffed gown  Min A  Doff/annette gown  Supervision    LB Dressing Moderate Assist   B socks Min A  Seated with assist to thread legs, pt able to pull over hips   Stand by Assist    Bathing Moderate Assist Mod A  simulated  Stand by Assist    Toileting Moderate Assist   Min A  simulated Stand by Assist    Bed Mobility  Supine to sit: Minimal Assist   Sit to supine: Minimal Assist   Min A  Supine to sit  Supine to sit: Supervision   Sit to supine: Supervision    Functional Transfers Minimal Assist   Min A  Cues for hand placement & safety  Supervision    Functional Mobility Moderate Assist w/o AD  Min A w/ w/w  Light mobility in room to prep for bathroom mobility  Min A  With no AD, swaying noted household distances, pt reported she felt unsteady at times Supervision    Balance Sitting:     Static:  SBA    Dynamic:Min A  Standing: Min A w/w/w  Mod A w/o AD     Sitting: SBA  Standing: Min A  No AD     Activity Tolerance Fair  Fair Good   Visual/  Perceptual Glasses: yes                       Education:  Pt was educated through out treatment regarding proper technique & safety with bed mobility, functional transfers & mobility and ADL compensatory strategies to ease tasks, improve safety & prevent falls to return home safely. Comments: Upon arrival pt was in bed & agreeable for therapy. At end of session pt was seated in chair,  all lines and tubes intact, call light within reach. · Pt has made Good progress towards set goals. · Continue with current plan of care      Treatment Time In: 11:48            Treatment Time Out: 12:03          Treatment Charges: Mins Units   Ther Ex  53268     Manual Therapy 94139     Thera Activities 03922 5 1   ADL/Home Mgt 59035 10 0   Neuro Re-ed 11534     Group Therapy      Orthotic manage/training  80660     Non-Billable Time     Total Timed Treatment 15 1       Eloisa GUIDO  [address] [place] [address], [address] [place] [address]

## 2022-06-05 NOTE — PROGRESS NOTES
[facility] Medicine    Subjective: Awake and confused pleasantly  Family friend by the bedside  She has no new complaints      Current Facility-Administered Medications:     cefTRIAXone (ROCEPHIN) 2,000 mg in sterile water 20 mL IV syringe, 2,000 mg, IntraVENous, Q12H, David Alegria MD, 2,000 mg at 06/05/22 0844    ALPRAZolam (XANAX) tablet 0.5 mg, 0.5 mg, Oral, TID PRN, Mishel Sorto DO    multivitamin 1 tablet, 1 tablet, Oral, Daily, Mishel Sorto, DO, 1 tablet at 06/04/22 0857    levETIRAcetam (KEPPRA) tablet 500 mg, 500 mg, Oral, BID, Mishel Sorto, DO, 500 mg at 06/05/22 2097    white petrolatum ointment, , Topical, BID PRN, Genny Terry MD    sodium chloride flush 0.9 % injection 5-40 mL, 5-40 mL, IntraVENous, 2 times per day, Genny Terry MD, 10 mL at 06/05/22 0844    sodium chloride flush 0.9 % injection 5-40 mL, 5-40 mL, IntraVENous, PRN, Cruz Keys MD    0.9 % sodium chloride infusion, , IntraVENous, PRN, Genny Terry MD    heparin flush 100 UNIT/ML injection 300 Units, 3 mL, IntraVENous, 2 times per day, Genny Terry MD, 300 Units at 06/05/22 0847    heparin flush 100 UNIT/ML injection 300 Units, 3 mL, IntraCATHeter, PRN, Genny Terry MD    lisinopril (PRINIVIL;ZESTRIL) tablet 20 mg, 20 mg, Oral, Daily, Neville Galeazzi, MD, 20 mg at 06/05/22 0843    insulin lispro (HUMALOG) injection vial 0-6 Units, 0-6 Units, SubCUTAneous, TID WC, Travis John MD, 1 Units at 05/31/22 1638    insulin lispro (HUMALOG) injection vial 0-3 Units, 0-3 Units, SubCUTAneous, Nightly, Travis John MD, 1 Units at 06/04/22 2118    glucose chewable tablet 16 g, 4 tablet, Oral, PRN, Travis John MD    dextrose bolus 10% 125 mL, 125 mL, IntraVENous, PRN **OR** dextrose bolus 10% 250 mL, 250 mL, IntraVENous, PRN, Travis John MD    glucagon (rDNA) injection 1 mg, 1 mg, IntraMUSCular, PRN, Travis John MD    dextrose 5 % solution, 100 mL/hr, IntraVENous, PRN, Sharyle Bays Kimberlee Luis MD    apixaban Loretta Mince) tablet 5 mg, 5 mg, Oral, BID, Keysha Monica Malmer, DO, 5 mg at 06/05/22 2304    docusate sodium (COLACE) capsule 100 mg, 100 mg, Oral, BID PRN, Keysha Monica Malmer, DO, 100 mg at 05/30/22 1011    levothyroxine (SYNTHROID) tablet 50 mcg, 50 mcg, Oral, Daily, Keysha Monica Malmer, DO, 50 mcg at 06/05/22 0600    pantoprazole (PROTONIX) tablet 40 mg, 40 mg, Oral, Daily, Keysha Monica Malmer, DO, 40 mg at 06/05/22 0843    ondansetron (ZOFRAN-ODT) disintegrating tablet 4 mg, 4 mg, Oral, Q8H PRN **OR** ondansetron (ZOFRAN) injection 4 mg, 4 mg, IntraVENous, Q6H PRN, Keysha Monica Malmer, DO, 4 mg at 05/31/22 1205    polyethylene glycol (GLYCOLAX) packet 17 g, 17 g, Oral, Daily PRN, Keysha Monica Malmer, DO    acetaminophen (TYLENOL) tablet 650 mg, 650 mg, Oral, Q6H PRN, 650 mg at 06/05/22 0000 **OR** acetaminophen (TYLENOL) suppository 650 mg, 650 mg, Rectal, Q6H PRN, Keysha Monica Malmer, DO    Objective:    BP (!) 140/67   Pulse 61   Temp 97.9 °F (36.6 °C) (Temporal)   Resp 16   Ht 5' 4\" (1.626 m)   Wt 160 lb 15 oz (73 kg)   SpO2 95%   BMI 27.62 kg/m²     Heart:  reg  Lungs:  ctab  Abd: + bs soft nontender  Extrem:  W/o edema    CBC with Differential:    Lab Results   Component Value Date    WBC 6.9 06/05/2022    RBC 3.12 06/05/2022    HGB 9.1 06/05/2022    HCT 29.4 06/05/2022     06/05/2022    MCV 94.2 06/05/2022    MCH 29.2 06/05/2022    MCHC 31.0 06/05/2022    RDW 15.9 06/05/2022    SEGSPCT 67 11/24/2011    LYMPHOPCT 11.0 05/27/2022    MONOPCT 7.9 05/27/2022    BASOPCT 0.1 05/27/2022    MONOSABS 1.02 05/27/2022    LYMPHSABS 1.42 05/27/2022    EOSABS 0.00 05/27/2022    BASOSABS 0.01 05/27/2022     CMP:    Lab Results   Component Value Date     06/05/2022    K 4.3 06/05/2022    K 3.4 05/28/2022     06/05/2022    CO2 28 06/05/2022    BUN 15 06/05/2022    CREATININE 1.2 06/05/2022    GFRAA 52 06/05/2022    LABGLOM 43 06/05/2022    GLUCOSE 128 06/05/2022    GLUCOSE 132 11/24/2011 PROT 6.2 06/05/2022    LABALBU 2.8 06/05/2022    LABALBU 3.4 01/23/2011    CALCIUM 8.3 06/05/2022    BILITOT <0.2 06/05/2022    ALKPHOS 44 06/05/2022    AST 17 06/05/2022    ALT 26 06/05/2022     Warfarin PT/INR:    Lab Results   Component Value Date    INR 1.2 06/05/2022    INR 1.3 06/04/2022    INR 1.2 06/03/2022    PROTIME 13.1 (H) 06/05/2022    PROTIME 14.0 (H) 06/04/2022    PROTIME 13.1 (H) 06/03/2022       Assessment:    Principal Problem:    Stroke-like symptoms  Active Problems: Moderate protein-calorie malnutrition ([address]    Altered mental status    Septicemia ([address]  Resolved Problems:    * No resolved hospital problems.  *  cholelithiasis    Plan:  Patient's antibiotics are going to be finished today for meningitis  Apparently patient needs new precertification for subacute rehab discharge  Georgiana Flowers MD  12:10 PM  6/5/2022

## 2022-06-05 NOTE — PROGRESS NOTES
Pharmacy Consultation Note  (Antibiotic Dosing and Monitoring)    Initial consult date: 5/27/22  Consulting physician/provider: Dr. Kishan Smith  Drug: Vancomycin  Indication: Empiric/CNS infection    Plan:  · Last vancomycin dose today per ID  · Clinical pharmacy signing off    Finesse Luna PharmD  Pharmacy Resident  P: [id number]  6/5/2022  8:13 AM

## 2022-06-06 LAB
ALBUMIN SERPL-MCNC: 2.9 G/DL (ref 3.5–5.2)
ALP BLD-CCNC: 44 U/L (ref 35–104)
ALT SERPL-CCNC: 22 U/L (ref 0–32)
ANION GAP SERPL CALCULATED.3IONS-SCNC: 14 MMOL/L (ref 7–16)
AST SERPL-CCNC: 16 U/L (ref 0–31)
BILIRUB SERPL-MCNC: <0.2 MG/DL (ref 0–1.2)
BUN BLDV-MCNC: 10 MG/DL (ref 6–23)
CALCIUM SERPL-MCNC: 8.6 MG/DL (ref 8.6–10.2)
CHLORIDE BLD-SCNC: 103 MMOL/L (ref 98–107)
CO2: 24 MMOL/L (ref 22–29)
CREAT SERPL-MCNC: 1 MG/DL (ref 0.5–1)
GFR AFRICAN AMERICAN: >60
GFR NON-AFRICAN AMERICAN: 53 ML/MIN/1.73
GLUCOSE BLD-MCNC: 96 MG/DL (ref 74–99)
HCT VFR BLD CALC: 29.1 % (ref 34–48)
HEMOGLOBIN: 9.1 G/DL (ref 11.5–15.5)
INR BLD: 1.1
MCH RBC QN AUTO: 29.5 PG (ref 26–35)
MCHC RBC AUTO-ENTMCNC: 31.3 % (ref 32–34.5)
MCV RBC AUTO: 94.5 FL (ref 80–99.9)
METER GLUCOSE: 116 MG/DL (ref 74–99)
METER GLUCOSE: 164 MG/DL (ref 74–99)
METER GLUCOSE: 98 MG/DL (ref 74–99)
PDW BLD-RTO: 15.8 FL (ref 11.5–15)
PLATELET # BLD: 347 E9/L (ref 130–450)
PMV BLD AUTO: 9.1 FL (ref 7–12)
POTASSIUM SERPL-SCNC: 4.1 MMOL/L (ref 3.5–5)
PROTHROMBIN TIME: 12.8 SEC (ref 9.3–12.4)
RBC # BLD: 3.08 E12/L (ref 3.5–5.5)
SODIUM BLD-SCNC: 141 MMOL/L (ref 132–146)
TOTAL CK: 56 U/L (ref 20–180)
TOTAL PROTEIN: 6.5 G/DL (ref 6.4–8.3)
WBC # BLD: 8 E9/L (ref 4.5–11.5)

## 2022-06-06 PROCEDURE — 6360000002 HC RX W HCPCS: Performed by: CHIROPRACTOR

## 2022-06-06 PROCEDURE — 85027 COMPLETE CBC AUTOMATED: CPT

## 2022-06-06 PROCEDURE — 82550 ASSAY OF CK (CPK): CPT

## 2022-06-06 PROCEDURE — 6370000000 HC RX 637 (ALT 250 FOR IP)

## 2022-06-06 PROCEDURE — 99222 1ST HOSP IP/OBS MODERATE 55: CPT | Performed by: SURGERY

## 2022-06-06 PROCEDURE — 80053 COMPREHEN METABOLIC PANEL: CPT

## 2022-06-06 PROCEDURE — 82962 GLUCOSE BLOOD TEST: CPT

## 2022-06-06 PROCEDURE — 6370000000 HC RX 637 (ALT 250 FOR IP): Performed by: STUDENT IN AN ORGANIZED HEALTH CARE EDUCATION/TRAINING PROGRAM

## 2022-06-06 PROCEDURE — 85610 PROTHROMBIN TIME: CPT

## 2022-06-06 PROCEDURE — 2060000000 HC ICU INTERMEDIATE R&B

## 2022-06-06 PROCEDURE — 36415 COLL VENOUS BLD VENIPUNCTURE: CPT

## 2022-06-06 PROCEDURE — 6370000000 HC RX 637 (ALT 250 FOR IP): Performed by: INTERNAL MEDICINE

## 2022-06-06 PROCEDURE — 97129 THER IVNTJ 1ST 15 MIN: CPT | Performed by: SPEECH-LANGUAGE PATHOLOGIST

## 2022-06-06 PROCEDURE — 2580000003 HC RX 258: Performed by: CHIROPRACTOR

## 2022-06-06 RX ADMIN — LISINOPRIL 20 MG: 20 TABLET ORAL at 08:28

## 2022-06-06 RX ADMIN — INSULIN LISPRO 1 UNITS: 100 INJECTION, SOLUTION INTRAVENOUS; SUBCUTANEOUS at 12:25

## 2022-06-06 RX ADMIN — APIXABAN 5 MG: 5 TABLET, FILM COATED ORAL at 08:28

## 2022-06-06 RX ADMIN — SODIUM CHLORIDE, PRESERVATIVE FREE 300 UNITS: 5 INJECTION INTRAVENOUS at 08:28

## 2022-06-06 RX ADMIN — SODIUM CHLORIDE, PRESERVATIVE FREE 300 UNITS: 5 INJECTION INTRAVENOUS at 21:31

## 2022-06-06 RX ADMIN — SODIUM CHLORIDE, PRESERVATIVE FREE 10 ML: 5 INJECTION INTRAVENOUS at 08:28

## 2022-06-06 RX ADMIN — ACETAMINOPHEN 325MG 650 MG: 325 TABLET ORAL at 19:32

## 2022-06-06 RX ADMIN — LEVOTHYROXINE SODIUM 50 MCG: 0.05 TABLET ORAL at 05:02

## 2022-06-06 RX ADMIN — PANTOPRAZOLE SODIUM 40 MG: 40 TABLET, DELAYED RELEASE ORAL at 08:28

## 2022-06-06 RX ADMIN — LEVETIRACETAM 500 MG: 500 TABLET, FILM COATED ORAL at 21:30

## 2022-06-06 RX ADMIN — APIXABAN 5 MG: 5 TABLET, FILM COATED ORAL at 21:30

## 2022-06-06 RX ADMIN — Medication 1 TABLET: at 09:42

## 2022-06-06 RX ADMIN — LEVETIRACETAM 500 MG: 500 TABLET, FILM COATED ORAL at 08:27

## 2022-06-06 ASSESSMENT — PAIN SCALES - GENERAL
PAINLEVEL_OUTOF10: 8
PAINLEVEL_OUTOF10: 8
PAINLEVEL_OUTOF10: 0

## 2022-06-06 ASSESSMENT — PAIN DESCRIPTION - LOCATION
LOCATION: HEAD
LOCATION: HEAD

## 2022-06-06 NOTE — CARE COORDINATION
Chart reviewed. Input from general surgery reviewed this am.  No surgical interventions at this time. Will follow-up in the office in one month. AVS updates with appropriate follow-up information. Spoke with Dmitry Pineda this morning re: re-submitting for authorization. Per Dmitry Pineda, they will resubmit this am with [facility] for transition of care. Call placed to the patient's daughter-in-law [facility], [place] to update re: above. Reviewed input from general surgery and follow-up in the office in one month. [facility] [place] expressed understanding and is agreeable to this. Explained follow-up information will be on the patient's discharge instructions. [facility] [place] asking about when patient can transition to rehab. Explained [address] [facility] [address] would need to be obtained again. Concern expressed re: patient losing her bed. Explained the facility still has a bed for the patient and that once pre-cert has been obtained, we will notify the family and arrange transportation. Explained patient will likely transport via [address] from [facility] as she is improving with therapy. [facility] [place] expressed understanding and is in agreement. Will continue to follow.      Mac Tanner RN.  [facility]  [phone]

## 2022-06-06 NOTE — PROGRESS NOTES
[facility] Medicine    Subjective:  Pt seen this am / more alert conversive      Current Facility-Administered Medications:     ALPRAZolam (XANAX) tablet 0.5 mg, 0.5 mg, Oral, TID PRN, Asia Sorto DO    multivitamin 1 tablet, 1 tablet, Oral, Daily, Asia Sorto DO, 1 tablet at 06/06/22 6523    levETIRAcetam (KEPPRA) tablet 500 mg, 500 mg, Oral, BID, Asia Sorto DO, 500 mg at 06/06/22 0827    white petrolatum ointment, , Topical, BID PRN, Ni Olivarez MD    sodium chloride flush 0.9 % injection 5-40 mL, 5-40 mL, IntraVENous, 2 times per day, Ni Olivarez MD, 10 mL at 06/06/22 0828    sodium chloride flush 0.9 % injection 5-40 mL, 5-40 mL, IntraVENous, PRN, Cruz Keys MD    0.9 % sodium chloride infusion, , IntraVENous, PRN, Ni Olivarez MD    heparin flush 100 UNIT/ML injection 300 Units, 3 mL, IntraVENous, 2 times per day, Ni Olivarez MD, 300 Units at 06/06/22 0828    heparin flush 100 UNIT/ML injection 300 Units, 3 mL, IntraCATHeter, PRN, Ni Olivarez MD    lisinopril (PRINIVIL;ZESTRIL) tablet 20 mg, 20 mg, Oral, Daily, Xenia Cabrera MD, 20 mg at 06/06/22 0828    insulin lispro (HUMALOG) injection vial 0-6 Units, 0-6 Units, SubCUTAneous, TID WC, Gaurav Grace MD, 1 Units at 06/06/22 1225    insulin lispro (HUMALOG) injection vial 0-3 Units, 0-3 Units, SubCUTAneous, Nightly, Gaurav Grace MD, 1 Units at 06/04/22 2118    glucose chewable tablet 16 g, 4 tablet, Oral, PRN, Gaurav Grace MD    dextrose bolus 10% 125 mL, 125 mL, IntraVENous, PRN **OR** dextrose bolus 10% 250 mL, 250 mL, IntraVENous, PRN, Gaurav Grace MD    glucagon (rDNA) injection 1 mg, 1 mg, IntraMUSCular, PRN, Gaurav Grace MD    dextrose 5 % solution, 100 mL/hr, IntraVENous, PRN, Gaurav Grace MD    apixaban (ELIQUIS) tablet 5 mg, 5 mg, Oral, BID, Asia Sorto DO, 5 mg at 06/06/22 8647    docusate sodium (COLACE) capsule 100 mg, 100 mg, Oral, BID PRN, Asia Sorto DO, 100 mg at 05/30/22 1011    levothyroxine (SYNTHROID) tablet 50 mcg, 50 mcg, Oral, Daily, Parth Buster Malmer, DO, 50 mcg at 06/06/22 0502    pantoprazole (PROTONIX) tablet 40 mg, 40 mg, Oral, Daily, [place] Buster Malmer, DO, 40 mg at 06/06/22 0828    ondansetron (ZOFRAN-ODT) disintegrating tablet 4 mg, 4 mg, Oral, Q8H PRN **OR** ondansetron (ZOFRAN) injection 4 mg, 4 mg, IntraVENous, Q6H PRN, [facility], [facility], 4 mg at 05/31/22 1205    polyethylene glycol (GLYCOLAX) packet 17 g, 17 g, Oral, Daily PRN, [facility], DO    acetaminophen (TYLENOL) tablet 650 mg, 650 mg, Oral, Q6H PRN, 650 mg at 06/05/22 1246 **OR** acetaminophen (TYLENOL) suppository 650 mg, 650 mg, Rectal, Q6H PRN, [place] [facility], DO    Objective:    BP (!) 126/59   Pulse 67   Temp 98.1 °F (36.7 °C) (Temporal)   Resp 16   Ht 5' 4\" (1.626 m)   Wt 160 lb 15 oz (73 kg)   SpO2 97%   BMI 27.62 kg/m²     Heart:  reg  Lungs:  ctab  Abd: + bs soft nontender  Extrem:  W/o edema    CBC with Differential:    Lab Results   Component Value Date    WBC 8.0 06/06/2022    RBC 3.08 06/06/2022    HGB 9.1 06/06/2022    HCT 29.1 06/06/2022     06/06/2022    MCV 94.5 06/06/2022    MCH 29.5 06/06/2022    MCHC 31.3 06/06/2022    RDW 15.8 06/06/2022    SEGSPCT 67 11/24/2011    LYMPHOPCT 11.0 05/27/2022    MONOPCT 7.9 05/27/2022    BASOPCT 0.1 05/27/2022    MONOSABS 1.02 05/27/2022    LYMPHSABS 1.42 05/27/2022    EOSABS 0.00 05/27/2022    BASOSABS 0.01 05/27/2022     CMP:    Lab Results   Component Value Date     06/06/2022    K 4.1 06/06/2022    K 3.4 05/28/2022     06/06/2022    CO2 24 06/06/2022    BUN 10 06/06/2022    CREATININE 1.0 06/06/2022    GFRAA >60 06/06/2022    LABGLOM 53 06/06/2022    GLUCOSE 96 06/06/2022    GLUCOSE 132 11/24/2011    PROT 6.5 06/06/2022    LABALBU 2.9 06/06/2022    LABALBU 3.4 01/23/2011    CALCIUM 8.6 06/06/2022    BILITOT <0.2 06/06/2022    ALKPHOS 44 06/06/2022    AST 16 06/06/2022    ALT 22 06/06/2022 Warfarin PT/INR:    Lab Results   Component Value Date    INR 1.1 06/06/2022    INR 1.2 06/05/2022    INR 1.3 06/04/2022    PROTIME 12.8 (H) 06/06/2022    PROTIME 13.1 (H) 06/05/2022    PROTIME 14.0 (H) 06/04/2022       Assessment:    Principal Problem:    Stroke-like symptoms  Active Problems: Moderate protein-calorie malnutrition ([address]    Altered mental status    Septicemia ([address]  Resolved Problems:    * No resolved hospital problems.  *      Plan:  Dc to rehab once arrangements made        Pau Ruiz DO  1:37 PM  6/6/2022

## 2022-06-06 NOTE — CONSULTS
GENERAL SURGERY  CONSULT NOTE  6/6/2022    Physician Consulted: Dr. Nuno Darnell  Reason for Consult: weight loss, abdominal pain  Referring Physician: Dr. Denver Dial is a 76 y.o. female who presents for evaluation of altered mental status. General surgery was consulted for abdominal pain and weight loss. Patient reportedly was being worked up for CNS source of infection. IV antibiotics scheduled and today. Patient states that she has had pain after eating for long she can remember. She states that she has a history of GERD that is uncontrolled. She denies any changes in her bowel movements. She denies melena and hematochezia. Reportedly has had a significant weight loss in the past year. Patient attributes this to recent knee surgery and lack of appetite. She states that she thinks she lost 20 pounds in the past month. She is on Eliquis for A. Fib. Patient reports having an EGD/colonoscopy before that demonstrated GERD and normal colonoscopy. There is no record of this in her chart and she cannot remember when her last were. She has a history of perforated appendicitis in 2020. Her father passed away from rectal cancer. 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 She denies tobacco and other drug use. She denies personal and family history of ulcerative colitis, Crohn's disease, and IBD. CTAP on 5/26 was demonstrated cholelithiasis. She has been afebrile and hemodynamically stable. Hgb ranging from 9-10 which appears to be her baseline.     Past Medical History:   Diagnosis Date    Anxiety     Asthma     controlled per pt    Atrial fibrillation ([address] 04/2021    Follows with Dr Kia Chaves- 2-2022    History of supraventricular tachycardia 2021    after surgery    Hypertension     Hypothyroidism     Macular degeneration 01/2022    bilateral    PVC's (premature ventricular contractions)        Past Surgical History:   Procedure Laterality Date    APPENDECTOMY      COLONOSCOPY      LAPAROSCOPIC APPENDECTOMY N/A 2020    APPENDECTOMY LAPAROSCOPIC performed by Margareth Roldan MD at [address] Right 3/15/2022    RIGHT KNEE TOTAL ARTHROPLASTY   ++SKY++    ++PNB++ performed by Reina Cabrera MD at [facility] OR       Medications Prior to Admission:    Prior to Admission medications    Medication Sig Start Date End Date Taking? Authorizing Provider   Multiple Vitamins-Minerals (PRESERVISION AREDS 2) CAPS Take 1 capsule by mouth daily   Yes Historical Provider, MD   levothyroxine (SYNTHROID) 50 MCG tablet Take 100 mcg by mouth once a week   Yes Historical Provider, MD   lisinopril-hydroCHLOROthiazide (PRINZIDE;ZESTORETIC) 20-12.5 MG per tablet Take 2 tablets by mouth daily   Yes Historical Provider, MD   docusate sodium (COLACE) 100 mg capsule Take 100 mg by mouth 2 times daily as needed for Constipation   Yes Historical Provider, MD   apixaban (ELIQUIS) 5 MG TABS tablet Take 1 tablet by mouth 2 times daily 22   Ebonie Fernandez MD   ALPRAZolam (XANAX) 1 MG tablet Take 1 mg by mouth 3 times daily as needed for Anxiety. 3/24/22   Historical Provider, MD   omeprazole (PRILOSEC) 40 MG delayed release capsule Take 40 mg by mouth daily    Historical Provider, MD   levothyroxine (SYNTHROID) 50 MCG tablet Take 50 mcg by mouth Six times weekly     Historical Provider, MD       Allergies   Allergen Reactions    Other      Ragweed and milk after allergy testing  No particular symptoms       Family History   Problem Relation Age of Onset    Other Mother 80        no health problems    Colon Cancer Father 79    Alcohol Abuse Sister         DRUG abuse    Alcohol Abuse Sister         Drug abuse       Social History     Tobacco Use    Smoking status: Former Smoker     Types: Cigarettes     Quit date: 2002     Years since quittin.2    Smokeless tobacco: Never Used   Vaping Use    Vaping Use: Never used   Substance Use Topics    Alcohol use:  No Comment: former alcoholic-last drink 3631    Drug use: Not Currently     Types: Marijuana Veryl Neal)     Comment: last use 2-2022         Review of Systems   General ROS: negative for - chills or fever  Hematological and Lymphatic ROS: negative for - bleeding problems or blood clots  Respiratory ROS: no cough, shortness of breath, or wheezing  Cardiovascular ROS: no chest pain or dyspnea on exertion  Gastrointestinal ROS: no abdominal pain, change in bowel habits, or black or bloody stools  Genito-Urinary ROS: no dysuria, trouble voiding, or hematuria  Musculoskeletal ROS: negative for - muscle pain or muscular weakness      PHYSICAL EXAM:    Vitals:    06/05/22 2317   BP: 132/68   Pulse: 74   Resp: 16   Temp: 97.8 °F (36.6 °C)   SpO2: 98%       General Appearance:  awake, alert, oriented, in no acute distress  Skin:  Skin color, texture, turgor normal. No rashes or lesions. Head/face:  NCAT  Eyes:  No gross abnormalities. Lungs: Normal work of breathing on room air  Heart: Regular rate, normotensive  Abdomen: Soft, nontender, nondistended  Extremities: Extremities warm to touch, pink, with no edema. Female Rectal: No hemorrhoids, normal rectal tone, no masses. Stool assess: PelvicConsistency- soft and brown and Guiac negative    LABS:    CBC  Recent Labs     06/06/22  0520   WBC 8.0   HGB 9.1*   HCT 29.1*        BMP  Recent Labs     06/05/22  0555      K 4.3      CO2 28   BUN 15   CREATININE 1.2*   CALCIUM 8.3*     Liver Function  Recent Labs     06/05/22  0555   BILITOT <0.2   AST 17   ALT 26   ALKPHOS 44   PROT 6.2*   LABALBU 2.8*     No results for input(s): LACTATE in the last 72 hours.   Recent Labs     06/06/22  0520   INR 1.1       RADIOLOGY    CT ABDOMEN PELVIS WO CONTRAST Additional Contrast? None    Result Date: 5/25/2022  EXAMINATION: CT OF THE ABDOMEN AND PELVIS WITHOUT CONTRAST 5/25/2022 4:13 pm TECHNIQUE: CT of the abdomen and pelvis was performed without the administration of intravenous contrast. Multiplanar reformatted images are provided for review. Automated exposure control, iterative reconstruction, and/or weight based adjustment of the mA/kV was utilized to reduce the radiation dose to as low as reasonably achievable. COMPARISON: None. HISTORY: ORDERING SYSTEM PROVIDED HISTORY: r/o stone TECHNOLOGIST PROVIDED HISTORY: Reason for exam:->r/o stone Additional Contrast?->None Decision Support Exception - unselect if not a suspected or confirmed emergency medical condition->Emergency Medical Condition (MA) What reading provider will be dictating this exam?->CRC FINDINGS: Lower Chest: Lung bases demonstrate minimal dependent atelectasis. Heart is upper limits of normal in size. Organs: Liver and spleen are normal in size without focal lesion. There are findings of cholelithiasis without cholecystitis. Stomach has a normal configuration with a minimal sliding-type hiatal hernia. There is a large cyst involving the mid to lower pole region of the right kidney measuring up to 5.6 x 6.1 cm. No evidence of hydronephrosis or hydroureter. Urinary bladder contains a Gonzales catheter. GI/Bowel: No evidence of bowel obstruction. Findings compatible with prior appendectomy. No mesenteric adenopathy or mass. Pelvis: No free fluid in the pelvis. Urinary bladder appears normal in size and contains a Gonzales catheter. Uterus is normal. Peritoneum/Retroperitoneum: No pathologic retroperitoneal adenopathy. No mass. Aorta is nonaneurysmal. Bones/Soft Tissues: Subcutaneous tissues demonstrate soft tissue stranding in the right flank which could represent ecchymosis. No fluid collections. Moderate degenerative changes at T12-L1 and L5-S1. No acute fractures identified. 1.  Cholelithiasis 2. Large right peripelvic renal cortical cyst.  No renal obstruction or calculus. 3.  Evidence of previous appendectomy.      CT Head WO Contrast    Result Date: 5/25/2022  EXAMINATION: CT OF THE HEAD WITHOUT CONTRAST  5/25/2022 4:13 pm TECHNIQUE: CT of the head was performed without the administration of intravenous contrast. Automated exposure control, iterative reconstruction, and/or weight based adjustment of the mA/kV was utilized to reduce the radiation dose to as low as reasonably achievable. COMPARISON: None. HISTORY: ORDERING SYSTEM PROVIDED HISTORY: AMS TECHNOLOGIST PROVIDED HISTORY: Reason for exam:->AMS Has a \"code stroke\" or \"stroke alert\" been called? ->No Decision Support Exception - unselect if not a suspected or confirmed emergency medical condition->Emergency Medical Condition (MA) What reading provider will be dictating this exam?->CRC FINDINGS: BRAIN/VENTRICLES: There is no acute intracranial hemorrhage, mass effect or midline shift. No abnormal extra-axial fluid collection. The gray-white differentiation is maintained without evidence of an acute infarct. There is no evidence of hydrocephalus. ORBITS: The visualized portion of the orbits demonstrate no acute abnormality. SINUSES: The visualized paranasal sinuses and mastoid air cells demonstrate no acute abnormality. SOFT TISSUES/SKULL:  No acute abnormality of the visualized skull. There is right parietooccipital scalp hematoma. No acute intracranial abnormality or hemorrhage. Right parieto-occipital scalp hematoma. CT CHEST WO CONTRAST    Result Date: 5/25/2022  EXAMINATION: CT OF THE CHEST WITHOUT CONTRAST 5/25/2022 4:13 pm TECHNIQUE: CT of the chest was performed without the administration of intravenous contrast. Multiplanar reformatted images are provided for review. Automated exposure control, iterative reconstruction, and/or weight based adjustment of the mA/kV was utilized to reduce the radiation dose to as low as reasonably achievable. COMPARISON: None. HISTORY: ORDERING SYSTEM PROVIDED HISTORY: fall? TECHNOLOGIST PROVIDED HISTORY: Reason for exam:->fall?  Decision Support Exception - unselect if not a suspected or confirmed emergency medical condition->Emergency Medical Condition (MA) What reading provider will be dictating this exam?->CRC FINDINGS: Mediastinum:  No evidence of mediastinal, hilar or axillary lymphadenopathy. Lungs/pleura:  Lungs are clear without focal consolidation or pulmonary edema. Mild dependent atelectasis. Upper Abdomen: There is cholelithiasis without CT evidence of acute cholecystitis. There is a 4 cm cyst in the upper pole of the right kidney. Liver and spleen appear normal in size. Soft Tissues/Bones: Subcutaneous structures are within normal limits. There are degenerative changes in the lower thoracic spine. No acute compression fracture detected. Sternum appears intact. No acute process identified in the chest. Incidental cholelithiasis. CT CERVICAL SPINE WO CONTRAST    Result Date: 5/25/2022  EXAMINATION: CT OF THE CERVICAL SPINE WITHOUT CONTRAST 5/25/2022 4:13 pm TECHNIQUE: CT of the cervical spine was performed without the administration of intravenous contrast. Multiplanar reformatted images are provided for review. Automated exposure control, iterative reconstruction, and/or weight based adjustment of the mA/kV was utilized to reduce the radiation dose to as low as reasonably achievable. COMPARISON: None. HISTORY: ORDERING SYSTEM PROVIDED HISTORY: fall TECHNOLOGIST PROVIDED HISTORY: Reason for exam:->fall Decision Support Exception - unselect if not a suspected or confirmed emergency medical condition->Emergency Medical Condition (MA) What reading provider will be dictating this exam?->CRC FINDINGS: BONES/ALIGNMENT: There is no acute fracture or traumatic malalignment. DEGENERATIVE CHANGES: There are multilevel degenerative changes and facet arthrosis. SOFT TISSUES: There is no prevertebral soft tissue swelling. No acute abnormality of the cervical spine. Multilevel degenerative changes and facet arthrosis.      XR CHEST PORTABLE    Result Date: 5/25/2022  EXAMINATION: ONE XRAY VIEW OF THE CHEST 5/25/2022 1:49 pm COMPARISON: 12 February 2020 HISTORY: ORDERING SYSTEM PROVIDED HISTORY: sepsis, r/o pneumonia TECHNOLOGIST PROVIDED HISTORY: Reason for exam:->sepsis, r/o pneumonia What reading provider will be dictating this exam?->CRC FINDINGS: Borderline cardiomegaly. Normal pulmonary vascularity. Clear lungs. Neither costophrenic angle is blunted. Borderline cardiomegaly. Nothing active. MRI BRAIN WO CONTRAST    Result Date: 5/26/2022  EXAMINATION: MRI OF THE BRAIN WITHOUT CONTRAST  5/26/2022 12:32 pm TECHNIQUE: Multiplanar multisequence MRI of the brain was performed without the administration of intravenous contrast. COMPARISON: None. HISTORY: ORDERING SYSTEM PROVIDED HISTORY: stroke like symptoms TECHNOLOGIST PROVIDED HISTORY: Reason for exam:->stroke like symptoms What reading provider will be dictating this exam?->CRC FINDINGS: INTRACRANIAL STRUCTURES/VENTRICLES: There is no acute infarct. No mass effect or midline shift. No evidence of an acute intracranial hemorrhage. The ventricles and sulci are normal in size and configuration. The sellar/suprasellar regions appear unremarkable. The normal signal voids within the major intracranial vessels appear maintained. Mild chronic microvascular disease is identified within the periventricular white matter. ORBITS: The visualized portion of the orbits demonstrate no acute abnormality. SINUSES: The visualized paranasal sinuses and mastoid air cells demonstrate no acute abnormality. BONES/SOFT TISSUES: The bone marrow signal intensity appears normal. The soft tissues demonstrate no acute abnormality. Mild chronic microvascular disease within the periventricular white matter. No acute ischemia. RECOMMENDATIONS: Unavailable         ASSESSMENT:  76 y.o. female with occasional abdominal pain. Differential diagnosis includes GERD vs symptomatic cholelithiasis.     PLAN:  Recommend EGD and HIDA, but discussed with patient that this can be completed outpatient. [facility] for discharge from surgical perspective. Can f/u with Dr. Shawnee Tomas for further work-up outpatient. Patient findings and plan discussed with Dr. Shawnee Tomas.     Electronically signed by Brianne Samayoa MD on 6/6/22 at 7:33 AM EDT

## 2022-06-06 NOTE — PROGRESS NOTES
Weight: 172 lb (78 kg) (2/22 EMR measured wt)  % Weight Change (Calculated): -7                    BMI Categories: Overweight (BMI 25.0-29. 9)    Estimated Daily Nutrient Needs:  Energy Requirements Based On: Formula  Weight Used for Energy Requirements: Current  Energy (kcal/day): MSJ 1191 x 1.2 SF= 4618-4262  Weight Used for Protein Requirements: Ideal  Protein (g/day): 1.3-1.5 g/kg IBW; 70-85  Method Used for Fluid Requirements: 1 ml/kcal  Fluid (ml/day): 1400-1500ml    Nutrition Diagnosis:   · Moderate malnutrition,In context of acute illness or injury related to cognitive or neurological impairment (AMS living alone) as evidenced by poor intake prior to admission,mild loss of subcutaneous fat,mild muscle loss,weight loss (7% x 3 mon)      Nutrition Interventions:   Food and/or Nutrient Delivery: Continue Current Diet,Continue Oral Nutrition Supplement  Nutrition Education/Counseling: No recommendation at this time  Coordination of Nutrition Care: Continue to monitor while inpatient       Goals:  Previous Goal Met: Goal(s) Achieved  Goals: PO intake 75% or greater (to continue)  Specify Other Goals: further diet progression & tolerance    Nutrition Monitoring and Evaluation:   Behavioral-Environmental Outcomes: None Identified  Food/Nutrient Intake Outcomes: Food and Nutrient Intake,Supplement Intake  Physical Signs/Symptoms Outcomes: Biochemical Data,Chewing or Swallowing,Nutrition Focused Physical Findings,Skin,Weight,GI Status,Fluid Status or Edema,Hemodynamic Status,Nausea or Vomiting    Discharge Planning:    No discharge needs at this time     Magda Vann RD  Contact: ext [phone]

## 2022-06-07 VITALS
HEART RATE: 77 BPM | RESPIRATION RATE: 18 BRPM | OXYGEN SATURATION: 97 % | HEIGHT: 64 IN | BODY MASS INDEX: 27.48 KG/M2 | TEMPERATURE: 98.1 F | WEIGHT: 160.94 LBS | SYSTOLIC BLOOD PRESSURE: 148 MMHG | DIASTOLIC BLOOD PRESSURE: 73 MMHG

## 2022-06-07 LAB
ALBUMIN SERPL-MCNC: 3 G/DL (ref 3.5–5.2)
ALP BLD-CCNC: 50 U/L (ref 35–104)
ALT SERPL-CCNC: 21 U/L (ref 0–32)
ANION GAP SERPL CALCULATED.3IONS-SCNC: 8 MMOL/L (ref 7–16)
AST SERPL-CCNC: 17 U/L (ref 0–31)
BILIRUB SERPL-MCNC: 0.3 MG/DL (ref 0–1.2)
BUN BLDV-MCNC: 11 MG/DL (ref 6–23)
CALCIUM SERPL-MCNC: 8.5 MG/DL (ref 8.6–10.2)
CHLORIDE BLD-SCNC: 104 MMOL/L (ref 98–107)
CO2: 27 MMOL/L (ref 22–29)
CREAT SERPL-MCNC: 1 MG/DL (ref 0.5–1)
GFR AFRICAN AMERICAN: >60
GFR NON-AFRICAN AMERICAN: 53 ML/MIN/1.73
GLUCOSE BLD-MCNC: 104 MG/DL (ref 74–99)
HCT VFR BLD CALC: 29 % (ref 34–48)
HEMOGLOBIN: 8.9 G/DL (ref 11.5–15.5)
INR BLD: 1.2
MCH RBC QN AUTO: 29.1 PG (ref 26–35)
MCHC RBC AUTO-ENTMCNC: 30.7 % (ref 32–34.5)
MCV RBC AUTO: 94.8 FL (ref 80–99.9)
METER GLUCOSE: 111 MG/DL (ref 74–99)
PDW BLD-RTO: 15.8 FL (ref 11.5–15)
PLATELET # BLD: 329 E9/L (ref 130–450)
PMV BLD AUTO: 9.2 FL (ref 7–12)
POTASSIUM SERPL-SCNC: 3.6 MMOL/L (ref 3.5–5)
PROTHROMBIN TIME: 12.9 SEC (ref 9.3–12.4)
RBC # BLD: 3.06 E12/L (ref 3.5–5.5)
SARS-COV-2, NAAT: NOT DETECTED
SODIUM BLD-SCNC: 139 MMOL/L (ref 132–146)
TOTAL CK: 53 U/L (ref 20–180)
TOTAL PROTEIN: 6.6 G/DL (ref 6.4–8.3)
WBC # BLD: 7.6 E9/L (ref 4.5–11.5)

## 2022-06-07 PROCEDURE — 6370000000 HC RX 637 (ALT 250 FOR IP)

## 2022-06-07 PROCEDURE — 85610 PROTHROMBIN TIME: CPT

## 2022-06-07 PROCEDURE — 87635 SARS-COV-2 COVID-19 AMP PRB: CPT

## 2022-06-07 PROCEDURE — 36415 COLL VENOUS BLD VENIPUNCTURE: CPT

## 2022-06-07 PROCEDURE — 97530 THERAPEUTIC ACTIVITIES: CPT

## 2022-06-07 PROCEDURE — 82962 GLUCOSE BLOOD TEST: CPT

## 2022-06-07 PROCEDURE — 82550 ASSAY OF CK (CPK): CPT

## 2022-06-07 PROCEDURE — 85027 COMPLETE CBC AUTOMATED: CPT

## 2022-06-07 PROCEDURE — 97535 SELF CARE MNGMENT TRAINING: CPT

## 2022-06-07 PROCEDURE — 6370000000 HC RX 637 (ALT 250 FOR IP): Performed by: INTERNAL MEDICINE

## 2022-06-07 PROCEDURE — 80053 COMPREHEN METABOLIC PANEL: CPT

## 2022-06-07 PROCEDURE — 2580000003 HC RX 258: Performed by: CHIROPRACTOR

## 2022-06-07 PROCEDURE — 6360000002 HC RX W HCPCS: Performed by: CHIROPRACTOR

## 2022-06-07 RX ORDER — POLYETHYLENE GLYCOL 3350 17 G/17G
17 POWDER, FOR SOLUTION ORAL DAILY PRN
Qty: 527 G | Refills: 1 | COMMUNITY
Start: 2022-06-07 | End: 2022-07-07

## 2022-06-07 RX ORDER — LISINOPRIL 20 MG/1
20 TABLET ORAL DAILY
Qty: 30 TABLET | Refills: 0
Start: 2022-06-08

## 2022-06-07 RX ORDER — ACETAMINOPHEN 325 MG/1
650 TABLET ORAL EVERY 6 HOURS PRN
Qty: 120 TABLET | Refills: 3 | COMMUNITY
Start: 2022-06-07

## 2022-06-07 RX ORDER — LEVETIRACETAM 500 MG/1
500 TABLET ORAL 2 TIMES DAILY
Qty: 60 TABLET | Refills: 0
Start: 2022-06-07 | End: 2022-08-24 | Stop reason: SDUPTHER

## 2022-06-07 RX ORDER — ALPRAZOLAM 0.25 MG/1
0.25 TABLET ORAL 3 TIMES DAILY PRN
Qty: 30 TABLET | Refills: 0
Start: 2022-06-07 | End: 2022-06-17

## 2022-06-07 RX ADMIN — SODIUM CHLORIDE, PRESERVATIVE FREE 300 UNITS: 5 INJECTION INTRAVENOUS at 09:15

## 2022-06-07 RX ADMIN — LEVETIRACETAM 500 MG: 500 TABLET, FILM COATED ORAL at 09:13

## 2022-06-07 RX ADMIN — LISINOPRIL 20 MG: 20 TABLET ORAL at 09:13

## 2022-06-07 RX ADMIN — LEVOTHYROXINE SODIUM 50 MCG: 0.05 TABLET ORAL at 05:31

## 2022-06-07 RX ADMIN — APIXABAN 5 MG: 5 TABLET, FILM COATED ORAL at 09:13

## 2022-06-07 RX ADMIN — ACETAMINOPHEN 325MG 650 MG: 325 TABLET ORAL at 13:50

## 2022-06-07 RX ADMIN — PANTOPRAZOLE SODIUM 40 MG: 40 TABLET, DELAYED RELEASE ORAL at 09:13

## 2022-06-07 RX ADMIN — Medication 1 TABLET: at 09:14

## 2022-06-07 RX ADMIN — SODIUM CHLORIDE, PRESERVATIVE FREE 10 ML: 5 INJECTION INTRAVENOUS at 09:14

## 2022-06-07 ASSESSMENT — PAIN SCALES - GENERAL
PAINLEVEL_OUTOF10: 0
PAINLEVEL_OUTOF10: 0
PAINLEVEL_OUTOF10: 6

## 2022-06-07 NOTE — PROGRESS NOTES
[facility] Medicine    Subjective:  Pt alert conversive jerry diet      Current Facility-Administered Medications:     ALPRAZolam (XANAX) tablet 0.5 mg, 0.5 mg, Oral, TID PRN, Lucy Benders Malmer, DO    multivitamin 1 tablet, 1 tablet, Oral, Daily, Lucy Benders Malmer, DO, 1 tablet at 06/06/22 9338    levETIRAcetam (KEPPRA) tablet 500 mg, 500 mg, Oral, BID, Lucy Benders Malmer, DO, 500 mg at 06/06/22 2130    white petrolatum ointment, , Topical, BID PRN, Andre Albarado MD    sodium chloride flush 0.9 % injection 5-40 mL, 5-40 mL, IntraVENous, 2 times per day, Andre Albarado MD, 10 mL at 06/06/22 0828    sodium chloride flush 0.9 % injection 5-40 mL, 5-40 mL, IntraVENous, PRN, Cruz Keys MD    0.9 % sodium chloride infusion, , IntraVENous, PRN, Andre Albarado MD    heparin flush 100 UNIT/ML injection 300 Units, 3 mL, IntraVENous, 2 times per day, Andre Albarado MD, 300 Units at 06/06/22 2131    heparin flush 100 UNIT/ML injection 300 Units, 3 mL, IntraCATHeter, PRN, Andre Albarado MD    lisinopril (PRINIVIL;ZESTRIL) tablet 20 mg, 20 mg, Oral, Daily, Srinivas Beasley MD, 20 mg at 06/06/22 0828    insulin lispro (HUMALOG) injection vial 0-6 Units, 0-6 Units, SubCUTAneous, TID WC, Yarely Humphries MD, 1 Units at 06/06/22 1225    insulin lispro (HUMALOG) injection vial 0-3 Units, 0-3 Units, SubCUTAneous, Nightly, Yarely Humphries MD, 1 Units at 06/04/22 2118    glucose chewable tablet 16 g, 4 tablet, Oral, PRN, Yarely Humphries MD    dextrose bolus 10% 125 mL, 125 mL, IntraVENous, PRN **OR** dextrose bolus 10% 250 mL, 250 mL, IntraVENous, PRN, Yarely Humphries MD    glucagon (rDNA) injection 1 mg, 1 mg, IntraMUSCular, PRN, Yarely Humphries MD    dextrose 5 % solution, 100 mL/hr, IntraVENous, PRN, Yarely Humphries MD    apixaban (ELIQUIS) tablet 5 mg, 5 mg, Oral, BID, Lucy Christine Malmer, DO, 5 mg at 06/06/22 2130    docusate sodium (COLACE) capsule 100 mg, 100 mg, Oral, BID PRN, Lucy Sorto DO, 100 mg at 05/30/22 1011    levothyroxine (SYNTHROID) tablet 50 mcg, 50 mcg, Oral, Daily, [facility] Carmine Sorto, DO, 50 mcg at 06/07/22 0531    pantoprazole (PROTONIX) tablet 40 mg, 40 mg, Oral, Daily, [facility] Carmine Sorto, DO, 40 mg at 06/06/22 0828    ondansetron (ZOFRAN-ODT) disintegrating tablet 4 mg, 4 mg, Oral, Q8H PRN **OR** ondansetron (ZOFRAN) injection 4 mg, 4 mg, IntraVENous, Q6H PRN, sarah Sorto, DO, 4 mg at 05/31/22 1205    polyethylene glycol (GLYCOLAX) packet 17 g, 17 g, Oral, Daily PRN, [facility] Kharikelsie, DO    acetaminophen (TYLENOL) tablet 650 mg, 650 mg, Oral, Q6H PRN, 650 mg at 06/06/22 1932 **OR** acetaminophen (TYLENOL) suppository 650 mg, 650 mg, Rectal, Q6H PRN, sarah Sorto, DO    Objective:    /83   Pulse 95   Temp 97.9 °F (36.6 °C) (Temporal)   Resp 18   Ht 5' 4\" (1.626 m)   Wt 160 lb 15 oz (73 kg)   SpO2 94%   BMI 27.62 kg/m²     Heart:  reg  Lungs:  ctab  Abd: + bs soft nontender  Extrem:  W/o edema    CBC with Differential:    Lab Results   Component Value Date    WBC 7.6 06/07/2022    RBC 3.06 06/07/2022    HGB 8.9 06/07/2022    HCT 29.0 06/07/2022     06/07/2022    MCV 94.8 06/07/2022    MCH 29.1 06/07/2022    MCHC 30.7 06/07/2022    RDW 15.8 06/07/2022    SEGSPCT 67 11/24/2011    LYMPHOPCT 11.0 05/27/2022    MONOPCT 7.9 05/27/2022    BASOPCT 0.1 05/27/2022    MONOSABS 1.02 05/27/2022    LYMPHSABS 1.42 05/27/2022    EOSABS 0.00 05/27/2022    BASOSABS 0.01 05/27/2022     CMP:    Lab Results   Component Value Date     06/07/2022    K 3.6 06/07/2022    K 3.4 05/28/2022     06/07/2022    CO2 27 06/07/2022    BUN 11 06/07/2022    CREATININE 1.0 06/07/2022    GFRAA >60 06/07/2022    LABGLOM 53 06/07/2022    GLUCOSE 104 06/07/2022    GLUCOSE 132 11/24/2011    PROT 6.6 06/07/2022    LABALBU 3.0 06/07/2022    LABALBU 3.4 01/23/2011    CALCIUM 8.5 06/07/2022    BILITOT 0.3 06/07/2022    ALKPHOS 50 06/07/2022    AST 17 06/07/2022    ALT 21 06/07/2022     Warfarin PT/INR: Lab Results   Component Value Date    INR 1.2 06/07/2022    INR 1.1 06/06/2022    INR 1.2 06/05/2022    PROTIME 12.9 (H) 06/07/2022    PROTIME 12.8 (H) 06/06/2022    PROTIME 13.1 (H) 06/05/2022       Assessment:    Principal Problem:    Stroke-like symptoms  Active Problems: Moderate protein-calorie malnutrition ([address]    Altered mental status    Septicemia ([address]  Resolved Problems:    * No resolved hospital problems.  *      Plan:  Dc to rehab        Jay Elkins DO  7:35 AM  6/7/2022

## 2022-06-07 NOTE — DISCHARGE INSTR - COC
Continuity of Care Form    Patient Name: Leah Gama   :  1945  MRN:  [id number]    Admit date:  2022  Discharge date:  2022    Code Status Order: Full Code   Advance Directives:      Admitting Physician:  Fan Flowers DO  PCP: Gina Goodson MD    Discharging Nurse: Nhan Ramírez [facility] Unit/Room#: 8109/2572-N  Discharging Unit Phone Number: [phone]    Emergency Contact:   Extended Emergency Contact Information  Primary Emergency Contact: [address] Phone: [phone]  Relation: Child  Secondary Emergency Contact: [address] [facility] Phone: [phone]  Mobile Phone: [phone]  Relation: Child    Past Surgical History:  Past Surgical History:   Procedure Laterality Date    APPENDECTOMY      COLONOSCOPY      LAPAROSCOPIC APPENDECTOMY N/A 2020    APPENDECTOMY LAPAROSCOPIC performed by Bridgett Redman MD at [address] [place] [address] Rd Right 3/15/2022    RIGHT KNEE TOTAL ARTHROPLASTY   ++SKY++    ++PNB++ performed by Aditya Jin MD at [facility] OR       Immunization History:   Immunization History   Administered Date(s) Administered    COVID-19, Georgie Donis, Primary or Immunocompromised, PF, 100mcg/0.5mL 2021    COVID-19, Pfizer Purple top, DILUTE for use, 12+ yrs, 30mcg/0.3mL dose 2021, 2021    Influenza Virus Vaccine 2019    Influenza, High-dose, Quadv, 65 yrs +, IM (Fluzone) 2021    Influenza, Quadv, adjuvanted, 65 yrs +, IM, PF (Fluad) 2021    Pneumococcal Conjugate 13-valent ([id number]) 2015    Pneumococcal Polysaccharide ([id number]) 10/01/2013    Tdap (Boostrix, Adacel) 10/24/2017       Active Problems:  Patient Active Problem List   Diagnosis Code    Atrial fibrillation (HCC) I48.91    Acute medial meniscal tear S83.249A    Anxiety F41.9    Arthritis M19.90    Asthma J45.909    Cholelithiasis K80.20    Depressive disorder F32.9    Diverticular disease K57.90 Fracture of tibia S82.209A    Gastroesophageal reflux disease K21.9    Hiatal hernia K44.9    Hypercholesterolemia E78.00    Hypertension I10    Hypothyroidism E03.9    Insomnia G47.00    Lipoma D17.9    Obesity E66.9    Osteopenia M85.80    Overweight with body mass index (BMI) 25.0-29.9 E66.3    Restless legs syndrome G25.81    Rupture of appendix K35.32    Secondary non-renal hyperparathyroidism (HCC) E21.1    Stage 3 chronic kidney disease (HCC) N18.30    Steatosis of liver K76.0    Tubular adenoma of colon D12.6    Acute encephalopathy G93.40    Stroke-like symptoms R29.90    Moderate protein-calorie malnutrition (HCC) E44.0    Altered mental status R41.82    Septicemia (HCC) A41.9       Isolation/Infection:   Isolation            No Isolation          Patient Infection Status       Infection Onset Added Last Indicated Last Indicated By Review Planned Expiration Resolved Resolved By    None active    Resolved    COVID-19 (Rule Out) 05/27/22 05/27/22 05/27/22 Respiratory Panel, Molecular, with COVID-19 (Restricted: peds pts or suitable admitted adults) (Ordered)   05/27/22 Rule-Out Test Resulted    COVID-19 (Rule Out) 05/25/22 05/25/22 05/25/22 COVID-19, Rapid (Ordered)   05/25/22 Rule-Out Test Resulted            Nurse Assessment:  Last Vital Signs: /83   Pulse 95   Temp 97.9 °F (36.6 °C) (Temporal)   Resp 18   Ht 5' 4\" (1.626 m)   Wt 160 lb 15 oz (73 kg)   SpO2 94%   BMI 27.62 kg/m²     Last documented pain score (0-10 scale): Pain Level: 0  Last Weight:   Wt Readings from Last 1 Encounters:   05/26/22 160 lb 15 oz (73 kg)     Mental Status:  oriented, alert, logical, thought processes intact, and able to concentrate and follow conversation    IV Access:  - None  - picc removed rue    Nursing Mobility/ADLs:  Walking   Independent  Transfer  Independent  Bathing  Assisted  Dressing  Independent  Toileting  Independent  Feeding  Independent  Med Admin  Assisted  Med Delivery   whole    Wound Care Documentation and Therapy:  Puncture 06/01/22 Back (Active)   Wound Assessment Other (Comment) 06/01/22 1600   Closure Other (Comment) 06/01/22 1600   Culture Taken No 06/01/22 1600   Drainage Amount None 06/01/22 1600   Drainage Description Sanguinous 06/01/22 1315   Odor None 06/01/22 1600   Dressing/Treatment Other (comment) 06/01/22 1315   Dressing Status Clean, dry & intact 06/01/22 1600   Dressing/Treatment Other (comment) 06/01/22 1600   Number of days: 5       Incision 03/15/22 Knee Anterior;Right (Active)   Number of days: 83        Elimination:  Continence: Bowel: Yes  Bladder: Yes  Urinary Catheter: None   Colostomy/Ileostomy/Ileal Conduit: No       Date of Last BM: 6/7/2022    Intake/Output Summary (Last 24 hours) at 6/7/2022 0837  Last data filed at 6/7/2022 0335  Gross per 24 hour   Intake 680 ml   Output --   Net 680 ml     I/O last 3 completed shifts: In: 65 [P.O.:680]  Out: -     Safety Concerns:     None    Impairments/Disabilities:      None    Nutrition Therapy:  Current Nutrition Therapy:   - Oral Diet:  General    Routes of Feeding: Oral  Liquids: Thin Liquids  Daily Fluid Restriction: no  Last Modified Barium Swallow with Video (Video Swallowing Test): not done    Treatments at the Time of Hospital Discharge:   Respiratory Treatments: ***  Oxygen Therapy:  is not on home oxygen therapy.   Ventilator:    - No ventilator support    Rehab Therapies: Physical Therapy and Occupational Therapy  Weight Bearing Status/Restrictions: No weight bearing restrictions  Other Medical Equipment (for information only, NOT a DME order):  {EQUIPMENT:533844435}  Other Treatments: ***    Patient's personal belongings (please select all that are sent with patient):  Glasses, Dentures upper and lower    RN SIGNATURE:  Electronically signed by Veronica Pressley RN on 6/7/22 at 11:54 AM EDT    CASE MANAGEMENT/SOCIAL WORK SECTION    Inpatient Status Date: ***    Readmission Risk Assessment Score:  Readmission Risk Risk of Unplanned Readmission:  21           Discharging to Facility/ Agency   Name:  [facility]ada Castillo   Address:  [address], [address] [place] [address]  Phone:   [phone]  Fax:   [phone]    Dialysis Facility (if applicable)   Name:  Address:  Dialysis Schedule:  Phone:  Fax:    / signature: Electronically signed by Abdiel Cuello RN on 6/7/22 at 11:08 AM EDT    PHYSICIAN SECTION    Prognosis: {Prognosis:0002825198}    Condition at Discharge: [facility] Patient Condition:[id number]}    Rehab Potential (if transferring to Rehab): {Prognosis:0574892411}    Recommended Labs or Other Treatments After Discharge: ***    Physician Certification: I certify the above information and transfer of Guero Cedillo  is necessary for the continuing treatment of the diagnosis listed and that she requires [place] for less 30 days.      Update Admission H&P: {CHP DME Changes in STKII:455290357}    PHYSICIAN SIGNATURE:  {Esignature:856940579}

## 2022-06-07 NOTE — CARE COORDINATION
Call received from [place], liaison with [employer] and authorization received for the patient to transition to rehab at their facility. Call placed to Dr Annei Sam for discharge instructions, JESSE, and med rec to be completed through the answering service. Brittanie Delcid can provide transportation at 15:30 pick-up. Transfer paperwork and envelope in the soft chart. Charge nurse [facility] notified. Call placed to Nidhi Hatch, daughter-in-law and notified of above. They are in agreement with transition of care plan. Patient also notified at bedside. JESSE completed. HENS completed with discharge orders.     Cam Hua RN.  [facility]  [phone]

## 2022-06-07 NOTE — PROGRESS NOTES
Speech Language Pathology      NAME:  Lakshmi Pires  :  1945  DATE: 2022  ROOM:  8516/8516-A    Pt seen for ongoing cog/ling intervention. Pt seated at side of bed, combing hair. Alert, oriented and pleasant. No noted aphasias, confabulations, or tangents noted today. Pt much improved. Able to recall hospital course upon recount from staff and family. Will DC from SLP services at this time. Please re-consult as needed and appropriate. Thank you.       Mayelin Chadwick M.S., [address] [facility] Pathologist  [id number]  2022        Acute encephalopathy [G93.40]  Altered mental status, unspecified altered mental status type [R41.82]

## 2022-06-07 NOTE — PROGRESS NOTES
Occupational Therapy  OT BEDSIDE TREATMENT NOTE   [address]  [address] [facility], [address] [facility]  Patient Name: Lakshmi Pires  MRN: [id number]  : 1945  Room: [address] [place] [address]-A     Per OT Eval:    Evaluating [address], [place] [address]4361     Referring Provider: Jordan De La Vega DO   Specific Provider Orders/Date: OT eval and treat 22     Diagnosis: Acute encephalopathy [G93.40]  Altered mental status, unspecified altered mental status type [R41.82]   Pt admitted to hospital on 22 after being found down by neighbor. AMS  RRT on  for seizures     Surgery / Procedure: lumbar puncture      Pertinent Medical History:  has a past medical history of Anxiety, Asthma, Atrial fibrillation ([address] History of supraventricular tachycardia, Hypertension, Hypothyroidism, Macular degeneration, and PVC's (premature ventricular contractions).      Precautions:  Fall Risk, cognition (see below), bed alarm, seizures, R knee replaced in 2022     Assessment of current deficits   [x]? Functional mobility         [x]? ADLs           [x]? Strength                  [x]? Cognition    [x]? Functional transfers       [x]? IADLs         [x]? Safety Awareness   [x]? Endurance    []? Fine Coordination                      [x]? Balance      []? Vision/perception   []? Sensation      []? Gross Motor Coordination          []? ROM           []?  Delirium                   []? Motor Control      OT PLAN OF CARE   OT POC based on physician orders, patient diagnosis and results of clinical assessment     Frequency/Duration 1-3 days/wk for 2 weeks PRN   Specific OT Treatment Interventions to include:   * Instruction/training on adapted ADL techniques and AE recommendations to increase functional independence within precautions       * Training on energy conservation strategies, correct breathing pattern and techniques to improve independence/tolerance for self-care routine  * Functional transfer/mobility training/DME recommendations for increased independence, safety, and fall prevention  * Patient/Family education to increase follow through with safety techniques and functional independence  * Recommendation of environmental modifications for increased safety with functional transfers/mobility and ADLs  * Cognitive retraining/development of therapeutic activities to improve problem solving, judgement, memory, and attention for increased safety/participation in ADL/IADL tasks  * Therapeutic exercise to improve motor endurance, ROM, and functional strength for ADLs/functional transfers  * Therapeutic activities to facilitate/challenge dynamic balance, stand tolerance for increased safety and independence with ADLs  * Therapeutic activities to facilitate gross/fine motor skills for increased independence with ADLs     Recommended Adaptive Equipment: TBD      Home Living: Pt lives alone in a mobile home. 4 LAVELL, 1 handrail   Bathroom setup: unable to obtain d/t cognition    Equipment owned: walker     Prior Level of Function: independent with ADLs; ambulated w/o AD   Above PLOF per chart - unable to obtain from pt d/t cognition     Pain Level: Pt c/o no pain this session      Cognition: A&O: x 4, pleasant & cooperative, motivated.  Follows 2 step directions, noting pt is impulsive, moves quickly            Memory: fair+ able to recall 3/3 words in 3, 5 & 10 minutes           Sequencing: fair           Problem solving: fair-           Judgement/safety: fair-             Functional Assessment:  AM-PAC Daily Activity Raw Score: 19/24    Initial Eval Status  Date: 6/2/22 Treatment Status  Date:  6/7/22 STGs = LTGs  Time frame: 10-14 days   Feeding Stand by Assist   Breakfast tray  Mod Indep  Modified [place]    Grooming Minimal Assist   Seated EOB to wash hands and face  Standing to apply lotion on R hand SBA  Standing at sink  Modified [place]    UB Dressing Minimal Assist   Donned/doffed gown SBA  Doff/annette gown, standing, assist to tie gown in back  Supervision    LB Dressing Moderate Assist   B socks Min A  Cues to sit down to thread legs into underwear & pants then stand to pull over hips for safety  Stand by Assist    Bathing Moderate Assist UB: Min A  Washing her back  LB: SBA  Cues to sit down to wash legs & feet for safety, otherwise pt was standing to complete sponge bathing tasks  Stand by Assist    Toileting Moderate Assist  SBA  Simulated, declined need Stand by Assist    Bed Mobility  Supine to sit: Minimal Assist   Sit to supine: Minimal Assist  Supervision   Supine < > sit  Supine to sit: Supervision   Sit to supine: Supervision    Functional Transfers Minimal Assist  SBA  Cues for hand placement & safety  Supervision    Functional Mobility Moderate Assist w/o AD  Min A w/ w/w  Light mobility in room to prep for bathroom mobility CGA   no AD, swaying noted household distances Supervision    Balance Sitting:     Static:  SBA    Dynamic:Min A  Standing: Min A w/w/w  Mod A w/o AD     Sitting: SBA  Standing: SBA/CGA  No AD     Activity Tolerance Fair  Fair+ Good   Visual/  Perceptual Glasses: yes                       Education:  Pt was educated through out treatment regarding proper technique & safety with bed mobility, functional transfers & mobility and ADL compensatory strategies to ease tasks, improve safety & prevent falls to return home safely. Comments: Upon arrival pt was in bed & agreeable for therapy. At end of session pt declined chair, requesting to return to bed, all lines and tubes intact, call light within reach. · Pt has made Good progress towards set goals.    · Continue with current plan of care      Treatment Time In: 9:30          Treatment Time Out: 10:00          Treatment Charges: Mins Units   Ther Ex  11111     Manual Therapy 82693     Thera Activities 09025 10 1   ADL/Home Mgt 91435 20 1   Neuro Re-ed 08191     Group Therapy Orthotic manage/training  15874     Non-Billable Time     Total Timed Treatment 30 2       Eloisa GUIDO  [address] [place] [address] Drive, [address] [facility]

## 2022-06-07 NOTE — PLAN OF CARE
Problem: Discharge Planning  Goal: Discharge to home or other facility with appropriate resources  6/7/2022 1530 by Kelli Case RN  Outcome: Completed  6/7/2022 0503 by Mendel Mcmullen RN  Outcome: Progressing     Problem: Pain  Goal: Verbalizes/displays adequate comfort level or baseline comfort level  Outcome: Completed     Problem: Skin/Tissue Integrity  Goal: Absence of new skin breakdown  Description: 1. Monitor for areas of redness and/or skin breakdown  2. Assess vascular access sites hourly  3. Every 4-6 hours minimum:  Change oxygen saturation probe site  4. Every 4-6 hours:  If on nasal continuous positive airway pressure, respiratory therapy assess nares and determine need for appliance change or resting period.   6/7/2022 1530 by Kelli Case RN  Outcome: Completed  6/7/2022 0503 by Mendel Mcmullen RN  Outcome: Progressing     Problem: Safety - Adult  Goal: Free from fall injury  6/7/2022 1530 by Kelli Case RN  Outcome: Completed  6/7/2022 0503 by Mendel Mcmullen RN  Outcome: Progressing     Problem: Nutrition Deficit:  Goal: Optimize nutritional status  Outcome: Completed     Problem: ABCDS Injury Assessment  Goal: Absence of physical injury  Outcome: Completed

## 2022-06-07 NOTE — PROGRESS NOTES
Physician Progress Note      PATIENT:               Lisa Amor  CSN #:                  [id number]  :                       1945  ADMIT DATE:       2022 1:41 PM  [address] [place] DATE:        2022 3:30 PM  RESPONDING  PROVIDER #:        Dai Gutierrez DO          QUERY TEXT:    Pt admitted with AMS, UTI. Noted documentation by neurology on  \"AMS   possibly related to possible metabolic derangement due to sepsis. \"  If   possible, please document in progress notes and discharge summary:    The medical record reflects the following:  Risk Factors: UTI  Clinical Indicators: AMS.  labs: WBC 14.7, lactic acid 1.9, procal 20.14,   Tmax 101.9 axillary, , resp 20. Treatment: IVF, IV Rocephin, IV Vancomycin, ID consult    KIM Nunes, RN, [facility]  Clinical   [phone]  Options provided:  -- Sepsis confirmed present on admission  -- Sepsis confirmed not present on admission  -- Sepsis ruled out  -- Other - I will add my own diagnosis  -- Disagree - Not applicable / Not valid  -- Disagree - Clinically unable to determine / Unknown  -- Refer to Clinical Documentation Reviewer    PROVIDER RESPONSE TEXT:    The diagnosis of sepsis was confirmed as present on admission. Query created by: Jillian Huffman on 2022 3:53 PM      QUERY TEXT:    Pt admitted with UTI and has encephalopathy documented. If possible, please   document in progress notes and discharge summary further specificity regarding   the type of encephalopathy:    The medical record reflects the following:  Risk Factors: UTI  Clinical Indicators: Nursing assessment: disoriented x4, poor judgement, and   safety awareness, impulsive.   Treatment: IV Ativan, IVF, neuro consult, ID consult    KIM Nunes, RN, [facility]  Clinical   [phone]  Options provided:  -- Metabolic encephalopathy  -- Other - I will add my own diagnosis  -- Disagree - Not applicable / Not valid  -- Disagree - Clinically unable to determine / Unknown  -- Refer to Clinical Documentation Reviewer    PROVIDER RESPONSE TEXT:    This patient has metabolic encephalopathy.     Query created by: Joann Lee on 5/27/2022 3:59 PM      Electronically signed by:  Ariana Yeager DO 6/7/2022 7:37 PM

## 2022-06-09 LAB
SARS-COV-2: NOT DETECTED
SOURCE: NORMAL

## 2022-06-10 LAB
ALBUMIN SERPL-MCNC: 3.1 G/DL (ref 3.5–5.2)
ALP BLD-CCNC: 45 U/L (ref 35–104)
ALT SERPL-CCNC: 19 U/L (ref 0–32)
ANION GAP SERPL CALCULATED.3IONS-SCNC: 13 MMOL/L (ref 7–16)
AST SERPL-CCNC: 17 U/L (ref 0–31)
BASOPHILS ABSOLUTE: 0.08 E9/L (ref 0–0.2)
BASOPHILS RELATIVE PERCENT: 1 % (ref 0–2)
BILIRUB SERPL-MCNC: 0.2 MG/DL (ref 0–1.2)
BUN BLDV-MCNC: 10 MG/DL (ref 6–23)
CALCIUM SERPL-MCNC: 8.5 MG/DL (ref 8.6–10.2)
CHLORIDE BLD-SCNC: 105 MMOL/L (ref 98–107)
CHOLESTEROL, TOTAL: 160 MG/DL (ref 0–199)
CO2: 22 MMOL/L (ref 22–29)
CREAT SERPL-MCNC: 1 MG/DL (ref 0.5–1)
EOSINOPHILS ABSOLUTE: 0.25 E9/L (ref 0.05–0.5)
EOSINOPHILS RELATIVE PERCENT: 3.1 % (ref 0–6)
FOLATE: 11.5 NG/ML (ref 4.8–24.2)
GFR AFRICAN AMERICAN: >60
GFR NON-AFRICAN AMERICAN: 53 ML/MIN/1.73
GLUCOSE BLD-MCNC: 89 MG/DL (ref 74–99)
HCT VFR BLD CALC: 28.6 % (ref 34–48)
HDLC SERPL-MCNC: 41 MG/DL
HEMOGLOBIN: 8.7 G/DL (ref 11.5–15.5)
IMMATURE GRANULOCYTES #: 0.05 E9/L
IMMATURE GRANULOCYTES %: 0.6 % (ref 0–5)
LDL CHOLESTEROL CALCULATED: 96 MG/DL (ref 0–99)
LYMPHOCYTES ABSOLUTE: 1.71 E9/L (ref 1.5–4)
LYMPHOCYTES RELATIVE PERCENT: 20.9 % (ref 20–42)
MCH RBC QN AUTO: 28.8 PG (ref 26–35)
MCHC RBC AUTO-ENTMCNC: 30.4 % (ref 32–34.5)
MCV RBC AUTO: 94.7 FL (ref 80–99.9)
MONOCYTES ABSOLUTE: 0.61 E9/L (ref 0.1–0.95)
MONOCYTES RELATIVE PERCENT: 7.4 % (ref 2–12)
NEUTROPHILS ABSOLUTE: 5.49 E9/L (ref 1.8–7.3)
NEUTROPHILS RELATIVE PERCENT: 67 % (ref 43–80)
PDW BLD-RTO: 15.7 FL (ref 11.5–15)
PLATELET # BLD: 314 E9/L (ref 130–450)
PMV BLD AUTO: 9.3 FL (ref 7–12)
POTASSIUM SERPL-SCNC: 3.9 MMOL/L (ref 3.5–5)
RBC # BLD: 3.02 E12/L (ref 3.5–5.5)
SODIUM BLD-SCNC: 140 MMOL/L (ref 132–146)
T3 TOTAL: 87.43 NG/DL (ref 80–200)
T4 TOTAL: 6.4 MCG/DL (ref 4.5–11.7)
TOTAL PROTEIN: 6.4 G/DL (ref 6.4–8.3)
TRIGL SERPL-MCNC: 114 MG/DL (ref 0–149)
TSH SERPL DL<=0.05 MIU/L-ACNC: 3.48 UIU/ML (ref 0.27–4.2)
VITAMIN B-12: 258 PG/ML (ref 211–946)
VITAMIN D 25-HYDROXY: 27 NG/ML (ref 30–100)
VLDLC SERPL CALC-MCNC: 23 MG/DL
WBC # BLD: 8.2 E9/L (ref 4.5–11.5)

## 2022-06-11 LAB
SARS-COV-2: NOT DETECTED
SOURCE: NORMAL

## 2022-06-12 LAB — KEPPRA: 16 UG/ML (ref 10–40)

## 2022-06-14 LAB
SARS-COV-2: NOT DETECTED
SOURCE: NORMAL

## 2022-07-08 NOTE — DISCHARGE SUMMARY
[address]                  [address] [facility],  [facility]                               DISCHARGE SUMMARY    PATIENT NAME: Lee Deng              :        1945  MED REC NO:   [id number]                            ROOM:       8516  ACCOUNT NO:   [de-identified]                           ADMIT DATE: 2022  PROVIDER:     Gopi Love DO                  DISCHARGE DATE:  2022    DISCHARGE DIAGNOSES:  1. Acute metabolic encephalopathy. 2.  Meningitis. 3.  Hypokalemia. 4.  Partially treated UTI. 5.  Acute seizure. 6.  Hypothyroid. 7.  Hypertension. 8.  Chronic Eliquis therapy. 9.  Paroxysmal atrial fibrillation. HOSPITAL COURSE:  The patient is a 27-year-old  female who was  found down at home with altered mental status. She was seen in the  emergency room, admitted for further evaluation and treatment. The  patient had been started on antibiotics prior to admission for presumed  UTI. She is admitted to the hospital.  She was seen by Neurology,  Infectious Disease, Critical Care. She underwent lumbar puncture. She  was felt to have meningitis which was treated. Her mental status  improved slowly during the hospital stay and she was eventually  discharged to rehab in stable condition on 2022. DISCHARGE MEDICATIONS:  As per discharge med rec which include:  1. Tylenol p.r.n.  2.  Keppra. 3.  Lisinopril. 4.  GlycoLax p.r.n.  5.  Levothyroxine. 6.  Eliquis. 7.  Colace p.r.n.  8.  Omeprazole. 9.  PreserVision vitamin.   10.  Alprazolam.        Danya Trent DO    D: 2022 12:17:36       T: 2022 12:20:02     [id number]  Job#: [id number]     Doc#: [id number]    CC:

## 2022-08-24 ENCOUNTER — OFFICE VISIT (OUTPATIENT)
Dept: NEUROLOGY | Age: 77
End: 2022-08-24
Payer: MEDICARE

## 2022-08-24 VITALS
WEIGHT: 159.5 LBS | BODY MASS INDEX: 27.23 KG/M2 | HEIGHT: 64 IN | TEMPERATURE: 98 F | HEART RATE: 68 BPM | SYSTOLIC BLOOD PRESSURE: 148 MMHG | OXYGEN SATURATION: 97 % | DIASTOLIC BLOOD PRESSURE: 72 MMHG

## 2022-08-24 DIAGNOSIS — R41.841 COGNITIVE COMMUNICATION DEFICIT: ICD-10-CM

## 2022-08-24 DIAGNOSIS — G93.40 ACUTE ENCEPHALOPATHY: Primary | ICD-10-CM

## 2022-08-24 PROCEDURE — G8400 PT W/DXA NO RESULTS DOC: HCPCS | Performed by: CLINICAL NURSE SPECIALIST

## 2022-08-24 PROCEDURE — 1123F ACP DISCUSS/DSCN MKR DOCD: CPT | Performed by: CLINICAL NURSE SPECIALIST

## 2022-08-24 PROCEDURE — 99215 OFFICE O/P EST HI 40 MIN: CPT | Performed by: CLINICAL NURSE SPECIALIST

## 2022-08-24 PROCEDURE — G8428 CUR MEDS NOT DOCUMENT: HCPCS | Performed by: CLINICAL NURSE SPECIALIST

## 2022-08-24 PROCEDURE — 1036F TOBACCO NON-USER: CPT | Performed by: CLINICAL NURSE SPECIALIST

## 2022-08-24 PROCEDURE — 1090F PRES/ABSN URINE INCON ASSESS: CPT | Performed by: CLINICAL NURSE SPECIALIST

## 2022-08-24 PROCEDURE — G8417 CALC BMI ABV UP PARAM F/U: HCPCS | Performed by: CLINICAL NURSE SPECIALIST

## 2022-08-24 RX ORDER — LEVETIRACETAM 500 MG/1
500 TABLET ORAL 2 TIMES DAILY
Qty: 60 TABLET | Refills: 5 | Status: SHIPPED | OUTPATIENT
Start: 2022-08-24

## 2022-08-24 RX ORDER — DILTIAZEM HYDROCHLORIDE 180 MG/1
CAPSULE, COATED, EXTENDED RELEASE ORAL
COMMUNITY
Start: 2022-08-19

## 2022-08-24 NOTE — PROGRESS NOTES
Jaron Gilliam is a 76 y.o. right handed female     Neurology was following her in the hospital end of May 2022 for alteration in mentation. Prior to admission patient was being treated for a UTI and told her son that she had felt that the antibiotic was messing with her brain. He began noticing that she was more confused at home. She reportedly has a history of alcohol abuse but has not had issues for many years. She presented to the ED on May 25 for increasing confusion. She was found by the neighbor sitting on the couch in her underwear with shards of glass on the chair. On May 26 she was found to have a flight of ideas not following commands or answering questions but her speech was clear and she is appeared to be having a conversation with herself. On May 27 she had 2 witnessed generalized tonic-clonic seizures and RRT was called and she was transferred to the ICU. Sophronia Galina was started and she has been seizure free since     MRI of her brain was obtained which was unrevealing for acute infarction. LP was obtained - 4 WBCs-no growth in culture or gram stain thus far   however she was treated for 7 days prior to LP being obtained    Her neuro issues have markedly improved but still continues to note short term memory deficits. No SLP on discharge.      Not driving    Discussed her need for Keppra moving forward but will most likely continue for 6 months to a year and then consider discontinuation     Here with her friend -- both appear to be excellent historians    No chest pain or palpitations  No SOB  No vertigo, lightheadedness or loss of consciousness  No falls, tripping or stumbling  No incontinence of bowels or bladder  No itching or bruising appreciated  No numbness, tingling or focal arm/leg weakness    ROS otherwise negative       Allergies as of 08/24/2022 - Fully Reviewed 06/07/2022   Allergen Reaction Noted    Other  06/24/2020     Objective:     BP (!) 148/72 (Site: Right Upper Arm)   Pulse 68   Temp 98 °F (36.7 °C)   Ht 5' 4\" (1.626 m)   Wt 159 lb 8 oz (72.3 kg)   SpO2 97%   BMI 27.38 kg/m²      General appearance: Awake-conversive-following all commands  Head: Normocephalic, without obvious abnormality, atraumatic  Extremities: no cyanosis or edema  Pulses: 2+ and symmetric  Skin: no rashes or lesions    Mental Status: awake -oriented to person, place and year    Speech: clear  Language: appropriate     No anomia or paraphasias noted    Cranial Nerves:  I: smell    II: visual acuity     II: visual fields Bilateral threat   II: pupils FLAQUITA   III,VII: ptosis None   III,IV,VI: extraocular muscles  Looks around the room   V: mastication Normal   V: facial light touch sensation  Normal   V,VII: corneal reflex  Present   VII: facial muscle function - upper     VII: facial muscle function - lower Normal   VIII: hearing Normal   IX: soft palate elevation   normal   IX,X: gag reflex    XI: trapezius strength   5/5   XI: sternocleidomastoid strength  5/5   XI: neck extension strength   5/5   XII: tongue strength   normal     Motor:  Moving all limbs symmetrically   5/5 throughout   Normal bulk  No drift    Sensory:  Appreciates LT and vibration in all limbs   Vibration minimally decreased in ankles     Coordination:   FN, FFM and KRISTOFER - no ataxia appreciated     DTR:   Right Brachioradialis reflex 2+  Left Brachioradialis reflex 2+  Right Biceps reflex 2+  Left Biceps reflex 2+  Right Quadriceps reflex 2+  Left Quadriceps reflex 2+  Right Achilles reflex 2+  Left Achilles reflex 2+    No Lowry's     Laboratory/Radiology:     CBC with Differential:    Lab Results   Component Value Date/Time    WBC 8.2 06/10/2022 04:25 AM    RBC 3.02 06/10/2022 04:25 AM    HGB 8.7 06/10/2022 04:25 AM    HCT 28.6 06/10/2022 04:25 AM     06/10/2022 04:25 AM    MCV 94.7 06/10/2022 04:25 AM    MCH 28.8 06/10/2022 04:25 AM    MCHC 30.4 06/10/2022 04:25 AM    RDW 15.7 06/10/2022 04:25 AM    SEGSPCT 67 11/24/2011 01:10 AM    LYMPHOPCT 20.9 06/10/2022 04:25 AM    MONOPCT 7.4 06/10/2022 04:25 AM    BASOPCT 1.0 06/10/2022 04:25 AM    MONOSABS 0.61 06/10/2022 04:25 AM    LYMPHSABS 1.71 06/10/2022 04:25 AM    EOSABS 0.25 06/10/2022 04:25 AM    BASOSABS 0.08 06/10/2022 04:25 AM     CMP:    Lab Results   Component Value Date/Time     06/10/2022 04:25 AM    K 3.9 06/10/2022 04:25 AM    K 3.4 05/28/2022 04:17 PM     06/10/2022 04:25 AM    CO2 22 06/10/2022 04:25 AM    BUN 10 06/10/2022 04:25 AM    CREATININE 1.0 06/10/2022 04:25 AM    GFRAA >60 06/10/2022 04:25 AM    LABGLOM 53 06/10/2022 04:25 AM    GLUCOSE 89 06/10/2022 04:25 AM    GLUCOSE 132 11/24/2011 01:10 AM    PROT 6.4 06/10/2022 04:25 AM    LABALBU 3.1 06/10/2022 04:25 AM    LABALBU 3.4 01/23/2011 07:45 AM    CALCIUM 8.5 06/10/2022 04:25 AM    BILITOT 0.2 06/10/2022 04:25 AM    ALKPHOS 45 06/10/2022 04:25 AM    AST 17 06/10/2022 04:25 AM    ALT 19 06/10/2022 04:25 AM     VDRL non reactive      Ref. Range 6/1/2022 13:08   GLUCOSE,CSF Latest Ref Range: 40 - 70 mg/dL 59   PROTEIN,CSF Latest Ref Range: 15 - 40 mg/dL 32   RBC, CSF Latest Units: /uL <2000   WBC, CSF Latest Ref Range: 0 - 2 /uL 4 (H)   Neutrophils, CSF Latest Ref Range: 0 - 10 % 0   Monocytes, CSF Latest Ref Range: 10 - 70 % 100 (H)   Color, CSF Unknown Colorless   Tube Number + CELL CT + DIFF-CSF Unknown Tube 3     MRI Brain:  Mild chronic microvascular disease within the periventricular white matter. No acute ischemia. I personally reviewed the patient's lab and imaging studies at this time. Assessment:     Patient with altered mental status who is being treated for a recent UTI.   There is no baseline cognitive impairment reported   Possible underlying meningitis-she was treated for 7 days prior to CSF analysis    No recurrent seizures maintained on Keppra for now     Marked improvement neurologically but still with cognitive issues     Plan:     Continue Keppra at this time which is dosed at 500 mg twice a day   Discussed probable discontinuation next year     SLP to commence     RTO afterwards    No driving -- she and her friend verbalized an understanding     CHERRIE Salguero  8:03 PM  8/24/2022

## 2022-11-04 ENCOUNTER — OFFICE VISIT (OUTPATIENT)
Dept: FAMILY MEDICINE CLINIC | Age: 77
End: 2022-11-04
Payer: MEDICARE

## 2022-11-04 VITALS
WEIGHT: 159 LBS | OXYGEN SATURATION: 97 % | TEMPERATURE: 98.1 F | SYSTOLIC BLOOD PRESSURE: 138 MMHG | DIASTOLIC BLOOD PRESSURE: 76 MMHG | HEIGHT: 64 IN | HEART RATE: 73 BPM | BODY MASS INDEX: 27.14 KG/M2

## 2022-11-04 DIAGNOSIS — R21 RASH AND NONSPECIFIC SKIN ERUPTION: ICD-10-CM

## 2022-11-04 DIAGNOSIS — L50.9 URTICARIA: Primary | ICD-10-CM

## 2022-11-04 PROCEDURE — G8400 PT W/DXA NO RESULTS DOC: HCPCS | Performed by: PHYSICIAN ASSISTANT

## 2022-11-04 PROCEDURE — G8484 FLU IMMUNIZE NO ADMIN: HCPCS | Performed by: PHYSICIAN ASSISTANT

## 2022-11-04 PROCEDURE — 1090F PRES/ABSN URINE INCON ASSESS: CPT | Performed by: PHYSICIAN ASSISTANT

## 2022-11-04 PROCEDURE — G8417 CALC BMI ABV UP PARAM F/U: HCPCS | Performed by: PHYSICIAN ASSISTANT

## 2022-11-04 PROCEDURE — 1123F ACP DISCUSS/DSCN MKR DOCD: CPT | Performed by: PHYSICIAN ASSISTANT

## 2022-11-04 PROCEDURE — 96372 THER/PROPH/DIAG INJ SC/IM: CPT | Performed by: PHYSICIAN ASSISTANT

## 2022-11-04 PROCEDURE — 99203 OFFICE O/P NEW LOW 30 MIN: CPT | Performed by: PHYSICIAN ASSISTANT

## 2022-11-04 PROCEDURE — 1036F TOBACCO NON-USER: CPT | Performed by: PHYSICIAN ASSISTANT

## 2022-11-04 PROCEDURE — 3078F DIAST BP <80 MM HG: CPT | Performed by: PHYSICIAN ASSISTANT

## 2022-11-04 PROCEDURE — G8427 DOCREV CUR MEDS BY ELIG CLIN: HCPCS | Performed by: PHYSICIAN ASSISTANT

## 2022-11-04 PROCEDURE — 3074F SYST BP LT 130 MM HG: CPT | Performed by: PHYSICIAN ASSISTANT

## 2022-11-04 RX ORDER — METHYLPREDNISOLONE ACETATE 40 MG/ML
40 INJECTION, SUSPENSION INTRA-ARTICULAR; INTRALESIONAL; INTRAMUSCULAR; SOFT TISSUE ONCE
Status: COMPLETED | OUTPATIENT
Start: 2022-11-04 | End: 2022-11-04

## 2022-11-04 RX ORDER — PREDNISONE 10 MG/1
TABLET ORAL
Qty: 18 TABLET | Refills: 0 | Status: SHIPPED | OUTPATIENT
Start: 2022-11-04

## 2022-11-04 RX ADMIN — METHYLPREDNISOLONE ACETATE 40 MG: 40 INJECTION, SUSPENSION INTRA-ARTICULAR; INTRALESIONAL; INTRAMUSCULAR; SOFT TISSUE at 13:23

## 2022-11-21 ENCOUNTER — OFFICE VISIT (OUTPATIENT)
Dept: NEUROLOGY | Age: 77
End: 2022-11-21
Payer: MEDICARE

## 2022-11-21 VITALS
HEART RATE: 61 BPM | OXYGEN SATURATION: 96 % | DIASTOLIC BLOOD PRESSURE: 76 MMHG | TEMPERATURE: 97.8 F | BODY MASS INDEX: 27.29 KG/M2 | WEIGHT: 159 LBS | SYSTOLIC BLOOD PRESSURE: 157 MMHG

## 2022-11-21 DIAGNOSIS — G93.40 ACUTE ENCEPHALOPATHY: Primary | ICD-10-CM

## 2022-11-21 DIAGNOSIS — R56.9 SEIZURE (HCC): ICD-10-CM

## 2022-11-21 PROCEDURE — 1123F ACP DISCUSS/DSCN MKR DOCD: CPT | Performed by: CLINICAL NURSE SPECIALIST

## 2022-11-21 PROCEDURE — G8417 CALC BMI ABV UP PARAM F/U: HCPCS | Performed by: CLINICAL NURSE SPECIALIST

## 2022-11-21 PROCEDURE — G8484 FLU IMMUNIZE NO ADMIN: HCPCS | Performed by: CLINICAL NURSE SPECIALIST

## 2022-11-21 PROCEDURE — G8400 PT W/DXA NO RESULTS DOC: HCPCS | Performed by: CLINICAL NURSE SPECIALIST

## 2022-11-21 PROCEDURE — G8427 DOCREV CUR MEDS BY ELIG CLIN: HCPCS | Performed by: CLINICAL NURSE SPECIALIST

## 2022-11-21 PROCEDURE — 1090F PRES/ABSN URINE INCON ASSESS: CPT | Performed by: CLINICAL NURSE SPECIALIST

## 2022-11-21 PROCEDURE — 3074F SYST BP LT 130 MM HG: CPT | Performed by: CLINICAL NURSE SPECIALIST

## 2022-11-21 PROCEDURE — 1036F TOBACCO NON-USER: CPT | Performed by: CLINICAL NURSE SPECIALIST

## 2022-11-21 PROCEDURE — 3078F DIAST BP <80 MM HG: CPT | Performed by: CLINICAL NURSE SPECIALIST

## 2022-11-21 PROCEDURE — 99213 OFFICE O/P EST LOW 20 MIN: CPT | Performed by: CLINICAL NURSE SPECIALIST

## 2022-11-21 RX ORDER — LEVETIRACETAM 500 MG/1
500 TABLET ORAL 2 TIMES DAILY
Qty: 60 TABLET | Refills: 5 | Status: SHIPPED | OUTPATIENT
Start: 2022-11-21

## 2022-11-21 RX ORDER — VIT C/E/CUPERIC/ZINC/LUTEIN 226-90-0.8
1 CAPSULE ORAL DAILY
COMMUNITY

## 2022-11-21 NOTE — PROGRESS NOTES
Rambo Castro is a 76 y.o. right handed female     Neurology was following her in the hospital end of May 2022 for alteration in mentation. Prior to admission patient was being treated for a UTI and told her son that she had felt that the antibiotic was messing with her brain. He began noticing that she was more confused at home. She reportedly has a history of alcohol abuse but has not had issues for many years. She presented to the ED on May 25 for increasing confusion. She was found by the neighbor sitting on the couch in her underwear with shards of glass on the chair. On May 26 she was found to have a flight of ideas not following commands or answering questions but her speech was clear and she is appeared to be having a conversation with herself. On May 27 she had 2 witnessed generalized tonic-clonic seizures and RRT was called and she was transferred to the ICU. Keppra was started and she has been seizure free since     MRI of her brain was obtained which was unrevealing for acute abnormalities     LP was obtained - 4 WBCs-no growth in culture or gram stain thus far   however she was treated for 7 days prior to LP being obtained    Her neuro issues have markedly improved but still continues to note short term memory deficits. No SLP on discharge.   We ordered to set this up however she never followed up with them states she is doing memory exercises at home and getting better    Not driving but driving paperwork was filled out for her now that she is over 6 months seizure-free on stable medication    Discussed her need for Keppra moving forward but will most likely continue for 6 months to a year and then consider discontinuation     Here with her friend -- both appear to be excellent historians    No chest pain or palpitations  No SOB  No vertigo, lightheadedness or loss of consciousness  No falls, tripping or stumbling  No incontinence of bowels or bladder  No itching or bruising appreciated  No numbness, tingling or focal arm/leg weakness    ROS otherwise negative       Allergies as of 11/21/2022 - Fully Reviewed 11/21/2022   Allergen Reaction Noted    Other  06/24/2020     Objective:     BP (!) 157/76 (Site: Left Upper Arm, Position: Sitting)   Pulse 61   Temp 97.8 °F (36.6 °C)   Wt 159 lb (72.1 kg)   SpO2 96%   BMI 27.29 kg/m²      General appearance: Awake-conversive-following all commands  Head: Normocephalic, without obvious abnormality, atraumatic  Extremities: no cyanosis or edema  Pulses: 2+ and symmetric  Skin: no rashes or lesions    Mental Status: awake -oriented to person, place and year    MMSE 30/30    Speech: clear  Language: appropriate     No anomia or paraphasias noted    Cranial Nerves:  I: smell    II: visual acuity     II: visual fields Bilateral threat   II: pupils FLAQUITA   III,VII: ptosis None   III,IV,VI: extraocular muscles  Looks around the room   V: mastication Normal   V: facial light touch sensation  Normal   V,VII: corneal reflex  Present   VII: facial muscle function - upper     VII: facial muscle function - lower Normal   VIII: hearing Normal   IX: soft palate elevation   normal   IX,X: gag reflex    XI: trapezius strength   5/5   XI: sternocleidomastoid strength  5/5   XI: neck extension strength   5/5   XII: tongue strength   normal     Motor:  Moving all limbs symmetrically   5/5 throughout   Normal bulk  No drift    Sensory:  Appreciates LT and vibration in all limbs   Vibration minimally decreased in ankles     Coordination:   FN, FFM and KRISTOFER - no ataxia appreciated     DTR:   Right Brachioradialis reflex 2+  Left Brachioradialis reflex 2+  Right Biceps reflex 2+  Left Biceps reflex 2+  Right Quadriceps reflex 2+  Left Quadriceps reflex 2+  Right Achilles reflex 2+  Left Achilles reflex 2+    No Lowry's     Laboratory/Radiology:     CBC with Differential:    Lab Results   Component Value Date/Time    WBC 8.2 06/10/2022 04:25 AM    RBC 3.02 06/10/2022 04:25 AM    HGB 8.7 06/10/2022 04:25 AM    HCT 28.6 06/10/2022 04:25 AM     06/10/2022 04:25 AM    MCV 94.7 06/10/2022 04:25 AM    MCH 28.8 06/10/2022 04:25 AM    MCHC 30.4 06/10/2022 04:25 AM    RDW 15.7 06/10/2022 04:25 AM    SEGSPCT 67 11/24/2011 01:10 AM    LYMPHOPCT 20.9 06/10/2022 04:25 AM    MONOPCT 7.4 06/10/2022 04:25 AM    BASOPCT 1.0 06/10/2022 04:25 AM    MONOSABS 0.61 06/10/2022 04:25 AM    LYMPHSABS 1.71 06/10/2022 04:25 AM    EOSABS 0.25 06/10/2022 04:25 AM    BASOSABS 0.08 06/10/2022 04:25 AM     CMP:    Lab Results   Component Value Date/Time     06/10/2022 04:25 AM    K 3.9 06/10/2022 04:25 AM    K 3.4 05/28/2022 04:17 PM     06/10/2022 04:25 AM    CO2 22 06/10/2022 04:25 AM    BUN 10 06/10/2022 04:25 AM    CREATININE 1.0 06/10/2022 04:25 AM    GFRAA >60 06/10/2022 04:25 AM    LABGLOM 53 06/10/2022 04:25 AM    GLUCOSE 89 06/10/2022 04:25 AM    GLUCOSE 132 11/24/2011 01:10 AM    PROT 6.4 06/10/2022 04:25 AM    LABALBU 3.1 06/10/2022 04:25 AM    LABALBU 3.4 01/23/2011 07:45 AM    CALCIUM 8.5 06/10/2022 04:25 AM    BILITOT 0.2 06/10/2022 04:25 AM    ALKPHOS 45 06/10/2022 04:25 AM    AST 17 06/10/2022 04:25 AM    ALT 19 06/10/2022 04:25 AM     VDRL non reactive      Ref. Range 6/1/2022 13:08   GLUCOSE,CSF Latest Ref Range: 40 - 70 mg/dL 59   PROTEIN,CSF Latest Ref Range: 15 - 40 mg/dL 32   RBC, CSF Latest Units: /uL <2000   WBC, CSF Latest Ref Range: 0 - 2 /uL 4 (H)   Neutrophils, CSF Latest Ref Range: 0 - 10 % 0   Monocytes, CSF Latest Ref Range: 10 - 70 % 100 (H)   Color, CSF Unknown Colorless   Tube Number + CELL CT + DIFF-CSF Unknown Tube 3     MRI Brain:  Mild chronic microvascular disease within the periventricular white matter. No acute ischemia. I personally reviewed the patient's lab and imaging studies at this time. Assessment:     Patient with altered mental status who is being treated for a recent UTI.   There is no baseline cognitive impairment reported   Possible underlying meningitis-she was treated for 7 days prior to CSF analysis    No recurrent seizures maintained on Keppra for now     Marked improvement neurologically but still with cognitive issues     Plan:     Continue Keppra at this time which is dosed at 500 mg twice a day   Discussed probable discontinuation next year     RTO at her 1 year anniversary      CHERRIE Garcia - CNS  4:09 PM  11/21/2022

## 2023-06-01 ENCOUNTER — OFFICE VISIT (OUTPATIENT)
Dept: NEUROLOGY | Age: 78
End: 2023-06-01
Payer: COMMERCIAL

## 2023-06-01 VITALS
BODY MASS INDEX: 27.29 KG/M2 | WEIGHT: 159 LBS | HEART RATE: 71 BPM | DIASTOLIC BLOOD PRESSURE: 83 MMHG | OXYGEN SATURATION: 96 % | SYSTOLIC BLOOD PRESSURE: 167 MMHG

## 2023-06-01 DIAGNOSIS — G93.40 ACUTE ENCEPHALOPATHY: Primary | ICD-10-CM

## 2023-06-01 DIAGNOSIS — R56.9 SEIZURE (HCC): ICD-10-CM

## 2023-06-01 PROCEDURE — 1123F ACP DISCUSS/DSCN MKR DOCD: CPT | Performed by: CLINICAL NURSE SPECIALIST

## 2023-06-01 PROCEDURE — 3077F SYST BP >= 140 MM HG: CPT | Performed by: CLINICAL NURSE SPECIALIST

## 2023-06-01 PROCEDURE — 99214 OFFICE O/P EST MOD 30 MIN: CPT | Performed by: CLINICAL NURSE SPECIALIST

## 2023-06-01 PROCEDURE — 3079F DIAST BP 80-89 MM HG: CPT | Performed by: CLINICAL NURSE SPECIALIST

## 2023-06-01 NOTE — PROGRESS NOTES
Betito Lock is a [de-identified] y.o. right handed female     Neurology was following her in the hospital end of May 2022 for alteration in mentation. Prior to admission patient was being treated for a UTI and told her son that she had felt that the antibiotic was messing with her brain. He began noticing that she was more confused at home. She reportedly has a history of alcohol abuse but has not had issues for many years. She presented to the ED on May 25 for increasing confusion. She was found by the neighbor sitting on the couch in her underwear with shards of glass on the chair. On May 26 she was found to have a flight of ideas not following commands or answering questions but her speech was clear and she is appeared to be having a conversation with herself. On May 27 she had 2 witnessed generalized tonic-clonic seizures and RRT was called and she was transferred to the ICU. Keppra was started and she has been seizure free since     MRI of her brain was obtained which was unrevealing for acute abnormalities     LP was obtained - 4 WBCs-no growth in culture or gram stain thus far   however she was treated for 7 days prior to LP being obtained    Her neuro issues have markedly improved but still continues to note short term memory deficits. No SLP on discharge.   We ordered to set this up however she never followed up with them states she is doing memory exercises at home and getting better    Discussed her need for Keppra moving forward -however she is going on vacation therefore we will begin weaning when she returns    Allergies as of 06/01/2023 - Fully Reviewed 06/01/2023   Allergen Reaction Noted    Other  06/24/2020     Objective:     BP (!) 167/83 (Site: Left Upper Arm, Position: Sitting)   Pulse 71   Wt 159 lb (72.1 kg)   SpO2 96%   BMI 27.29 kg/m²      General appearance: Awake-conversive-following all commands  Head: Normocephalic, without obvious abnormality, atraumatic  Extremities: no

## 2023-06-28 ENCOUNTER — TELEPHONE (OUTPATIENT)
Dept: NON INVASIVE DIAGNOSTICS | Age: 78
End: 2023-06-28

## 2023-07-06 DIAGNOSIS — I48.0 PAROXYSMAL ATRIAL FIBRILLATION (HCC): Primary | ICD-10-CM

## 2023-07-06 PROCEDURE — 93000 ELECTROCARDIOGRAM COMPLETE: CPT | Performed by: INTERNAL MEDICINE

## 2023-07-07 ENCOUNTER — APPOINTMENT (OUTPATIENT)
Dept: CT IMAGING | Age: 78
End: 2023-07-07
Payer: COMMERCIAL

## 2023-07-07 ENCOUNTER — APPOINTMENT (OUTPATIENT)
Dept: GENERAL RADIOLOGY | Age: 78
End: 2023-07-07
Payer: COMMERCIAL

## 2023-07-07 ENCOUNTER — HOSPITAL ENCOUNTER (EMERGENCY)
Age: 78
Discharge: HOME | End: 2023-07-07
Attending: EMERGENCY MEDICINE
Payer: COMMERCIAL

## 2023-07-07 VITALS
DIASTOLIC BLOOD PRESSURE: 90 MMHG | HEART RATE: 65 BPM | SYSTOLIC BLOOD PRESSURE: 184 MMHG | RESPIRATION RATE: 16 BRPM | TEMPERATURE: 98.2 F | BODY MASS INDEX: 27.14 KG/M2 | OXYGEN SATURATION: 96 % | WEIGHT: 159 LBS | HEIGHT: 64 IN

## 2023-07-07 DIAGNOSIS — R41.82 ALTERED MENTAL STATUS, UNSPECIFIED ALTERED MENTAL STATUS TYPE: Primary | ICD-10-CM

## 2023-07-07 LAB
ALBUMIN SERPL-MCNC: 4.1 G/DL (ref 3.5–5.2)
ALP SERPL-CCNC: 61 U/L (ref 35–104)
ALT SERPL-CCNC: 18 U/L (ref 0–32)
ANION GAP SERPL CALCULATED.3IONS-SCNC: 12 MMOL/L (ref 7–16)
AST SERPL-CCNC: 19 U/L (ref 0–31)
BACTERIA URNS QL MICRO: NORMAL /HPF
BASOPHILS # BLD: 0.04 E9/L (ref 0–0.2)
BASOPHILS NFR BLD: 0.7 % (ref 0–2)
BILIRUB SERPL-MCNC: 0.2 MG/DL (ref 0–1.2)
BILIRUB UR QL STRIP: NEGATIVE
BUN SERPL-MCNC: 15 MG/DL (ref 6–23)
CALCIUM SERPL-MCNC: 9.3 MG/DL (ref 8.6–10.2)
CHLORIDE SERPL-SCNC: 102 MMOL/L (ref 98–107)
CLARITY UR: CLEAR
CO2 SERPL-SCNC: 22 MMOL/L (ref 22–29)
COLOR UR: NORMAL
CREAT SERPL-MCNC: 1 MG/DL (ref 0.5–1)
EOSINOPHIL # BLD: 0.23 E9/L (ref 0.05–0.5)
EOSINOPHIL NFR BLD: 3.8 % (ref 0–6)
ERYTHROCYTE [DISTWIDTH] IN BLOOD BY AUTOMATED COUNT: 14.7 FL (ref 11.5–15)
GLUCOSE SERPL-MCNC: 108 MG/DL (ref 74–99)
GLUCOSE UR STRIP-MCNC: NEGATIVE MG/DL
HCT VFR BLD AUTO: 33.2 % (ref 34–48)
HGB BLD-MCNC: 11 G/DL (ref 11.5–15.5)
HGB UR QL STRIP: NEGATIVE
IMM GRANULOCYTES # BLD: 0.05 E9/L
IMM GRANULOCYTES NFR BLD: 0.8 % (ref 0–5)
KETONES UR STRIP-MCNC: NEGATIVE MG/DL
LEUKOCYTE ESTERASE UR QL STRIP: NEGATIVE
LYMPHOCYTES # BLD: 1.76 E9/L (ref 1.5–4)
LYMPHOCYTES NFR BLD: 29.1 % (ref 20–42)
MCH RBC QN AUTO: 30 PG (ref 26–35)
MCHC RBC AUTO-ENTMCNC: 33.1 % (ref 32–34.5)
MCV RBC AUTO: 90.5 FL (ref 80–99.9)
MONOCYTES # BLD: 0.56 E9/L (ref 0.1–0.95)
MONOCYTES NFR BLD: 9.3 % (ref 2–12)
NEUTROPHILS # BLD: 3.41 E9/L (ref 1.8–7.3)
NEUTS SEG NFR BLD: 56.3 % (ref 43–80)
NITRITE UR QL STRIP: NEGATIVE
PH UR STRIP: 6.5 [PH] (ref 5–9)
PLATELET # BLD AUTO: 336 E9/L (ref 130–450)
PMV BLD AUTO: 9.3 FL (ref 7–12)
POTASSIUM SERPL-SCNC: 4.2 MMOL/L (ref 3.5–5)
PROT SERPL-MCNC: 8.6 G/DL (ref 6.4–8.3)
PROT UR STRIP-MCNC: NEGATIVE MG/DL
RBC # BLD AUTO: 3.67 E12/L (ref 3.5–5.5)
RBC #/AREA URNS HPF: NORMAL /HPF (ref 0–2)
SODIUM SERPL-SCNC: 136 MMOL/L (ref 132–146)
SP GR UR STRIP: <=1.005 (ref 1–1.03)
TROPONIN, HIGH SENSITIVITY: 10 NG/L (ref 0–9)
UROBILINOGEN UR STRIP-ACNC: 0.2 E.U./DL
WBC # BLD: 6.1 E9/L (ref 4.5–11.5)
WBC #/AREA URNS HPF: NORMAL /HPF (ref 0–5)

## 2023-07-07 PROCEDURE — 80053 COMPREHEN METABOLIC PANEL: CPT

## 2023-07-07 PROCEDURE — 99285 EMERGENCY DEPT VISIT HI MDM: CPT

## 2023-07-07 PROCEDURE — 70450 CT HEAD/BRAIN W/O DYE: CPT

## 2023-07-07 PROCEDURE — 81001 URINALYSIS AUTO W/SCOPE: CPT

## 2023-07-07 PROCEDURE — 85025 COMPLETE CBC W/AUTO DIFF WBC: CPT

## 2023-07-07 PROCEDURE — 93005 ELECTROCARDIOGRAM TRACING: CPT | Performed by: EMERGENCY MEDICINE

## 2023-07-07 PROCEDURE — 36415 COLL VENOUS BLD VENIPUNCTURE: CPT

## 2023-07-07 PROCEDURE — 84484 ASSAY OF TROPONIN QUANT: CPT

## 2023-07-07 PROCEDURE — 71046 X-RAY EXAM CHEST 2 VIEWS: CPT

## 2023-07-07 ASSESSMENT — PAIN - FUNCTIONAL ASSESSMENT: PAIN_FUNCTIONAL_ASSESSMENT: NONE - DENIES PAIN

## 2023-07-08 LAB
EKG ATRIAL RATE: 58 BPM
EKG P AXIS: 50 DEGREES
EKG P-R INTERVAL: 220 MS
EKG Q-T INTERVAL: 426 MS
EKG QRS DURATION: 90 MS
EKG QTC CALCULATION (BAZETT): 418 MS
EKG R AXIS: -14 DEGREES
EKG T AXIS: 39 DEGREES
EKG VENTRICULAR RATE: 58 BPM

## 2023-07-08 PROCEDURE — 93010 ELECTROCARDIOGRAM REPORT: CPT | Performed by: INTERNAL MEDICINE

## 2023-07-08 NOTE — ED NOTES
Discharge instructions given, medications and follow up instructions reviewed. Patient verbalized understanding, no other noted or stated problems at this time. Patient will follow up with physicians as directed.         Aby Ohara RN  07/07/23 [id number]

## 2023-07-08 NOTE — ED NOTES
Pt states she did not take any of her pills this morning. She was advised to make sure she takes her prescribed medications and follow up with her pcp about her bp.      Drew Ramos RN  07/07/23 [id number]

## 2023-07-09 ASSESSMENT — ENCOUNTER SYMPTOMS
VOMITING: 0
NAUSEA: 0
SHORTNESS OF BREATH: 0
ABDOMINAL PAIN: 0
EYE REDNESS: 0

## 2023-07-10 ENCOUNTER — TELEPHONE (OUTPATIENT)
Dept: NEUROLOGY | Age: 78
End: 2023-07-10

## 2023-07-10 ENCOUNTER — TELEPHONE (OUTPATIENT)
Dept: ADMINISTRATIVE | Age: 78
End: 2023-07-10

## 2023-07-10 RX ORDER — LEVETIRACETAM 500 MG/1
500 TABLET ORAL 2 TIMES DAILY
Qty: 60 TABLET | Refills: 1 | Status: SHIPPED | OUTPATIENT
Start: 2023-07-10

## 2023-07-10 NOTE — TELEPHONE ENCOUNTER
Patient called in stating that she is to wean off of her Keppra now that she is back from vacation. She only has 15 tablets left. Is she going to need more Keppra to wean off of it? Please advise.

## 2023-07-10 NOTE — TELEPHONE ENCOUNTER
Patient would like a call back. She is trying to get off kelsie medication. Please call and advise. Thank you!

## 2023-07-11 NOTE — TELEPHONE ENCOUNTER
Called and LM for [place]. She missed her lab/ EKG appointment yesterday, called to get that rescheduled.

## 2023-07-11 NOTE — TELEPHONE ENCOUNTER
[address] called back and I got her scheduled with the first available with Dr. Diana Palmer.

## 2023-07-13 NOTE — TELEPHONE ENCOUNTER
I do not see any instructions. She is wanting to wean off of the [facility] [place], [facility] by [facility].

## 2023-10-04 NOTE — PROGRESS NOTES
[address]  Heart and Vascular [facility]  [address] Electrophysiology  Outpatient Progress Note Report  PATIENT: [id number] [facility] RECORD NUMBER: [id number]  DATE OF SERVICE:  10/5/2023  ATTENDING ELECTROPHYSIOLOGIST: Denis Roe MD  REFERRING PHYSICIAN: Kevin Wilkes MD  CHIEF COMPLAINT: Paroxysmal atrial fibrillation    HPI: This is a 78 y.o. female with a history of   Patient Active Problem List   Diagnosis    Atrial fibrillation ([address])    Acute medial meniscal tear    Anxiety    Arthritis    Asthma    Cholelithiasis    Depressive disorder    Diverticular disease    Fracture of tibia    Gastroesophageal reflux disease    Hiatal hernia    Hypercholesterolemia    Hypertension    Hypothyroidism    Insomnia    Lipoma    Obesity    Osteopenia    Overweight with body mass index (BMI) 25.0-29.9    Restless legs syndrome    Rupture of appendix    Secondary non-renal hyperparathyroidism (HCC)    Stage 3 chronic kidney disease (HCC)    Steatosis of liver    Tubular adenoma of colon    Acute encephalopathy    Stroke-like symptoms    Moderate protein-calorie malnutrition ([address])    Altered mental status    Septicemia ([address])   The patient presents to cardiac electrophysiology clinic for follow up and management of paroxysmal atrial fibrillation. The patient feels overall well from a EP POV but reports 2-3 times a month that can last up to 20 minutes with spontaneous resolution of AF. She does report that her BP was not very controlled. She also states that she was admitted to the hospital in May 2022 due to Meningitis since she was last seen in the clinic in April 2022. The patient currently denies any chest pain, dyspnea, dizziness, syncope, orthopnea or paroxysmal nocturnal dyspnea. She presents today in NSR. She remains on Cardizem for rate control and Eliquis for stroke risk reduction.     Patient Active Problem List    Diagnosis Date Noted    Altered mental status      Priority:

## 2023-10-05 ENCOUNTER — OFFICE VISIT (OUTPATIENT)
Dept: NON INVASIVE DIAGNOSTICS | Age: 78
End: 2023-10-05
Payer: COMMERCIAL

## 2023-10-05 VITALS
HEART RATE: 53 BPM | BODY MASS INDEX: 33.12 KG/M2 | HEIGHT: 64 IN | WEIGHT: 194 LBS | DIASTOLIC BLOOD PRESSURE: 82 MMHG | SYSTOLIC BLOOD PRESSURE: 146 MMHG

## 2023-10-05 DIAGNOSIS — I49.5 SSS (SICK SINUS SYNDROME) (HCC): Primary | ICD-10-CM

## 2023-10-05 DIAGNOSIS — I48.0 PAROXYSMAL ATRIAL FIBRILLATION (HCC): ICD-10-CM

## 2023-10-05 PROCEDURE — 3079F DIAST BP 80-89 MM HG: CPT | Performed by: INTERNAL MEDICINE

## 2023-10-05 PROCEDURE — 3077F SYST BP >= 140 MM HG: CPT | Performed by: INTERNAL MEDICINE

## 2023-10-05 PROCEDURE — 93000 ELECTROCARDIOGRAM COMPLETE: CPT | Performed by: INTERNAL MEDICINE

## 2023-10-05 PROCEDURE — 99215 OFFICE O/P EST HI 40 MIN: CPT | Performed by: INTERNAL MEDICINE

## 2023-10-05 PROCEDURE — 1123F ACP DISCUSS/DSCN MKR DOCD: CPT | Performed by: INTERNAL MEDICINE

## 2024-01-16 NOTE — PLAN OF CARE
Reason for Call:  Other appointment    Detailed comments: Shashi is calling today to let Dr Austin know that he is not able to get into the [facility] for 3 months. He says he is not able to wait that long and wants to know what Dr Austin suggests.     Phone Number Patient can be reached at: Cell number on file:    Telephone Information:   Mobile [phone]       Best Time: any    Can we leave a detailed message on this number? YES    Call taken on 1/16/2024 at 9:34 AM by Dilip Salazar     Spoke with son Abdi Ward at [phone] regarding obtaining consent for lumbar puncture. Explained risks and benefits involved with this procedure, including risk for infection,bleeding, neurological damage among others. He understands and wants to go ahead with lumbar puncture at this time. All questions answered.     This note is electronically signed by Nikunj Nesbitt MD on 5/31/2022 at 3:06 PM

## 2024-04-22 ENCOUNTER — APPOINTMENT (OUTPATIENT)
Dept: ULTRASOUND IMAGING | Age: 79
End: 2024-04-22
Payer: COMMERCIAL

## 2024-04-22 ENCOUNTER — HOSPITAL ENCOUNTER (EMERGENCY)
Age: 79
Discharge: HOME | End: 2024-04-22
Attending: EMERGENCY MEDICINE
Payer: COMMERCIAL

## 2024-04-22 ENCOUNTER — APPOINTMENT (OUTPATIENT)
Dept: CT IMAGING | Age: 79
End: 2024-04-22
Payer: COMMERCIAL

## 2024-04-22 ENCOUNTER — APPOINTMENT (OUTPATIENT)
Dept: GENERAL RADIOLOGY | Age: 79
End: 2024-04-22
Payer: COMMERCIAL

## 2024-04-22 VITALS
DIASTOLIC BLOOD PRESSURE: 96 MMHG | TEMPERATURE: 97.5 F | SYSTOLIC BLOOD PRESSURE: 148 MMHG | HEIGHT: 64 IN | BODY MASS INDEX: 30.73 KG/M2 | RESPIRATION RATE: 16 BRPM | HEART RATE: 87 BPM | WEIGHT: 180 LBS | OXYGEN SATURATION: 98 %

## 2024-04-22 DIAGNOSIS — E87.1 HYPONATREMIA: ICD-10-CM

## 2024-04-22 DIAGNOSIS — G45.9 TIA (TRANSIENT ISCHEMIC ATTACK): Primary | ICD-10-CM

## 2024-04-22 LAB
ALBUMIN SERPL-MCNC: 4.3 G/DL (ref 3.5–5.2)
ALP SERPL-CCNC: 54 U/L (ref 35–104)
ALT SERPL-CCNC: 14 U/L (ref 0–32)
ANION GAP SERPL CALCULATED.3IONS-SCNC: 13 MMOL/L (ref 7–16)
AST SERPL-CCNC: 21 U/L (ref 0–31)
BASOPHILS # BLD: 0.03 K/UL (ref 0–0.2)
BASOPHILS NFR BLD: 0 % (ref 0–2)
BILIRUB SERPL-MCNC: 0.3 MG/DL (ref 0–1.2)
BILIRUB UR QL STRIP: NEGATIVE
BUN SERPL-MCNC: 17 MG/DL (ref 6–23)
CALCIUM SERPL-MCNC: 9.3 MG/DL (ref 8.6–10.2)
CHLORIDE SERPL-SCNC: 92 MMOL/L (ref 98–107)
CLARITY UR: CLEAR
CO2 SERPL-SCNC: 23 MMOL/L (ref 22–29)
COLOR UR: YELLOW
CREAT SERPL-MCNC: 1 MG/DL (ref 0.5–1)
EKG ATRIAL RATE: 73 BPM
EKG P AXIS: 49 DEGREES
EKG P-R INTERVAL: 202 MS
EKG Q-T INTERVAL: 390 MS
EKG QRS DURATION: 90 MS
EKG QTC CALCULATION (BAZETT): 429 MS
EKG R AXIS: -25 DEGREES
EKG T AXIS: 30 DEGREES
EKG VENTRICULAR RATE: 73 BPM
EOSINOPHIL # BLD: 0.15 K/UL (ref 0.05–0.5)
EOSINOPHILS RELATIVE PERCENT: 2 % (ref 0–6)
ERYTHROCYTE [DISTWIDTH] IN BLOOD BY AUTOMATED COUNT: 14.3 % (ref 11.5–15)
GFR SERPL CREATININE-BSD FRML MDRD: 58 ML/MIN/1.73M2
GLUCOSE SERPL-MCNC: 111 MG/DL (ref 74–99)
GLUCOSE UR STRIP-MCNC: NEGATIVE MG/DL
HCT VFR BLD AUTO: 35.7 % (ref 34–48)
HGB BLD-MCNC: 11.8 G/DL (ref 11.5–15.5)
HGB UR QL STRIP.AUTO: NEGATIVE
IMM GRANULOCYTES # BLD AUTO: 0.07 K/UL (ref 0–0.58)
IMM GRANULOCYTES NFR BLD: 1 % (ref 0–5)
KETONES UR STRIP-MCNC: NEGATIVE MG/DL
LEUKOCYTE ESTERASE UR QL STRIP: NEGATIVE
LYMPHOCYTES NFR BLD: 2.24 K/UL (ref 1.5–4)
LYMPHOCYTES RELATIVE PERCENT: 26 % (ref 20–42)
MCH RBC QN AUTO: 30.3 PG (ref 26–35)
MCHC RBC AUTO-ENTMCNC: 33.1 G/DL (ref 32–34.5)
MCV RBC AUTO: 91.5 FL (ref 80–99.9)
MONOCYTES NFR BLD: 0.7 K/UL (ref 0.1–0.95)
MONOCYTES NFR BLD: 8 % (ref 2–12)
NEUTROPHILS NFR BLD: 63 % (ref 43–80)
NEUTS SEG NFR BLD: 5.42 K/UL (ref 1.8–7.3)
NITRITE UR QL STRIP: NEGATIVE
PH UR STRIP: 6 [PH] (ref 5–9)
PLATELET # BLD AUTO: 314 K/UL (ref 130–450)
PMV BLD AUTO: 8.5 FL (ref 7–12)
POTASSIUM SERPL-SCNC: 4.3 MMOL/L (ref 3.5–5)
PROT SERPL-MCNC: 9.8 G/DL (ref 6.4–8.3)
PROT UR STRIP-MCNC: ABNORMAL MG/DL
RBC # BLD AUTO: 3.9 M/UL (ref 3.5–5.5)
RBC #/AREA URNS HPF: ABNORMAL /HPF
SODIUM SERPL-SCNC: 128 MMOL/L (ref 132–146)
SP GR UR STRIP: 1.02 (ref 1–1.03)
TROPONIN I SERPL HS-MCNC: 12 NG/L (ref 0–9)
UROBILINOGEN UR STRIP-ACNC: 0.2 EU/DL (ref 0–1)
WBC #/AREA URNS HPF: ABNORMAL /HPF
WBC OTHER # BLD: 8.6 K/UL (ref 4.5–11.5)

## 2024-04-22 PROCEDURE — 93005 ELECTROCARDIOGRAM TRACING: CPT | Performed by: EMERGENCY MEDICINE

## 2024-04-22 PROCEDURE — 93880 EXTRACRANIAL BILAT STUDY: CPT

## 2024-04-22 PROCEDURE — 85025 COMPLETE CBC W/AUTO DIFF WBC: CPT

## 2024-04-22 PROCEDURE — 81001 URINALYSIS AUTO W/SCOPE: CPT

## 2024-04-22 PROCEDURE — 80053 COMPREHEN METABOLIC PANEL: CPT

## 2024-04-22 PROCEDURE — 2580000003 HC RX 258: Performed by: EMERGENCY MEDICINE

## 2024-04-22 PROCEDURE — 93010 ELECTROCARDIOGRAM REPORT: CPT | Performed by: INTERNAL MEDICINE

## 2024-04-22 PROCEDURE — 71046 X-RAY EXAM CHEST 2 VIEWS: CPT

## 2024-04-22 PROCEDURE — 84484 ASSAY OF TROPONIN QUANT: CPT

## 2024-04-22 PROCEDURE — 70450 CT HEAD/BRAIN W/O DYE: CPT

## 2024-04-22 PROCEDURE — 99285 EMERGENCY DEPT VISIT HI MDM: CPT

## 2024-04-22 RX ORDER — CLOPIDOGREL BISULFATE 75 MG/1
75 TABLET ORAL DAILY
Status: CANCELLED | OUTPATIENT
Start: 2024-04-22

## 2024-04-22 RX ORDER — ONDANSETRON 2 MG/ML
4 INJECTION INTRAMUSCULAR; INTRAVENOUS EVERY 6 HOURS PRN
Status: CANCELLED | OUTPATIENT
Start: 2024-04-22

## 2024-04-22 RX ORDER — ASPIRIN 81 MG/1
81 TABLET ORAL DAILY
Status: CANCELLED | OUTPATIENT
Start: 2024-04-22

## 2024-04-22 RX ORDER — 0.9 % SODIUM CHLORIDE 0.9 %
1000 INTRAVENOUS SOLUTION INTRAVENOUS ONCE
Status: COMPLETED | OUTPATIENT
Start: 2024-04-22 | End: 2024-04-22

## 2024-04-22 RX ORDER — ATORVASTATIN CALCIUM 40 MG/1
40 TABLET, FILM COATED ORAL NIGHTLY
Status: CANCELLED | OUTPATIENT
Start: 2024-04-22

## 2024-04-22 RX ORDER — SODIUM CHLORIDE 9 MG/ML
INJECTION, SOLUTION INTRAVENOUS PRN
Status: CANCELLED | OUTPATIENT
Start: 2024-04-22

## 2024-04-22 RX ORDER — ONDANSETRON 4 MG/1
4 TABLET, ORALLY DISINTEGRATING ORAL EVERY 8 HOURS PRN
Status: CANCELLED | OUTPATIENT
Start: 2024-04-22

## 2024-04-22 RX ORDER — SODIUM CHLORIDE 0.9 % (FLUSH) 0.9 %
5-40 SYRINGE (ML) INJECTION EVERY 12 HOURS SCHEDULED
Status: CANCELLED | OUTPATIENT
Start: 2024-04-22

## 2024-04-22 RX ORDER — SODIUM CHLORIDE 0.9 % (FLUSH) 0.9 %
5-40 SYRINGE (ML) INJECTION PRN
Status: CANCELLED | OUTPATIENT
Start: 2024-04-22

## 2024-04-22 RX ORDER — LANOLIN ALCOHOL/MO/W.PET/CERES
3 CREAM (GRAM) TOPICAL NIGHTLY PRN
Status: CANCELLED | OUTPATIENT
Start: 2024-04-22

## 2024-04-22 RX ORDER — POLYETHYLENE GLYCOL 3350 17 G/17G
17 POWDER, FOR SOLUTION ORAL DAILY PRN
Status: CANCELLED | OUTPATIENT
Start: 2024-04-22

## 2024-04-22 RX ORDER — ENOXAPARIN SODIUM 100 MG/ML
40 INJECTION SUBCUTANEOUS DAILY
Status: CANCELLED | OUTPATIENT
Start: 2024-04-22

## 2024-04-22 RX ADMIN — SODIUM CHLORIDE 1000 ML: 9 INJECTION, SOLUTION INTRAVENOUS at 18:21

## 2024-04-22 ASSESSMENT — LIFESTYLE VARIABLES
HOW MANY STANDARD DRINKS CONTAINING ALCOHOL DO YOU HAVE ON A TYPICAL DAY: PATIENT DOES NOT DRINK
HOW OFTEN DO YOU HAVE A DRINK CONTAINING ALCOHOL: NEVER

## 2024-04-22 ASSESSMENT — PAIN - FUNCTIONAL ASSESSMENT: PAIN_FUNCTIONAL_ASSESSMENT: NONE - DENIES PAIN

## 2024-04-22 NOTE — ED PROVIDER NOTES
ischemic attack)    2. Hyponatremia        DISPOSITION  Disposition: Discharge to home  Patient condition is stable      NOTE: This report was transcribed using voice recognition software. Every effort was made to ensure accuracy; however, inadvertent computerized transcription errors may be present    Cristina HENDERSON MD, am the primary provider of this record        Cristina Kenney MD  04/22/24 [id number]

## 2024-05-07 ENCOUNTER — TELEPHONE (OUTPATIENT)
Dept: NON INVASIVE DIAGNOSTICS | Age: 79
End: 2024-05-07

## 2024-05-07 NOTE — TELEPHONE ENCOUNTER
Patient notified the records and script have been faxed to [facility]. Patient verbalized understanding.    Electronically signed by Dagmar Mcmullen MA on 5/7/2024 at 1:32 PM

## 2024-06-19 ENCOUNTER — OFFICE VISIT (OUTPATIENT)
Dept: FAMILY MEDICINE CLINIC | Age: 79
End: 2024-06-19

## 2024-06-19 VITALS
WEIGHT: 189 LBS | HEIGHT: 64 IN | BODY MASS INDEX: 32.27 KG/M2 | OXYGEN SATURATION: 97 % | SYSTOLIC BLOOD PRESSURE: 142 MMHG | DIASTOLIC BLOOD PRESSURE: 82 MMHG | HEART RATE: 91 BPM | TEMPERATURE: 98.9 F

## 2024-06-19 DIAGNOSIS — S91.312A LACERATION OF LEFT FOOT, INITIAL ENCOUNTER: ICD-10-CM

## 2024-06-19 DIAGNOSIS — L03.90 WOUND CELLULITIS: Primary | ICD-10-CM

## 2024-06-19 DIAGNOSIS — Z23 NEED FOR TDAP VACCINATION: ICD-10-CM

## 2024-06-19 RX ORDER — CEPHALEXIN 500 MG/1
500 CAPSULE ORAL 4 TIMES DAILY
Qty: 28 CAPSULE | Refills: 0 | Status: SHIPPED | OUTPATIENT
Start: 2024-06-19 | End: 2024-06-26

## 2024-06-19 RX ORDER — ASPIRIN 81 MG/1
81 TABLET ORAL DAILY
COMMUNITY
Start: 2024-04-26

## 2024-06-19 NOTE — PROGRESS NOTES
laceration which has dehisced approximately 1 cm across widest margin.  0.5 cm surrounding erythema and warmth.  Moderate tenderness overlying same area.    No bleeding or drainage noted. No lymphangitic streaking. No fluctuance noted.  Neurological:  Orientation age-appropriate unless noted elseware.  Motor functions intact.    Lab / Imaging Results   (All laboratory and radiology results have been personally reviewed by myself)  Labs:      Imaging:  All Radiology results interpreted by Radiologist unless otherwise noted.        Assessment / Plan     Impression(s):  [place] was seen today for wound infection.    Diagnoses and all orders for this visit:    Wound cellulitis  -     cephALEXin (KEFLEX) 500 MG capsule; Take 1 capsule by mouth 4 times daily for 7 days  -     mupirocin (BACTROBAN) 2 % ointment; Apply topically 3 times daily.  -     Wound care    Need for Tdap vaccination  -     Tdap, BOOSTRIX, (age 10 yrs+), IM    Laceration of left foot, initial encounter  -     Tdap, BOOSTRIX, (age 10 yrs+), IM  -     Wound care      Disposition:  Disposition: Discharge to home.    Vital signs, past medical history, medications, and allergies reviewed at visit.    Wound edges are not healing appropriately.  Wound care measures discussed at length.  He was cleansed in office and topical antibiotic and dressing applied.  Will cover for surrounding erythema and warmth with Keflex and mupirocin, side effects discussed.  Tetanus has not been administered in since 2017, will update due to injury on old fadi screen door.    Return to clinic or f/u with PCP in 3 days for recheck. ED sooner if symptoms worsen or change. ED immediately with the development of fever, body aches, shaking chills, spreading erythema, lymphangitic streaking, lethargy, CP, or SOB. Pt is in agreement with this care plan. All questions answered.    JENA Stroud    **This report was transcribed using voice recognition software. Every effort was

## 2024-06-26 ENCOUNTER — OFFICE VISIT (OUTPATIENT)
Dept: FAMILY MEDICINE CLINIC | Age: 79
End: 2024-06-26
Payer: MEDICARE

## 2024-06-26 VITALS
DIASTOLIC BLOOD PRESSURE: 78 MMHG | HEART RATE: 89 BPM | HEIGHT: 64 IN | WEIGHT: 189 LBS | OXYGEN SATURATION: 97 % | SYSTOLIC BLOOD PRESSURE: 148 MMHG | TEMPERATURE: 98.1 F | BODY MASS INDEX: 32.27 KG/M2

## 2024-06-26 DIAGNOSIS — L03.90 WOUND CELLULITIS: Primary | ICD-10-CM

## 2024-06-26 DIAGNOSIS — L03.90 WOUND CELLULITIS: ICD-10-CM

## 2024-06-26 LAB
BASOPHILS ABSOLUTE: 0.06 K/UL (ref 0–0.2)
BASOPHILS RELATIVE PERCENT: 1 % (ref 0–2)
EOSINOPHILS ABSOLUTE: 0.45 K/UL (ref 0.05–0.5)
EOSINOPHILS RELATIVE PERCENT: 5 % (ref 0–6)
HCT VFR BLD CALC: 31.3 % (ref 34–48)
HEMOGLOBIN: 10.4 G/DL (ref 11.5–15.5)
IMMATURE GRANULOCYTES %: 1 % (ref 0–5)
IMMATURE GRANULOCYTES ABSOLUTE: 0.11 K/UL (ref 0–0.58)
LYMPHOCYTES ABSOLUTE: 2.54 K/UL (ref 1.5–4)
LYMPHOCYTES RELATIVE PERCENT: 28 % (ref 20–42)
MCH RBC QN AUTO: 31.3 PG (ref 26–35)
MCHC RBC AUTO-ENTMCNC: 33.2 G/DL (ref 32–34.5)
MCV RBC AUTO: 94.3 FL (ref 80–99.9)
MONOCYTES ABSOLUTE: 0.73 K/UL (ref 0.1–0.95)
MONOCYTES RELATIVE PERCENT: 8 % (ref 2–12)
NEUTROPHILS ABSOLUTE: 5.29 K/UL (ref 1.8–7.3)
NEUTROPHILS RELATIVE PERCENT: 58 % (ref 43–80)
PDW BLD-RTO: 14.7 % (ref 11.5–15)
PLATELET # BLD: 370 K/UL (ref 130–450)
PMV BLD AUTO: 9 FL (ref 7–12)
RBC # BLD: 3.32 M/UL (ref 3.5–5.5)
WBC # BLD: 9.2 K/UL (ref 4.5–11.5)

## 2024-06-26 PROCEDURE — G8417 CALC BMI ABV UP PARAM F/U: HCPCS

## 2024-06-26 PROCEDURE — G8427 DOCREV CUR MEDS BY ELIG CLIN: HCPCS

## 2024-06-26 PROCEDURE — 3078F DIAST BP <80 MM HG: CPT

## 2024-06-26 PROCEDURE — 3077F SYST BP >= 140 MM HG: CPT

## 2024-06-26 PROCEDURE — 1036F TOBACCO NON-USER: CPT

## 2024-06-26 PROCEDURE — 1090F PRES/ABSN URINE INCON ASSESS: CPT

## 2024-06-26 PROCEDURE — 1123F ACP DISCUSS/DSCN MKR DOCD: CPT

## 2024-06-26 PROCEDURE — 99214 OFFICE O/P EST MOD 30 MIN: CPT

## 2024-06-26 PROCEDURE — G8400 PT W/DXA NO RESULTS DOC: HCPCS

## 2024-06-26 NOTE — PROGRESS NOTES
Chief Complaint   wound left foot    History of Present Illness   Source of history provided by:  patient.      Latosha Olvera is a 79 y.o. old female presenting to the walk in clinic for evaluation of recheck of wound on her left heel.  Patient was seen on 6/19/2024 and placed on Keflex and mupirocin, tetanus was updated at this time.  Patient is requesting recheck as she is paranoid the infection is not improving and the wound has not healed.  She does report the lesion decreased in size but it is still open with some small surrounding erythema.  Denies any active bleeding or drainage. Denies any fever, chills, HA, recent illness, myalgias, nausea, vomiting, or lethargy. Pt has been taking and using medications as directed.     ROS    Unless otherwise stated in this report or unable to obtain because of the patient's clinical or mental status as evidenced by the medical record, this patients's positive and negative responses for Review of Systems, constitutional, psych, eyes, ENT, cardiovascular, respiratory, gastrointestinal, neurological, genitourinary, musculoskeletal, integument systems and systems related to the presenting problem are either stated in the preceding or were not pertinent or were negative for the symptoms and/or complaints related to the medical problem.    Physical Exam         VS:  BP (!) 148/78   Pulse 89   Temp 98.1 °F (36.7 °C)   Ht 1.626 m (5' 4.02\")   Wt 85.7 kg (189 lb)   SpO2 97%   BMI 32.43 kg/m²    Oxygen Saturation Interpretation: Normal.    Constitutional:  Alert, development consistent with age. NAD.  Lungs:  CTAB without wheezing, rales, or rhonchi.  Heart:  Regular rate and rhythm, no pathologic murmurs, rubs, or gallops.  Skin:  Normal turgor and appropriately dry to touch. 3 cm laceration which has dehisced approximately 0.5 cm across widest margin. 0.5 cm surrounding erythema.  No surrounding warmth or significant tenderness.  No bleeding or drainage noted.

## 2024-09-04 ENCOUNTER — OFFICE VISIT (OUTPATIENT)
Dept: FAMILY MEDICINE CLINIC | Age: 79
End: 2024-09-04

## 2024-09-04 VITALS
WEIGHT: 190.4 LBS | HEART RATE: 107 BPM | HEIGHT: 64 IN | SYSTOLIC BLOOD PRESSURE: 144 MMHG | DIASTOLIC BLOOD PRESSURE: 82 MMHG | BODY MASS INDEX: 32.5 KG/M2 | OXYGEN SATURATION: 98 % | TEMPERATURE: 97.7 F

## 2024-09-04 DIAGNOSIS — M54.50 ACUTE BILATERAL LOW BACK PAIN WITHOUT SCIATICA: Primary | ICD-10-CM

## 2024-09-04 DIAGNOSIS — M54.50 ACUTE RIGHT-SIDED LOW BACK PAIN WITHOUT SCIATICA: ICD-10-CM

## 2024-09-04 RX ORDER — PREDNISONE 10 MG/1
TABLET ORAL
Qty: 18 TABLET | Refills: 0 | Status: SHIPPED | OUTPATIENT
Start: 2024-09-04

## 2024-09-04 RX ORDER — TIZANIDINE 2 MG/1
2 TABLET ORAL 4 TIMES DAILY PRN
Qty: 12 TABLET | Refills: 0 | Status: SHIPPED | OUTPATIENT
Start: 2024-09-04

## 2024-09-04 RX ORDER — METHYLPREDNISOLONE ACETATE 80 MG/ML
80 INJECTION, SUSPENSION INTRA-ARTICULAR; INTRALESIONAL; INTRAMUSCULAR; SOFT TISSUE ONCE
Status: COMPLETED | OUTPATIENT
Start: 2024-09-04 | End: 2024-09-04

## 2024-09-04 RX ADMIN — METHYLPREDNISOLONE ACETATE 80 MG: 80 INJECTION, SUSPENSION INTRA-ARTICULAR; INTRALESIONAL; INTRAMUSCULAR; SOFT TISSUE at 11:48

## 2024-09-04 NOTE — PROGRESS NOTES
able to ambulate. Normal sensation noted to the bilateral lower extremities.  Neurological: alert and oriented x4, normal sensory and motor observed.  Psychiatric: Cooperative        Testing:     No results found for this visit on 09/04/24.        Medical Decision Making:     Vital signs reviewed    Past medical history reviewed.    Allergies reviewed.    Medications reviewed.    Patient on arrival does not appear to be in any apparent distress or discomfort.  The patient has been seen and evaluated.  The patient does not appear to be toxic or lethargic.     The patient was sent for x-rays of the lumbar spine that are pending at this time.    The patient was treated with IM injection methylprednisone.  The patient will be given Zanaflex and will stop the Flexeril.  The patient will be given a tapering dose of prednisone.  The patient will monitor symptoms closely and follow-up with PCP.    The patient is to return to express care or go directly to the emergency department should any of the signs or symptoms worsen. The patient is to followup with primary care physician in 2-3 days for repeat evaluation. The patient has no other questions or concerns at this time the patient will be discharged home.      Clinical Impression:   [place] was seen today for lower back pain.    Diagnoses and all orders for this visit:    Acute bilateral low back pain without sciatica  -     XR LUMBAR SPINE (2-3 VIEWS); Future  -     methylPREDNISolone acetate (DEPO-MEDROL) injection 80 mg    Other orders  -     predniSONE (DELTASONE) 10 MG tablet; 3 tabs once daily for 3 days, 2 tabs once daily for 3 days, 1 tab once daily for 3 days  -     tiZANidine (ZANAFLEX) 2 MG tablet; Take 1 tablet by mouth 4 times daily as needed (back pain)        The patient is to call for any concerns or return if any of the signs or symptoms worsen. The patient is to follow-up with PCP in the next 2-3 days for repeat evaluation repeat assessment or go

## 2025-01-16 NOTE — PROGRESS NOTES
[facility]- [facility]  Outpatient Progress Note Report  PATIENT: Latosha Olvera  MEDICAL RECORD NUMBER: [id number]  DATE OF SERVICE:  1/20/2025  ATTENDING ELECTROPHYSIOLOGIST: Tanmay Bagley MD  REFERRING PHYSICIAN: Efrem Malin DO  CHIEF COMPLAINT: Paroxysmal atrial fibrillation    HPI: This is a 79 y.o. female with a history of   Patient Active Problem List   Diagnosis    Atrial fibrillation (HCC)    Acute medial meniscal tear    Anxiety    Arthritis    Asthma    Cholelithiasis    Depressive disorder    Diverticular disease    Fracture of tibia    Gastroesophageal reflux disease    Hiatal hernia    Hypercholesterolemia    Hypertension    Hypothyroidism    Insomnia    Lipoma    Obesity    Osteopenia    Overweight with body mass index (BMI) 25.0-29.9    Restless legs syndrome    Rupture of appendix    Secondary non-renal hyperparathyroidism (HCC)    Stage 3 chronic kidney disease (HCC)    Steatosis of liver    Tubular adenoma of colon    Acute encephalopathy    Stroke-like symptoms    Moderate protein-calorie malnutrition (HCC)    Altered mental status    Septicemia (HCC)   The patient presents to cardiac electrophysiology clinic for follow up and management of paroxysmal atrial fibrillation. She was seen in the ER for a possible TIA 04/22/24, she had confusion aphasia when she awoke and lated 30 min.  She reports palpitations approximately 2 weeks ago however this was associated with anxiety and have since resolved.  Today she presents in sinus rhythm, repeat monitor to assess for increasing burden of AF was discussed and she defers.  She remains on Eliquis 5 twice daily as well as Cardizem.  She has no additional cardiac complaints reports no chest pain, syncope, nursing orthopnea or PND.      Patient Active Problem List    Diagnosis Date Noted    Altered mental status      Priority: Medium    Septicemia (HCC)      Priority: Medium

## 2025-01-20 ENCOUNTER — OFFICE VISIT (OUTPATIENT)
Dept: NON INVASIVE DIAGNOSTICS | Age: 80
End: 2025-01-20
Payer: MEDICARE

## 2025-01-20 VITALS
HEART RATE: 78 BPM | BODY MASS INDEX: 34.52 KG/M2 | WEIGHT: 187.6 LBS | SYSTOLIC BLOOD PRESSURE: 130 MMHG | OXYGEN SATURATION: 94 % | RESPIRATION RATE: 16 BRPM | HEIGHT: 62 IN | TEMPERATURE: 97.5 F | DIASTOLIC BLOOD PRESSURE: 80 MMHG

## 2025-01-20 DIAGNOSIS — I48.0 PAROXYSMAL ATRIAL FIBRILLATION (HCC): Primary | ICD-10-CM

## 2025-01-20 PROCEDURE — 99214 OFFICE O/P EST MOD 30 MIN: CPT | Performed by: NURSE PRACTITIONER

## 2025-01-20 PROCEDURE — 3079F DIAST BP 80-89 MM HG: CPT | Performed by: NURSE PRACTITIONER

## 2025-01-20 PROCEDURE — G8417 CALC BMI ABV UP PARAM F/U: HCPCS | Performed by: NURSE PRACTITIONER

## 2025-01-20 PROCEDURE — 93000 ELECTROCARDIOGRAM COMPLETE: CPT | Performed by: INTERNAL MEDICINE

## 2025-01-20 PROCEDURE — 1159F MED LIST DOCD IN RCRD: CPT | Performed by: NURSE PRACTITIONER

## 2025-01-20 PROCEDURE — 1036F TOBACCO NON-USER: CPT | Performed by: NURSE PRACTITIONER

## 2025-01-20 PROCEDURE — G8400 PT W/DXA NO RESULTS DOC: HCPCS | Performed by: NURSE PRACTITIONER

## 2025-01-20 PROCEDURE — 1160F RVW MEDS BY RX/DR IN RCRD: CPT | Performed by: NURSE PRACTITIONER

## 2025-01-20 PROCEDURE — 1090F PRES/ABSN URINE INCON ASSESS: CPT | Performed by: NURSE PRACTITIONER

## 2025-01-20 PROCEDURE — 1123F ACP DISCUSS/DSCN MKR DOCD: CPT | Performed by: NURSE PRACTITIONER

## 2025-01-20 PROCEDURE — G8427 DOCREV CUR MEDS BY ELIG CLIN: HCPCS | Performed by: NURSE PRACTITIONER

## 2025-01-20 PROCEDURE — 3075F SYST BP GE 130 - 139MM HG: CPT | Performed by: NURSE PRACTITIONER

## (undated) DEVICE — NEEDLE HYPO 22GA L1.5IN BLK POLYPR HUB S STL REG BVL STR

## (undated) DEVICE — Z DISCONTINUED NO SUB IDED BAG SPEC RETRV M C240ML MOUTH 7.3MM L17CM SHFT 10MM NYL EZEE

## (undated) DEVICE — DRESSING COMP W4XL4IN N ADH PD W2.5XL2.5IN GZ BORDERED ADH

## (undated) DEVICE — 3M™ IOBAN™ 2 ANTIMICROBIAL INCISE DRAPE 6650EZ: Brand: IOBAN™ 2

## (undated) DEVICE — TROCAR: Brand: KII SHIELDED BLADED ACCESS SYSTEM

## (undated) DEVICE — SYRINGE 20ML LL S/C 50

## (undated) DEVICE — SET ORTHO STD STORTSTD1

## (undated) DEVICE — TRAP SPEC MUCUS FOR SUCT

## (undated) DEVICE — DECANTER: Brand: UNBRANDED

## (undated) DEVICE — DRAPE,CHEST,FENES,15X10,STERIL: Brand: MEDLINE

## (undated) DEVICE — DRAPE,TOP,102X53,STERILE: Brand: MEDLINE

## (undated) DEVICE — INSUFFLATION NEEDLE TO ESTABLISH PNEUMOPERITONEUM.: Brand: INSUFFLATION NEEDLE

## (undated) DEVICE — COVER HNDL LT DISP

## (undated) DEVICE — TUBE IRRIG HNDPC HI FLO TP INTRPULS W/SUCTION TUBE

## (undated) DEVICE — TAPE ADH W3INXL10YD WHT COT WVN BK POWERFUL RUB BASE HIGHLY

## (undated) DEVICE — SPONGE LAP W18XL18IN WHT COT 4 PLY FLD STRUNG RADPQ DISP ST

## (undated) DEVICE — STERILE PVP: Brand: MEDLINE INDUSTRIES, INC.

## (undated) DEVICE — INTENDED FOR TISSUE SEPARATION, AND OTHER PROCEDURES THAT REQUIRE A SHARP SURGICAL BLADE TO PUNCTURE OR CUT.: Brand: BARD-PARKER ® STAINLESS STEEL BLADES

## (undated) DEVICE — GLOVE SURG SZ 7 CRM LTX FREE POLYISOPRENE POLYMER BEAD ANTI

## (undated) DEVICE — NEEDLE CLOSURE OMNICLOSE

## (undated) DEVICE — ELECTRODE PT RET AD L9FT HI MOIST COND ADH HYDRGEL CORDED

## (undated) DEVICE — DOUBLE BASIN SET: Brand: MEDLINE INDUSTRIES, INC.

## (undated) DEVICE — PADDING CAST W6INXL4YD COT LO LINTING WYTEX

## (undated) DEVICE — Z INACTIVE USE 2660664 SOLUTION IRRIG 3000ML 0.9% SOD CHL USP UROMATIC PLAS CONT

## (undated) DEVICE — DRAPE,REIN 53X77,STERILE: Brand: MEDLINE

## (undated) DEVICE — INSUFFLATION TUBING SET WITH FILTER, FUNNEL CONNECTOR AND LUER LOCK: Brand: JOSNOE MEDICAL INC

## (undated) DEVICE — TUBING, SUCTION, 9/32" X 10', STRAIGHT: Brand: MEDLINE

## (undated) DEVICE — Z DISCONTINUED PER MEDLINE USE 2741944 DRESSING AQUACEL 12 IN SURG W9XL30CM SIL CVR WTRPRF VIR BACT BARR ANTIMIC

## (undated) DEVICE — GOWN,SIRUS,FABRNF,XL,20/CS: Brand: MEDLINE

## (undated) DEVICE — Device

## (undated) DEVICE — 4-PORT MANIFOLD: Brand: NEPTUNE 2

## (undated) DEVICE — TOTAL KNEE PK

## (undated) DEVICE — GLOVE ORANGE PI 8 1/2   MSG9085

## (undated) DEVICE — Z DUP USE 2257490 ADHESIVE SKIN CLSRE 036ML TPCL 2CTL CNCRLTE HIGH VSCSTY DRMB

## (undated) DEVICE — INSTRUMENT SYSTEM 7 BATTERY REUSABLE

## (undated) DEVICE — PACK PROCEDURE SURG GEN CUST

## (undated) DEVICE — TOWEL,OR,DSP,ST,BLUE,STD,6/PK,12PK/CS: Brand: MEDLINE

## (undated) DEVICE — 2108 SERIES SAGITTAL BLADE FAN, OFFSET  (34.5 X 0.8 X 64.0MM)

## (undated) DEVICE — BASIC SINGLE BASIN 1-LF: Brand: MEDLINE INDUSTRIES, INC.

## (undated) DEVICE — GLOVE ORANGE PI 8   MSG9080

## (undated) DEVICE — STRYKER PERFORMANCE SERIES SAGITTAL BLADE: Brand: STRYKER PERFORMANCE SERIES

## (undated) DEVICE — INSTRUMENT GRADUATE REUSABLE

## (undated) DEVICE — SUTURE STRATAFIX SYMMETRIC SZ 1 L18IN ABSRB VLT CT1 L36CM SXPP1A404

## (undated) DEVICE — TROCAR: Brand: KII® SLEEVE

## (undated) DEVICE — GOWN,SIRUS,POLYRNF,BRTHSLV,LG,30/CS: Brand: MEDLINE

## (undated) DEVICE — GOWN,SIRUS,POLYRNF,BRTHSLV,XL,30/CS: Brand: MEDLINE

## (undated) DEVICE — TRAY LAMBOTS REUSABLE

## (undated) DEVICE — 1000 S-DRAPE TOWEL DRAPE 10/BX: Brand: STERI-DRAPE™

## (undated) DEVICE — TRAY SYSTEM 7 DRILL REUSABLE

## (undated) DEVICE — STRIP,CLOSURE,WOUND,MEDI-STRIP,1/2X4: Brand: MEDLINE

## (undated) DEVICE — BAG SPECIMEN BIOHAZARD 6IN X 9IN

## (undated) DEVICE — RELOAD STPL SZ 0 L45MM DIA3.5MM 0DEG STD REG TISS BLU TI

## (undated) DEVICE — SOLUTION IV 500ML 0.9% SOD CHL PH 5 INJ USP VIAFLX PLAS

## (undated) DEVICE — TOTAL TRAY, DB, 100% SILI FOLEY, 16FR 10: Brand: MEDLINE

## (undated) DEVICE — KIT PLT RATIO DISPNS KT 2IN CANN TIP SPRY TIP DISP MAGELLAN

## (undated) DEVICE — SUTURE VCRL SZ 3-0 L18IN ABSRB UD PS-2 L19MM 1/2 CIR J497G

## (undated) DEVICE — BANDAGE COMPR W6INXL12FT SMOOTH FOR LIMB EXSANG ESMARCH

## (undated) DEVICE — CHLORAPREP 26ML ORANGE

## (undated) DEVICE — SCISSORS ENDOSCP DIA5MM CRV MPLR CAUT W/ RATCH HNDL

## (undated) DEVICE — GLOVE SURG SZ 7 L12IN FNGR THK79MIL GRN LTX FREE

## (undated) DEVICE — BNDG,ELSTC,MATRIX,STRL,6"X5YD,LF,HOOK&LP: Brand: MEDLINE

## (undated) DEVICE — PUMP SUC IRR TBNG L10FT W/ HNDPC ASSEMB STRYKEFLOW 2

## (undated) DEVICE — KIT,ANTI FOG,W/SPONGE & FLUID,SOFT PACK: Brand: MEDLINE

## (undated) DEVICE — APPLICATOR PREP 26ML 0.7% IOD POVACRYLEX 74% ISO ALC ST

## (undated) DEVICE — SOLUTION IRRIG 2000ML 0.9% SOD CHL USP UROMATIC PLAS CONT